# Patient Record
Sex: FEMALE | Race: WHITE | NOT HISPANIC OR LATINO | Employment: OTHER | URBAN - METROPOLITAN AREA
[De-identification: names, ages, dates, MRNs, and addresses within clinical notes are randomized per-mention and may not be internally consistent; named-entity substitution may affect disease eponyms.]

---

## 2017-01-17 ENCOUNTER — HOSPITAL ENCOUNTER (OUTPATIENT)
Dept: FAMILY MEDICINE CLINIC | Facility: CLINIC | Age: 67
Setting detail: SPECIMEN
Discharge: HOME | End: 2017-01-17
Attending: PREVENTIVE MEDICINE | Admitting: PREVENTIVE MEDICINE

## 2017-01-17 LAB
AZTREONAM SUSC ISLT: ABNORMAL
BACTERIA ISLT: ABNORMAL
BACTERIA SPEC AEROBE CULT: ABNORMAL
CEFEPIME SUSC ISLT: ABNORMAL
CEFTRIAXONE SUSC ISLT: ABNORMAL
CIPROFLOXACIN SUSC ISLT: ABNORMAL
COLONY COUNT: ABNORMAL
ERTAPENEM SUSC ISLT: ABNORMAL
LEVOFLOXACIN SUSC ISLT: ABNORMAL
Lab: ABNORMAL
MEROPENEM SUSC ISLT: ABNORMAL
MICRO REPORT STATUS: ABNORMAL
NITROFURANTOIN SUSC ISLT: ABNORMAL
PIP+TAZO SUSC ISLT: ABNORMAL
SPECIMEN SOURCE: ABNORMAL
SUSC METH SPEC: ABNORMAL
TETRACYCLINE SUSC ISLT: ABNORMAL
TOBRAMYCIN SUSC ISLT: ABNORMAL
TRIMETHOPRIM/SULFA: ABNORMAL

## 2017-04-07 ENCOUNTER — HOSPITAL ENCOUNTER (OUTPATIENT)
Dept: FAMILY MEDICINE CLINIC | Facility: CLINIC | Age: 67
Setting detail: SPECIMEN
Discharge: HOME | End: 2017-04-07
Attending: PREVENTIVE MEDICINE | Admitting: PREVENTIVE MEDICINE

## 2017-04-07 LAB
BACTERIA SPEC AEROBE CULT: NORMAL
CASTS URNS QL MICRO: NORMAL /[LPF]
CONV BACTERIA IN URINE MICRO: NEGATIVE
CONV HYALINE CASTS IN URINE MICRO: 1 /[LPF] (ref 0–5)
CONV SMALL ROUND CELLS: NORMAL /[HPF]
FERRITIN SERPL-MCNC: 72 NG/ML (ref 11–307)
FOLATE SERPL-MCNC: 8.1 NG/ML (ref 5.9–24.8)
IRON SATN MFR SERPL: 23 % (ref 15–50)
IRON SERPL-MCNC: 68 UG/DL (ref 28–170)
Lab: NORMAL
MICRO REPORT STATUS: NORMAL
RBC #/AREA URNS HPF: 2 /[HPF] (ref 0–3)
SPECIMEN SOURCE: NORMAL
SPERM URNS QL MICRO: NORMAL /[HPF]
SQUAMOUS SPT QL MICRO: NORMAL /[HPF] (ref 0–5)
TIBC SERPL-MCNC: 299 UG/DL (ref 228–428)
UNIDENT CRYS URNS QL MICRO: NORMAL /[HPF]
VIT B12 SERPL-MCNC: >1500 PG/ML (ref 180–914)
WBC #/AREA URNS HPF: 1 /[HPF] (ref 0–5)
YEAST SPEC QL WET PREP: NORMAL /[HPF]

## 2017-04-25 ENCOUNTER — HOSPITAL ENCOUNTER (OUTPATIENT)
Dept: ONCOLOGY | Facility: CLINIC | Age: 67
Discharge: HOME | End: 2017-04-25
Attending: INTERNAL MEDICINE | Admitting: INTERNAL MEDICINE

## 2017-05-26 ENCOUNTER — HOSPITAL ENCOUNTER (OUTPATIENT)
Dept: FAMILY MEDICINE CLINIC | Facility: CLINIC | Age: 67
Setting detail: SPECIMEN
Discharge: HOME | End: 2017-05-26
Attending: PREVENTIVE MEDICINE | Admitting: PREVENTIVE MEDICINE

## 2017-05-27 LAB
AZTREONAM SUSC ISLT: NORMAL
BACTERIA ISLT: NORMAL
BACTERIA SPEC AEROBE CULT: NORMAL
CEFAZOLIN SUSC ISLT: NORMAL
CEFEPIME SUSC ISLT: NORMAL
CEFTRIAXONE SUSC ISLT: NORMAL
CIPROFLOXACIN SUSC ISLT: NORMAL
COLONY COUNT: NORMAL
ERTAPENEM SUSC ISLT: NORMAL
LEVOFLOXACIN SUSC ISLT: NORMAL
Lab: NORMAL
MEROPENEM SUSC ISLT: NORMAL
MICRO REPORT STATUS: NORMAL
NITROFURANTOIN SUSC ISLT: NORMAL
PIP+TAZO SUSC ISLT: NORMAL
SPECIMEN SOURCE: NORMAL
SUSC METH SPEC: NORMAL
TETRACYCLINE SUSC ISLT: NORMAL
TOBRAMYCIN SUSC ISLT: NORMAL
TRIMETHOPRIM/SULFA: NORMAL

## 2017-07-11 ENCOUNTER — HOSPITAL ENCOUNTER (OUTPATIENT)
Dept: ONCOLOGY | Facility: CLINIC | Age: 67
Discharge: HOME | End: 2017-07-11
Attending: INTERNAL MEDICINE | Admitting: INTERNAL MEDICINE

## 2017-08-23 ENCOUNTER — HOSPITAL ENCOUNTER (OUTPATIENT)
Dept: MAMMOGRAPHY | Facility: HOSPITAL | Age: 67
Discharge: HOME | End: 2017-08-23
Attending: PREVENTIVE MEDICINE | Admitting: PREVENTIVE MEDICINE

## 2017-09-11 ENCOUNTER — HOSPITAL ENCOUNTER (OUTPATIENT)
Dept: FAMILY MEDICINE CLINIC | Facility: CLINIC | Age: 67
Setting detail: SPECIMEN
Discharge: HOME | End: 2017-09-11
Attending: PREVENTIVE MEDICINE | Admitting: PREVENTIVE MEDICINE

## 2017-10-11 ENCOUNTER — HOSPITAL ENCOUNTER (OUTPATIENT)
Dept: INFUSION THERAPY | Facility: HOSPITAL | Age: 67
Discharge: HOME | End: 2017-10-11
Attending: PREVENTIVE MEDICINE | Admitting: PREVENTIVE MEDICINE

## 2017-10-20 ENCOUNTER — HOSPITAL ENCOUNTER (OUTPATIENT)
Dept: OTHER | Facility: HOSPITAL | Age: 67
Discharge: HOME | End: 2017-10-20
Attending: PREVENTIVE MEDICINE | Admitting: PREVENTIVE MEDICINE

## 2017-10-20 LAB
ALBUMIN SERPL-MCNC: 3.3 G/DL (ref 3.5–4.8)
ALBUMIN/GLOB SERPL: 1.3 {RATIO} (ref 1–1.7)
ALP SERPL-CCNC: 75 IU/L (ref 32–91)
ALT SERPL-CCNC: 13 IU/L (ref 14–54)
ANION GAP SERPL CALC-SCNC: 12.2 MMOL/L (ref 10–20)
AST SERPL-CCNC: 16 IU/L (ref 15–41)
BASOPHILS # BLD AUTO: 0 10*3/UL (ref 0–0.2)
BASOPHILS NFR BLD AUTO: 1 % (ref 0–2)
BILIRUB SERPL-MCNC: 0.5 MG/DL (ref 0.3–1.2)
BUN SERPL-MCNC: 21 MG/DL (ref 8–20)
BUN/CREAT SERPL: 11.7 (ref 5.4–26.2)
CALCIUM SERPL-MCNC: 6.5 MG/DL (ref 8.9–10.3)
CHLORIDE SERPL-SCNC: 107 MMOL/L (ref 101–111)
CONV CO2: 25 MMOL/L (ref 22–32)
CONV TOTAL PROTEIN: 5.8 G/DL (ref 6.1–7.9)
CREAT UR-MCNC: 1.8 MG/DL (ref 0.4–1)
DIFFERENTIAL METHOD BLD: (no result)
EOSINOPHIL # BLD AUTO: 0.1 10*3/UL (ref 0–0.3)
EOSINOPHIL # BLD AUTO: 3 % (ref 0–3)
ERYTHROCYTE [DISTWIDTH] IN BLOOD BY AUTOMATED COUNT: 15.6 % (ref 11.5–14.5)
ERYTHROCYTE [SEDIMENTATION RATE] IN BLOOD BY WESTERGREN METHOD: 29 MM/HR (ref 0–30)
GLOBULIN UR ELPH-MCNC: 2.5 G/DL (ref 2.5–3.8)
GLUCOSE SERPL-MCNC: 89 MG/DL (ref 65–99)
HCT VFR BLD AUTO: 32.1 % (ref 35–49)
HGB BLD-MCNC: 10.3 G/DL (ref 12–15)
LYMPHOCYTES # BLD AUTO: 1.2 10*3/UL (ref 0.8–4.8)
LYMPHOCYTES NFR BLD AUTO: 26 % (ref 18–42)
MCH RBC QN AUTO: 28.5 PG (ref 26–32)
MCHC RBC AUTO-ENTMCNC: 32.1 G/DL (ref 32–36)
MCV RBC AUTO: 89 FL (ref 80–94)
MONOCYTES # BLD AUTO: 0.3 10*3/UL (ref 0.1–1.3)
MONOCYTES NFR BLD AUTO: 6 % (ref 2–11)
NEUTROPHILS # BLD AUTO: 3 10*3/UL (ref 2.3–8.6)
NEUTROPHILS NFR BLD AUTO: 64 % (ref 50–75)
NRBC BLD AUTO-RTO: 0 /100{WBCS}
NRBC/RBC NFR BLD MANUAL: 0 10*3/UL
PLATELET # BLD AUTO: 211 10*3/UL (ref 150–450)
PMV BLD AUTO: 7.6 FL (ref 7.4–10.4)
POTASSIUM SERPL-SCNC: 5.2 MMOL/L (ref 3.6–5.1)
RBC # BLD AUTO: 3.6 10*6/UL (ref 4–5.4)
SODIUM SERPL-SCNC: 139 MMOL/L (ref 136–144)
TSH SERPL-ACNC: 1.59 UIU/ML (ref 0.34–5.6)
WBC # BLD AUTO: 4.6 10*3/UL (ref 4.5–11.5)

## 2017-11-14 ENCOUNTER — HOSPITAL ENCOUNTER (OUTPATIENT)
Dept: ONCOLOGY | Facility: CLINIC | Age: 67
Discharge: HOME | End: 2017-11-14
Attending: INTERNAL MEDICINE | Admitting: INTERNAL MEDICINE

## 2018-02-20 ENCOUNTER — HOSPITAL ENCOUNTER (OUTPATIENT)
Dept: FAMILY MEDICINE CLINIC | Facility: CLINIC | Age: 68
Setting detail: SPECIMEN
Discharge: HOME | End: 2018-02-20
Attending: PREVENTIVE MEDICINE | Admitting: PREVENTIVE MEDICINE

## 2018-04-13 ENCOUNTER — HOSPITAL ENCOUNTER (OUTPATIENT)
Dept: CARDIOLOGY | Facility: HOSPITAL | Age: 68
Discharge: HOME | End: 2018-04-13
Attending: INTERNAL MEDICINE | Admitting: INTERNAL MEDICINE

## 2018-05-08 ENCOUNTER — EPISODE CHANGES (OUTPATIENT)
Dept: CASE MANAGEMENT | Facility: OTHER | Age: 68
End: 2018-05-08

## 2018-06-04 ENCOUNTER — EPISODE CHANGES (OUTPATIENT)
Dept: CASE MANAGEMENT | Facility: OTHER | Age: 68
End: 2018-06-04

## 2018-06-19 ENCOUNTER — HOSPITAL ENCOUNTER (OUTPATIENT)
Dept: ONCOLOGY | Facility: CLINIC | Age: 68
Discharge: HOME | End: 2018-06-19
Attending: INTERNAL MEDICINE | Admitting: INTERNAL MEDICINE

## 2018-08-01 ENCOUNTER — HOSPITAL ENCOUNTER (OUTPATIENT)
Dept: FAMILY MEDICINE CLINIC | Facility: CLINIC | Age: 68
Setting detail: SPECIMEN
Discharge: HOME | End: 2018-08-01
Attending: PREVENTIVE MEDICINE | Admitting: PREVENTIVE MEDICINE

## 2018-08-01 LAB
BILIRUB UR QL STRIP: NEGATIVE MG/DL
CASTS URNS QL MICRO: ABNORMAL /[LPF]
COLOR UR: YELLOW
CONV BACTERIA IN URINE MICRO: NEGATIVE
CONV CLARITY OF URINE: CLEAR
CONV HYALINE CASTS IN URINE MICRO: 1 /[LPF] (ref 0–5)
CONV PROTEIN IN URINE BY AUTOMATED TEST STRIP: NEGATIVE MG/DL
CONV SMALL ROUND CELLS: ABNORMAL /[HPF]
CONV UROBILINOGEN IN URINE BY AUTOMATED TEST STRIP: 0.2 MG/DL
CULTURE INDICATED?: ABNORMAL
GLUCOSE UR QL: NEGATIVE MG/DL
HGB UR QL STRIP: NEGATIVE
KETONES UR QL STRIP: NEGATIVE MG/DL
LEUKOCYTE ESTERASE UR QL STRIP: ABNORMAL
NITRITE UR QL STRIP: NEGATIVE
PH UR STRIP.AUTO: 5 [PH] (ref 4.5–8)
RBC #/AREA URNS HPF: 1 /[HPF] (ref 0–3)
SP GR UR: 1.01 (ref 1–1.03)
SPERM URNS QL MICRO: ABNORMAL /[HPF]
SQUAMOUS SPT QL MICRO: 1 /[HPF] (ref 0–5)
UNIDENT CRYS URNS QL MICRO: ABNORMAL /[HPF]
WBC #/AREA URNS HPF: 2 /[HPF] (ref 0–5)
YEAST SPEC QL WET PREP: ABNORMAL /[HPF]

## 2018-09-04 ENCOUNTER — HOSPITAL ENCOUNTER (OUTPATIENT)
Dept: FAMILY MEDICINE CLINIC | Facility: CLINIC | Age: 68
Setting detail: SPECIMEN
Discharge: HOME | End: 2018-09-04
Attending: PREVENTIVE MEDICINE | Admitting: PREVENTIVE MEDICINE

## 2018-09-06 LAB
BACTERIA SPEC AEROBE CULT: NORMAL
Lab: NORMAL
MICRO REPORT STATUS: NORMAL
SPECIMEN SOURCE: NORMAL

## 2018-09-11 ENCOUNTER — EPISODE CHANGES (OUTPATIENT)
Dept: CASE MANAGEMENT | Facility: OTHER | Age: 68
End: 2018-09-11

## 2018-10-08 ENCOUNTER — EPISODE CHANGES (OUTPATIENT)
Dept: CASE MANAGEMENT | Facility: OTHER | Age: 68
End: 2018-10-08

## 2018-12-14 ENCOUNTER — HOSPITAL ENCOUNTER (OUTPATIENT)
Dept: CARDIOLOGY | Facility: HOSPITAL | Age: 68
Discharge: HOME | End: 2018-12-14
Attending: INTERNAL MEDICINE | Admitting: INTERNAL MEDICINE

## 2018-12-18 ENCOUNTER — HOSPITAL ENCOUNTER (OUTPATIENT)
Dept: ONCOLOGY | Facility: CLINIC | Age: 68
Discharge: HOME | End: 2018-12-18
Attending: INTERNAL MEDICINE | Admitting: INTERNAL MEDICINE

## 2019-06-25 ENCOUNTER — OFFICE VISIT (OUTPATIENT)
Dept: ONCOLOGY | Facility: CLINIC | Age: 69
End: 2019-06-25

## 2019-06-25 VITALS
DIASTOLIC BLOOD PRESSURE: 88 MMHG | WEIGHT: 253 LBS | HEIGHT: 62 IN | BODY MASS INDEX: 46.56 KG/M2 | TEMPERATURE: 97.4 F | HEART RATE: 83 BPM | SYSTOLIC BLOOD PRESSURE: 160 MMHG

## 2019-06-25 DIAGNOSIS — Z86.2 HISTORY OF ANEMIA DUE TO CHRONIC KIDNEY DISEASE: Primary | ICD-10-CM

## 2019-06-25 DIAGNOSIS — R30.0 DYSURIA: ICD-10-CM

## 2019-06-25 DIAGNOSIS — N18.9 HISTORY OF ANEMIA DUE TO CHRONIC KIDNEY DISEASE: Primary | ICD-10-CM

## 2019-06-25 DIAGNOSIS — Z86.39 HISTORY OF IRON DEFICIENCY: ICD-10-CM

## 2019-06-25 PROCEDURE — G0463 HOSPITAL OUTPT CLINIC VISIT: HCPCS | Performed by: INTERNAL MEDICINE

## 2019-06-25 PROCEDURE — 99214 OFFICE O/P EST MOD 30 MIN: CPT | Performed by: INTERNAL MEDICINE

## 2019-06-25 RX ORDER — METOCLOPRAMIDE 5 MG/1
5 TABLET ORAL 4 TIMES DAILY
Refills: 4 | COMMUNITY
Start: 2019-06-07 | End: 2020-09-01

## 2019-06-25 RX ORDER — ONDANSETRON 4 MG/1
4 TABLET, FILM COATED ORAL AS NEEDED
Refills: 0 | COMMUNITY
Start: 2019-06-13 | End: 2020-03-09 | Stop reason: SDUPTHER

## 2019-06-25 RX ORDER — ASPIRIN 81 MG/1
81 TABLET ORAL DAILY
COMMUNITY
Start: 2018-09-24 | End: 2021-06-02 | Stop reason: SDUPTHER

## 2019-06-25 RX ORDER — DICYCLOMINE HYDROCHLORIDE 10 MG/1
10 CAPSULE ORAL 4 TIMES DAILY
Refills: 0 | COMMUNITY
Start: 2019-06-13 | End: 2021-06-02 | Stop reason: SDUPTHER

## 2019-06-25 RX ORDER — TRAZODONE HYDROCHLORIDE 50 MG/1
100 TABLET ORAL NIGHTLY
Refills: 2 | COMMUNITY
Start: 2019-05-29 | End: 2019-07-05

## 2019-06-25 RX ORDER — GABAPENTIN 300 MG/1
300 CAPSULE ORAL 3 TIMES DAILY
Refills: 6 | COMMUNITY
Start: 2019-06-17 | End: 2019-08-19 | Stop reason: SDUPTHER

## 2019-06-25 RX ORDER — BUDESONIDE AND FORMOTEROL FUMARATE DIHYDRATE 160; 4.5 UG/1; UG/1
2 AEROSOL RESPIRATORY (INHALATION) AS NEEDED
COMMUNITY
Start: 2017-09-19 | End: 2019-07-22 | Stop reason: SDUPTHER

## 2019-06-25 RX ORDER — CYANOCOBALAMIN 1000 UG/ML
1 INJECTION, SOLUTION INTRAMUSCULAR; SUBCUTANEOUS
Refills: 0 | COMMUNITY
Start: 2019-06-20 | End: 2020-04-26

## 2019-06-25 RX ORDER — BUSPIRONE HYDROCHLORIDE 10 MG/1
10 TABLET ORAL 2 TIMES DAILY
Refills: 6 | COMMUNITY
Start: 2019-06-21 | End: 2020-01-13 | Stop reason: SDUPTHER

## 2019-06-25 RX ORDER — OMEPRAZOLE 40 MG/1
40 CAPSULE, DELAYED RELEASE ORAL 2 TIMES DAILY
Refills: 5 | Status: ON HOLD | COMMUNITY
Start: 2019-06-21 | End: 2021-05-20

## 2019-06-25 RX ORDER — ALLOPURINOL 100 MG/1
100 TABLET ORAL
Refills: 11 | COMMUNITY
Start: 2019-05-24 | End: 2019-08-19

## 2019-06-25 RX ORDER — METOPROLOL SUCCINATE 50 MG/1
50 TABLET, EXTENDED RELEASE ORAL
Refills: 3 | COMMUNITY
Start: 2019-03-30 | End: 2019-07-05

## 2019-06-25 RX ORDER — SUCRALFATE 1 G/1
1 TABLET ORAL 4 TIMES DAILY
Refills: 1 | Status: ON HOLD | COMMUNITY
Start: 2019-05-24 | End: 2021-05-20

## 2019-06-25 RX ORDER — HYDROCODONE BITARTRATE AND ACETAMINOPHEN 7.5; 325 MG/1; MG/1
1 TABLET ORAL EVERY 6 HOURS PRN
Refills: 0 | COMMUNITY
Start: 2019-06-14 | End: 2020-05-29

## 2019-06-25 RX ORDER — FUROSEMIDE 40 MG/1
40 TABLET ORAL DAILY
Refills: 2 | COMMUNITY
Start: 2019-04-01 | End: 2019-09-25 | Stop reason: SDUPTHER

## 2019-06-25 RX ORDER — TIZANIDINE 2 MG/1
2 TABLET ORAL
Refills: 1 | COMMUNITY
Start: 2019-06-13 | End: 2019-11-05

## 2019-06-25 RX ORDER — FLUTICASONE PROPIONATE 50 MCG
1 SPRAY, SUSPENSION (ML) NASAL AS NEEDED
COMMUNITY
Start: 2018-09-24 | End: 2019-07-19

## 2019-06-25 RX ORDER — ERGOCALCIFEROL 1.25 MG/1
50000 CAPSULE ORAL WEEKLY
Refills: 3 | COMMUNITY
Start: 2019-06-13 | End: 2019-08-05 | Stop reason: SDUPTHER

## 2019-06-25 RX ORDER — DESVENLAFAXINE SUCCINATE 50 MG/1
50 TABLET, EXTENDED RELEASE ORAL EVERY MORNING
Refills: 2 | COMMUNITY
Start: 2019-06-13 | End: 2019-11-05

## 2019-06-25 NOTE — PROGRESS NOTES
Hematology-Oncology Follow-up Note       Elaine Toscano  1950    Primary Care Physician: Shanta Morales MD  Referring Physician: Shanta Morales MD    Reason For Visit:  Chief Complaint   Patient presents with   • Follow-up     ACKD       HPI:   :  Ms. Toscano is referred for evaluation of anemia.  She has a history of chronic kidney disease, congestive heart failure, hypertension, osteopenia.  CBC done at [facility] in January 2017 showed hemoglobin 9.7.  She was being followed recently at Dr. Morales’s office and CBC was repeated on 4/11/17 and, at that time, hemoglobin was 11.5.  Patient had an EGD and colonoscopy done by Dr. Cisse that showed erosive esophagitis, gastritis and duodenitis.  Colonoscopy was normal.  Patient has a history of gastric bypass.  The patient was started on iron supplements in April 2017 by Dr. Morales and her hemoglobin improved since then.  Her iron stores also improved in April 2017.    • 1/20/17 - PTH intact 124 (H).  Serum protein electrophoresis shows hypoalbuminemia.  • 1/21/17 - WBC 4.9, hemoglobin 9.5, platelet count 190,000.  Creatinine 3.3, BUN 73.  • 1/24/17 - Creatinine 2.1, BUN 37.    • 3/6/17 - EGD showed grade 3 to grade 4 erosive esophagitis and duodenitis.  Hayden-en-Y gastric bypass.  Colonoscopy was negative, but had poor prep.    • 4/7/17 - B12 >1500.  Ferritin 72.  Folate 8.1.  Iron saturation 23%, TIBC 299, serum Iron 68.    • 4/11/17 - WBC 5.5, hemoglobin 11.5, 0lct 274,000, MCV 93.4.  Retic count 1.7%.    4/25/2017 - The patient presents for initial consultation.  She denies any bleeding per rectum.  She has been taking one iron tablet since March.  She reports giving numerous stool sample tests for diagnosing occult blood, in the past, at Dr. Morales’s office.  She does have some chronic lower extremity edema due to CHF.  She has CKD and follows up with nephrologist, Dr. Tanmay Carroll.  • 7/5/17 - WBC 3.7, hemoglobin 11.2,  platelet count 194,000, MCV 94.2.  Creatinine 1.97.  Serum iron 66, TIBC 272, iron saturation 24%.    • 8/23/17 - DEXAscan shows osteoporosis with a T-score of -2.8.    • 10/20/17 - Doppler of the lower extremities:  Normal.  No evidence of DVT.    • 11/13/17 - Creatinine 1.75.  Ferritin 156.  Serum iron 92, iron saturation 33%, TIBC 186.  WBC 4.2, hemoglobin 11.0, platelet count 229,000, MCV 94.9.    • 1/24/18 - Colonoscopy by Dr. Cisse:  Colon shows diverticulosis and internal hemorrhoids.    • 6/13/18 - Creatinine 1.93.  WBC 4.1, hemoglobin 11.2, platelet count 209,000, MCV 91.9.  Ferritin 258.  Iron saturation 39, serum 100, TIBC 158.  PTH intact 119.   • 8/3/18 - Upper GI endoscopy:  Shows mild chronic gastritis in the stomach and mild reflux esophagitis within the esophagus.     • 12/11/18 - Creatinine 1.8, BUN 24.  WBC 3.8, hemoglobin 11.1, platelet count 202,000, MCV 93.9.  Ferritin 203.  Iron saturation 26%, TIBC 255, serum iron 66.    • 12/14/18 - Cardiac stress test:  EF 52%.  Imaging shows inferior wall MI versus ischemia.  • 6/25/2019  White count 7.4 hemoglobin 11.4 platelet count 232 MCV is 92.8    ferritin 224 serum iron 45 low, iron saturation 17% TIBC's 272 creatinine 1.68 BUN is 25    Subjective:     Patient has never required Procrit.  She is not on any oral iron.  She is reporting symptoms of dysuria.  She reports being tired all the time.      The following portions of the patient's history were reviewed and updated as appropriate: allergies, current medications, past family history, past medical history, past social history, past surgical history and problem list.     Past Medical History:   Diagnosis Date   • Anemia    • Broken foot     right   • Broken nose 07/2017   • CHF (congestive heart failure) (CMS/HCC)    • Chronic low back pain    • CRI (chronic renal insufficiency)    • Depression    • DJD (degenerative joint disease)    • GERD (gastroesophageal reflux disease)    • Gout    •  Hypertension    • Hypothyroidism    • CRUZ (obstructive sleep apnea)     non-complaince with bipap   • Osteopenia    • Suicide attempt (CMS/HCC)    • TMJ (dislocation of temporomandibular joint)        Past Surgical History:   Procedure Laterality Date   • APPENDECTOMY  1986   • BACK SURGERY      2004---2005 L5-S1 spinal stenosis   • CARDIAC CATHETERIZATION  04/2018   • CHOLECYSTECTOMY  2008   • FOOT SURGERY Bilateral 2008   • GASTRIC BYPASS  2007   • INSERT / REPLACE / REMOVE PACEMAKER     • LAPAROSCOPIC GASTRIC BANDING  2004    removed   • OTHER SURGICAL HISTORY  01/2018    myelogram   • TOTAL KNEE ARTHROPLASTY Left          Current Outpatient Medications:   •  allopurinol (ZYLOPRIM) 100 MG tablet, Take 100 mg by mouth every night at bedtime., Disp: , Rfl: 11  •  aspirin (ASPIR) 81 MG EC tablet, Take 81 mg by mouth Daily., Disp: , Rfl:   •  budesonide-formoterol (SYMBICORT) 160-4.5 MCG/ACT inhaler, Inhale 2 puffs As Needed., Disp: , Rfl:   •  busPIRone (BUSPAR) 10 MG tablet, Take 10 mg by mouth 2 (Two) Times a Day., Disp: , Rfl: 6  •  calcium carb-cholecalciferol (CALCIUM PLUS VITAMIN D3) 600-800 MG-UNIT tablet, Take 600 mg by mouth Daily., Disp: , Rfl:   •  cyanocobalamin 1000 MCG/ML injection, Inject 1 mL into the appropriate muscle as directed by prescriber Every 30 (Thirty) Days., Disp: , Rfl: 0  •  desvenlafaxine (PRISTIQ) 50 MG 24 hr tablet, Take 50 mg by mouth Every Morning., Disp: , Rfl: 2  •  diclofenac (VOLTAREN) 1 % gel gel, Apply 1 application topically to the appropriate area as directed As Needed., Disp: , Rfl: 2  •  dicyclomine (BENTYL) 10 MG capsule, Take 10 mg by mouth 4 (Four) Times a Day., Disp: , Rfl: 0  •  fluticasone (FLONASE) 50 MCG/ACT nasal spray, 1 spray into the nostril(s) as directed by provider As Needed., Disp: , Rfl:   •  furosemide (LASIX) 40 MG tablet, Take 40 mg by mouth Daily., Disp: , Rfl: 2  •  gabapentin (NEURONTIN) 300 MG capsule, Take 300 mg by mouth 3 (Three) Times a Day.,  Disp: , Rfl: 6  •  HYDROcodone-acetaminophen (NORCO) 7.5-325 MG per tablet, Take 1 tablet by mouth 4 (Four) Times a Day., Disp: , Rfl: 0  •  metoclopramide (REGLAN) 5 MG tablet, Take 5 mg by mouth 4 (Four) Times a Day., Disp: , Rfl: 4  •  metoprolol succinate XL (TOPROL-XL) 50 MG 24 hr tablet, Take 50 mg by mouth every night at bedtime., Disp: , Rfl: 3  •  omeprazole (priLOSEC) 40 MG capsule, Take 40 mg by mouth 2 (Two) Times a Day., Disp: , Rfl: 5  •  ondansetron (ZOFRAN) 4 MG tablet, Take 4 mg by mouth As Needed., Disp: , Rfl: 0  •  sucralfate (CARAFATE) 1 g tablet, Take 1 g by mouth 4 (Four) Times a Day., Disp: , Rfl: 1  •  tiZANidine (ZANAFLEX) 2 MG tablet, Take 2 mg by mouth every night at bedtime., Disp: , Rfl: 1  •  traZODone (DESYREL) 50 MG tablet, Take 50 mg by mouth Every Night., Disp: , Rfl: 2  •  vitamin D (ERGOCALCIFEROL) 09662 units capsule capsule, Take 50,000 Units by mouth 1 (One) Time Per Week., Disp: , Rfl: 3    Allergies   Allergen Reactions   • Azithromycin Unknown (See Comments)   • Dilaudid  [Hydromorphone Hcl] Unknown (See Comments)   • Isosorbide Nitrate Other (See Comments)   • Macrobid  [Nitrofurantoin] Rash   • Morphine Rash   • Other Itching   • Oxycontin  [Oxycodone] Delirium   • Pineapple Other (See Comments)     Mouth numbness   • Tetanus Antitoxin Rash   • Denosumab Other (See Comments)   • Sulfa Antibiotics Unknown (See Comments)       Family History   Problem Relation Age of Onset   • Hypertension Father         PM implanted   • Arthritis Sister    • Heart disease Sister    • Lung disease Paternal Grandmother    • COPD Other    • Lung disease Other        Cancer-related family history is not on file.    Social History     Tobacco Use   • Smoking status: Never Smoker   • Smokeless tobacco: Never Used   Substance Use Topics   • Alcohol use: No     Frequency: Never   • Drug use: No         ROS:     Review of Systems   Constitutional: Positive for fatigue (has been very tired and in  "bed more than usual) and fever (states she has had a low grade fever recently). Negative for chills.   HENT: Negative for ear pain, mouth sores, nosebleeds and sore throat.    Eyes: Negative for photophobia and visual disturbance.   Respiratory: Negative for wheezing and stridor.    Cardiovascular: Negative for chest pain and palpitations.   Gastrointestinal: Negative for abdominal pain, diarrhea, nausea and vomiting.   Endocrine: Negative for cold intolerance and heat intolerance.   Genitourinary: Positive for dysuria (painful and frequent urination). Negative for hematuria.   Musculoskeletal: Negative for joint swelling and neck stiffness.   Skin: Negative for color change and rash.   Neurological: Negative for seizures and syncope.   Hematological: Negative for adenopathy.        No obvious bleeding   Psychiatric/Behavioral: Negative for agitation, confusion and hallucinations.       Objective:    Vitals:    06/25/19 1431   BP: 160/88   Pulse: 83   Temp: 97.4 °F (36.3 °C)   TempSrc: Oral   Weight: 115 kg (253 lb)   Height: 157.5 cm (62\")   PainSc:   9   PainLoc: Back  Comment: lower left side       (1) Restricted in physically strenuous activity, ambulatory and able to do work of light nature    Physical Exam:     Physical Exam   Constitutional: She is oriented to person, place, and time. She appears well-developed and well-nourished. No distress.   OBESE   HENT:   Head: Normocephalic and atraumatic.   Eyes: Conjunctivae and EOM are normal. Right eye exhibits no discharge. No scleral icterus.   Neck: Normal range of motion. Neck supple. No thyromegaly present.   Cardiovascular: Normal rate, regular rhythm and normal heart sounds. Exam reveals no gallop and no friction rub.   Pulmonary/Chest: Effort normal. No stridor. No respiratory distress. She has no wheezes.   Abdominal: Soft. Bowel sounds are normal. She exhibits no mass. There is no tenderness. There is no rebound and no guarding.   Musculoskeletal: Normal " range of motion. She exhibits edema (TRACE). She exhibits no tenderness.   Lymphadenopathy:     She has no cervical adenopathy.   Neurological: She is alert and oriented to person, place, and time. She exhibits normal muscle tone.   Skin: Skin is warm. No rash noted. She is not diaphoretic. No erythema.   Psychiatric: She has a normal mood and affect. Her behavior is normal.   Nursing note and vitals reviewed.        Lab Results - Last 18 Months   Lab Units 04/29/19  0648 04/20/18  1410   WBC 10*3/uL 5.3 5.0   HEMOGLOBIN g/dL 12.1 11.4*   HEMATOCRIT % 38.8 35.7   PLATELETS 10*3/uL 241 185   MCV fL 87.5 88.9     Lab Results - Last 18 Months   Lab Units 04/29/19  0648 04/20/18  1410   SODIUM mmol/L 139 137   POTASSIUM mmol/L 4.5 4.5 Hemolyzed specimen. Results may be falsely elevated.   CHLORIDE mmol/L 99* 102   TOTAL CO2 mmol/L 27 25   BUN mg/dL 41* 48*   CREATININE mg/dl 1.9* 2.1*   CALCIUM mg/dL 9.2 9.1   GLUCOSE mg/dL 91 88       Assessment/Plan     Assessment:    1. Anemia of chronic kidney disease:  Hemoglobin has been staying above 11.  She has a history of gastric bypass, gastritis and esophagitis, and therefore is also at risk of iron deficiency.  She has CKD stage 4 causing her anemia.  She is now off iron supplements.  Colonoscopy showed diverticulosis and internal hemorrhoids.    2. Chronic kidney disease:  Creatinine is stable around 1.9.    3. History of iron deficiency: Currently she is off iron.  Recent ferritin is in the high normal range, but iron is a little low..  She is currently not on any oral iron.  4. Recurrent UTI:- Reports dysurea  PLAN:  1. No need for Procrit, since hemoglobin is above 10.    2. No need for iron supplementation.  We will need to monitor iron levels.  3. Check UA + culture today  4. Repeat CBC, BMP and iron panel in 6 months       Orders Placed This Encounter   Procedures   • UA / M With / Rflx Culture([facility] ONLY) - Urine, Clean Catch   • Iron Profile     Before next  visit     Standing Status:   Future     Scheduling Instructions:      Please draw these labs prior to the next visit.   • Ferritin     Before next visit     Standing Status:   Future     Scheduling Instructions:      Please draw these labs prior to the next visit.   • CBC & Differential     Please draw these labs prior to the next visit.  Before next visit     Standing Status:   Future     Scheduling Instructions:      Please draw these labs prior to the next visit.     Order Specific Question:   Manual Differential     Answer:   No                Thank you very much for providing the opportunity to participate in this patient’s care. Please do not hesitate to call if there are any other questions

## 2019-06-26 ENCOUNTER — TELEPHONE (OUTPATIENT)
Dept: ONCOLOGY | Facility: CLINIC | Age: 69
End: 2019-06-26

## 2019-06-27 ENCOUNTER — TELEPHONE (OUTPATIENT)
Dept: ONCOLOGY | Facility: CLINIC | Age: 69
End: 2019-06-27

## 2019-06-27 LAB
APPEARANCE UR: CLEAR
BACTERIA #/AREA URNS HPF: ABNORMAL /[HPF]
BACTERIA UR CULT: NORMAL
BACTERIA UR CULT: NORMAL
BILIRUB UR QL STRIP: NEGATIVE
COLOR UR: YELLOW
CRYSTALS URNS MICRO: ABNORMAL
EPI CELLS #/AREA URNS HPF: ABNORMAL /HPF
GLUCOSE UR QL: NEGATIVE
HGB UR QL STRIP: ABNORMAL
KETONES UR QL STRIP: NEGATIVE
LEUKOCYTE ESTERASE UR QL STRIP: ABNORMAL
MICRO URNS: ABNORMAL
MUCOUS THREADS URNS QL MICRO: PRESENT
NITRITE UR QL STRIP: NEGATIVE
PH UR STRIP: 6 [PH] (ref 5–7.5)
PROT UR QL STRIP: NEGATIVE
RBC #/AREA URNS HPF: ABNORMAL /HPF
SP GR UR: 1.01 (ref 1–1.03)
UNIDENT CRYS URNS QL MICRO: PRESENT
URINALYSIS REFLEX: ABNORMAL
UROBILINOGEN UR STRIP-MCNC: 0.2 MG/DL (ref 0.2–1)
WBC #/AREA URNS HPF: ABNORMAL /HPF

## 2019-06-27 RX ORDER — CIPROFLOXACIN 500 MG/1
500 TABLET, FILM COATED ORAL 2 TIMES DAILY
Qty: 14 TABLET | Refills: 0 | Status: SHIPPED | OUTPATIENT
Start: 2019-06-27 | End: 2019-07-05

## 2019-06-27 NOTE — TELEPHONE ENCOUNTER
Called pt and let her know the UA results. I let her know Dr. Guido would like her to start Cipro. She showed v/u. Rx was escribed to pt's pharmacy.

## 2019-06-27 NOTE — TELEPHONE ENCOUNTER
----- Message from Graham Guido MD sent at 6/27/2019  3:27 PM EDT -----  Give her Cirpfloxacin 500 mg PO Q12H x 7 days  UA +ve

## 2019-07-05 ENCOUNTER — OFFICE VISIT (OUTPATIENT)
Dept: FAMILY MEDICINE CLINIC | Facility: CLINIC | Age: 69
End: 2019-07-05

## 2019-07-05 VITALS
RESPIRATION RATE: 20 BRPM | TEMPERATURE: 97.6 F | SYSTOLIC BLOOD PRESSURE: 157 MMHG | HEIGHT: 62 IN | BODY MASS INDEX: 48.21 KG/M2 | OXYGEN SATURATION: 95 % | WEIGHT: 262 LBS | DIASTOLIC BLOOD PRESSURE: 82 MMHG | HEART RATE: 75 BPM

## 2019-07-05 DIAGNOSIS — E87.6 HYPOKALEMIA: ICD-10-CM

## 2019-07-05 DIAGNOSIS — M54.5 CHRONIC LOW BACK PAIN, UNSPECIFIED BACK PAIN LATERALITY, WITH SCIATICA PRESENCE UNSPECIFIED: ICD-10-CM

## 2019-07-05 DIAGNOSIS — B37.2 YEAST DERMATITIS: ICD-10-CM

## 2019-07-05 DIAGNOSIS — R30.0 DYSURIA: ICD-10-CM

## 2019-07-05 DIAGNOSIS — R60.0 LOCALIZED EDEMA: ICD-10-CM

## 2019-07-05 DIAGNOSIS — N18.9 HISTORY OF ANEMIA DUE TO CHRONIC KIDNEY DISEASE: ICD-10-CM

## 2019-07-05 DIAGNOSIS — F33.2 SEVERE EPISODE OF RECURRENT MAJOR DEPRESSIVE DISORDER, WITHOUT PSYCHOTIC FEATURES (HCC): ICD-10-CM

## 2019-07-05 DIAGNOSIS — E78.01 FAMILIAL HYPERCHOLESTEROLEMIA: ICD-10-CM

## 2019-07-05 DIAGNOSIS — E03.9 ACQUIRED HYPOTHYROIDISM: ICD-10-CM

## 2019-07-05 DIAGNOSIS — E53.8 B12 DEFICIENCY: ICD-10-CM

## 2019-07-05 DIAGNOSIS — N39.46 MIXED STRESS AND URGE URINARY INCONTINENCE: Primary | ICD-10-CM

## 2019-07-05 DIAGNOSIS — G89.29 CHRONIC LOW BACK PAIN, UNSPECIFIED BACK PAIN LATERALITY, WITH SCIATICA PRESENCE UNSPECIFIED: ICD-10-CM

## 2019-07-05 DIAGNOSIS — N18.4 CHRONIC KIDNEY DISEASE, STAGE IV (SEVERE) (HCC): ICD-10-CM

## 2019-07-05 DIAGNOSIS — I10 ESSENTIAL HYPERTENSION: ICD-10-CM

## 2019-07-05 DIAGNOSIS — M1A.30X0 CHRONIC GOUT DUE TO RENAL IMPAIRMENT WITHOUT TOPHUS, UNSPECIFIED SITE: ICD-10-CM

## 2019-07-05 DIAGNOSIS — I50.9 CONGESTIVE HEART FAILURE, UNSPECIFIED HF CHRONICITY, UNSPECIFIED HEART FAILURE TYPE (HCC): ICD-10-CM

## 2019-07-05 DIAGNOSIS — Z86.39 HISTORY OF IRON DEFICIENCY: ICD-10-CM

## 2019-07-05 DIAGNOSIS — Z86.2 HISTORY OF ANEMIA DUE TO CHRONIC KIDNEY DISEASE: ICD-10-CM

## 2019-07-05 PROBLEM — R60.9 EDEMA: Status: ACTIVE | Noted: 2018-09-04

## 2019-07-05 PROBLEM — M79.89 SWELLING OF LOWER EXTREMITY: Status: ACTIVE | Noted: 2017-10-20

## 2019-07-05 PROBLEM — G47.33 OBSTRUCTIVE SLEEP APNEA SYNDROME IN ADULT: Status: ACTIVE | Noted: 2017-11-20

## 2019-07-05 PROBLEM — J45.909 ASTHMA: Status: ACTIVE | Noted: 2019-07-05

## 2019-07-05 PROBLEM — E66.01 MORBID OBESITY WITH BMI OF 45.0-49.9, ADULT: Status: ACTIVE | Noted: 2017-12-07

## 2019-07-05 PROBLEM — D64.9 ANEMIA: Status: ACTIVE | Noted: 2019-07-05

## 2019-07-05 LAB
BILIRUB BLD-MCNC: NEGATIVE MG/DL
CLARITY, POC: CLEAR
COLOR UR: YELLOW
GLUCOSE UR STRIP-MCNC: NEGATIVE MG/DL
KETONES UR QL: NEGATIVE
LEUKOCYTE EST, POC: NEGATIVE
NITRITE UR-MCNC: NEGATIVE MG/ML
PH UR: 5.5 [PH] (ref 5–8)
PROT UR STRIP-MCNC: NEGATIVE MG/DL
RBC # UR STRIP: ABNORMAL /UL
SP GR UR: 1 (ref 1–1.03)
UROBILINOGEN UR QL: NORMAL

## 2019-07-05 PROCEDURE — 99214 OFFICE O/P EST MOD 30 MIN: CPT | Performed by: FAMILY MEDICINE

## 2019-07-05 PROCEDURE — 81003 URINALYSIS AUTO W/O SCOPE: CPT | Performed by: FAMILY MEDICINE

## 2019-07-05 RX ORDER — ERYTHROMYCIN 5 MG/G
0.5 OINTMENT OPHTHALMIC NIGHTLY
Refills: 3 | COMMUNITY
Start: 2019-05-17 | End: 2019-07-22 | Stop reason: SDUPTHER

## 2019-07-05 RX ORDER — TOLTERODINE 2 MG/1
2 CAPSULE, EXTENDED RELEASE ORAL DAILY
Qty: 30 CAPSULE | Refills: 2 | Status: SHIPPED | OUTPATIENT
Start: 2019-07-05 | End: 2019-09-03 | Stop reason: SDUPTHER

## 2019-07-05 RX ORDER — NYSTATIN 100000 U/G
CREAM TOPICAL 2 TIMES DAILY
Qty: 30 G | Refills: 3 | Status: ON HOLD | OUTPATIENT
Start: 2019-07-05 | End: 2020-04-24

## 2019-07-05 RX ORDER — FLUCONAZOLE 150 MG/1
150 TABLET ORAL ONCE
Qty: 2 TABLET | Refills: 0 | Status: SHIPPED | OUTPATIENT
Start: 2019-07-05 | End: 2019-07-05

## 2019-07-05 RX ORDER — TRAZODONE HYDROCHLORIDE 100 MG/1
100 TABLET ORAL NIGHTLY PRN
Refills: 2 | COMMUNITY
Start: 2019-05-09 | End: 2019-11-05

## 2019-07-05 NOTE — PROGRESS NOTES
Subjective   Elaine Toscano is a 68 y.o. female.   Chief Complaint   Patient presents with   • Urinary Tract Infection       History of Present Illness   Elaine presents today with persistent as a new patient to establish with complaint of urinary frequency and incontinence and some burning with urination but is improving.  She just finished a course of Cipro prescribed by her hematologist Dr. Crowder 2 days.  Additionally she reports a skin rash in her groin has returned with the antibiotics this has responded in the past 2 Diflucan and nystatin.  I have reviewed urine culture which revealed 2 organisms and sensitivity was performed.  Patient did have 3+ leukocytes at that time today besides her negative.  She does have a nephrologist, and has an appointment scheduled later this month.  She has seen urologist Dr. Lai but does not have any upcoming appointments additionally she sees counselors and psychiatrist at [facility] . previous PCP was Dr. Morales, available notes have been reviewed.  She has extensive history and attempted review and clarify as best possible, patient's clearly asks about forgetfulness and memory.  States her dad  with end-stage dementia recently and since that time she has been very they will interest in doing anything.  The following portions of the patient's history were reviewed and updated as appropriate: allergies, current medications, past family history, past medical history, past social history, past surgical history and problem list.    Review of Systems   Constitutional: Positive for fatigue. Negative for fever.   Respiratory: Positive for shortness of breath. Negative for cough.    Cardiovascular: Positive for leg swelling. Negative for chest pain and palpitations.   Gastrointestinal: Negative.    Genitourinary: Positive for urinary incontinence, dysuria, frequency and urgency. Negative for hematuria.   Musculoskeletal: Positive for back pain and neck stiffness.   Skin:  Negative for rash.   Neurological: Positive for memory problem.   Psychiatric/Behavioral: Positive for depressed mood. Negative for suicidal ideas.       Objective   Physical Exam   Constitutional: She is oriented to person, place, and time. She appears well-developed and well-nourished. No distress.   Eyes: Conjunctivae and EOM are normal. Pupils are equal, round, and reactive to light.   Neck: Normal range of motion.   Cardiovascular: Normal rate and regular rhythm.   No murmur heard.  Pulmonary/Chest: Effort normal. She has no wheezes.   Musculoskeletal: She exhibits edema.   Arthritic changes of the knees bilaterally   Neurological: She is alert and oriented to person, place, and time.   Skin: Skin is warm and dry.   Psychiatric: Cognition and memory are impaired. She exhibits a depressed mood. She expresses no suicidal ideation. She expresses no suicidal plans.   Nursing note and vitals reviewed.        Assessment/Plan   Elaine was seen today for urinary tract infection.    Diagnoses and all orders for this visit:    Mixed stress and urge urinary incontinence    Dysuria  -     POCT urinalysis dipstick, automated    History of anemia due to chronic kidney disease    Congestive heart failure, unspecified HF chronicity, unspecified heart failure type (CMS/HCC)  -     Comprehensive Metabolic Panel; Future  -     Vitamin D 25 Hydroxy; Future    Localized edema    Chronic gout due to renal impairment without tophus, unspecified site  -     Uric acid; Future    Familial hypercholesterolemia  -     Lipid Panel; Future    Acquired hypothyroidism  -     TSH; Future    Chronic low back pain, unspecified back pain laterality, with sciatica presence unspecified    Essential hypertension  -     Comprehensive Metabolic Panel; Future  -     MicroAlbumin, Urine, Random - Urine, Clean Catch; Future    Hypokalemia  -     Comprehensive Metabolic Panel; Future    History of iron deficiency  -     Ferritin; Future  -     CBC and  Differential; Future    Severe episode of recurrent major depressive disorder, without psychotic features (CMS/HCC)    B12 deficiency  -     Vitamin B12 and Folate; Future    Chronic kidney disease, stage IV (severe) (CMS/HCC)   -     Vitamin D 25 Hydroxy; Future    Other orders  -     tolterodine LA (DETROL LA) 2 MG 24 hr capsule; Take 1 capsule by mouth Daily.      Will obtain labs and have her return next week to review if any medications were adjusted as appropriate.  Did advise her to make up with Dr. Lai and discuss her concerns about her memory impression with her psychiatrist, I do believe medications may need to be adjusted.  Return in about 2 weeks (around 7/19/2019) for review medication, with Labs.

## 2019-07-12 ENCOUNTER — RESULTS ENCOUNTER (OUTPATIENT)
Dept: FAMILY MEDICINE CLINIC | Facility: CLINIC | Age: 69
End: 2019-07-12

## 2019-07-12 ENCOUNTER — TELEPHONE (OUTPATIENT)
Dept: FAMILY MEDICINE CLINIC | Facility: CLINIC | Age: 69
End: 2019-07-12

## 2019-07-12 DIAGNOSIS — R35.0 URINARY FREQUENCY: ICD-10-CM

## 2019-07-12 DIAGNOSIS — I50.9 CONGESTIVE HEART FAILURE, UNSPECIFIED HF CHRONICITY, UNSPECIFIED HEART FAILURE TYPE (HCC): ICD-10-CM

## 2019-07-12 DIAGNOSIS — E53.8 B12 DEFICIENCY: ICD-10-CM

## 2019-07-12 DIAGNOSIS — N18.4 CHRONIC KIDNEY DISEASE, STAGE IV (SEVERE) (HCC): ICD-10-CM

## 2019-07-12 DIAGNOSIS — I10 ESSENTIAL HYPERTENSION: ICD-10-CM

## 2019-07-12 DIAGNOSIS — E03.9 ACQUIRED HYPOTHYROIDISM: ICD-10-CM

## 2019-07-12 DIAGNOSIS — Z86.39 HISTORY OF IRON DEFICIENCY: ICD-10-CM

## 2019-07-12 DIAGNOSIS — R30.0 DYSURIA: Primary | ICD-10-CM

## 2019-07-12 DIAGNOSIS — R39.15 URINARY URGENCY: ICD-10-CM

## 2019-07-12 DIAGNOSIS — M1A.30X0 CHRONIC GOUT DUE TO RENAL IMPAIRMENT WITHOUT TOPHUS, UNSPECIFIED SITE: ICD-10-CM

## 2019-07-12 DIAGNOSIS — N39.46 MIXED STRESS AND URGE URINARY INCONTINENCE: ICD-10-CM

## 2019-07-12 DIAGNOSIS — E78.01 FAMILIAL HYPERCHOLESTEROLEMIA: ICD-10-CM

## 2019-07-12 DIAGNOSIS — E87.6 HYPOKALEMIA: ICD-10-CM

## 2019-07-12 NOTE — TELEPHONE ENCOUNTER
Patient called said she was seen recently with , was wanting to know if she could have a referral for Urology to see - Patient is still having issues with frequent urination patient also states she just got over a UTI

## 2019-07-12 NOTE — TELEPHONE ENCOUNTER
Yes, please make referral as requested, for follow-up of symptoms urinary incontinence, dysuria, frequency and urgency.

## 2019-07-15 RX ORDER — ISOSORBIDE MONONITRATE 30 MG/1
TABLET, EXTENDED RELEASE ORAL
Qty: 90 TABLET | Refills: 1 | Status: SHIPPED | OUTPATIENT
Start: 2019-07-15 | End: 2019-07-22 | Stop reason: SDUPTHER

## 2019-07-19 ENCOUNTER — TELEPHONE (OUTPATIENT)
Dept: FAMILY MEDICINE CLINIC | Facility: CLINIC | Age: 69
End: 2019-07-19

## 2019-07-19 RX ORDER — OXYBUTYNIN CHLORIDE 5 MG/1
1 TABLET ORAL 3 TIMES DAILY
COMMUNITY
End: 2019-07-22 | Stop reason: SDUPTHER

## 2019-07-19 RX ORDER — METOPROLOL SUCCINATE 100 MG/1
1 TABLET, EXTENDED RELEASE ORAL DAILY
COMMUNITY
End: 2019-09-25 | Stop reason: SDUPTHER

## 2019-07-19 RX ORDER — BUDESONIDE AND FORMOTEROL FUMARATE DIHYDRATE 160; 4.5 UG/1; UG/1
2 AEROSOL RESPIRATORY (INHALATION) 2 TIMES DAILY PRN
COMMUNITY
End: 2019-07-19 | Stop reason: SDUPTHER

## 2019-07-19 RX ORDER — ALENDRONATE SODIUM 70 MG/1
1 TABLET ORAL
COMMUNITY
End: 2019-12-18

## 2019-07-19 RX ORDER — ESCITALOPRAM OXALATE 20 MG/1
1 TABLET ORAL EVERY MORNING
COMMUNITY
End: 2019-07-22 | Stop reason: SDUPTHER

## 2019-07-22 ENCOUNTER — OFFICE VISIT (OUTPATIENT)
Dept: FAMILY MEDICINE CLINIC | Facility: CLINIC | Age: 69
End: 2019-07-22

## 2019-07-22 VITALS
BODY MASS INDEX: 47.11 KG/M2 | HEIGHT: 62 IN | OXYGEN SATURATION: 95 % | RESPIRATION RATE: 20 BRPM | WEIGHT: 256 LBS | TEMPERATURE: 97.7 F | SYSTOLIC BLOOD PRESSURE: 176 MMHG | HEART RATE: 79 BPM | DIASTOLIC BLOOD PRESSURE: 98 MMHG

## 2019-07-22 DIAGNOSIS — F33.2 SEVERE EPISODE OF RECURRENT MAJOR DEPRESSIVE DISORDER, WITHOUT PSYCHOTIC FEATURES (HCC): ICD-10-CM

## 2019-07-22 DIAGNOSIS — R60.0 LOCALIZED EDEMA: ICD-10-CM

## 2019-07-22 DIAGNOSIS — Z95.0 PRESENCE OF CARDIAC PACEMAKER: ICD-10-CM

## 2019-07-22 DIAGNOSIS — E53.8 B12 DEFICIENCY: Primary | ICD-10-CM

## 2019-07-22 DIAGNOSIS — M48.062 SPINAL STENOSIS OF LUMBAR REGION WITH NEUROGENIC CLAUDICATION: ICD-10-CM

## 2019-07-22 DIAGNOSIS — N18.9 HISTORY OF ANEMIA DUE TO CHRONIC KIDNEY DISEASE: ICD-10-CM

## 2019-07-22 DIAGNOSIS — M26.629 TMJ SYNDROME: ICD-10-CM

## 2019-07-22 DIAGNOSIS — D64.9 ANEMIA, UNSPECIFIED TYPE: ICD-10-CM

## 2019-07-22 DIAGNOSIS — R13.10 DYSPHAGIA, UNSPECIFIED TYPE: ICD-10-CM

## 2019-07-22 DIAGNOSIS — I10 ESSENTIAL HYPERTENSION: ICD-10-CM

## 2019-07-22 DIAGNOSIS — E55.9 VITAMIN D DEFICIENCY: ICD-10-CM

## 2019-07-22 DIAGNOSIS — J45.909 ASTHMA, UNSPECIFIED ASTHMA SEVERITY, UNSPECIFIED WHETHER COMPLICATED, UNSPECIFIED WHETHER PERSISTENT: ICD-10-CM

## 2019-07-22 DIAGNOSIS — E78.01 FAMILIAL HYPERCHOLESTEROLEMIA: ICD-10-CM

## 2019-07-22 DIAGNOSIS — E66.01 MORBID OBESITY WITH BMI OF 45.0-49.9, ADULT (HCC): ICD-10-CM

## 2019-07-22 DIAGNOSIS — K21.00 GASTROESOPHAGEAL REFLUX DISEASE WITH ESOPHAGITIS: ICD-10-CM

## 2019-07-22 DIAGNOSIS — M1A.30X0 CHRONIC GOUT DUE TO RENAL IMPAIRMENT WITHOUT TOPHUS, UNSPECIFIED SITE: ICD-10-CM

## 2019-07-22 DIAGNOSIS — E03.9 ACQUIRED HYPOTHYROIDISM: ICD-10-CM

## 2019-07-22 DIAGNOSIS — Z86.2 HISTORY OF ANEMIA DUE TO CHRONIC KIDNEY DISEASE: ICD-10-CM

## 2019-07-22 DIAGNOSIS — I49.5 SICK SINUS SYNDROME (HCC): ICD-10-CM

## 2019-07-22 DIAGNOSIS — M81.0 OSTEOPOROSIS WITHOUT CURRENT PATHOLOGICAL FRACTURE, UNSPECIFIED OSTEOPOROSIS TYPE: ICD-10-CM

## 2019-07-22 DIAGNOSIS — E87.6 HYPOKALEMIA: ICD-10-CM

## 2019-07-22 PROBLEM — N63.0 BREAST LUMP: Status: ACTIVE | Noted: 2019-02-27

## 2019-07-22 PROBLEM — M79.642 HAND PAIN, LEFT: Status: ACTIVE | Noted: 2019-01-25

## 2019-07-22 PROBLEM — R20.0 NUMBNESS AND TINGLING SENSATION OF SKIN: Status: ACTIVE | Noted: 2017-11-02

## 2019-07-22 PROBLEM — M70.60 GREATER TROCHANTERIC BURSITIS: Status: ACTIVE | Noted: 2017-12-14

## 2019-07-22 PROBLEM — M79.605 LEG PAIN, BILATERAL: Status: ACTIVE | Noted: 2017-10-20

## 2019-07-22 PROBLEM — R29.6 FREQUENT FALLS: Status: ACTIVE | Noted: 2017-07-19

## 2019-07-22 PROBLEM — R94.39 ABNORMAL CARDIOVASCULAR STRESS TEST: Status: ACTIVE | Noted: 2018-04-17

## 2019-07-22 PROBLEM — G62.89 PERIPHERAL AXONAL NEUROPATHY: Status: ACTIVE | Noted: 2017-11-20

## 2019-07-22 PROBLEM — R20.2 NUMBNESS AND TINGLING SENSATION OF SKIN: Status: ACTIVE | Noted: 2017-11-02

## 2019-07-22 PROBLEM — R26.89 LOSS OF BALANCE: Status: ACTIVE | Noted: 2019-02-28

## 2019-07-22 PROBLEM — M79.604 LEG PAIN, BILATERAL: Status: ACTIVE | Noted: 2017-10-20

## 2019-07-22 PROBLEM — M25.511 PAIN IN RIGHT SHOULDER: Status: ACTIVE | Noted: 2017-11-20

## 2019-07-22 PROBLEM — Z74.09 MOBILITY POOR: Status: ACTIVE | Noted: 2019-02-28

## 2019-07-22 PROBLEM — M47.26 OTHER SPONDYLOSIS WITH RADICULOPATHY, LUMBAR REGION: Status: ACTIVE | Noted: 2017-12-07

## 2019-07-22 PROCEDURE — 99214 OFFICE O/P EST MOD 30 MIN: CPT | Performed by: FAMILY MEDICINE

## 2019-07-22 RX ORDER — NYSTATIN 100000 [USP'U]/G
1 POWDER TOPICAL 2 TIMES DAILY PRN
Refills: 3 | COMMUNITY
Start: 2019-07-10 | End: 2019-09-09 | Stop reason: SDUPTHER

## 2019-07-22 RX ORDER — BUDESONIDE AND FORMOTEROL FUMARATE DIHYDRATE 160; 4.5 UG/1; UG/1
2 AEROSOL RESPIRATORY (INHALATION)
Qty: 1 INHALER | Refills: 12 | Status: SHIPPED | OUTPATIENT
Start: 2019-07-22 | End: 2020-10-26 | Stop reason: SDUPTHER

## 2019-07-22 RX ORDER — SYRINGE W-NEEDLE,DISPOSAB,3 ML 25GX5/8"
1 SYRINGE, EMPTY DISPOSABLE MISCELLANEOUS
Refills: 0 | Status: ON HOLD | COMMUNITY
Start: 2019-07-17 | End: 2020-04-24

## 2019-07-22 NOTE — PROGRESS NOTES
Subjective   Elaine Toscano is a 68 y.o. female.   Chief Complaint   Patient presents with   • Follow-up     medication reconciliation        History of Present Illness   Elaine returns today for what was scheduled as a lab review however she has not yet had her labs.  She brings a list of her home medications which are reconciled today has [employer] three times weekly for medication checks and to help with house work. Denies needing medication refills.    PHQ 9 = 20. Blood pressure elevated today but states home blood pressure readings are typically around 1 15-1 17 over 60s to 70s.  Recheck in the office with large adult cuff is 137/94    . The following portions of the patient's history were reviewed and updated as appropriate: allergies, current medications, past family history, past medical history, past social history, past surgical history and problem list.    Review of Systems   Constitutional: Negative for fatigue and fever.   HENT: Positive for ear pain.    Respiratory: Negative for cough and shortness of breath.    Cardiovascular: Negative for chest pain, palpitations and leg swelling.   Gastrointestinal: Positive for abdominal pain, nausea, vomiting ( GI issues followed by Dr. Cisse), GERD and indigestion.   Musculoskeletal: Positive for arthralgias ( Knee pain, x-ray scheduled today per Dr. Owen) and gait problem.   Skin: Negative for rash.   Psychiatric/Behavioral: Positive for depressed mood ( Patient is followed by [facility] at [facility] current medications doing okay). The patient is nervous/anxious.        Objective    Vitals:    07/22/19 1509   BP: 176/98   Pulse: 79   Resp: 20   Temp: 97.7 °F (36.5 °C)   SpO2: 95%       Physical Exam   Constitutional: She is oriented to person, place, and time.   Obese   HENT:   Head: Normocephalic and atraumatic.   Right Ear: Tympanic membrane and external ear normal.   Left Ear: Tympanic membrane and external ear normal.   Mouth/Throat: Uvula is  midline and oropharynx is clear and moist. Oral lesions present. Normal dentition.   Eyes: Conjunctivae and EOM are normal. Pupils are equal, round, and reactive to light.   Neck: No JVD present. No thyromegaly present.   Cardiovascular: Normal rate, regular rhythm and normal heart sounds.   No murmur heard.  Pulmonary/Chest: Breath sounds normal. She has no wheezes. She has no rales.   Musculoskeletal: She exhibits edema.   Tenderness, crepitus, and popping at TMJ joints bilaterally   Lymphadenopathy:     She has no cervical adenopathy.   Neurological: She is alert and oriented to person, place, and time.   Skin: Skin is warm and dry. No rash noted.   Psychiatric: She has a normal mood and affect.         Assessment/Plan   Elaine was seen today for follow-up.    Diagnoses and all orders for this visit:    B12 deficiency    Gastroesophageal reflux disease with esophagitis    Anemia, unspecified type    Asthma, unspecified asthma severity, unspecified whether complicated, unspecified whether persistent  -     budesonide-formoterol (SYMBICORT) 160-4.5 MCG/ACT inhaler; Inhale 2 puffs 2 (Two) Times a Day.    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 tophus, unspecified site    History of anemia due to chronic kidney disease    Familial hypercholesterolemia    Essential hypertension    Hypokalemia    Acquired hypothyroidism    Osteoporosis without current pathological fracture, unspecified osteoporosis type    Sick sinus syndrome (CMS/HCC)    Presence of cardiac pacemaker    Spinal stenosis of lumbar region with neurogenic claudication    TMJ syndrome    Vitamin D deficiency    Morbid obesity with BMI of 45.0-49.9, adult (CMS/[facility])    have reviewed all meds and discussed with patient their individual purpose, updated mediation list,  will send to [facility] in home health care to  facilitate medication management. Continue without change unless dictated by lab results or by specialists. Labs today as previously ordered.     Return in about 3 months (around 10/22/2019) for Recheck.

## 2019-07-22 NOTE — PATIENT INSTRUCTIONS
Temporomandibular Joint Syndrome  Temporomandibular joint (TMJ) syndrome is a condition that affects the joints between your jaw and your skull. The TMJs are located near your ears and allow your jaw to open and close. These joints and the nearby muscles are involved in all movements of the jaw. People with TMJ syndrome have pain in the area of these joints and muscles. Chewing, biting, or other movements of the jaw can be difficult or painful.  TMJ syndrome can be caused by various things. In many cases, the condition is mild and goes away within a few weeks. For some people, the condition can become a long-term problem.  What are the causes?  Possible causes of TMJ syndrome include:  · Grinding your teeth or clenching your jaw. Some people do this when they are under stress.  · Arthritis.  · Injury to the jaw.  · Head or neck injury.  · Teeth or dentures that are not aligned well.    In some cases, the cause of TMJ syndrome may not be known.  What are the signs or symptoms?  The most common symptom is an aching pain on the side of the head in the area of the TMJ. Other symptoms may include:  · Pain when moving your jaw, such as when chewing or biting.  · Being unable to open your jaw all the way.  · Making a clicking sound when you open your mouth.  · Headache.  · Earache.  · Neck or shoulder pain.    How is this diagnosed?  Diagnosis can usually be made based on your symptoms, your medical history, and a physical exam. Your health care provider may check the range of motion of your jaw. Imaging tests, such as X-rays or an MRI, are sometimes done. You may need to see your dentist to determine if your teeth and jaw are lined up correctly.  How is this treated?  TMJ syndrome often goes away on its own. If treatment is needed, the options may include:  · Eating soft foods and applying ice or heat.  · Medicines to relieve pain or inflammation.  · Medicines to relax the muscles.  · A splint, bite plate, or mouthpiece  to prevent teeth grinding or jaw clenching.  · Relaxation techniques or counseling to help reduce stress.  · Transcutaneous electrical nerve stimulation (TENS). This helps to relieve pain by applying an electrical current through the skin.  · Acupuncture. This is sometimes helpful to relieve pain.  · Jaw surgery. This is rarely needed.    Follow these instructions at home:  · Take medicines only as directed by your health care provider.  · Eat a soft diet if you are having trouble chewing.  · Apply ice to the painful area.  ? Put ice in a plastic bag.  ? Place a towel between your skin and the bag.  ? Leave the ice on for 20 minutes, 2-3 times a day.  · Apply a warm compress to the painful area as directed.  · Massage your jaw area and perform any jaw stretching exercises as recommended by your health care provider.  · If you were given a mouthpiece or bite plate, wear it as directed.  · Avoid foods that require a lot of chewing. Do not chew gum.  · Keep all follow-up visits as directed by your health care provider. This is important.  Contact a health care provider if:  · You are having trouble eating.  · You have new or worsening symptoms.  Get help right away if:  · Your jaw locks open or closed.  This information is not intended to replace advice given to you by your health care provider. Make sure you discuss any questions you have with your health care provider.  Document Released: 09/12/2002 Document Revised: 08/17/2017 Document Reviewed: 07/23/2015  [employer] Interactive Patient Education © 2019 [employer]

## 2019-07-23 LAB
25(OH)D3+25(OH)D2 SERPL-MCNC: 48.8 NG/ML (ref 30–100)
ALBUMIN SERPL-MCNC: 4.2 G/DL (ref 3.5–4.8)
ALBUMIN/GLOB SERPL: 1.8 {RATIO} (ref 1.2–2.2)
ALP SERPL-CCNC: 105 IU/L (ref 39–117)
ALT SERPL-CCNC: 12 IU/L (ref 0–32)
AST SERPL-CCNC: 16 IU/L (ref 0–40)
BASOPHILS # BLD AUTO: 0 X10E3/UL (ref 0–0.2)
BASOPHILS NFR BLD AUTO: 1 %
BILIRUB SERPL-MCNC: 0.3 MG/DL (ref 0–1.2)
BUN SERPL-MCNC: 32 MG/DL (ref 8–27)
BUN/CREAT SERPL: 16 (ref 12–28)
CALCIUM SERPL-MCNC: 9.3 MG/DL (ref 8.7–10.3)
CHLORIDE SERPL-SCNC: 96 MMOL/L (ref 96–106)
CHOLEST SERPL-MCNC: 207 MG/DL (ref 100–199)
CO2 SERPL-SCNC: 29 MMOL/L (ref 20–29)
CREAT SERPL-MCNC: 2.05 MG/DL (ref 0.57–1)
EOSINOPHIL # BLD AUTO: 0.3 X10E3/UL (ref 0–0.4)
EOSINOPHIL NFR BLD AUTO: 5 %
ERYTHROCYTE [DISTWIDTH] IN BLOOD BY AUTOMATED COUNT: 15.1 % (ref 12.3–15.4)
FERRITIN SERPL-MCNC: 280 NG/ML (ref 15–150)
FOLATE SERPL-MCNC: 10.1 NG/ML
GLOBULIN SER CALC-MCNC: 2.3 G/DL (ref 1.5–4.5)
GLUCOSE SERPL-MCNC: 112 MG/DL (ref 65–99)
HCT VFR BLD AUTO: 37.4 % (ref 34–46.6)
HDLC SERPL-MCNC: 48 MG/DL
HGB BLD-MCNC: 12 G/DL (ref 11.1–15.9)
IMM GRANULOCYTES # BLD AUTO: 0 X10E3/UL (ref 0–0.1)
IMM GRANULOCYTES NFR BLD AUTO: 0 %
LDLC SERPL CALC-MCNC: 129 MG/DL (ref 0–99)
LYMPHOCYTES # BLD AUTO: 1.1 X10E3/UL (ref 0.7–3.1)
LYMPHOCYTES NFR BLD AUTO: 22 %
MCH RBC QN AUTO: 27.5 PG (ref 26.6–33)
MCHC RBC AUTO-ENTMCNC: 32.1 G/DL (ref 31.5–35.7)
MCV RBC AUTO: 86 FL (ref 79–97)
MICROALBUMIN UR-MCNC: <3 UG/ML
MONOCYTES # BLD AUTO: 0.4 X10E3/UL (ref 0.1–0.9)
MONOCYTES NFR BLD AUTO: 8 %
NEUTROPHILS # BLD AUTO: 3.4 X10E3/UL (ref 1.4–7)
NEUTROPHILS NFR BLD AUTO: 64 %
PLATELET # BLD AUTO: 241 X10E3/UL (ref 150–450)
POTASSIUM SERPL-SCNC: 4.4 MMOL/L (ref 3.5–5.2)
PROT SERPL-MCNC: 6.5 G/DL (ref 6–8.5)
RBC # BLD AUTO: 4.37 X10E6/UL (ref 3.77–5.28)
SODIUM SERPL-SCNC: 142 MMOL/L (ref 134–144)
TRIGL SERPL-MCNC: 151 MG/DL (ref 0–149)
TSH SERPL DL<=0.005 MIU/L-ACNC: 1.19 UIU/ML (ref 0.45–4.5)
URATE SERPL-MCNC: 7.7 MG/DL (ref 2.5–7.1)
VIT B12 SERPL-MCNC: 1094 PG/ML (ref 232–1245)
VLDLC SERPL CALC-MCNC: 30 MG/DL (ref 5–40)
WBC # BLD AUTO: 5.3 X10E3/UL (ref 3.4–10.8)

## 2019-07-30 RX ORDER — ERGOCALCIFEROL 1.25 MG/1
CAPSULE ORAL
Qty: 4 CAPSULE | OUTPATIENT
Start: 2019-07-30

## 2019-08-05 RX ORDER — ERGOCALCIFEROL 1.25 MG/1
50000 CAPSULE ORAL WEEKLY
Qty: 5 CAPSULE | Refills: 3 | Status: SHIPPED | OUTPATIENT
Start: 2019-08-05 | End: 2019-12-18

## 2019-08-05 NOTE — TELEPHONE ENCOUNTER
Pharmacy needing refill of vitamin d, originally prescribed by dr. Morales.pt was seen here on July 22, 2019

## 2019-08-15 RX ORDER — GABAPENTIN 300 MG/1
CAPSULE ORAL
Qty: 90 CAPSULE | OUTPATIENT
Start: 2019-08-15

## 2019-08-19 ENCOUNTER — TELEPHONE (OUTPATIENT)
Dept: FAMILY MEDICINE CLINIC | Facility: CLINIC | Age: 69
End: 2019-08-19

## 2019-08-19 DIAGNOSIS — E53.8 B12 DEFICIENCY: Primary | ICD-10-CM

## 2019-08-19 DIAGNOSIS — M1A.30X0 CHRONIC GOUT DUE TO RENAL IMPAIRMENT WITHOUT TOPHUS, UNSPECIFIED SITE: ICD-10-CM

## 2019-08-19 RX ORDER — GABAPENTIN 300 MG/1
300 CAPSULE ORAL 3 TIMES DAILY
Qty: 90 CAPSULE | Refills: 1 | Status: SHIPPED | OUTPATIENT
Start: 2019-08-19 | End: 2019-12-13

## 2019-08-19 RX ORDER — ALLOPURINOL 100 MG/1
TABLET ORAL
Qty: 180 TABLET | Refills: 1 | Status: SHIPPED | OUTPATIENT
Start: 2019-08-19 | End: 2019-11-25 | Stop reason: SDUPTHER

## 2019-08-19 NOTE — TELEPHONE ENCOUNTER
Pt calling stating in last ov she was instructed to take 100mg allopurinol 2 times daily. Pt is out of the rx and needs a new rx sent into [facility] pharmacy with new instructions if she is still to continue taking that way. thanks

## 2019-08-19 NOTE — TELEPHONE ENCOUNTER
Called pt on cell phone. No answer. LVM advising pt that per Dr. Rocha, patient needs to schedule f/up appt with her in October and to call office to schedule.

## 2019-08-19 NOTE — TELEPHONE ENCOUNTER
Yes per phone note following visit told to increase allopurinol to  200 mg and recheck uric acid and B12 level in about 3 months. (2 months from now). Can you order meds and labs

## 2019-09-03 RX ORDER — TOLTERODINE 2 MG/1
CAPSULE, EXTENDED RELEASE ORAL
Qty: 30 CAPSULE | Refills: 2 | Status: SHIPPED | OUTPATIENT
Start: 2019-09-03 | End: 2019-11-30 | Stop reason: SDUPTHER

## 2019-09-09 ENCOUNTER — OFFICE VISIT (OUTPATIENT)
Dept: FAMILY MEDICINE CLINIC | Facility: CLINIC | Age: 69
End: 2019-09-09

## 2019-09-09 ENCOUNTER — RESULTS ENCOUNTER (OUTPATIENT)
Dept: FAMILY MEDICINE CLINIC | Facility: CLINIC | Age: 69
End: 2019-09-09

## 2019-09-09 VITALS
OXYGEN SATURATION: 95 % | SYSTOLIC BLOOD PRESSURE: 130 MMHG | DIASTOLIC BLOOD PRESSURE: 81 MMHG | WEIGHT: 254.2 LBS | HEART RATE: 64 BPM | TEMPERATURE: 98 F | HEIGHT: 62 IN | BODY MASS INDEX: 46.78 KG/M2 | RESPIRATION RATE: 20 BRPM

## 2019-09-09 DIAGNOSIS — R30.0 DYSURIA: ICD-10-CM

## 2019-09-09 DIAGNOSIS — N30.01 ACUTE CYSTITIS WITH HEMATURIA: Primary | ICD-10-CM

## 2019-09-09 DIAGNOSIS — N30.01 ACUTE CYSTITIS WITH HEMATURIA: ICD-10-CM

## 2019-09-09 DIAGNOSIS — M20.41 HAMMERTOE OF SECOND TOE OF RIGHT FOOT: ICD-10-CM

## 2019-09-09 DIAGNOSIS — M79.675 PAIN OF TOE OF LEFT FOOT: ICD-10-CM

## 2019-09-09 LAB
BILIRUB BLD-MCNC: NEGATIVE MG/DL
CLARITY, POC: ABNORMAL
COLOR UR: YELLOW
GLUCOSE UR STRIP-MCNC: NEGATIVE MG/DL
KETONES UR QL: NEGATIVE
LEUKOCYTE EST, POC: ABNORMAL
NITRITE UR-MCNC: NEGATIVE MG/ML
PH UR: 5.5 [PH] (ref 5–8)
PROT UR STRIP-MCNC: NEGATIVE MG/DL
RBC # UR STRIP: ABNORMAL /UL
SP GR UR: 1 (ref 1–1.03)
UROBILINOGEN UR QL: NORMAL

## 2019-09-09 PROCEDURE — 99213 OFFICE O/P EST LOW 20 MIN: CPT | Performed by: FAMILY MEDICINE

## 2019-09-09 PROCEDURE — 81003 URINALYSIS AUTO W/O SCOPE: CPT | Performed by: FAMILY MEDICINE

## 2019-09-09 RX ORDER — AMOXICILLIN 500 MG/1
500 TABLET, FILM COATED ORAL 3 TIMES DAILY
Qty: 30 TABLET | Refills: 0 | Status: SHIPPED | OUTPATIENT
Start: 2019-09-09 | End: 2019-09-13 | Stop reason: ALTCHOICE

## 2019-09-09 RX ORDER — BUDESONIDE AND FORMOTEROL FUMARATE DIHYDRATE 160; 4.5 UG/1; UG/1
2 AEROSOL RESPIRATORY (INHALATION) 2 TIMES DAILY
COMMUNITY
End: 2019-11-05

## 2019-09-09 NOTE — PROGRESS NOTES
"Subjective:      Elaine Toscano is a 68 y.o. female who complains of burning with urination and dysuria for 1 week.   Patient denies fever, stomach ache and vaginal discharge.  Patient does have a history of recurrent UTI.  Patient does not have a history of pyelonephritis.  The following portions of the patient's history were reviewed and updated as appropriate: allergies, current medications, past family history, past medical history, past social history, past surgical history and problem list.   She is scheduled for surgery on September 19, 2019 right hammertoe and pinning of left second toe by Dr. Keenan in [place], she is going to take care of her urinary tract infection to prevent consultation with the surgery.  Review of Systems  Gastrointestinal: negative for abdominal pain, nausea and vomiting  Musculoskeletal:positive for back pain and bilateral toe/foot pain      Objective:      /81 (BP Location: Right arm, Patient Position: Sitting)   Pulse 64   Temp 98 °F (36.7 °C) (Oral)   Resp 20   Ht 157.5 cm (62\")   Wt 115 kg (254 lb 3.2 oz)   SpO2 95%   BMI 46.49 kg/m²   General: alert, appears stated age, cooperative, no distress and morbidly obese   Abdomen: soft, non-tender, without masses or organomegaly and obese    Back: back muscles are tender, CVA tenderness absent   : defer exam   Rectal: deferred     Laboratory:   Urine dipstick shows Positive for trace blood and small leukocytes otherwise negative.    Micro exam: not done.      Assessment:   Elaine was seen today for difficulty urinating.    Diagnoses and all orders for this visit:    Acute cystitis with hematuria  -     POCT urinalysis dipstick, automated  -     amoxicillin (AMOXIL) 500 MG tablet; Take 1 tablet by mouth 3 (Three) Times a Day.    Dysuria  -     POCT urinalysis dipstick, automated    Hammertoe of second toe of right foot    Pain of toe of left foot         Acute cystitis      Plan:  Plan:      1. Medications: Amoxicillin  2. " Maintain adequate hydration  3. Follow up if symptoms not improving, and prn.      Return in about 6 weeks (around 10/21/2019) for Recheck, chronic conditions.

## 2019-09-12 LAB
BACTERIA UR CULT: ABNORMAL
OTHER ANTIBIOTIC SUSC ISLT: ABNORMAL

## 2019-09-13 RX ORDER — CIPROFLOXACIN 500 MG/1
500 TABLET, FILM COATED ORAL 2 TIMES DAILY
Qty: 14 TABLET | Refills: 0 | Status: SHIPPED | OUTPATIENT
Start: 2019-09-13 | End: 2019-09-25 | Stop reason: SINTOL

## 2019-09-16 ENCOUNTER — TELEPHONE (OUTPATIENT)
Dept: FAMILY MEDICINE CLINIC | Facility: CLINIC | Age: 69
End: 2019-09-16

## 2019-09-19 ENCOUNTER — RESULTS ENCOUNTER (OUTPATIENT)
Dept: FAMILY MEDICINE CLINIC | Facility: CLINIC | Age: 69
End: 2019-09-19

## 2019-09-19 DIAGNOSIS — M1A.30X0 CHRONIC GOUT DUE TO RENAL IMPAIRMENT WITHOUT TOPHUS, UNSPECIFIED SITE: ICD-10-CM

## 2019-09-19 DIAGNOSIS — E53.8 B12 DEFICIENCY: ICD-10-CM

## 2019-09-24 NOTE — TELEPHONE ENCOUNTER
Received request for metoprolol 50 mg.  At initial visit with me on July 5, 2019 this was removed from her medication list.  Please ask if she has been taking this medication or not and confirm she asking for a refill at this time.  Please ask if she has been checking her blood pressures?  If so what kind of readings has she been getting?

## 2019-09-25 ENCOUNTER — OFFICE VISIT (OUTPATIENT)
Dept: FAMILY MEDICINE CLINIC | Facility: CLINIC | Age: 69
End: 2019-09-25

## 2019-09-25 VITALS
BODY MASS INDEX: 48.03 KG/M2 | SYSTOLIC BLOOD PRESSURE: 159 MMHG | HEART RATE: 60 BPM | RESPIRATION RATE: 18 BRPM | WEIGHT: 261 LBS | DIASTOLIC BLOOD PRESSURE: 89 MMHG | TEMPERATURE: 98 F | OXYGEN SATURATION: 95 % | HEIGHT: 62 IN

## 2019-09-25 DIAGNOSIS — I10 ESSENTIAL HYPERTENSION: ICD-10-CM

## 2019-09-25 DIAGNOSIS — S50.812A ABRASION OF LEFT FOREARM, INITIAL ENCOUNTER: Primary | ICD-10-CM

## 2019-09-25 PROBLEM — K58.9 IRRITABLE BOWEL SYNDROME: Status: ACTIVE | Noted: 2019-09-25

## 2019-09-25 PROBLEM — M51.36 DEGENERATION OF LUMBAR INTERVERTEBRAL DISC: Status: ACTIVE | Noted: 2019-09-25

## 2019-09-25 PROBLEM — M51.369 DEGENERATION OF LUMBAR INTERVERTEBRAL DISC: Status: ACTIVE | Noted: 2019-09-25

## 2019-09-25 PROBLEM — K27.9 PEPTIC ULCER: Status: ACTIVE | Noted: 2019-09-25

## 2019-09-25 PROBLEM — T81.9XXA COMPLICATION OF SURGICAL PROCEDURE: Status: ACTIVE | Noted: 2019-09-25

## 2019-09-25 PROCEDURE — 99213 OFFICE O/P EST LOW 20 MIN: CPT | Performed by: FAMILY MEDICINE

## 2019-09-25 RX ORDER — FUROSEMIDE 40 MG/1
TABLET ORAL
Qty: 180 TABLET | Refills: 1 | Status: SHIPPED | OUTPATIENT
Start: 2019-09-25 | End: 2020-03-25

## 2019-09-25 RX ORDER — METOPROLOL SUCCINATE 50 MG/1
TABLET, EXTENDED RELEASE ORAL
Qty: 90 TABLET | Refills: 0 | OUTPATIENT
Start: 2019-09-25

## 2019-09-25 RX ORDER — TRAMADOL HYDROCHLORIDE 50 MG/1
1 TABLET ORAL TAKE AS DIRECTED
Refills: 0 | COMMUNITY
Start: 2019-09-19 | End: 2019-11-05

## 2019-09-25 RX ORDER — METOPROLOL SUCCINATE 100 MG/1
100 TABLET, EXTENDED RELEASE ORAL DAILY
Qty: 90 TABLET | Refills: 3 | Status: SHIPPED | OUTPATIENT
Start: 2019-09-25 | End: 2019-11-05

## 2019-09-25 NOTE — PATIENT INSTRUCTIONS
Abrasion    An abrasion is a cut or a scrape on the outer surface of the skin. An abrasion does not go through all the layers of the skin. It is important to care for your abrasion properly to prevent infection.  What are the causes?  This condition is caused by falling on or gliding across the ground or another surface. When your skin rubs on something, the outer and inner layers of skin may rub off.  What are the signs or symptoms?  The main symptom of this condition is a cut or a scrape. The scrape may be bleeding, or it may appear red or pink. If the abrasion was caused by a fall, there may be a bruise under the cut or scrape.  How is this diagnosed?  An abrasion is diagnosed with a physical exam.  How is this treated?  Treatment for this condition depends on how large and deep the abrasion is. In most cases:  · Your abrasion will be cleaned with water and mild soap. This is done to remove any dirt or debris (such as particles of glass or rock) that may be stuck in the wound.  · An antibiotic ointment may be applied to the abrasion to help prevent infection.  · A bandage (dressing) may be placed on the abrasion to keep it clean.  You may also need a tetanus shot.  Follow these instructions at home:  Medicines  · Take or apply over-the-counter and prescription medicines only as told by your health care provider.  · If you were prescribed an antibiotic medicine, apply it as told by your health care provider.  Wound care  · Clean the wound 2-3 times a day, or as directed by your health care provider. To do this, wash the wound with mild soap and water, rinse off the soap, and pat the wound dry with a clean towel. Do not rub the wound.  · Keep the dressing clean and dry as told by your health care provider.  · There are many different ways to close and cover a wound. Follow instructions from your health care provider about:  ? Caring for your wound.  ? Changing and removing your dressing. You may have to change your  dressing one or more times a day, or as directed by your health care provider.  · Check your wound every day for signs of infection. Check for:  ? Redness, particularly a red streak that spreads out from the wound.  ? Swelling or increased pain.  ? Warmth.  ? Fluid, pus, or a bad smell.  · If directed, put ice on the injured area to reduce pain and swelling:  ? Put ice in a plastic bag.  ? Place a towel between your skin and the bag.  ? Leave the ice on for 20 minutes, 2-3 times a day.  General instructions  · Do not take baths, swim, or use a hot tub until your health care provider says it is okay to do so.  · If possible, raise (elevate) the injured area above the level of your heart while you are sitting or lying down. This will reduce pain and swelling.  · Keep all follow-up visits as directed by your health care provider. This is important.  Contact a health care provider if:  · You received a tetanus shot, and you have swelling, severe pain, redness, or bleeding at the injection site.  · Your pain is not controlled with medicine.  · You have redness, swelling, or more pain at the site of your wound.  Get help right away if:  · You have a red streak spreading away from your wound.  · You have a fever.  · You have fluid, blood, or pus coming from your wound.  · You notice a bad smell coming from your wound or your dressing.  Summary  · An abrasion is a cut or a scrape on the outer surface of the skin. An abrasion does not go through all the layers of the skin.  · Care for your abrasion properly to prevent infection.  · Clean the wound with mild soap and water 2-3 times a day. Follow instructions from your health care provider about taking medicines and changing your bandage (dressing).  · Contact your health care provider if you have redness, swelling or more pain in the wound area.  · Get help right away if you have a fever or if you have fluid, blood, pus, a bad smell, or a red streak coming from the  wound.  This information is not intended to replace advice given to you by your health care provider. Make sure you discuss any questions you have with your health care provider.  Document Released: 09/27/2006 Document Revised: 08/01/2018 Document Reviewed: 08/01/2018  [employer] Interactive Patient Education © 2019 [employer]

## 2019-09-25 NOTE — TELEPHONE ENCOUNTER
Spoke with patient, she is on 100mg,she is NOT on the 50mg ( I dont see it on med list, onlye the 100).  Patient has not checked her bp for few days, she fell on Monday.  She hit concrete and tore skin off of her arm, she put ointment and a bandage on the wound but doesn't want to take bandage off bc she doesn't have anymore supplies.  Pt was transferred up front to try to get appt to be seen.  Pt also states that her BP was 130/70-80 on Saturday, she said that is the average of what it has been running

## 2019-09-25 NOTE — PROGRESS NOTES
Chief Complaint   Patient presents with   • Abrasion     LFA- Fell on 9/23/19       Subjective   History of Present Illness   Elaine Toscano is a 69 y.o. female.   She presents today, 2 days after falling and striking her left forearm on concrete.  She had bilateral foot surgery last week and is still in bandages and postop shoes and is been advised not to get in the bathtub or shower until she comes off her bandages probably about 2 weeks.  She states she put some bacitracin on the wound and wrapped it is unable to cleanse it appropriately.    She has been taking her metoprolol claims to have picked up a refill today.  States blood pressures are much better, feels it is up due to stress and pain    The following portions of the patient's history were reviewed and updated as appropriate: allergies, current medications,  family history, past medical history, social history,  surgical history and problem list.    Current Outpatient Medications on File Prior to Visit   Medication Sig   • alendronate (FOSAMAX) 70 MG tablet Take 1 tablet by mouth Every 7 (Seven) Days. Take on empty stomach. Do not lay down for 1 hour.   • allopurinol (ZYLOPRIM) 100 MG tablet Take 1 tablet by mouth 2 times a day at bedtime   • aspirin (ASPIR) 81 MG EC tablet Take 81 mg by mouth Daily.   • budesonide-formoterol (SYMBICORT) 160-4.5 MCG/ACT inhaler Inhale 2 puffs 2 (Two) Times a Day.   • budesonide-formoterol (SYMBICORT) 160-4.5 MCG/ACT inhaler Inhale 2 puffs 2 (Two) Times a Day. Rinse mouth after use.   • busPIRone (BUSPAR) 10 MG tablet Take 10 mg by mouth 2 (Two) Times a Day.   • calcium carb-cholecalciferol (CALCIUM PLUS VITAMIN D3) 600-800 MG-UNIT tablet Take 600 mg by mouth Daily.   • cyanocobalamin 1000 MCG/ML injection Inject 1 mL into the appropriate muscle as directed by prescriber Every 30 (Thirty) Days.   • desvenlafaxine (PRISTIQ) 50 MG 24 hr tablet Take 50 mg by mouth Every Morning.   • diclofenac (VOLTAREN) 1 % gel gel Apply 1  "application topically to the appropriate area as directed As Needed.   • dicyclomine (BENTYL) 10 MG capsule Take 10 mg by mouth 4 (Four) Times a Day.   • furosemide (LASIX) 40 MG tablet TAKE ONE TABLET BY MOUTH TWICE DAILY   • gabapentin (NEURONTIN) 300 MG capsule Take 1 capsule by mouth 3 (Three) Times a Day.   • HYDROcodone-acetaminophen (NORCO) 7.5-325 MG per tablet Take 1 tablet by mouth 4 (Four) Times a Day.   • linaclotide (LINZESS) 145 MCG capsule capsule Take 1 capsule by mouth Daily As Needed.   • metoclopramide (REGLAN) 5 MG tablet Take 5 mg by mouth 4 (Four) Times a Day.   • metoprolol succinate XL (TOPROL-XL) 100 MG 24 hr tablet Take 1 tablet by mouth Daily.   • nystatin (MYCOSTATIN) 576214 UNIT/GM cream Apply  topically to the appropriate area as directed 2 (Two) Times a Day.   • omeprazole (priLOSEC) 40 MG capsule Take 40 mg by mouth 2 (Two) Times a Day.   • ondansetron (ZOFRAN) 4 MG tablet Take 4 mg by mouth As Needed.   • sucralfate (CARAFATE) 1 g tablet Take 1 g by mouth 4 (Four) Times a Day.   • Syringe 23G X 1\" 3 ML misc Inject 1 syringe into the appropriate muscle as directed by prescriber Every 30 (Thirty) Days.   • tiZANidine (ZANAFLEX) 2 MG tablet Take 2 mg by mouth every night at bedtime.   • tolterodine LA (DETROL LA) 2 MG 24 hr capsule TAKE ONE CAPSULE BY MOUTH EVERY DAY   • traMADol (ULTRAM) 50 MG tablet Take 1 tablet by mouth Take As Directed. Take 1 tablet by mouth every 4 to 6 hours as needed for pain   • traZODone (DESYREL) 100 MG tablet Take 100 mg by mouth At Night As Needed.   • vitamin D (ERGOCALCIFEROL) 15721 units capsule capsule Take 1 capsule by mouth 1 (One) Time Per Week.   • [DISCONTINUED] ciprofloxacin (CIPRO) 500 MG tablet Take 1 tablet by mouth 2 (Two) Times a Day.   • [DISCONTINUED] furosemide (LASIX) 40 MG tablet Take 40 mg by mouth Daily.   • [DISCONTINUED] metoprolol succinate XL (TOPROL-XL) 100 MG 24 hr tablet Take 1 tablet by mouth Daily.     No current " "facility-administered medications on file prior to visit.          Review of Systems   Constitutional: Negative for fatigue and fever.   Respiratory: Negative for cough and shortness of breath.    Cardiovascular: Negative for chest pain, palpitations and leg swelling.   Gastrointestinal: Negative.    Musculoskeletal: Positive for arthralgias, back pain and gait problem.   Skin: Negative for rash.       Objective   /89 (BP Location: Right arm, Patient Position: Sitting)   Pulse 60   Temp 98 °F (36.7 °C) (Oral)   Resp 18   Ht 157.5 cm (62\")   Wt 118 kg (261 lb)   SpO2 95%   BMI 47.74 kg/m²     Physical Exam   Constitutional: She is oriented to person, place, and time. She appears well-developed and well-nourished. No distress.   Cardiovascular: Normal rate and regular rhythm.   No murmur heard.  Pulmonary/Chest: Effort normal and breath sounds normal. She has no wheezes.   Musculoskeletal: She exhibits no edema.   Both feet bandaged and wearing postop shoes.  Ambulating slowly but steady   Neurological: She is alert and oriented to person, place, and time.   Skin: Skin is warm and dry.   Entire left forearm from just distal olecranon to just proximal of wrist along the ulnar surface with purple hematoma.  several non-confluence areas of superficial  -top layer skin abrasion, up to 3 cm in length but mostly limited to half centimeter wide, there is no active drainage and no bleeding or scab   Psychiatric: She has a normal mood and affect.   Nursing note and vitals reviewed.            Assessment/Plan   Diagnoses and all orders for this visit:    1. Abrasion of left forearm, initial encounter (Primary)    2. Essential hypertension      Have cleansed wound with wound wash followed by rinse with saline and patted dry, applied bacitracin, covered with Telfa and wrapped with Kerlix.  We will have Nette, her skilled home health nurse with [facility] clean wound tomorrow and Friday and return here on " Monday for a recheck, sooner if needed.         Return in about 5 days (around 9/30/2019) for wound check.      AVS given with handouts appropriate to diagnoses addressed

## 2019-09-26 ENCOUNTER — TELEPHONE (OUTPATIENT)
Dept: FAMILY MEDICINE CLINIC | Facility: CLINIC | Age: 69
End: 2019-09-26

## 2019-09-30 ENCOUNTER — OFFICE VISIT (OUTPATIENT)
Dept: FAMILY MEDICINE CLINIC | Facility: CLINIC | Age: 69
End: 2019-09-30

## 2019-09-30 VITALS
DIASTOLIC BLOOD PRESSURE: 86 MMHG | OXYGEN SATURATION: 93 % | HEIGHT: 62 IN | HEART RATE: 66 BPM | BODY MASS INDEX: 47.29 KG/M2 | SYSTOLIC BLOOD PRESSURE: 153 MMHG | RESPIRATION RATE: 18 BRPM | WEIGHT: 257 LBS | TEMPERATURE: 98.3 F

## 2019-09-30 DIAGNOSIS — R60.0 LOCALIZED EDEMA: ICD-10-CM

## 2019-09-30 DIAGNOSIS — S40.812D ABRASION OF LEFT UPPER EXTREMITY, SUBSEQUENT ENCOUNTER: Primary | ICD-10-CM

## 2019-09-30 DIAGNOSIS — Z23 NEED FOR IMMUNIZATION AGAINST INFLUENZA: ICD-10-CM

## 2019-09-30 DIAGNOSIS — I10 ESSENTIAL HYPERTENSION: ICD-10-CM

## 2019-09-30 PROCEDURE — 99214 OFFICE O/P EST MOD 30 MIN: CPT | Performed by: FAMILY MEDICINE

## 2019-09-30 PROCEDURE — G0008 ADMIN INFLUENZA VIRUS VAC: HCPCS | Performed by: FAMILY MEDICINE

## 2019-09-30 PROCEDURE — 90653 IIV ADJUVANT VACCINE IM: CPT | Performed by: FAMILY MEDICINE

## 2019-09-30 NOTE — PATIENT INSTRUCTIONS
Abrasion    An abrasion is a cut or a scrape on the outer surface of the skin. An abrasion does not go through all the layers of the skin. It is important to care for your abrasion properly to prevent infection.  What are the causes?  This condition is caused by falling on or gliding across the ground or another surface. When your skin rubs on something, the outer and inner layers of skin may rub off.  What are the signs or symptoms?  The main symptom of this condition is a cut or a scrape. The scrape may be bleeding, or it may appear red or pink. If the abrasion was caused by a fall, there may be a bruise under the cut or scrape.  How is this diagnosed?  An abrasion is diagnosed with a physical exam.  How is this treated?  Treatment for this condition depends on how large and deep the abrasion is. In most cases:  · Your abrasion will be cleaned with water and mild soap. This is done to remove any dirt or debris (such as particles of glass or rock) that may be stuck in the wound.  · An antibiotic ointment may be applied to the abrasion to help prevent infection.  · A bandage (dressing) may be placed on the abrasion to keep it clean.  You may also need a tetanus shot.  Follow these instructions at home:  Medicines  · Take or apply over-the-counter and prescription medicines only as told by your health care provider.  · If you were prescribed an antibiotic medicine, apply it as told by your health care provider.  Wound care  · Clean the wound 2-3 times a day, or as directed by your health care provider. To do this, wash the wound with mild soap and water, rinse off the soap, and pat the wound dry with a clean towel. Do not rub the wound.  · Keep the dressing clean and dry as told by your health care provider.  · There are many different ways to close and cover a wound. Follow instructions from your health care provider about:  ? Caring for your wound.  ? Changing and removing your dressing. You may have to change your  dressing one or more times a day, or as directed by your health care provider.  · Check your wound every day for signs of infection. Check for:  ? Redness, particularly a red streak that spreads out from the wound.  ? Swelling or increased pain.  ? Warmth.  ? Fluid, pus, or a bad smell.  · If directed, put ice on the injured area to reduce pain and swelling:  ? Put ice in a plastic bag.  ? Place a towel between your skin and the bag.  ? Leave the ice on for 20 minutes, 2-3 times a day.  General instructions  · Do not take baths, swim, or use a hot tub until your health care provider says it is okay to do so.  · If possible, raise (elevate) the injured area above the level of your heart while you are sitting or lying down. This will reduce pain and swelling.  · Keep all follow-up visits as directed by your health care provider. This is important.  Contact a health care provider if:  · You received a tetanus shot, and you have swelling, severe pain, redness, or bleeding at the injection site.  · Your pain is not controlled with medicine.  · You have redness, swelling, or more pain at the site of your wound.  Get help right away if:  · You have a red streak spreading away from your wound.  · You have a fever.  · You have fluid, blood, or pus coming from your wound.  · You notice a bad smell coming from your wound or your dressing.  Summary  · An abrasion is a cut or a scrape on the outer surface of the skin. An abrasion does not go through all the layers of the skin.  · Care for your abrasion properly to prevent infection.  · Clean the wound with mild soap and water 2-3 times a day. Follow instructions from your health care provider about taking medicines and changing your bandage (dressing).  · Contact your health care provider if you have redness, swelling or more pain in the wound area.  · Get help right away if you have a fever or if you have fluid, blood, pus, a bad smell, or a red streak coming from the  wound.  This information is not intended to replace advice given to you by your health care provider. Make sure you discuss any questions you have with your health care provider.  Document Released: 09/27/2006 Document Revised: 08/01/2018 Document Reviewed: 08/01/2018  [employer] Interactive Patient Education © 2019 [employer]    Peripheral Edema    Peripheral edema is swelling that is caused by a buildup of fluid. Peripheral edema most often affects the lower legs, ankles, and feet. It can also develop in the arms, hands, and face. The area of the body that has peripheral edema will look swollen. It may also feel heavy or warm. Your clothes may start to feel tight. Pressing on the area may make a temporary dent in your skin. You may not be able to move your arm or leg as much as usual.  There are many causes of peripheral edema. It can be a complication of other diseases, such as congestive heart failure, kidney disease, or a problem with your blood circulation. It also can be a side effect of certain medicines. It often happens to women during pregnancy. Sometimes, the cause is not known. Treating the underlying condition is often the only treatment for peripheral edema.  Follow these instructions at home:  Pay attention to any changes in your symptoms. Take these actions to help with your discomfort:  · Raise (elevate) your legs while you are sitting or lying down.  · Move around often to prevent stiffness and to lessen swelling. Do not sit or stand for long periods of time.  · Wear support stockings as told by your health care provider.  · Follow instructions from your health care provider about limiting salt (sodium) in your diet. Sometimes eating less salt can reduce swelling.  · Take over-the-counter and prescription medicines only as told by your health care provider. Your health care provider may prescribe medicine to help your body get rid of excess water (diuretic).  · Keep all follow-up visits as told  by your health care provider. This is important.  Contact a health care provider if:  · You have a fever.  · Your edema starts suddenly or is getting worse, especially if you are pregnant or have a medical condition.  · You have swelling in only one leg.  · You have increased swelling and pain in your legs.  Get help right away if:  · You develop shortness of breath, especially when you are lying down.  · You have pain in your chest or abdomen.  · You feel weak.  · You faint.  This information is not intended to replace advice given to you by your health care provider. Make sure you discuss any questions you have with your health care provider.  Document Released: 01/25/2006 Document Revised: 05/22/2017 Document Reviewed: 06/29/2016  [employer] Interactive Patient Education © 2019 [employer]

## 2019-09-30 NOTE — PROGRESS NOTES
Chief Complaint   Patient presents with   • Wound Check     LFA       Subjective   History of Present Illness   Elaine Toscano is a 69 y.o. female.     She presents today for follow-up on left forearm abrasion.  It was cleaned dressed and bandaged by home health on Thursday and Friday. She has cleaned it and applied antibiotic ointment the  past 2 days to the best of her ability, she feel like the skin is pulling and tight. It is not draining or red.     Additionally she states she is feeling shaky today and her blood pressure has been running high.  She has not dose of Lasix as prescribed more swelling in her usual she does in the recliner during the day.      The following portions of the patient's history were reviewed and updated as appropriate: allergies, current medications,  family history, past medical history, social history,  surgical history and problem list.    Current Outpatient Medications on File Prior to Visit   Medication Sig   • alendronate (FOSAMAX) 70 MG tablet Take 1 tablet by mouth Every 7 (Seven) Days. Take on empty stomach. Do not lay down for 1 hour.   • allopurinol (ZYLOPRIM) 100 MG tablet Take 1 tablet by mouth 2 times a day at bedtime   • aspirin (ASPIR) 81 MG EC tablet Take 81 mg by mouth Daily.   • budesonide-formoterol (SYMBICORT) 160-4.5 MCG/ACT inhaler Inhale 2 puffs 2 (Two) Times a Day.   • budesonide-formoterol (SYMBICORT) 160-4.5 MCG/ACT inhaler Inhale 2 puffs 2 (Two) Times a Day. Rinse mouth after use.   • busPIRone (BUSPAR) 10 MG tablet Take 10 mg by mouth 2 (Two) Times a Day.   • calcium carb-cholecalciferol (CALCIUM PLUS VITAMIN D3) 600-800 MG-UNIT tablet Take 600 mg by mouth Daily.   • cyanocobalamin 1000 MCG/ML injection Inject 1 mL into the appropriate muscle as directed by prescriber Every 30 (Thirty) Days.   • desvenlafaxine (PRISTIQ) 50 MG 24 hr tablet Take 50 mg by mouth Every Morning.   • diclofenac (VOLTAREN) 1 % gel gel Apply 1 application topically to the appropriate  "area as directed As Needed.   • dicyclomine (BENTYL) 10 MG capsule Take 10 mg by mouth 4 (Four) Times a Day.   • furosemide (LASIX) 40 MG tablet TAKE ONE TABLET BY MOUTH TWICE DAILY   • gabapentin (NEURONTIN) 300 MG capsule Take 1 capsule by mouth 3 (Three) Times a Day.   • HYDROcodone-acetaminophen (NORCO) 7.5-325 MG per tablet Take 1 tablet by mouth 4 (Four) Times a Day.   • linaclotide (LINZESS) 145 MCG capsule capsule Take 1 capsule by mouth Daily As Needed.   • metoclopramide (REGLAN) 5 MG tablet Take 5 mg by mouth 4 (Four) Times a Day.   • metoprolol succinate XL (TOPROL-XL) 100 MG 24 hr tablet Take 1 tablet by mouth Daily.   • nystatin (MYCOSTATIN) 330666 UNIT/GM cream Apply  topically to the appropriate area as directed 2 (Two) Times a Day.   • omeprazole (priLOSEC) 40 MG capsule Take 40 mg by mouth 2 (Two) Times a Day.   • ondansetron (ZOFRAN) 4 MG tablet Take 4 mg by mouth As Needed.   • sucralfate (CARAFATE) 1 g tablet Take 1 g by mouth 4 (Four) Times a Day.   • Syringe 23G X 1\" 3 ML misc Inject 1 syringe into the appropriate muscle as directed by prescriber Every 30 (Thirty) Days.   • tiZANidine (ZANAFLEX) 2 MG tablet Take 2 mg by mouth every night at bedtime.   • tolterodine LA (DETROL LA) 2 MG 24 hr capsule TAKE ONE CAPSULE BY MOUTH EVERY DAY   • traMADol (ULTRAM) 50 MG tablet Take 1 tablet by mouth Take As Directed. Take 1 tablet by mouth every 4 to 6 hours as needed for pain   • traZODone (DESYREL) 100 MG tablet Take 100 mg by mouth At Night As Needed.   • vitamin D (ERGOCALCIFEROL) 53687 units capsule capsule Take 1 capsule by mouth 1 (One) Time Per Week.     No current facility-administered medications on file prior to visit.          Review of Systems   Constitutional: Negative for fatigue and fever.   Respiratory: Negative for cough and shortness of breath.    Cardiovascular: Positive for leg swelling. Negative for chest pain and palpitations.   Gastrointestinal: Negative.    Musculoskeletal: " "Positive for arthralgias, back pain and gait problem.   Skin: Negative for rash.   Neurological: Positive for dizziness.       Objective   /86 (BP Location: Right arm, Patient Position: Sitting, Cuff Size: Large Adult)   Pulse 66   Temp 98.3 °F (36.8 °C) (Oral)   Resp 18   Ht 157.5 cm (62\")   Wt 117 kg (257 lb)   SpO2 93%   BMI 47.01 kg/m²     Physical Exam   Constitutional: She is oriented to person, place, and time. She appears well-developed and well-nourished. No distress.   Cardiovascular: Normal rate and regular rhythm.   No murmur heard.  Pulmonary/Chest: Effort normal and breath sounds normal. She has no wheezes.   Musculoskeletal: She exhibits edema.   Both feet bandaged with ace wraps , edema above the compression line.    wearing postop shoes.  Ambulating slowly but steady   Neurological: She is alert and oriented to person, place, and time.   Skin: Skin is warm and dry.   left forearm with 6 non-confluence areas of superficial skin abrasion 1 to 2.5 cm in length by 0.5 to 2.5 centimeter wide with some puckering at the wound edges, there is no active drainage and no bleeding. Olecranon with 2 cm  Light colored crust/ scab   purple hematoma along ulnar surface.    Psychiatric: She has a normal mood and affect.   Nursing note and vitals reviewed.          Assessment/Plan   Diagnoses and all orders for this visit:    1. Abrasion of left upper extremity, subsequent encounter (Primary)    2. Essential hypertension    3. Localized edema    4. Need for immunization against influenza  -     Fluad Quad >65 years      Increase lasix to 40 mg bid  Discussed reducing salt in diet, elevating feet 10 to 15 minutes 3-4 times a day above the level of the heart and wearing compression stockings to improve edema.           Return for as previously scheduled on 10/21/19.      AVS given with handouts appropriate to diagnoses addressed  "

## 2019-11-05 ENCOUNTER — OFFICE VISIT (OUTPATIENT)
Dept: FAMILY MEDICINE CLINIC | Facility: CLINIC | Age: 69
End: 2019-11-05

## 2019-11-05 VITALS
DIASTOLIC BLOOD PRESSURE: 86 MMHG | TEMPERATURE: 97.6 F | HEIGHT: 62 IN | HEART RATE: 86 BPM | WEIGHT: 273 LBS | OXYGEN SATURATION: 97 % | BODY MASS INDEX: 50.24 KG/M2 | SYSTOLIC BLOOD PRESSURE: 167 MMHG

## 2019-11-05 DIAGNOSIS — R73.9 HYPERGLYCEMIA: ICD-10-CM

## 2019-11-05 DIAGNOSIS — E03.9 ACQUIRED HYPOTHYROIDISM: ICD-10-CM

## 2019-11-05 DIAGNOSIS — M79.605 LEG PAIN, BILATERAL: ICD-10-CM

## 2019-11-05 DIAGNOSIS — E53.8 B12 DEFICIENCY: ICD-10-CM

## 2019-11-05 DIAGNOSIS — I10 ESSENTIAL HYPERTENSION: ICD-10-CM

## 2019-11-05 DIAGNOSIS — M79.604 LEG PAIN, BILATERAL: ICD-10-CM

## 2019-11-05 DIAGNOSIS — M48.062 SPINAL STENOSIS OF LUMBAR REGION WITH NEUROGENIC CLAUDICATION: Primary | ICD-10-CM

## 2019-11-05 DIAGNOSIS — R30.0 DYSURIA: ICD-10-CM

## 2019-11-05 DIAGNOSIS — E55.9 VITAMIN D DEFICIENCY: ICD-10-CM

## 2019-11-05 DIAGNOSIS — Z74.09 MOBILITY POOR: ICD-10-CM

## 2019-11-05 DIAGNOSIS — F33.2 SEVERE EPISODE OF RECURRENT MAJOR DEPRESSIVE DISORDER, WITHOUT PSYCHOTIC FEATURES (HCC): ICD-10-CM

## 2019-11-05 DIAGNOSIS — G62.89 PERIPHERAL AXONAL NEUROPATHY: ICD-10-CM

## 2019-11-05 DIAGNOSIS — R29.6 FREQUENT FALLS: ICD-10-CM

## 2019-11-05 DIAGNOSIS — E87.6 HYPOKALEMIA: ICD-10-CM

## 2019-11-05 DIAGNOSIS — N30.00 ACUTE CYSTITIS WITHOUT HEMATURIA: ICD-10-CM

## 2019-11-05 DIAGNOSIS — E66.01 MORBID OBESITY WITH BMI OF 45.0-49.9, ADULT (HCC): ICD-10-CM

## 2019-11-05 DIAGNOSIS — R32 INCONTINENCE OF URINE IN FEMALE: ICD-10-CM

## 2019-11-05 DIAGNOSIS — M1A.30X0 CHRONIC GOUT DUE TO RENAL IMPAIRMENT WITHOUT TOPHUS, UNSPECIFIED SITE: ICD-10-CM

## 2019-11-05 DIAGNOSIS — R26.89 LOSS OF BALANCE: ICD-10-CM

## 2019-11-05 LAB
BILIRUB BLD-MCNC: NEGATIVE MG/DL
CLARITY, POC: ABNORMAL
COLOR UR: ABNORMAL
GLUCOSE UR STRIP-MCNC: NEGATIVE MG/DL
KETONES UR QL: NEGATIVE
LEUKOCYTE EST, POC: ABNORMAL
NITRITE UR-MCNC: POSITIVE MG/ML
PH UR: 6 [PH] (ref 5–8)
PROT UR STRIP-MCNC: ABNORMAL MG/DL
RBC # UR STRIP: ABNORMAL /UL
SP GR UR: 1.01 (ref 1–1.03)
UROBILINOGEN UR QL: NORMAL

## 2019-11-05 PROCEDURE — 99215 OFFICE O/P EST HI 40 MIN: CPT | Performed by: FAMILY MEDICINE

## 2019-11-05 PROCEDURE — 81002 URINALYSIS NONAUTO W/O SCOPE: CPT | Performed by: FAMILY MEDICINE

## 2019-11-05 RX ORDER — CIPROFLOXACIN 500 MG/1
500 TABLET, FILM COATED ORAL 2 TIMES DAILY
Qty: 14 TABLET | Refills: 0 | Status: SHIPPED | OUTPATIENT
Start: 2019-11-05 | End: 2019-11-19

## 2019-11-05 RX ORDER — DESVENLAFAXINE SUCCINATE 50 MG/1
50 TABLET, EXTENDED RELEASE ORAL EVERY MORNING
Qty: 30 TABLET | Refills: 2 | Status: SHIPPED | OUTPATIENT
Start: 2019-11-05 | End: 2019-11-19

## 2019-11-05 RX ORDER — METOPROLOL SUCCINATE 200 MG/1
200 TABLET, EXTENDED RELEASE ORAL DAILY
Qty: 30 TABLET | Refills: 5 | Status: SHIPPED | OUTPATIENT
Start: 2019-11-05 | End: 2019-12-23 | Stop reason: SDUPTHER

## 2019-11-05 RX ORDER — TIZANIDINE 2 MG/1
2 TABLET ORAL
Qty: 30 TABLET | Refills: 2 | Status: SHIPPED | OUTPATIENT
Start: 2019-11-05 | End: 2020-01-21

## 2019-11-05 RX ORDER — TRAZODONE HYDROCHLORIDE 100 MG/1
100 TABLET ORAL NIGHTLY PRN
Qty: 30 TABLET | Refills: 2 | Status: SHIPPED | OUTPATIENT
Start: 2019-11-05 | End: 2019-11-25 | Stop reason: SDUPTHER

## 2019-11-05 RX ORDER — OXYBUTYNIN CHLORIDE 10 MG/1
TABLET, EXTENDED RELEASE ORAL
Refills: 3 | COMMUNITY
Start: 2019-10-01 | End: 2020-01-21 | Stop reason: SDUPTHER

## 2019-11-05 NOTE — PROGRESS NOTES
Chief Complaint   Patient presents with   • Urinary Tract Infection       Subjective   History of Present Illness   Elaine Toscano is a 69 y.o. female. Pt here 6 week follow up.  Pt having urinary symptoms of hurting when she pees and cloudy urine.  Additionally she reports increased frequency but denies fever nausea or vomiting.     pt has also fallen 3 times in the last 6 weeks.  She is reporting increased problems with ambulation and strength in her legs as well as ongoing muscle spasms and pain in her back.      The following portions of the patient's history were reviewed and updated as appropriate: allergies, current medications, past family history, past medical history, past social history, past surgical history and problem list.    Current Outpatient Medications on File Prior to Visit   Medication Sig   • alendronate (FOSAMAX) 70 MG tablet Take 1 tablet by mouth Every 7 (Seven) Days. Take on empty stomach. Do not lay down for 1 hour.   • allopurinol (ZYLOPRIM) 100 MG tablet Take 1 tablet by mouth 2 times a day at bedtime   • aspirin (ASPIR) 81 MG EC tablet Take 81 mg by mouth Daily.   • budesonide-formoterol (SYMBICORT) 160-4.5 MCG/ACT inhaler Inhale 2 puffs 2 (Two) Times a Day.   • busPIRone (BUSPAR) 10 MG tablet Take 10 mg by mouth 2 (Two) Times a Day.   • calcium carb-cholecalciferol (CALCIUM PLUS VITAMIN D3) 600-800 MG-UNIT tablet Take 600 mg by mouth Daily.   • cyanocobalamin 1000 MCG/ML injection Inject 1 mL into the appropriate muscle as directed by prescriber Every 30 (Thirty) Days.   • diclofenac (VOLTAREN) 1 % gel gel Apply 1 application topically to the appropriate area as directed As Needed.   • dicyclomine (BENTYL) 10 MG capsule Take 10 mg by mouth 4 (Four) Times a Day.   • furosemide (LASIX) 40 MG tablet TAKE ONE TABLET BY MOUTH TWICE DAILY   • gabapentin (NEURONTIN) 300 MG capsule Take 1 capsule by mouth 3 (Three) Times a Day.   • HYDROcodone-acetaminophen (NORCO) 7.5-325 MG per tablet Take 1  "tablet by mouth 4 (Four) Times a Day.   • linaclotide (LINZESS) 145 MCG capsule capsule Take 1 capsule by mouth Daily As Needed.   • metoclopramide (REGLAN) 5 MG tablet Take 5 mg by mouth 4 (Four) Times a Day.   • nystatin (MYCOSTATIN) 040476 UNIT/GM cream Apply  topically to the appropriate area as directed 2 (Two) Times a Day.   • omeprazole (priLOSEC) 40 MG capsule Take 40 mg by mouth 2 (Two) Times a Day.   • ondansetron (ZOFRAN) 4 MG tablet Take 4 mg by mouth As Needed.   • oxybutynin XL (DITROPAN-XL) 10 MG 24 hr tablet Take 1 tablet(s) every day by oral route for 45 days.   • sucralfate (CARAFATE) 1 g tablet Take 1 g by mouth 4 (Four) Times a Day.   • Syringe 23G X 1\" 3 ML misc Inject 1 syringe into the appropriate muscle as directed by prescriber Every 30 (Thirty) Days.   • tolterodine LA (DETROL LA) 2 MG 24 hr capsule TAKE ONE CAPSULE BY MOUTH EVERY DAY   • vitamin D (ERGOCALCIFEROL) 80986 units capsule capsule Take 1 capsule by mouth 1 (One) Time Per Week.     No current facility-administered medications on file prior to visit.          Review of Systems   Constitutional: Positive for fatigue. Negative for fever.   Respiratory: Negative for cough and shortness of breath.    Cardiovascular: Positive for leg swelling. Negative for chest pain and palpitations.   Gastrointestinal: Negative.    Musculoskeletal: Positive for arthralgias, back pain and gait problem.   Skin: Negative for rash.   Neurological: Positive for dizziness, light-headedness and memory problem.       Objective   Vitals:    11/05/19 1352   BP: 167/86   BP Location: Right arm   Patient Position: Sitting   Cuff Size: Adult   Pulse: 86   Temp: 97.6 °F (36.4 °C)   TempSrc: Oral   SpO2: 97%   Weight: 124 kg (273 lb)   Height: 157.5 cm (62.01\")      Body mass index is 49.92 kg/m².    Physical Exam   Constitutional: She is oriented to person, place, and time. She appears well-developed and well-nourished. No distress.   HENT:   Head: Normocephalic " and atraumatic.   Cardiovascular: Normal rate and regular rhythm.   No murmur heard.  Pulmonary/Chest: Effort normal and breath sounds normal. She has no wheezes.   Musculoskeletal: She exhibits no edema.    Ambulating slowly but steady   Neurological: She is alert and oriented to person, place, and time.   Skin: Skin is warm and dry.   Few bruises, the previous skin tears and abrasions have resolved   Psychiatric: She has a normal mood and affect.   Nursing note and vitals reviewed.      Lab Results   Component Value Date    GLU 91 11/05/2019    BUN 18 11/05/2019    CREATININE 1.52 (H) 11/05/2019    EGFRIFNONA 34 (L) 11/05/2019    EGFRIFAFRI 39 (L) 11/05/2019     11/05/2019    K 3.5 11/05/2019     11/05/2019    CALCIUM 8.7 11/05/2019    ALBUMIN 3.5 11/05/2019    BILITOT <0.2 11/05/2019    ALKPHOS 104 11/05/2019    AST 20 11/05/2019    ALT 24 11/05/2019    WBC 6.7 11/05/2019    RBC 3.86 11/05/2019    HCT 34.0 11/05/2019    MCV 88 11/05/2019    MCH 27.7 11/05/2019    TSH 2.200 11/05/2019    PEOH57EO 50.1 11/05/2019    URICACID 5.4 11/05/2019           Assessment/Plan   Diagnoses and all orders for this visit:    1. Spinal stenosis of lumbar region with neurogenic claudication (Primary)  -     Ambulatory Referral to [facility]  -     Ambulatory Referral to Physical Therapy Evaluate and treat  -     tiZANidine (ZANAFLEX) 2 MG tablet; Take 1 tablet by mouth every night at bedtime.  Dispense: 30 tablet; Refill: 2    2. Leg pain, bilateral  -     Ambulatory Referral to Physical Therapy Evaluate and treat  -     tiZANidine (ZANAFLEX) 2 MG tablet; Take 1 tablet by mouth every night at bedtime.  Dispense: 30 tablet; Refill: 2    3. Loss of balance  -     Ambulatory Referral to Physical Therapy Evaluate and treat    4. Mobility poor  -     Ambulatory Referral to Physical Therapy Evaluate and treat    5. Morbid obesity with BMI of 45.0-49.9, adult (CMS/[facility])    6. Frequent falls  -     Ambulatory  Referral to Physical Therapy Evaluate and treat    7. Dysuria  -     POCT urinalysis dipstick, manual    8. Acute cystitis without hematuria  -     POCT urinalysis dipstick, manual  -     ciprofloxacin (CIPRO) 500 MG tablet; Take 1 tablet by mouth 2 (Two) Times a Day.  Dispense: 14 tablet; Refill: 0    9. Peripheral axonal neuropathy  -     Ambulatory Referral to Physical Therapy Evaluate and treat    10. Severe episode of recurrent major depressive disorder, without psychotic features (CMS/HCC)  -     desvenlafaxine (PRISTIQ) 50 MG 24 hr tablet; Take 1 tablet by mouth Every Morning.  Dispense: 30 tablet; Refill: 2  -     traZODone (DESYREL) 100 MG tablet; Take 1 tablet by mouth At Night As Needed for Sleep.  Dispense: 30 tablet; Refill: 2    11. Hyperglycemia  -     Hemoglobin A1c    12. Hypokalemia  -     Comprehensive Metabolic Panel  -     CBC & Differential    13. Acquired hypothyroidism  -     TSH    14. Vitamin D deficiency  -     Vitamin D 25 Hydroxy    15. B12 deficiency  -     Vitamin B12    16. Chronic gout due to renal impairment without tophus, unspecified site  -     Uric Acid    17. Essential hypertension  -     Comprehensive Metabolic Panel  -     metoprolol succinate XL (TOPROL-XL) 200 MG 24 hr tablet; Take 1 tablet by mouth Daily.  Dispense: 30 tablet; Refill: 5    18. Incontinence of urine in female      Patient has acute UTI treating with Cipro.  Patient has long-standing spinal stenosis and increasing symptoms of neuropathy and weakness referring to spine specialist and physical therapy for further evaluation and strengthening.       Return in about 2 weeks (around 11/19/2019) for review labs, F/U falls, memory HTN.      AVS given with handouts appropriate to diagnoses addressed

## 2019-11-06 LAB
25(OH)D3+25(OH)D2 SERPL-MCNC: 50.1 NG/ML (ref 30–100)
ALBUMIN SERPL-MCNC: 3.5 G/DL (ref 3.5–4.8)
ALBUMIN/GLOB SERPL: 1.4 {RATIO} (ref 1.2–2.2)
ALP SERPL-CCNC: 104 IU/L (ref 39–117)
ALT SERPL-CCNC: 24 IU/L (ref 0–32)
AST SERPL-CCNC: 20 IU/L (ref 0–40)
BASOPHILS # BLD AUTO: 0 X10E3/UL (ref 0–0.2)
BASOPHILS NFR BLD AUTO: 0 %
BILIRUB SERPL-MCNC: <0.2 MG/DL (ref 0–1.2)
BUN SERPL-MCNC: 18 MG/DL (ref 8–27)
BUN/CREAT SERPL: 12 (ref 12–28)
CALCIUM SERPL-MCNC: 8.7 MG/DL (ref 8.7–10.3)
CHLORIDE SERPL-SCNC: 102 MMOL/L (ref 96–106)
CO2 SERPL-SCNC: 23 MMOL/L (ref 20–29)
CREAT SERPL-MCNC: 1.52 MG/DL (ref 0.57–1)
EOSINOPHIL # BLD AUTO: 0.2 X10E3/UL (ref 0–0.4)
EOSINOPHIL NFR BLD AUTO: 4 %
ERYTHROCYTE [DISTWIDTH] IN BLOOD BY AUTOMATED COUNT: 16.5 % (ref 12.3–15.4)
GLOBULIN SER CALC-MCNC: 2.5 G/DL (ref 1.5–4.5)
GLUCOSE SERPL-MCNC: 91 MG/DL (ref 65–99)
HBA1C MFR BLD: 5.8 % (ref 4.8–5.6)
HCT VFR BLD AUTO: 34 % (ref 34–46.6)
HGB BLD-MCNC: 10.7 G/DL (ref 11.1–15.9)
IMM GRANULOCYTES # BLD AUTO: 0.1 X10E3/UL (ref 0–0.1)
IMM GRANULOCYTES NFR BLD AUTO: 1 %
LYMPHOCYTES # BLD AUTO: 1.2 X10E3/UL (ref 0.7–3.1)
LYMPHOCYTES NFR BLD AUTO: 17 %
MCH RBC QN AUTO: 27.7 PG (ref 26.6–33)
MCHC RBC AUTO-ENTMCNC: 31.5 G/DL (ref 31.5–35.7)
MCV RBC AUTO: 88 FL (ref 79–97)
MONOCYTES # BLD AUTO: 0.7 X10E3/UL (ref 0.1–0.9)
MONOCYTES NFR BLD AUTO: 11 %
NEUTROPHILS # BLD AUTO: 4.5 X10E3/UL (ref 1.4–7)
NEUTROPHILS NFR BLD AUTO: 67 %
PLATELET # BLD AUTO: 262 X10E3/UL (ref 150–450)
POTASSIUM SERPL-SCNC: 3.5 MMOL/L (ref 3.5–5.2)
PROT SERPL-MCNC: 6 G/DL (ref 6–8.5)
RBC # BLD AUTO: 3.86 X10E6/UL (ref 3.77–5.28)
SODIUM SERPL-SCNC: 144 MMOL/L (ref 134–144)
TSH SERPL DL<=0.005 MIU/L-ACNC: 2.2 UIU/ML (ref 0.45–4.5)
URATE SERPL-MCNC: 5.4 MG/DL (ref 2.5–7.1)
VIT B12 SERPL-MCNC: 1297 PG/ML (ref 232–1245)
WBC # BLD AUTO: 6.7 X10E3/UL (ref 3.4–10.8)

## 2019-11-07 RX ORDER — ERGOCALCIFEROL 1.25 MG/1
CAPSULE ORAL
Qty: 5 CAPSULE | Refills: 3 | OUTPATIENT
Start: 2019-11-07

## 2019-11-08 ENCOUNTER — TELEPHONE (OUTPATIENT)
Dept: FAMILY MEDICINE CLINIC | Facility: CLINIC | Age: 69
End: 2019-11-08

## 2019-11-08 DIAGNOSIS — I10 ESSENTIAL HYPERTENSION: ICD-10-CM

## 2019-11-08 DIAGNOSIS — R60.0 LOCALIZED EDEMA: ICD-10-CM

## 2019-11-08 DIAGNOSIS — N18.9 HISTORY OF ANEMIA DUE TO CHRONIC KIDNEY DISEASE: Primary | ICD-10-CM

## 2019-11-08 DIAGNOSIS — Z86.2 HISTORY OF ANEMIA DUE TO CHRONIC KIDNEY DISEASE: Primary | ICD-10-CM

## 2019-11-08 NOTE — TELEPHONE ENCOUNTER
Patient calling stating that both her legs are swollen.  Started two days ago.  Swelled when she wakes up in morning, elevates her legs during the day. She is taking 2 lasix daily. Please advise.

## 2019-11-08 NOTE — TELEPHONE ENCOUNTER
Continue Lasix, reduce salt in diet, elevating feet at least 10 to 15 minutes 3-4 times a day above the level of the heart get some compression stockings i.e. NIKOLAY hose or Jobst stockings to improve edema. Repeat CMP and CBC to make sure renal function and hemoglobin are stable.  If she is having shortness of breath or chest pain or any increase in leg pain, or swelling become severe she should go to urgent care or emergency department as needed

## 2019-11-08 NOTE — TELEPHONE ENCOUNTER
Called patient. Patient's identity verified. Advised her of the above. She voiced understanding. Lab orders placed.

## 2019-11-11 ENCOUNTER — RESULTS ENCOUNTER (OUTPATIENT)
Dept: FAMILY MEDICINE CLINIC | Facility: CLINIC | Age: 69
End: 2019-11-11

## 2019-11-11 DIAGNOSIS — I10 ESSENTIAL HYPERTENSION: ICD-10-CM

## 2019-11-11 DIAGNOSIS — R60.0 LOCALIZED EDEMA: ICD-10-CM

## 2019-11-11 DIAGNOSIS — Z86.2 HISTORY OF ANEMIA DUE TO CHRONIC KIDNEY DISEASE: ICD-10-CM

## 2019-11-11 DIAGNOSIS — N18.9 HISTORY OF ANEMIA DUE TO CHRONIC KIDNEY DISEASE: ICD-10-CM

## 2019-11-12 DIAGNOSIS — M79.604 LEG PAIN, BILATERAL: ICD-10-CM

## 2019-11-12 DIAGNOSIS — Z74.09 MOBILITY POOR: Primary | ICD-10-CM

## 2019-11-12 DIAGNOSIS — M79.605 LEG PAIN, BILATERAL: ICD-10-CM

## 2019-11-12 DIAGNOSIS — R26.89 LOSS OF BALANCE: ICD-10-CM

## 2019-11-19 ENCOUNTER — OFFICE VISIT (OUTPATIENT)
Dept: FAMILY MEDICINE CLINIC | Facility: CLINIC | Age: 69
End: 2019-11-19

## 2019-11-19 VITALS
TEMPERATURE: 98 F | OXYGEN SATURATION: 91 % | HEART RATE: 60 BPM | WEIGHT: 273 LBS | DIASTOLIC BLOOD PRESSURE: 81 MMHG | BODY MASS INDEX: 50.24 KG/M2 | SYSTOLIC BLOOD PRESSURE: 151 MMHG | HEIGHT: 62 IN

## 2019-11-19 DIAGNOSIS — M48.062 SPINAL STENOSIS OF LUMBAR REGION WITH NEUROGENIC CLAUDICATION: ICD-10-CM

## 2019-11-19 DIAGNOSIS — N18.4 CHRONIC PROGRESSIVE RENAL FAILURE, STAGE 4 (SEVERE) (HCC): ICD-10-CM

## 2019-11-19 DIAGNOSIS — D50.9 IRON DEFICIENCY ANEMIA, UNSPECIFIED IRON DEFICIENCY ANEMIA TYPE: ICD-10-CM

## 2019-11-19 DIAGNOSIS — F33.2 SEVERE EPISODE OF RECURRENT MAJOR DEPRESSIVE DISORDER, WITHOUT PSYCHOTIC FEATURES (HCC): ICD-10-CM

## 2019-11-19 DIAGNOSIS — I10 ESSENTIAL HYPERTENSION: Primary | ICD-10-CM

## 2019-11-19 DIAGNOSIS — Z74.09 MOBILITY POOR: ICD-10-CM

## 2019-11-19 PROBLEM — D64.9 ANEMIA: Status: ACTIVE | Noted: 2019-11-19

## 2019-11-19 PROCEDURE — 99214 OFFICE O/P EST MOD 30 MIN: CPT | Performed by: FAMILY MEDICINE

## 2019-11-19 RX ORDER — CYCLOSPORINE 0.5 MG/ML
1 EMULSION OPHTHALMIC 2 TIMES DAILY
Status: ON HOLD | COMMUNITY
Start: 2019-10-24 | End: 2020-04-24

## 2019-11-19 RX ORDER — DESVENLAFAXINE 100 MG/1
100 TABLET, EXTENDED RELEASE ORAL EVERY MORNING
Qty: 30 TABLET | Refills: 12 | Status: SHIPPED | OUTPATIENT
Start: 2019-11-19 | End: 2020-01-21

## 2019-11-19 RX ORDER — POTASSIUM CHLORIDE 750 MG/1
10 TABLET, FILM COATED, EXTENDED RELEASE ORAL DAILY
Refills: 5 | COMMUNITY
Start: 2019-11-15 | End: 2021-02-10 | Stop reason: SDUPTHER

## 2019-11-19 NOTE — PATIENT INSTRUCTIONS
Prediabetes Eating Plan  Prediabetes is a condition that causes blood sugar (glucose) levels to be higher than normal. This increases the risk for developing diabetes. In order to prevent diabetes from developing, your health care provider may recommend a diet and other lifestyle changes to help you:  · Control your blood glucose levels.  · Improve your cholesterol levels.  · Manage your blood pressure.  Your health care provider may recommend working with a diet and nutrition specialist (dietitian) to make a meal plan that is best for you.  What are tips for following this plan?  Lifestyle  · Set weight loss goals with the help of your health care team. It is recommended that most people with prediabetes lose 7% of their current body weight.  · Exercise for at least 30 minutes at least 5 days a week.  · Attend a support group or seek ongoing support from a mental health counselor.  · Take over-the-counter and prescription medicines only as told by your health care provider.  Reading food labels  · Read food labels to check the amount of fat, salt (sodium), and sugar in prepackaged foods. Avoid foods that have:  ? Saturated fats.  ? Trans fats.  ? Added sugars.  · Avoid foods that have more than 300 milligrams (mg) of sodium per serving. Limit your daily sodium intake to less than 2,300 mg each day.  Shopping  · Avoid buying pre-made and processed foods.  Cooking  · Cook with olive oil. Do not use butter, lard, or ghee.  · Bake, broil, grill, or boil foods. Avoid frying.  Meal planning    · Work with your dietitian to develop an eating plan that is right for you. This may include:  ? Tracking how many calories you take in. Use a food diary, notebook, or mobile application to track what you eat at each meal.  ? Using the glycemic index (GI) to plan your meals. The index tells you how quickly a food will raise your blood glucose. Choose low-GI foods. These foods take a longer time to raise blood glucose.  · Consider  following a Mediterranean diet. This diet includes:  ? Several servings each day of fresh fruits and vegetables.  ? Eating fish at least twice a week.  ? Several servings each day of whole grains, beans, nuts, and seeds.  ? Using olive oil instead of other fats.  ? Moderate alcohol consumption.  ? Eating small amounts of red meat and whole-fat dairy.  · If you have high blood pressure, you may need to limit your sodium intake or follow a diet such as the DASH eating plan. DASH is an eating plan that aims to lower high blood pressure.  What foods are recommended?  The items listed below may not be a complete list. Talk with your dietitian about what dietary choices are best for you.  Grains  Whole grains, such as whole-wheat or whole-grain breads, crackers, cereals, and pasta. Unsweetened oatmeal. Bulgur. Barley. Quinoa. Brown rice. Corn or whole-wheat flour tortillas or taco shells.  Vegetables  Lettuce. Spinach. Peas. Beets. Cauliflower. Cabbage. Broccoli. Carrots. Tomatoes. Squash. Eggplant. Herbs. Peppers. Onions. Cucumbers. [place] sprouts.  Fruits  Berries. Bananas. Apples. Oranges. Grapes. Papaya. [place]. Pomegranate. Kiwi. Grapefruit. Cherries.  Meats and other protein foods  Seafood. Poultry without skin. Lean cuts of pork and beef. Tofu. Eggs. Nuts. Beans.  Dairy  Low-fat or fat-free dairy products, such as yogurt, cottage cheese, and cheese.  Beverages  Water. Tea. Coffee. Sugar-free or diet soda. [place] water. Lowfat or no-fat milk. Milk alternatives, such as soy or almond milk.  Fats and oils  Olive oil. Canola oil. Sunflower oil. Grapeseed oil. Avocado. Walnuts.  Sweets and desserts  Sugar-free or low-fat pudding. Sugar-free or low-fat ice cream and other frozen treats.  Seasoning and other foods  Herbs. Sodium-free spices. Mustard. Relish. Low-fat, low-sugar ketchup. Low-fat, low-sugar barbecue sauce. Low-fat or fat-free mayonnaise.  What foods are not recommended?  The items listed below may not be a  complete list. Talk with your dietitian about what dietary choices are best for you.  Grains  Refined white flour and flour products, such as bread, pasta, snack foods, and cereals.  Vegetables  Canned vegetables. Frozen vegetables with butter or cream sauce.  Fruits  Fruits canned with syrup.  Meats and other protein foods  Fatty cuts of meat. Poultry with skin. Breaded or fried meat. Processed meats.  Dairy  Full-fat yogurt, cheese, or milk.  Beverages  Sweetened drinks, such as sweet iced tea and soda.  Fats and oils  Butter. Lard. Ghee.  Sweets and desserts  Baked goods, such as cake, cupcakes, pastries, cookies, and cheesecake.  Seasoning and other foods  Spice mixes with added salt. Ketchup. Barbecue sauce. Mayonnaise.  Summary  · To prevent diabetes from developing, you may need to make diet and other lifestyle changes to help control blood sugar, improve cholesterol levels, and manage your blood pressure.  · Set weight loss goals with the help of your health care team. It is recommended that most people with prediabetes lose 7 percent of their current body weight.  · Consider following a Mediterranean diet that includes plenty of fresh fruits and vegetables, whole grains, beans, nuts, seeds, fish, lean meat, low-fat dairy, and healthy oils.  This information is not intended to replace advice given to you by your health care provider. Make sure you discuss any questions you have with your health care provider.  Document Released: 05/03/2016 Document Revised: 02/21/2018 Document Reviewed: 02/21/2018  [employer] Interactive Patient Education © 2019 [employer]

## 2019-11-19 NOTE — PROGRESS NOTES
Chief Complaint   Patient presents with   • Hypertension       Subjective   Hypertension   Pertinent negatives include no chest pain, palpitations or shortness of breath.      Elaine Toscano is a 69 y.o. female.  Patient returns for a 2-week follow-up on hypertension, falls, and was supposed to have had lab work but did not do before visit and will do following this visit.  She had had 3 falls prior to her previous appointment has not had an additional fall in the past 2 weeks.  Had referred to spine specialist and physical therapy for evaluation and strengthening. Reports legs are week and hurt. Seeing pain management.    This am prior to taking metoprolol BP was 183/101. Forgot to increase from 100 to 200, pharmacy delivers tomorrow.    Stress, no pateince, irritable, 1st holidays without father. Dont sleep well wake up often.  Pristiq not enough. Previously Prozac worked better.         The following portions of the patient's history were reviewed and updated as appropriate: allergies, current medications, past family history, past medical history, past social history, past surgical history and problem list.    Current Outpatient Medications on File Prior to Visit   Medication Sig   • alendronate (FOSAMAX) 70 MG tablet Take 1 tablet by mouth Every 7 (Seven) Days. Take on empty stomach. Do not lay down for 1 hour.   • allopurinol (ZYLOPRIM) 100 MG tablet Take 1 tablet by mouth 2 times a day at bedtime   • aspirin (ASPIR) 81 MG EC tablet Take 81 mg by mouth Daily.   • budesonide-formoterol (SYMBICORT) 160-4.5 MCG/ACT inhaler Inhale 2 puffs 2 (Two) Times a Day.   • busPIRone (BUSPAR) 10 MG tablet Take 10 mg by mouth 2 (Two) Times a Day.   • calcium carb-cholecalciferol (CALCIUM PLUS VITAMIN D3) 600-800 MG-UNIT tablet Take 600 mg by mouth Daily.   • cyanocobalamin 1000 MCG/ML injection Inject 1 mL into the appropriate muscle as directed by prescriber Every 30 (Thirty) Days.   • cycloSPORINE (RESTASIS) 0.05 % ophthalmic  "emulsion Administer 1 drop to both eyes 2 (Two) Times a Day.   • diclofenac (VOLTAREN) 1 % gel gel Apply 1 application topically to the appropriate area as directed As Needed.   • dicyclomine (BENTYL) 10 MG capsule Take 10 mg by mouth 4 (Four) Times a Day.   • furosemide (LASIX) 40 MG tablet TAKE ONE TABLET BY MOUTH TWICE DAILY   • gabapentin (NEURONTIN) 300 MG capsule Take 1 capsule by mouth 3 (Three) Times a Day.   • HYDROcodone-acetaminophen (NORCO) 7.5-325 MG per tablet Take 1 tablet by mouth 4 (Four) Times a Day.   • linaclotide (LINZESS) 145 MCG capsule capsule Take 1 capsule by mouth Daily As Needed.   • metoclopramide (REGLAN) 5 MG tablet Take 5 mg by mouth 4 (Four) Times a Day.   • metoprolol succinate XL (TOPROL-XL) 200 MG 24 hr tablet Take 1 tablet by mouth Daily.   • nystatin (MYCOSTATIN) 674121 UNIT/GM cream Apply  topically to the appropriate area as directed 2 (Two) Times a Day.   • omeprazole (priLOSEC) 40 MG capsule Take 40 mg by mouth 2 (Two) Times a Day.   • ondansetron (ZOFRAN) 4 MG tablet Take 4 mg by mouth As Needed.   • oxybutynin XL (DITROPAN-XL) 10 MG 24 hr tablet Take 1 tablet(s) every day by oral route for 45 days.   • potassium chloride (K-DUR) 10 MEQ CR tablet Take 10 mEq by mouth Daily.   • sucralfate (CARAFATE) 1 g tablet Take 1 g by mouth 4 (Four) Times a Day.   • Syringe 23G X 1\" 3 ML misc Inject 1 syringe into the appropriate muscle as directed by prescriber Every 30 (Thirty) Days.   • tiZANidine (ZANAFLEX) 2 MG tablet Take 1 tablet by mouth every night at bedtime.   • tolterodine LA (DETROL LA) 2 MG 24 hr capsule TAKE ONE CAPSULE BY MOUTH EVERY DAY   • traZODone (DESYREL) 100 MG tablet Take 1 tablet by mouth At Night As Needed for Sleep.   • vitamin D (ERGOCALCIFEROL) 50683 units capsule capsule Take 1 capsule by mouth 1 (One) Time Per Week.   • [DISCONTINUED] ciprofloxacin (CIPRO) 500 MG tablet Take 1 tablet by mouth 2 (Two) Times a Day.   • [DISCONTINUED] desvenlafaxine (PRISTIQ) " "50 MG 24 hr tablet Take 1 tablet by mouth Every Morning.   • [DISCONTINUED] Cyanocobalamin 1000 MCG/ML kit Inject 1 dose as directed Every 30 (Thirty) Days.     No current facility-administered medications on file prior to visit.          Review of Systems   Constitutional: Positive for fatigue. Negative for fever.   Respiratory: Negative for cough and shortness of breath.    Cardiovascular: Positive for leg swelling. Negative for chest pain and palpitations.   Gastrointestinal: Negative.    Musculoskeletal: Positive for arthralgias, back pain and gait problem.   Skin: Negative for rash.   Neurological: Positive for dizziness, weakness, light-headedness and memory problem.   Psychiatric/Behavioral: Positive for stress. The patient is nervous/anxious.        Objective   Vitals:    11/19/19 0937   BP: 151/81   BP Location: Right arm   Patient Position: Sitting   Cuff Size: Adult   Pulse: 60   Temp: 98 °F (36.7 °C)   TempSrc: Oral   SpO2: 91%   Weight: 124 kg (273 lb)   Height: 157.5 cm (62.01\")      Body mass index is 49.92 kg/m².    Physical Exam   Constitutional: She is oriented to person, place, and time. She appears well-developed and well-nourished. No distress.   HENT:   Head: Normocephalic and atraumatic.   Cardiovascular: Normal rate and regular rhythm.   No murmur heard.  Pulmonary/Chest: Effort normal and breath sounds normal. She has no wheezes.   Musculoskeletal: She exhibits no edema.    Ambulating slowly but steady   Neurological: She is alert and oriented to person, place, and time.   Skin: Skin is warm and dry.   Few bruises, the previous skin tears and abrasions have resolved   Psychiatric: She has a normal mood and affect.   Nursing note and vitals reviewed.      Lab Results   Component Value Date    GLU 91 11/05/2019    BUN 18 11/05/2019    CREATININE 1.52 (H) 11/05/2019    EGFRIFNONA 34 (L) 11/05/2019    EGFRIFAFRI 39 (L) 11/05/2019     11/05/2019    K 3.5 11/05/2019     11/05/2019    " CALCIUM 8.7 11/05/2019    ALBUMIN 3.5 11/05/2019    BILITOT <0.2 11/05/2019    ALKPHOS 104 11/05/2019    AST 20 11/05/2019    ALT 24 11/05/2019    WBC 6.7 11/05/2019    RBC 3.86 11/05/2019    HCT 34.0 11/05/2019    MCV 88 11/05/2019    MCH 27.7 11/05/2019    TSH 2.200 11/05/2019    ATRV06BT 50.1 11/05/2019    URICACID 5.4 11/05/2019           Assessment/Plan   Diagnoses and all orders for this visit:    1. Essential hypertension (Primary)    2. Severe episode of recurrent major depressive disorder, without psychotic features (CMS/HCC)  -     desvenlafaxine (PRISTIQ) 100 MG 24 hr tablet; Take 1 tablet by mouth Every Morning.  Dispense: 30 tablet; Refill: 12    3. Mobility poor    4. Spinal stenosis of lumbar region with neurogenic claudication    5. Iron deficiency anemia, unspecified iron deficiency anemia type    6. Chronic progressive renal failure, stage 4 (severe) (CMS/HCC)    Obtain labs CBC BMP to follow-up on anemia and renal failure as previously ordered-  today    HTN still above goal, increase metoprolol to 200 mg as previously directed and if still not at goal will add second agent such at  ARB (coozar 50 mg)    long-standing spinal stenosis and increasing symptoms of neuropathy and weakness.  Has initial physical therapy appointment scheduled on 11/21/19 at [place] and has an upcoming appointment with spine specialist as well.   Return in about 4 weeks (around 12/17/2019).      AVS given with handouts appropriate to diagnoses addressed

## 2019-11-20 LAB
ALBUMIN SERPL-MCNC: 3.7 G/DL (ref 3.5–4.8)
ALBUMIN/GLOB SERPL: 1.7 {RATIO} (ref 1.2–2.2)
ALP SERPL-CCNC: 101 IU/L (ref 39–117)
ALT SERPL-CCNC: 11 IU/L (ref 0–32)
AST SERPL-CCNC: 14 IU/L (ref 0–40)
BASOPHILS # BLD AUTO: 0 X10E3/UL (ref 0–0.2)
BASOPHILS NFR BLD AUTO: 0 %
BILIRUB SERPL-MCNC: 0.2 MG/DL (ref 0–1.2)
BUN SERPL-MCNC: 18 MG/DL (ref 8–27)
BUN/CREAT SERPL: 12 (ref 12–28)
CALCIUM SERPL-MCNC: 8.8 MG/DL (ref 8.7–10.3)
CHLORIDE SERPL-SCNC: 99 MMOL/L (ref 96–106)
CO2 SERPL-SCNC: 28 MMOL/L (ref 20–29)
CREAT SERPL-MCNC: 1.45 MG/DL (ref 0.57–1)
EOSINOPHIL # BLD AUTO: 0.2 X10E3/UL (ref 0–0.4)
EOSINOPHIL NFR BLD AUTO: 3 %
ERYTHROCYTE [DISTWIDTH] IN BLOOD BY AUTOMATED COUNT: 15.9 % (ref 12.3–15.4)
GLOBULIN SER CALC-MCNC: 2.2 G/DL (ref 1.5–4.5)
GLUCOSE SERPL-MCNC: 103 MG/DL (ref 65–99)
HCT VFR BLD AUTO: 34.9 % (ref 34–46.6)
HGB BLD-MCNC: 11 G/DL (ref 11.1–15.9)
IMM GRANULOCYTES # BLD AUTO: 0.1 X10E3/UL (ref 0–0.1)
IMM GRANULOCYTES NFR BLD AUTO: 1 %
LYMPHOCYTES # BLD AUTO: 1 X10E3/UL (ref 0.7–3.1)
LYMPHOCYTES NFR BLD AUTO: 16 %
MCH RBC QN AUTO: 28.1 PG (ref 26.6–33)
MCHC RBC AUTO-ENTMCNC: 31.5 G/DL (ref 31.5–35.7)
MCV RBC AUTO: 89 FL (ref 79–97)
MONOCYTES # BLD AUTO: 0.4 X10E3/UL (ref 0.1–0.9)
MONOCYTES NFR BLD AUTO: 7 %
NEUTROPHILS # BLD AUTO: 4.8 X10E3/UL (ref 1.4–7)
NEUTROPHILS NFR BLD AUTO: 73 %
PLATELET # BLD AUTO: 298 X10E3/UL (ref 150–450)
POTASSIUM SERPL-SCNC: 3.5 MMOL/L (ref 3.5–5.2)
PROT SERPL-MCNC: 5.9 G/DL (ref 6–8.5)
RBC # BLD AUTO: 3.92 X10E6/UL (ref 3.77–5.28)
SODIUM SERPL-SCNC: 145 MMOL/L (ref 134–144)
WBC # BLD AUTO: 6.5 X10E3/UL (ref 3.4–10.8)

## 2019-11-25 DIAGNOSIS — F33.2 SEVERE EPISODE OF RECURRENT MAJOR DEPRESSIVE DISORDER, WITHOUT PSYCHOTIC FEATURES (HCC): ICD-10-CM

## 2019-11-25 RX ORDER — TRAZODONE HYDROCHLORIDE 100 MG/1
100 TABLET ORAL NIGHTLY PRN
Qty: 90 TABLET | Refills: 1 | Status: SHIPPED | OUTPATIENT
Start: 2019-11-25 | End: 2019-12-13

## 2019-11-25 RX ORDER — ALLOPURINOL 100 MG/1
TABLET ORAL
Qty: 180 TABLET | Refills: 1 | Status: SHIPPED | OUTPATIENT
Start: 2019-11-25 | End: 2020-05-13

## 2019-12-02 RX ORDER — TOLTERODINE 2 MG/1
CAPSULE, EXTENDED RELEASE ORAL
Qty: 30 CAPSULE | Refills: 2 | Status: SHIPPED | OUTPATIENT
Start: 2019-12-02 | End: 2020-02-26

## 2019-12-06 ENCOUNTER — TELEPHONE (OUTPATIENT)
Dept: FAMILY MEDICINE CLINIC | Facility: CLINIC | Age: 69
End: 2019-12-06

## 2019-12-06 NOTE — TELEPHONE ENCOUNTER
Pt called to ask if she should take an extra potassium pill.  She is having incresed cramping in her legs.

## 2019-12-06 NOTE — TELEPHONE ENCOUNTER
Last 2 potassium levels were at low end of normal at 3.5, yes she can take an extra potassium daily for up to 1 week but if continue longer than that we will need to check a potassium level to ensure not getting too much.

## 2019-12-07 NOTE — TELEPHONE ENCOUNTER
TC to patient with instructions per Josefina Landeros MD. Patient understands and will call next week to update.

## 2019-12-13 ENCOUNTER — RESULTS ENCOUNTER (OUTPATIENT)
Dept: FAMILY MEDICINE CLINIC | Facility: CLINIC | Age: 69
End: 2019-12-13

## 2019-12-13 ENCOUNTER — OFFICE VISIT (OUTPATIENT)
Dept: FAMILY MEDICINE CLINIC | Facility: CLINIC | Age: 69
End: 2019-12-13

## 2019-12-13 VITALS
OXYGEN SATURATION: 93 % | BODY MASS INDEX: 46.93 KG/M2 | WEIGHT: 255 LBS | DIASTOLIC BLOOD PRESSURE: 78 MMHG | HEIGHT: 62 IN | TEMPERATURE: 98.5 F | HEART RATE: 63 BPM | SYSTOLIC BLOOD PRESSURE: 121 MMHG

## 2019-12-13 DIAGNOSIS — E66.01 MORBID OBESITY WITH BMI OF 45.0-49.9, ADULT (HCC): ICD-10-CM

## 2019-12-13 DIAGNOSIS — R30.0 DYSURIA: ICD-10-CM

## 2019-12-13 DIAGNOSIS — F33.2 SEVERE EPISODE OF RECURRENT MAJOR DEPRESSIVE DISORDER, WITHOUT PSYCHOTIC FEATURES (HCC): ICD-10-CM

## 2019-12-13 DIAGNOSIS — M47.26 OTHER SPONDYLOSIS WITH RADICULOPATHY, LUMBAR REGION: ICD-10-CM

## 2019-12-13 DIAGNOSIS — M51.36 DEGENERATION OF LUMBAR INTERVERTEBRAL DISC: Primary | ICD-10-CM

## 2019-12-13 DIAGNOSIS — M48.062 SPINAL STENOSIS OF LUMBAR REGION WITH NEUROGENIC CLAUDICATION: ICD-10-CM

## 2019-12-13 DIAGNOSIS — N30.00 ACUTE CYSTITIS WITHOUT HEMATURIA: ICD-10-CM

## 2019-12-13 LAB
BILIRUB BLD-MCNC: NEGATIVE MG/DL
CLARITY, POC: ABNORMAL
COLOR UR: YELLOW
GLUCOSE UR STRIP-MCNC: NEGATIVE MG/DL
KETONES UR QL: NEGATIVE
LEUKOCYTE EST, POC: ABNORMAL
NITRITE UR-MCNC: POSITIVE MG/ML
PH UR: 5.5 [PH] (ref 5–8)
PROT UR STRIP-MCNC: NEGATIVE MG/DL
RBC # UR STRIP: ABNORMAL /UL
SP GR UR: 1.01 (ref 1–1.03)
UROBILINOGEN UR QL: NORMAL

## 2019-12-13 PROCEDURE — 99214 OFFICE O/P EST MOD 30 MIN: CPT | Performed by: FAMILY MEDICINE

## 2019-12-13 PROCEDURE — 81003 URINALYSIS AUTO W/O SCOPE: CPT | Performed by: FAMILY MEDICINE

## 2019-12-13 RX ORDER — AMOXICILLIN AND CLAVULANATE POTASSIUM 500; 125 MG/1; MG/1
1 TABLET, FILM COATED ORAL 2 TIMES DAILY
Qty: 14 TABLET | Refills: 0 | Status: SHIPPED | OUTPATIENT
Start: 2019-12-13 | End: 2020-01-21

## 2019-12-13 RX ORDER — GABAPENTIN 300 MG/1
300 CAPSULE ORAL 2 TIMES DAILY
Qty: 90 CAPSULE | Refills: 1
Start: 2019-12-13 | End: 2020-03-09

## 2019-12-13 RX ORDER — TRAZODONE HYDROCHLORIDE 100 MG/1
100 TABLET ORAL NIGHTLY PRN
Qty: 90 TABLET | Refills: 1
Start: 2019-12-13 | End: 2020-01-21

## 2019-12-13 NOTE — PROGRESS NOTES
Chief Complaint   Patient presents with   • Leg Pain     legs giving out on her   • Urinary Tract Infection     symptoms       Subjective   History of Present Illness   Elaine Toscano is a 69 y.o. female. Pt here stating she is having increased difficulty walking due to pain additionally she is having urinary symptoms of internal hurting when she pees and cloudy urine.  She reports increased frequency but denies fever nausea or vomiting.  For previous recent UTI she received a course of Cipro on November 5    Patient had been referred to spine surgery but appears to have canceled an appointment on December 10.  She has follow-up with pain management on the sixth.  She is continuing physical therapy 2 days a week.  She reports ongoing problems with ambulation and strength in her legs as well as ongoing muscle spasms and pain in her back.    3 days ago she [facility] the emergency [facility] due to increased pain in her legs.  She was given a shot of Toradol.  Additionally the she has recently had a emergency department physician told her she was taking too many medications and she has reduced trazodone from 200 to 100 mg at night.    The following portions of the patient's history were reviewed and updated as appropriate: allergies, current medications, past family history, past medical history, past social history, past surgical history and problem list.    Current Outpatient Medications on File Prior to Visit   Medication Sig   • allopurinol (ZYLOPRIM) 100 MG tablet Take 2 tablet by mouth at bedtime   • aspirin (ASPIR) 81 MG EC tablet Take 81 mg by mouth Daily.   • busPIRone (BUSPAR) 10 MG tablet Take 10 mg by mouth 2 (Two) Times a Day.   • calcium carb-cholecalciferol (CALCIUM PLUS VITAMIN D3) 600-800 MG-UNIT tablet Take 600 mg by mouth Daily.   • cyanocobalamin 1000 MCG/ML injection Inject 1 mL into the appropriate muscle as directed by prescriber Every 30 (Thirty) Days.   •  "cycloSPORINE (RESTASIS) 0.05 % ophthalmic emulsion Administer 1 drop to both eyes 2 (Two) Times a Day.   • desvenlafaxine (PRISTIQ) 100 MG 24 hr tablet Take 1 tablet by mouth Every Morning.   • diclofenac (VOLTAREN) 1 % gel gel Apply 1 application topically to the appropriate area as directed As Needed.   • dicyclomine (BENTYL) 10 MG capsule Take 10 mg by mouth 4 (Four) Times a Day.   • furosemide (LASIX) 40 MG tablet TAKE ONE TABLET BY MOUTH TWICE DAILY   • HYDROcodone-acetaminophen (NORCO) 7.5-325 MG per tablet Take 1 tablet by mouth 4 (Four) Times a Day.   • linaclotide (LINZESS) 145 MCG capsule capsule Take 1 capsule by mouth Daily As Needed.   • metoclopramide (REGLAN) 5 MG tablet Take 5 mg by mouth 4 (Four) Times a Day.   • nystatin (MYCOSTATIN) 371118 UNIT/GM cream Apply  topically to the appropriate area as directed 2 (Two) Times a Day.   • omeprazole (priLOSEC) 40 MG capsule Take 40 mg by mouth 2 (Two) Times a Day.   • ondansetron (ZOFRAN) 4 MG tablet Take 4 mg by mouth As Needed.   • oxybutynin XL (DITROPAN-XL) 10 MG 24 hr tablet Take 1 tablet(s) every day by oral route for 45 days.   • potassium chloride (K-DUR) 10 MEQ CR tablet Take 10 mEq by mouth Daily.   • sucralfate (CARAFATE) 1 g tablet Take 1 g by mouth 4 (Four) Times a Day.   • Syringe 23G X 1\" 3 ML misc Inject 1 syringe into the appropriate muscle as directed by prescriber Every 30 (Thirty) Days.   • tiZANidine (ZANAFLEX) 2 MG tablet Take 1 tablet by mouth every night at bedtime.   • tolterodine LA (DETROL LA) 2 MG 24 hr capsule TAKE ONE CAPSULE BY MOUTH EVERY DAY   • budesonide-formoterol (SYMBICORT) 160-4.5 MCG/ACT inhaler Inhale 2 puffs 2 (Two) Times a Day.     No current facility-administered medications on file prior to visit.          Review of Systems   Constitutional: Positive for fatigue. Negative for fever.   Respiratory: Negative for cough and shortness of breath.    Cardiovascular: Positive for leg swelling. Negative for chest pain " "and palpitations.   Gastrointestinal: Negative.    Musculoskeletal: Positive for arthralgias, back pain and gait problem.   Skin: Negative for rash.   Neurological: Positive for dizziness, light-headedness and memory problem.       Objective   Vitals:    12/13/19 1314   BP: 121/78   BP Location: Left arm   Patient Position: Sitting   Cuff Size: Adult   Pulse: 63   Temp: 98.5 °F (36.9 °C)   TempSrc: Oral   SpO2: 93%   Weight: 116 kg (255 lb)   Height: 157.5 cm (62.01\")      Body mass index is 46.63 kg/m².    Physical Exam   Constitutional: She is oriented to person, place, and time. She appears well-developed and well-nourished. No distress.   HENT:   Head: Normocephalic and atraumatic.   Cardiovascular: Normal rate and regular rhythm.   No murmur heard.  Pulmonary/Chest: Effort normal and breath sounds normal. She has no wheezes.   Musculoskeletal: She exhibits no edema.    Ambulating very slowly   Neurological: She is alert and oriented to person, place, and time.   Skin: Skin is warm and dry.    bruises on extremities   Psychiatric: She has a normal mood and affect.   Nursing note and vitals reviewed.      Lab Results   Component Value Date     (H) 11/19/2019    BUN 18 11/19/2019    CREATININE 1.45 (H) 11/19/2019    EGFRIFNONA 36 (L) 11/19/2019    EGFRIFAFRI 42 (L) 11/19/2019     (H) 11/19/2019    K 3.5 11/19/2019    CL 99 11/19/2019    CALCIUM 8.8 11/19/2019    ALBUMIN 3.7 11/19/2019    BILITOT 0.2 11/19/2019    ALKPHOS 101 11/19/2019    AST 14 11/19/2019    ALT 11 11/19/2019    WBC 6.5 11/19/2019    RBC 3.92 11/19/2019    HCT 34.9 11/19/2019    MCV 89 11/19/2019    MCH 28.1 11/19/2019    TSH 2.200 11/05/2019    [id number] 50.1 11/05/2019    URICACID 5.4 11/05/2019           Assessment/Plan   Diagnoses and all orders for this visit:    1. Degeneration of lumbar intervertebral disc (Primary)    2. Morbid obesity with BMI of 45.0-49.9, adult (CMS/[facility])    3. Dysuria  -     POCT urinalysis dipstick, " automated  -     Cancel: Urinalysis With Culture If Indicated -; Future  -     Urine Culture - Urine, Urine, Clean Catch; Future    4. Severe episode of recurrent major depressive disorder, without psychotic features (CMS/HCC)  -     traZODone (DESYREL) 100 MG tablet; Take 1 tablet by mouth At Night As Needed for Sleep.  Dispense: 90 tablet; Refill: 1    5. Acute cystitis without hematuria  -     POCT urinalysis dipstick, automated  -     amoxicillin-clavulanate (AUGMENTIN) 500-125 MG per tablet; Take 1 tablet by mouth 2 (Two) Times a Day.  Dispense: 14 tablet; Refill: 0  -     Urine Culture - Urine, Urine, Clean Catch; Future    6. Other spondylosis with radiculopathy, lumbar region  -     gabapentin (NEURONTIN) 300 MG capsule; Take 1 capsule by mouth 2 (Two) Times a Day.  Dispense: 90 capsule; Refill: 1    7. Spinal stenosis of lumbar region with neurogenic claudication  -     gabapentin (NEURONTIN) 300 MG capsule; Take 1 capsule by mouth 2 (Two) Times a Day.  Dispense: 90 capsule; Refill: 1      Patient has another acute UTI treating with Augmentin, due to recent Cipro use.  Patient has long-standing spinal stenosis and increasing symptoms of neuropathy and weakness has upcoming appointment with spine specialist, but apparently was postponed from December 10 to January 30.  Continue physical therapy for strengthening.    Add, urine culture was resistant to Augmentin, sending in a course of Cipro.     Return in about 2 weeks (around 12/27/2019) for Recheck.      AVS given with handouts appropriate to diagnoses addressed

## 2019-12-16 LAB
BACTERIA UR CULT: ABNORMAL
BACTERIA UR CULT: ABNORMAL
OTHER ANTIBIOTIC SUSC ISLT: ABNORMAL

## 2019-12-18 RX ORDER — ALENDRONATE SODIUM 70 MG/1
TABLET ORAL
Qty: 13 TABLET | Refills: 3 | Status: SHIPPED | OUTPATIENT
Start: 2019-12-18 | End: 2020-12-29

## 2019-12-18 RX ORDER — ERGOCALCIFEROL 1.25 MG/1
CAPSULE ORAL
Qty: 5 CAPSULE | Refills: 3 | Status: SHIPPED | OUTPATIENT
Start: 2019-12-18 | End: 2020-03-31

## 2019-12-23 RX ORDER — METOPROLOL SUCCINATE 50 MG/1
TABLET, EXTENDED RELEASE ORAL
Qty: 90 TABLET | Refills: 3 | Status: SHIPPED | OUTPATIENT
Start: 2019-12-23 | End: 2019-12-24

## 2019-12-24 RX ORDER — METOPROLOL SUCCINATE 50 MG/1
TABLET, EXTENDED RELEASE ORAL
Qty: 90 TABLET | Refills: 3 | Status: SHIPPED | OUTPATIENT
Start: 2019-12-24 | End: 2019-12-28 | Stop reason: SDUPTHER

## 2019-12-25 ENCOUNTER — RESULTS ENCOUNTER (OUTPATIENT)
Dept: ONCOLOGY | Facility: CLINIC | Age: 69
End: 2019-12-25

## 2019-12-25 DIAGNOSIS — Z86.2 HISTORY OF ANEMIA DUE TO CHRONIC KIDNEY DISEASE: ICD-10-CM

## 2019-12-25 DIAGNOSIS — N18.9 HISTORY OF ANEMIA DUE TO CHRONIC KIDNEY DISEASE: ICD-10-CM

## 2019-12-25 DIAGNOSIS — Z86.39 HISTORY OF IRON DEFICIENCY: ICD-10-CM

## 2019-12-28 RX ORDER — METOPROLOL SUCCINATE 50 MG/1
TABLET, EXTENDED RELEASE ORAL
Qty: 90 TABLET | Refills: 3 | Status: SHIPPED | OUTPATIENT
Start: 2019-12-28 | End: 2020-02-21 | Stop reason: ALTCHOICE

## 2019-12-30 RX ORDER — CIPROFLOXACIN 500 MG/1
500 TABLET, FILM COATED ORAL 2 TIMES DAILY
Qty: 14 TABLET | Refills: 0 | Status: SHIPPED | OUTPATIENT
Start: 2019-12-30 | End: 2020-01-21

## 2020-01-02 NOTE — PROGRESS NOTES
Hematology-Oncology Follow-up Note       Elaine Toscano  1950    Primary Care Physician: Josefina Landeros MD  Referring Physician: Josefina Landeros*    Reason For Visit:  No chief complaint on file.      HPI:   :  Ms. Toscano is referred for evaluation of anemia.  She has a history of chronic kidney disease, congestive heart failure, hypertension, osteopenia.  CBC done at [facility] in January 2017 showed hemoglobin 9.7.  She was being followed recently at Dr. Morales’s office and CBC was repeated on 4/11/17 and, at that time, hemoglobin was 11.5.  Patient had an EGD and colonoscopy done by Dr. Cisse that showed erosive esophagitis, gastritis and duodenitis.  Colonoscopy was normal.  Patient has a history of gastric bypass.  The patient was started on iron supplements in April 2017 by Dr. Morales and her hemoglobin improved since then.  Her iron stores also improved in April 2017.    • 1/20/17 - PTH intact 124 (H).  Serum protein electrophoresis shows hypoalbuminemia.  • 1/21/17 - WBC 4.9, hemoglobin 9.5, platelet count 190,000.  Creatinine 3.3, BUN 73.  • 1/24/17 - Creatinine 2.1, BUN 37.    • 3/6/17 - EGD showed grade 3 to grade 4 erosive esophagitis and duodenitis.  Hayden-en-Y gastric bypass.  Colonoscopy was negative, but had poor prep.    • 4/7/17 - B12 >1500.  Ferritin 72.  Folate 8.1.  Iron saturation 23%, TIBC 299, serum Iron 68.    • 4/11/17 - WBC 5.5, hemoglobin 11.5, 0lct 274,000, MCV 93.4.  Retic count 1.7%.    4/25/2017 - The patient presents for initial consultation.  She denies any bleeding per rectum.  She has been taking one iron tablet since March.  She reports giving numerous stool sample tests for diagnosing occult blood, in the past, at Dr. Morales’s office.  She does have some chronic lower extremity edema due to CHF.  She has CKD and follows up with nephrologist, Dr. Tanmay Carroll.  • 7/5/17 - WBC 3.7, hemoglobin 11.2, platelet count 194,000, MCV 94.2.   Creatinine 1.97.  Serum iron 66, TIBC 272, iron saturation 24%.    • 8/23/17 - DEXAscan shows osteoporosis with a T-score of -2.8.    • 10/20/17 - Doppler of the lower extremities:  Normal.  No evidence of DVT.    • 11/13/17 - Creatinine 1.75.  Ferritin 156.  Serum iron 92, iron saturation 33%, TIBC 186.  WBC 4.2, hemoglobin 11.0, platelet count 229,000, MCV 94.9.    • 1/24/18 - Colonoscopy by Dr. Cisse:  Colon shows diverticulosis and internal hemorrhoids.    • 6/13/18 - Creatinine 1.93.  WBC 4.1, hemoglobin 11.2, platelet count 209,000, MCV 91.9.  Ferritin 258.  Iron saturation 39, serum 100, TIBC 158.  PTH intact 119.   • 8/3/18 - Upper GI endoscopy:  Shows mild chronic gastritis in the stomach and mild reflux esophagitis within the esophagus.     • 12/11/18 - Creatinine 1.8, BUN 24.  WBC 3.8, hemoglobin 11.1, platelet count 202,000, MCV 93.9.  Ferritin 203.  Iron saturation 26%, TIBC 255, serum iron 66.    • 12/14/18 - Cardiac stress test:  EF 52%.  Imaging shows inferior wall MI versus ischemia.  • 1/2/2020  White count 7.4 hemoglobin 11.4 platelet count 232 MCV is 92.8    ferritin 224 serum iron 45 low, iron saturation 17% TIBC's 272 creatinine 1.68 BUN is 25    Subjective:     Patient has never required Procrit.  She is not on any oral iron.  She is reporting symptoms of dysuria.  She reports being tired all the time.      The following portions of the patient's history were reviewed and updated as appropriate: allergies, current medications, past family history, past medical history, past social history, past surgical history and problem list.     Past Medical History:   Diagnosis Date   • Anemia    • Broken foot     right   • Broken nose 07/2017   • CHF (congestive heart failure) (CMS/[facility])    • Chronic low back pain    • CRI (chronic renal insufficiency)    • Depression    • DJD (degenerative joint disease)    • Fracture of ankle 8/12/2016   • GERD (gastroesophageal reflux disease)    • Gout    •  Hypertension    • Hypothyroidism    • CRUZ (obstructive sleep apnea)     non-complaince with bipap   • Osteopenia    • Suicide attempt (CMS/HCC)    • TMJ (dislocation of temporomandibular joint)        Past Surgical History:   Procedure Laterality Date   • APPENDECTOMY  1986   • BACK SURGERY      2004---2005 L5-S1 spinal stenosis   • CARDIAC CATHETERIZATION  04/2018   • CHOLECYSTECTOMY  2008   • FOOT SURGERY Bilateral 2008   • FOOT SURGERY Bilateral 09/2019    Dr. Keenan- 2nd toe on bilateral feet    • GASTRIC BYPASS  2007   • INSERT / REPLACE / REMOVE PACEMAKER     • LAPAROSCOPIC GASTRIC BANDING  2004    removed   • OTHER SURGICAL HISTORY  01/2018    myelogram   • TOTAL KNEE ARTHROPLASTY Left          Current Outpatient Medications:   •  alendronate (FOSAMAX) 70 MG tablet, TAKE ONE TABLET BY MOUTH every WEEK, Disp: 13 tablet, Rfl: 3  •  allopurinol (ZYLOPRIM) 100 MG tablet, Take 2 tablet by mouth at bedtime, Disp: 180 tablet, Rfl: 1  •  amoxicillin-clavulanate (AUGMENTIN) 500-125 MG per tablet, Take 1 tablet by mouth 2 (Two) Times a Day., Disp: 14 tablet, Rfl: 0  •  aspirin (ASPIR) 81 MG EC tablet, Take 81 mg by mouth Daily., Disp: , Rfl:   •  budesonide-formoterol (SYMBICORT) 160-4.5 MCG/ACT inhaler, Inhale 2 puffs 2 (Two) Times a Day., Disp: 1 inhaler, Rfl: 12  •  busPIRone (BUSPAR) 10 MG tablet, Take 10 mg by mouth 2 (Two) Times a Day., Disp: , Rfl: 6  •  calcium carb-cholecalciferol (CALCIUM PLUS VITAMIN D3) 600-800 MG-UNIT tablet, Take 600 mg by mouth Daily., Disp: , Rfl:   •  ciprofloxacin (CIPRO) 500 MG tablet, Take 1 tablet by mouth 2 (Two) Times a Day., Disp: 14 tablet, Rfl: 0  •  cyanocobalamin 1000 MCG/ML injection, Inject 1 mL into the appropriate muscle as directed by prescriber Every 30 (Thirty) Days., Disp: , Rfl: 0  •  cycloSPORINE (RESTASIS) 0.05 % ophthalmic emulsion, Administer 1 drop to both eyes 2 (Two) Times a Day., Disp: , Rfl:   •  desvenlafaxine (PRISTIQ) 100 MG 24 hr tablet, Take 1 tablet by  "mouth Every Morning., Disp: 30 tablet, Rfl: 12  •  diclofenac (VOLTAREN) 1 % gel gel, Apply 1 application topically to the appropriate area as directed As Needed., Disp: , Rfl: 2  •  dicyclomine (BENTYL) 10 MG capsule, Take 10 mg by mouth 4 (Four) Times a Day., Disp: , Rfl: 0  •  furosemide (LASIX) 40 MG tablet, TAKE ONE TABLET BY MOUTH TWICE DAILY, Disp: 180 tablet, Rfl: 1  •  gabapentin (NEURONTIN) 300 MG capsule, Take 1 capsule by mouth 2 (Two) Times a Day., Disp: 90 capsule, Rfl: 1  •  HYDROcodone-acetaminophen (NORCO) 7.5-325 MG per tablet, Take 1 tablet by mouth 4 (Four) Times a Day., Disp: , Rfl: 0  •  linaclotide (LINZESS) 145 MCG capsule capsule, Take 1 capsule by mouth Daily As Needed., Disp: , Rfl:   •  metoclopramide (REGLAN) 5 MG tablet, Take 5 mg by mouth 4 (Four) Times a Day., Disp: , Rfl: 4  •  metoprolol succinate XL (TOPROL-XL) 50 MG 24 hr tablet, TAKE ONE TABLET BY MOUTH at bedtime, Disp: 90 tablet, Rfl: 3  •  nystatin (MYCOSTATIN) 996077 UNIT/GM cream, Apply  topically to the appropriate area as directed 2 (Two) Times a Day., Disp: 30 g, Rfl: 3  •  omeprazole (priLOSEC) 40 MG capsule, Take 40 mg by mouth 2 (Two) Times a Day., Disp: , Rfl: 5  •  ondansetron (ZOFRAN) 4 MG tablet, Take 4 mg by mouth As Needed., Disp: , Rfl: 0  •  oxybutynin XL (DITROPAN-XL) 10 MG 24 hr tablet, Take 1 tablet(s) every day by oral route for 45 days., Disp: , Rfl: 3  •  potassium chloride (K-DUR) 10 MEQ CR tablet, Take 10 mEq by mouth Daily., Disp: , Rfl: 5  •  sucralfate (CARAFATE) 1 g tablet, Take 1 g by mouth 4 (Four) Times a Day., Disp: , Rfl: 1  •  Syringe 23G X 1\" 3 ML misc, Inject 1 syringe into the appropriate muscle as directed by prescriber Every 30 (Thirty) Days., Disp: , Rfl: 0  •  tiZANidine (ZANAFLEX) 2 MG tablet, Take 1 tablet by mouth every night at bedtime., Disp: 30 tablet, Rfl: 2  •  tolterodine LA (DETROL LA) 2 MG 24 hr capsule, TAKE ONE CAPSULE BY MOUTH EVERY DAY, Disp: 30 capsule, Rfl: 2  •  " traZODone (DESYREL) 100 MG tablet, Take 1 tablet by mouth At Night As Needed for Sleep., Disp: 90 tablet, Rfl: 1  •  vitamin D (ERGOCALCIFEROL) 1.25 MG (85109 UT) capsule capsule, TAKE ONE CAPSULE BY MOUTH one time PER WEEK, Disp: 5 capsule, Rfl: 3    Allergies   Allergen Reactions   • Azithromycin Unknown (See Comments)   • Codeine Nausea And Vomiting   • Dilaudid  [Hydromorphone Hcl] Unknown (See Comments)   • Isosorbide Nitrate Other (See Comments)   • Macrobid  [Nitrofurantoin] Rash   • Morphine Rash   • Other Itching   • Oxycontin  [Oxycodone] Delirium   • Pineapple Other (See Comments)     Mouth numbness   • Povidone Iodine Rash   • Tetanus Antitoxin Rash   • Denosumab Other (See Comments)   • Demerol [Meperidine] Unknown (See Comments)     Pt does not remember. She states it was a long time ago   • Etodolac Other (See Comments)   • Lodrane D [Brompheniramine-Pseudoeph] Other (See Comments)     drowsiness   • Shrimp Flavor Swelling     Sauce only    • Sulfa Antibiotics Unknown (See Comments)   • Iodine Itching and Rash       Family History   Problem Relation Age of Onset   • Hypertension Father         PM implanted   • Arthritis Sister    • Heart disease Sister    • Lung disease Paternal Grandmother    • COPD Other    • Lung disease Other        Cancer-related family history is not on file.    Social History     Tobacco Use   • Smoking status: Never Smoker   • Smokeless tobacco: Never Used   Substance Use Topics   • Alcohol use: No     Frequency: Never   • Drug use: No         ROS:     Review of Systems   Constitutional: Positive for fatigue (has been very tired and in bed more than usual) and fever (states she has had a low grade fever recently). Negative for chills.   HENT: Negative for ear pain, mouth sores, nosebleeds and sore throat.    Eyes: Negative for photophobia and visual disturbance.   Respiratory: Negative for wheezing and stridor.    Cardiovascular: Negative for chest pain and palpitations.    Gastrointestinal: Negative for abdominal pain, diarrhea, nausea and vomiting.   Endocrine: Negative for cold intolerance and heat intolerance.   Genitourinary: Positive for dysuria (painful and frequent urination). Negative for hematuria.   Musculoskeletal: Negative for joint swelling and neck stiffness.   Skin: Negative for color change and rash.   Neurological: Negative for seizures and syncope.   Hematological: Negative for adenopathy.        No obvious bleeding   Psychiatric/Behavioral: Negative for agitation, confusion and hallucinations.       Objective:    There were no vitals filed for this visit.    (1) Restricted in physically strenuous activity, ambulatory and able to do work of light nature    Physical Exam:     Physical Exam   Constitutional: She is oriented to person, place, and time. She appears well-developed and well-nourished. No distress.   OBESE   HENT:   Head: Normocephalic and atraumatic.   Eyes: Conjunctivae and EOM are normal. Right eye exhibits no discharge. No scleral icterus.   Neck: Normal range of motion. Neck supple. No thyromegaly present.   Cardiovascular: Normal rate, regular rhythm and normal heart sounds. Exam reveals no gallop and no friction rub.   Pulmonary/Chest: Effort normal. No stridor. No respiratory distress. She has no wheezes.   Abdominal: Soft. Bowel sounds are normal. She exhibits no mass. There is no tenderness. There is no rebound and no guarding.   Musculoskeletal: Normal range of motion. She exhibits edema (TRACE). She exhibits no tenderness.   Lymphadenopathy:     She has no cervical adenopathy.   Neurological: She is alert and oriented to person, place, and time. She exhibits normal muscle tone.   Skin: Skin is warm. No rash noted. She is not diaphoretic. No erythema.   Psychiatric: She has a normal mood and affect. Her behavior is normal.   Nursing note and vitals reviewed.        Lab Results - Last 18 Months   Lab Units 11/19/19  1030 11/05/19  0608  07/22/19  1608   WBC x10E3/uL 6.5 6.7 5.3   HEMOGLOBIN g/dL 11.0* 10.7* 12.0   HEMATOCRIT % 34.9 34.0 37.4   PLATELETS x10E3/uL 298 262 241   MCV fL 89 88 86     Lab Results - Last 18 Months   Lab Units 11/19/19  1030 11/05/19  1457 07/22/19  1608 04/29/19  0648   SODIUM mmol/L 145* 144 142 139   POTASSIUM mmol/L 3.5 3.5 4.4 4.5   CHLORIDE mmol/L 99 102 96 99*   TOTAL CO2 mmol/L 28 23 29 27   BUN mg/dL 18 18 32* 41*   CREATININE mg/dL 1.45* 1.52* 2.05* 1.9*   CALCIUM mg/dL 8.8 8.7 9.3 9.2   BILIRUBIN mg/dL 0.2 <0.2 0.3  --    ALK PHOS IU/L 101 104 105  --    ALT (SGPT) IU/L 11 24 12  --    AST (SGOT) IU/L 14 20 16  --    GLUCOSE mg/dL  --   --   --  91       Assessment/Plan     Assessment:    1. Anemia of chronic kidney disease:  Hemoglobin has been staying above 11.  She has a history of gastric bypass, gastritis and esophagitis, and therefore is also at risk of iron deficiency.  She has CKD stage 4 causing her anemia.  She is now off iron supplements.  Colonoscopy showed diverticulosis and internal hemorrhoids.    2. Chronic kidney disease:  Creatinine is stable around 1.9.    3. History of iron deficiency: Currently she is off iron.  Recent ferritin is in the high normal range, but iron is a little low..  She is currently not on any oral iron.  4. Recurrent UTI:- Reports dysurea  PLAN:  1. No need for Procrit, since hemoglobin is above 10.    2. No need for iron supplementation.  We will need to monitor iron levels.  3. Check UA + culture today  4. Repeat CBC, BMP and iron panel in 6 months       No orders of the defined types were placed in this encounter.               Thank you very much for providing the opportunity to participate in this patient’s care. Please do not hesitate to call if there are any other questions

## 2020-01-03 ENCOUNTER — CLINICAL SUPPORT NO REQUIREMENTS (OUTPATIENT)
Dept: CARDIOLOGY | Facility: CLINIC | Age: 70
End: 2020-01-03

## 2020-01-03 DIAGNOSIS — I49.5 SICK SINUS SYNDROME (HCC): ICD-10-CM

## 2020-01-03 DIAGNOSIS — Z95.0 PRESENCE OF CARDIAC PACEMAKER: Primary | ICD-10-CM

## 2020-01-03 PROCEDURE — 93280 PM DEVICE PROGR EVAL DUAL: CPT | Performed by: INTERNAL MEDICINE

## 2020-01-07 ENCOUNTER — TELEPHONE (OUTPATIENT)
Dept: ONCOLOGY | Facility: CLINIC | Age: 70
End: 2020-01-07

## 2020-01-07 ENCOUNTER — APPOINTMENT (OUTPATIENT)
Dept: ONCOLOGY | Facility: CLINIC | Age: 70
End: 2020-01-07

## 2020-01-07 NOTE — TELEPHONE ENCOUNTER
Ms. Toscano called to cancel today's appt due to lack of transportation.  Rescheduled for 2/4, 1st avail appt.

## 2020-01-13 RX ORDER — BUSPIRONE HYDROCHLORIDE 10 MG/1
10 TABLET ORAL 2 TIMES DAILY
Qty: 60 TABLET | Refills: 6 | Status: ON HOLD | OUTPATIENT
Start: 2020-01-13 | End: 2020-04-24

## 2020-01-17 ENCOUNTER — TELEPHONE (OUTPATIENT)
Dept: FAMILY MEDICINE CLINIC | Facility: CLINIC | Age: 70
End: 2020-01-17

## 2020-01-17 NOTE — TELEPHONE ENCOUNTER
PATIENT CALLED SAID SHE HAS A UTI, WHEN I ASKED HER SYMPTOMS SHE SAID SHE IS NOT HAVING ANY BURNING WHEN SHE PEES, SAID SHE IS SHAKING ALL OVER AND KEEPS DROPPING THINGS. SHE IS WANTING TO KNOW IF SHE CAN DROP OFF URINE SAMPLE

## 2020-01-21 ENCOUNTER — OFFICE VISIT (OUTPATIENT)
Dept: FAMILY MEDICINE CLINIC | Facility: CLINIC | Age: 70
End: 2020-01-21

## 2020-01-21 VITALS
HEART RATE: 67 BPM | OXYGEN SATURATION: 92 % | HEIGHT: 62 IN | DIASTOLIC BLOOD PRESSURE: 80 MMHG | SYSTOLIC BLOOD PRESSURE: 125 MMHG | BODY MASS INDEX: 48.58 KG/M2 | TEMPERATURE: 97.7 F | WEIGHT: 264 LBS

## 2020-01-21 DIAGNOSIS — N18.9 HISTORY OF ANEMIA DUE TO CHRONIC KIDNEY DISEASE: ICD-10-CM

## 2020-01-21 DIAGNOSIS — M79.605 LEG PAIN, BILATERAL: ICD-10-CM

## 2020-01-21 DIAGNOSIS — E66.01 MORBID OBESITY WITH BMI OF 45.0-49.9, ADULT (HCC): ICD-10-CM

## 2020-01-21 DIAGNOSIS — M79.604 LEG PAIN, BILATERAL: ICD-10-CM

## 2020-01-21 DIAGNOSIS — N18.4 RENAL FAILURE, CHRONIC, STAGE 4 (SEVERE) (HCC): ICD-10-CM

## 2020-01-21 DIAGNOSIS — F33.2 SEVERE EPISODE OF RECURRENT MAJOR DEPRESSIVE DISORDER, WITHOUT PSYCHOTIC FEATURES (HCC): Primary | ICD-10-CM

## 2020-01-21 DIAGNOSIS — M48.062 SPINAL STENOSIS OF LUMBAR REGION WITH NEUROGENIC CLAUDICATION: ICD-10-CM

## 2020-01-21 DIAGNOSIS — Z86.2 HISTORY OF ANEMIA DUE TO CHRONIC KIDNEY DISEASE: ICD-10-CM

## 2020-01-21 DIAGNOSIS — I50.9 CONGESTIVE HEART FAILURE, UNSPECIFIED HF CHRONICITY, UNSPECIFIED HEART FAILURE TYPE (HCC): ICD-10-CM

## 2020-01-21 DIAGNOSIS — N39.46 MIXED STRESS AND URGE URINARY INCONTINENCE: ICD-10-CM

## 2020-01-21 PROCEDURE — 99214 OFFICE O/P EST MOD 30 MIN: CPT | Performed by: FAMILY MEDICINE

## 2020-01-21 RX ORDER — DESVENLAFAXINE SUCCINATE 50 MG/1
50 TABLET, EXTENDED RELEASE ORAL EVERY MORNING
Qty: 30 TABLET | Refills: 3 | Status: SHIPPED | OUTPATIENT
Start: 2020-01-21 | End: 2020-04-09

## 2020-01-21 RX ORDER — TRAZODONE HYDROCHLORIDE 100 MG/1
50 TABLET ORAL NIGHTLY PRN
Qty: 90 TABLET | Refills: 1
Start: 2020-01-21 | End: 2020-02-18 | Stop reason: SDUPTHER

## 2020-01-21 NOTE — PROGRESS NOTES
Chief Complaint   Patient presents with   • Depression   • Fatigue       Subjective      Elaine Toscano is a 69 y.o. female.   She presents today for 4-month follow-up on depression, urinary incontinence, and back pain.  She was driven emergency department January 17 for generalized body aches, she was diagnosed with a viral syndrome.  I have reviewed records, GFR is down to 27, hemoglobin down to 10.7, Flu tests were negative I did not see a UA in that report.  She states her general fatigue and weakness improved somewhat.  Denies any current fevers    Depression, stable, on Pristiq.    Chronic renal insufficiency, have an appointment nephrologist,Dr Araujo, on Thursday.     Urinary incontinence continues to have frequent urination, she is taking both Detrol and Ditropan he has seen clinic appointment with him she is currently taking Azo for discomfort, states it is not really helping, she is not sure if she has another infection or not.    Patient had been referred to spine surgery canceled an appointment on December 10 and is rescheduled for January 30.  She had follow-up with pain management on Jan 6,  Increased gabapentin to 3 daily , reduced Hydrocodone to 3 daily,  tried tizanidine, increased pain, stopped tizanidine.    Missed physical therapy due to recent viral infection. Was helpful but transportation is an issue.  She reports ongoing problems with ambulation and strength in her legs as well as ongoing muscle spasms and pain in her back.      Fatigue, last visit reduced trazodone from 200 to 100 mg at night. Did not make much difference, still sleepy during the day.  Did not have any increased problems last night.    The following portions of the patient's history were reviewed and updated as appropriate: allergies, current medications, past family history, past medical history, past social history, past surgical history and problem list.    Current Outpatient Medications on File Prior to Visit    Medication Sig   • alendronate (FOSAMAX) 70 MG tablet TAKE ONE TABLET BY MOUTH every WEEK   • allopurinol (ZYLOPRIM) 100 MG tablet Take 2 tablet by mouth at bedtime   • aspirin (ASPIR) 81 MG EC tablet Take 81 mg by mouth Daily.   • budesonide-formoterol (SYMBICORT) 160-4.5 MCG/ACT inhaler Inhale 2 puffs 2 (Two) Times a Day.   • busPIRone (BUSPAR) 10 MG tablet Take 1 tablet by mouth 2 (Two) Times a Day.   • calcium carb-cholecalciferol (CALCIUM PLUS VITAMIN D3) 600-800 MG-UNIT tablet Take 600 mg by mouth Daily.   • cyanocobalamin 1000 MCG/ML injection Inject 1 mL into the appropriate muscle as directed by prescriber Every 30 (Thirty) Days.   • cycloSPORINE (RESTASIS) 0.05 % ophthalmic emulsion Administer 1 drop to both eyes 2 (Two) Times a Day.   • diclofenac (VOLTAREN) 1 % gel gel Apply 1 application topically to the appropriate area as directed As Needed.   • dicyclomine (BENTYL) 10 MG capsule Take 10 mg by mouth 4 (Four) Times a Day.   • furosemide (LASIX) 40 MG tablet TAKE ONE TABLET BY MOUTH TWICE DAILY   • gabapentin (NEURONTIN) 300 MG capsule Take 1 capsule by mouth 2 (Two) Times a Day. (Patient taking differently: Take 300 mg by mouth 3 (Three) Times a Day.)   • HYDROcodone-acetaminophen (NORCO) 7.5-325 MG per tablet Take 1 tablet by mouth Every 8 (Eight) Hours As Needed.   • linaclotide (LINZESS) 145 MCG capsule capsule Take 1 capsule by mouth Daily As Needed.   • metoclopramide (REGLAN) 5 MG tablet Take 5 mg by mouth 4 (Four) Times a Day.   • metoprolol succinate XL (TOPROL-XL) 50 MG 24 hr tablet TAKE ONE TABLET BY MOUTH at bedtime   • nystatin (MYCOSTATIN) 015201 UNIT/GM cream Apply  topically to the appropriate area as directed 2 (Two) Times a Day.   • omeprazole (priLOSEC) 40 MG capsule Take 40 mg by mouth 2 (Two) Times a Day.   • ondansetron (ZOFRAN) 4 MG tablet Take 4 mg by mouth As Needed.   • potassium chloride (K-DUR) 10 MEQ CR tablet Take 10 mEq by mouth Daily.   • sucralfate (CARAFATE) 1 g  "tablet Take 1 g by mouth 4 (Four) Times a Day.   • Syringe 23G X 1\" 3 ML misc Inject 1 syringe into the appropriate muscle as directed by prescriber Every 30 (Thirty) Days.   • tolterodine LA (DETROL LA) 2 MG 24 hr capsule TAKE ONE CAPSULE BY MOUTH EVERY DAY   • vitamin D (ERGOCALCIFEROL) 1.25 MG (97589 UT) capsule capsule TAKE ONE CAPSULE BY MOUTH one time PER WEEK   • [DISCONTINUED] desvenlafaxine (PRISTIQ) 100 MG 24 hr tablet Take 1 tablet by mouth Every Morning.   • [DISCONTINUED] oxybutynin XL (DITROPAN-XL) 10 MG 24 hr tablet Take 1 tablet(s) every day by oral route for 45 days.   • [DISCONTINUED] traZODone (DESYREL) 100 MG tablet Take 1 tablet by mouth At Night As Needed for Sleep.   • [DISCONTINUED] amoxicillin-clavulanate (AUGMENTIN) 500-125 MG per tablet Take 1 tablet by mouth 2 (Two) Times a Day.   • [DISCONTINUED] ciprofloxacin (CIPRO) 500 MG tablet Take 1 tablet by mouth 2 (Two) Times a Day.   • [DISCONTINUED] tiZANidine (ZANAFLEX) 2 MG tablet Take 1 tablet by mouth every night at bedtime.     No current facility-administered medications on file prior to visit.          Review of Systems   Constitutional: Positive for fatigue. Negative for fever.   Respiratory: Negative for cough and shortness of breath.    Cardiovascular: Positive for leg swelling. Negative for chest pain and palpitations.   Gastrointestinal: Negative.    Genitourinary: Positive for dysuria, frequency, urgency and urinary incontinence.   Musculoskeletal: Positive for arthralgias, back pain and gait problem.   Skin: Negative for rash.   Neurological: Positive for dizziness, light-headedness and memory problem.       Objective   Vitals:    01/21/20 1337 01/21/20 1348   BP: 139/90 125/80   BP Location: Right arm Left arm   Patient Position: Sitting Sitting   Cuff Size: Adult Adult   Pulse: 67    Temp: 97.7 °F (36.5 °C)    TempSrc: Oral    SpO2: 92%    Weight: 120 kg (264 lb)    Height: 157.5 cm (62.01\")       Body mass index is 48.27 " kg/m².    Physical Exam   Constitutional: She is oriented to person, place, and time. She appears well-developed and well-nourished. No distress.   HENT:   Head: Normocephalic and atraumatic.   Cardiovascular: Normal rate, regular rhythm and normal heart sounds.   No murmur heard.  Pulmonary/Chest: Effort normal and breath sounds normal. She has no wheezes.   Musculoskeletal: She exhibits no edema.    Ambulates slowly   Neurological: She is alert and oriented to person, place, and time.   Skin: Skin is warm and dry.   Psychiatric: She has a normal mood and affect.   Nursing note and vitals reviewed.      Lab Results   Component Value Date     (H) 11/19/2019    BUN 18 11/19/2019    CREATININE 1.45 (H) 11/19/2019    EGFRIFNONA 36 (L) 11/19/2019    EGFRIFAFRI 42 (L) 11/19/2019     (H) 11/19/2019    K 3.5 11/19/2019    CL 99 11/19/2019    CALCIUM 8.8 11/19/2019    ALBUMIN 3.7 11/19/2019    BILITOT 0.2 11/19/2019    ALKPHOS 101 11/19/2019    AST 14 11/19/2019    ALT 11 11/19/2019    WBC 6.5 11/19/2019    RBC 3.92 11/19/2019    HCT 34.9 11/19/2019    MCV 89 11/19/2019    MCH 28.1 11/19/2019    TSH 2.200 11/05/2019    [id number] 50.1 11/05/2019    URICACID 5.4 11/05/2019           Assessment/Plan   Diagnoses and all orders for this visit:    1. Severe episode of recurrent major depressive disorder, without psychotic features (CMS/HCC) (Primary)  -     traZODone (DESYREL) 100 MG tablet; Take 0.5 tablets by mouth At Night As Needed for Sleep.  Dispense: 90 tablet; Refill: 1  -     desvenlafaxine (PRISTIQ) 50 MG 24 hr tablet; Take 1 tablet by mouth Every Morning.  Dispense: 30 tablet; Refill: 3    2. Renal failure, chronic, stage 4 (severe) (CMS/HCC)    3. Spinal stenosis of lumbar region with neurogenic claudication    4. Leg pain, bilateral    5. Congestive heart failure, unspecified HF chronicity, unspecified heart failure type (CMS/HCC)    6. Morbid obesity with BMI of 45.0-49.9, adult (CMS/HCC)    7. History  of anemia due to chronic kidney disease    8. Mixed stress and urge urinary incontinence      Patient has history of recurrent urinary tract infections as well as urinary incontinence.  She is currently taking Azo, advised her to discontinue that today and return on Friday with a clean-catch urine. Stopping Ditropan as accidental duplicate therapy, continue Detrol     Patient has long-standing spinal stenosis and increasing symptoms of neuropathy and weakness has upcoming appointment with spine specialist, January 30. Recommend continue physical therapy for strengthening.    Chronic renal disease, recent GFR of 27 in the emergency department.  Stressed need to keep follow-up with nephrology, gave her a copy of her labs from the emergency department to share with nephrology.    Reducing Pristiq and  trazodone half due to renal disease and day time sedation.      Return in about 4 weeks (around 2/18/2020) for Recheck depression , pain, CRF.      AVS given with handouts appropriate to diagnoses addressed

## 2020-01-22 ENCOUNTER — TELEPHONE (OUTPATIENT)
Dept: FAMILY MEDICINE CLINIC | Facility: CLINIC | Age: 70
End: 2020-01-22

## 2020-01-22 DIAGNOSIS — M79.605 LEG PAIN, BILATERAL: Primary | ICD-10-CM

## 2020-01-22 DIAGNOSIS — M48.062 SPINAL STENOSIS OF LUMBAR REGION WITH NEUROGENIC CLAUDICATION: ICD-10-CM

## 2020-01-22 DIAGNOSIS — M79.604 LEG PAIN, BILATERAL: Primary | ICD-10-CM

## 2020-01-22 NOTE — TELEPHONE ENCOUNTER
Thank you for placing the referral, can you please add diagnosis of lumbar spinal stenosis with neurogenic claudication and then I will sign the order.  Additionally patient had been referred to Dr. Gonzales spine surgeon, appears her appointment is on February 4.  Want to make sure she does not miss that appointment.

## 2020-01-22 NOTE — TELEPHONE ENCOUNTER
Have called the pt.  She wants to switch her pain clinic to [facility].  I have put in the referral to change.

## 2020-01-22 NOTE — TELEPHONE ENCOUNTER
Thank you, I have signed the referral.  Has she been reminded about the back surgeon appointment on February 4?

## 2020-01-28 ENCOUNTER — TELEPHONE (OUTPATIENT)
Dept: PAIN MEDICINE | Facility: CLINIC | Age: 70
End: 2020-01-28

## 2020-01-28 ENCOUNTER — TELEPHONE (OUTPATIENT)
Dept: FAMILY MEDICINE CLINIC | Facility: CLINIC | Age: 70
End: 2020-01-28

## 2020-01-28 NOTE — TELEPHONE ENCOUNTER
Pain Management denied patient was discharged from [facility] for unprescribed meds in system x's 2 and got an unauthorized med fill

## 2020-01-30 NOTE — TELEPHONE ENCOUNTER
Did you ask how many hydrocodone she had remaining?  Did you look into referral to the new pain management clinic?

## 2020-01-30 NOTE — TELEPHONE ENCOUNTER
I have spoke with the pt, first open appt in on Wednesday.  I have told her to go to the ER for treatment is it gets worse.  She agreed

## 2020-01-30 NOTE — TELEPHONE ENCOUNTER
Pain Management denied patient - pt was  discharged from [facility] for unprescribed meds in system x's 2 and got and unauthorized med fill    Please advise

## 2020-01-30 NOTE — TELEPHONE ENCOUNTER
Pt had a rough weekend and is starting to feel a little better although is very shakey.  Pt is possibly going thru with- drawls.  She is trying to ration out her pain meds.  No Pain clinic will take her.  Please Advise

## 2020-01-30 NOTE — TELEPHONE ENCOUNTER
I am sorry this has happened, however, she is the one who broke her pain management contract.  Historically pain doctors will not see patients after they have broken a pain contract with someone else.  If she thinks she is in withdrawal she should go to the emergency department for immediate treatment.  I can see her tomorrow to discus treatment to help her through withdrawals, put  I cannot prescribe her narcotics.  Please find out how many Norco  Or other narcotic pills she has remaining and I can give her a schedule to take them to reduce the withdrawal symptoms.    There has been any new pain management clinic open, there is a 1 page brochure probably within the top 5 pages in my lower in basket, could contact them to try to get an appointment.

## 2020-01-31 DIAGNOSIS — M79.604 LEG PAIN, BILATERAL: Primary | ICD-10-CM

## 2020-01-31 DIAGNOSIS — M79.605 LEG PAIN, BILATERAL: Primary | ICD-10-CM

## 2020-01-31 NOTE — TELEPHONE ENCOUNTER
If she reduces to 1 twice a day for the next 4 days, 1/2 tablet 3 times a day for 4 days, one half twice a day for 4 days and then one half daily until she has completed her supply she should not go through narcotic withdrawal

## 2020-01-31 NOTE — TELEPHONE ENCOUNTER
Pt has 23 pills left yesturday.  I called her and the shakiness is better today, compared to yesturday.  I have Jeffery trying to get her in at the new pain clinic

## 2020-01-31 NOTE — TELEPHONE ENCOUNTER
Per Dr. Josefina Ross I faxed everything to [facility] to see if they will see this patient

## 2020-01-31 NOTE — TELEPHONE ENCOUNTER
Told pt schedule to take the medication, daughter was writing it down for her.  Told her as soon as we know about new pain clinic we will call her.  Stated she understood.

## 2020-02-03 NOTE — PROGRESS NOTES
Hematology-Oncology Follow-up Note       Elaine Toscano  1950    Primary Care Physician: Josefina Landeros MD  Referring Physician: Josefina Landeros*    Reason For Visit:  Chief Complaint   Patient presents with   • Follow-up     Anemia of chronic kidney disease       HPI:    Ms. Toscano is referred for evaluation of anemia.  She has a history of chronic kidney disease, congestive heart failure, hypertension, osteopenia.  CBC done at [facility] in January 2017 showed hemoglobin 9.7.  She was being followed recently at Dr. Morales’s office and CBC was repeated on 4/11/17 and, at that time, hemoglobin was 11.5.  Patient had an EGD and colonoscopy done by Dr. Cisse that showed erosive esophagitis, gastritis and duodenitis.  Colonoscopy was normal.  Patient has a history of gastric bypass.  The patient was started on iron supplements in April 2017 by Dr. Morales and her hemoglobin improved since then.  Her iron stores also improved in April 2017.    • 1/20/17 - PTH intact 124 (H).  Serum protein electrophoresis shows hypoalbuminemia.  • 1/21/17 - WBC 4.9, hemoglobin 9.5, platelet count 190,000.  Creatinine 3.3, BUN 73.  • 1/24/17 - Creatinine 2.1, BUN 37.    • 3/6/17 - EGD showed grade 3 to grade 4 erosive esophagitis and duodenitis.  Hayden-en-Y gastric bypass.  Colonoscopy was negative, but had poor prep.    • 4/7/17 - B12 >1500.  Ferritin 72.  Folate 8.1.  Iron saturation 23%, TIBC 299, serum Iron 68.    • 4/11/17 - WBC 5.5, hemoglobin 11.5, 0lct 274,000, MCV 93.4.  Retic count 1.7%.    4/25/2017 - The patient presents for initial consultation.  She denies any bleeding per rectum.  She has been taking one iron tablet since March.  She reports giving numerous stool sample tests for diagnosing occult blood, in the past, at Dr. Morales’s office.  She does have some chronic lower extremity edema due to CHF.  She has CKD and follows up with nephrologist, Dr. Tanmay Carroll.  • 7/5/17 -  WBC 3.7, hemoglobin 11.2, platelet count 194,000, MCV 94.2.  Creatinine 1.97.  Serum iron 66, TIBC 272, iron saturation 24%.    • 8/23/17 - DEXAscan shows osteoporosis with a T-score of -2.8.    • 10/20/17 - Doppler of the lower extremities:  Normal.  No evidence of DVT.    • 11/13/17 - Creatinine 1.75.  Ferritin 156.  Serum iron 92, iron saturation 33%, TIBC 186.  WBC 4.2, hemoglobin 11.0, platelet count 229,000, MCV 94.9.    • 1/24/18 - Colonoscopy by Dr. Cisse:  Colon shows diverticulosis and internal hemorrhoids.    • 6/13/18 - Creatinine 1.93.  WBC 4.1, hemoglobin 11.2, platelet count 209,000, MCV 91.9.  Ferritin 258.  Iron saturation 39, serum 100, TIBC 158.  PTH intact 119.   • 8/3/18 - Upper GI endoscopy:  Shows mild chronic gastritis in the stomach and mild reflux esophagitis within the esophagus.     • 12/11/18 - Creatinine 1.8, BUN 24.  WBC 3.8, hemoglobin 11.1, platelet count 202,000, MCV 93.9.  Ferritin 203.  Iron saturation 26%, TIBC 255, serum iron 66.    • 12/14/18 - Cardiac stress test:  EF 52%.  Imaging shows inferior wall MI versus ischemia.  •  White count 7.4 hemoglobin 11.4 platelet count 232 MCV is 92.8  ferritin 224 serum iron 45 low, iron saturation 17% TIBC's 272 creatinine 1.68 BUN is 25  •  1/25/2020 creatinine 2.23, BUN 48, calcium 8.6, hemoglobin 11.1, hematocrit 36.3, platelets 234,   • 1/17/2020: WBC 5.1, hemoglobin 10.7, MCV 91.5, platelets 205          Subjective: 02/04/20  Here for a follow up appointment. Her female friend is present at today's appointment. She has not needed Procrit, as her HGB has been greater than ten. She is also no longer on an iron supplement. She states she did not get blood work done prior to this visit because she was sick. She states she is still having some fatigue and SOA. She also complains of back pain at today's visit. She sates that she has had diarrhea the past few days.   Patient denies noticing any blood in the stool or bleeding per  rectum.    The following portions of the patient's history were reviewed and updated as appropriate: allergies, current medications, past family history, past medical history, past social history, past surgical history and problem list.     Past Medical History:   Diagnosis Date   • Anemia    • Broken foot     right   • Broken nose 07/2017   • CHF (congestive heart failure) (CMS/[facility])    • Chronic low back pain    • CRI (chronic renal insufficiency)    • Depression    • DJD (degenerative joint disease)    • Fracture of ankle 8/12/2016   • GERD (gastroesophageal reflux disease)    • Gout    • Hypertension    • Hypothyroidism    • CRUZ (obstructive sleep apnea)     non-complaince with bipap   • Osteopenia    • Suicide attempt (CMS/[facility])    • TMJ (dislocation of temporomandibular joint)        Past Surgical History:   Procedure Laterality Date   • APPENDECTOMY  1986   • BACK SURGERY      2004---2005 L5-S1 spinal stenosis   • CARDIAC CATHETERIZATION  04/2018   • CHOLECYSTECTOMY  2008   • FOOT SURGERY Bilateral 2008   • FOOT SURGERY Bilateral 09/2019    Dr. Keenan- 2nd toe on bilateral feet    • GASTRIC BYPASS  2007   • INSERT / REPLACE / REMOVE PACEMAKER     • LAPAROSCOPIC GASTRIC BANDING  2004    removed   • OTHER SURGICAL HISTORY  01/2018    myelogram   • TOTAL KNEE ARTHROPLASTY Left          Current Outpatient Medications:   •  alendronate (FOSAMAX) 70 MG tablet, TAKE ONE TABLET BY MOUTH every WEEK, Disp: 13 tablet, Rfl: 3  •  allopurinol (ZYLOPRIM) 100 MG tablet, Take 2 tablet by mouth at bedtime, Disp: 180 tablet, Rfl: 1  •  aspirin (ASPIR) 81 MG EC tablet, Take 81 mg by mouth Daily., Disp: , Rfl:   •  budesonide-formoterol (SYMBICORT) 160-4.5 MCG/ACT inhaler, Inhale 2 puffs 2 (Two) Times a Day., Disp: 1 inhaler, Rfl: 12  •  busPIRone (BUSPAR) 10 MG tablet, Take 1 tablet by mouth 2 (Two) Times a Day., Disp: 60 tablet, Rfl: 6  •  calcium carb-cholecalciferol (CALCIUM PLUS VITAMIN D3) 600-800 MG-UNIT tablet, Take 600 mg  "by mouth Daily., Disp: , Rfl:   •  cyanocobalamin 1000 MCG/ML injection, Inject 1 mL into the appropriate muscle as directed by prescriber Every 30 (Thirty) Days., Disp: , Rfl: 0  •  cycloSPORINE (RESTASIS) 0.05 % ophthalmic emulsion, Administer 1 drop to both eyes 2 (Two) Times a Day., Disp: , Rfl:   •  desvenlafaxine (PRISTIQ) 50 MG 24 hr tablet, Take 1 tablet by mouth Every Morning., Disp: 30 tablet, Rfl: 3  •  diclofenac (VOLTAREN) 1 % gel gel, Apply 1 application topically to the appropriate area as directed As Needed., Disp: , Rfl: 2  •  dicyclomine (BENTYL) 10 MG capsule, Take 10 mg by mouth 4 (Four) Times a Day., Disp: , Rfl: 0  •  furosemide (LASIX) 40 MG tablet, TAKE ONE TABLET BY MOUTH TWICE DAILY, Disp: 180 tablet, Rfl: 1  •  gabapentin (NEURONTIN) 300 MG capsule, Take 1 capsule by mouth 2 (Two) Times a Day. (Patient taking differently: Take 300 mg by mouth 3 (Three) Times a Day.), Disp: 90 capsule, Rfl: 1  •  HYDROcodone-acetaminophen (NORCO) 7.5-325 MG per tablet, Take 1 tablet by mouth Every 8 (Eight) Hours As Needed., Disp: , Rfl: 0  •  linaclotide (LINZESS) 145 MCG capsule capsule, Take 1 capsule by mouth Daily As Needed., Disp: , Rfl:   •  metoclopramide (REGLAN) 5 MG tablet, Take 5 mg by mouth 4 (Four) Times a Day., Disp: , Rfl: 4  •  metoprolol succinate XL (TOPROL-XL) 50 MG 24 hr tablet, TAKE ONE TABLET BY MOUTH at bedtime, Disp: 90 tablet, Rfl: 3  •  nystatin (MYCOSTATIN) 954704 UNIT/GM cream, Apply  topically to the appropriate area as directed 2 (Two) Times a Day., Disp: 30 g, Rfl: 3  •  omeprazole (priLOSEC) 40 MG capsule, Take 40 mg by mouth 2 (Two) Times a Day., Disp: , Rfl: 5  •  ondansetron (ZOFRAN) 4 MG tablet, Take 4 mg by mouth As Needed., Disp: , Rfl: 0  •  potassium chloride (K-DUR) 10 MEQ CR tablet, Take 10 mEq by mouth Daily., Disp: , Rfl: 5  •  sucralfate (CARAFATE) 1 g tablet, Take 1 g by mouth 4 (Four) Times a Day., Disp: , Rfl: 1  •  Syringe 23G X 1\" 3 ML misc, Inject 1 " syringe into the appropriate muscle as directed by prescriber Every 30 (Thirty) Days., Disp: , Rfl: 0  •  tolterodine LA (DETROL LA) 2 MG 24 hr capsule, TAKE ONE CAPSULE BY MOUTH EVERY DAY, Disp: 30 capsule, Rfl: 2  •  traZODone (DESYREL) 100 MG tablet, Take 0.5 tablets by mouth At Night As Needed for Sleep., Disp: 90 tablet, Rfl: 1  •  vitamin D (ERGOCALCIFEROL) 1.25 MG (03921 UT) capsule capsule, TAKE ONE CAPSULE BY MOUTH one time PER WEEK, Disp: 5 capsule, Rfl: 3    Allergies   Allergen Reactions   • Azithromycin Unknown (See Comments)   • Codeine Nausea And Vomiting   • Dilaudid  [Hydromorphone Hcl] Unknown (See Comments)   • Isosorbide Nitrate Other (See Comments)   • Macrobid  [Nitrofurantoin] Rash   • Morphine Rash   • Other Itching   • Oxycontin  [Oxycodone] Delirium   • Pineapple Other (See Comments)     Mouth numbness   • Povidone Iodine Rash   • Tetanus Antitoxin Rash   • Denosumab Other (See Comments)   • Demerol [Meperidine] Unknown (See Comments)     Pt does not remember. She states it was a long time ago   • Etodolac Other (See Comments)   • Lodrane D [Brompheniramine-Pseudoeph] Other (See Comments)     drowsiness   • Shrimp Flavor Swelling     Sauce only    • Sulfa Antibiotics Unknown (See Comments)   • Tizanidine Other (See Comments)     Increased pain   • Iodine Itching and Rash       Family History   Problem Relation Age of Onset   • Hypertension Father         PM implanted   • Arthritis Sister    • Heart disease Sister    • Lung disease Paternal Grandmother    • COPD Other    • Lung disease Other        Cancer-related family history is not on file.    Social History     Tobacco Use   • Smoking status: Never Smoker   • Smokeless tobacco: Never Used   Substance Use Topics   • Alcohol use: No     Frequency: Never   • Drug use: No         ROS:     Review of Systems   Constitutional: Positive for fatigue (has been very tired and in bed more than usual). Negative for chills and fever (states she has  "had a low grade fever recently).   HENT: Negative for ear pain, mouth sores, nosebleeds and sore throat.    Eyes: Negative for photophobia and visual disturbance.   Respiratory: Positive for shortness of breath. Negative for wheezing and stridor.    Cardiovascular: Negative for chest pain and palpitations.   Gastrointestinal: Positive for diarrhea (for the past few days). Negative for abdominal pain, nausea and vomiting.   Endocrine: Negative for cold intolerance and heat intolerance.   Genitourinary: Positive for dysuria (painful and frequent urination). Negative for hematuria.   Musculoskeletal: Positive for arthralgias, back pain, gait problem (uses cane on right side) and myalgias. Negative for joint swelling and neck stiffness.   Skin: Negative for color change and rash.   Neurological: Negative for seizures and syncope.   Hematological: Negative for adenopathy.        No obvious bleeding   Psychiatric/Behavioral: Negative for agitation, confusion and hallucinations.       Objective:    Vitals:    02/04/20 1032   BP: 150/93   Pulse: 74   Resp: 18   Temp: 98 °F (36.7 °C)   Weight: 115 kg (254 lb)   Height: 157.5 cm (62\")   PainSc:   9   PainLoc: Back       (1) Restricted in physically strenuous activity, ambulatory and able to do work of light nature    Physical Exam:     Physical Exam   Constitutional: She is oriented to person, place, and time. She appears well-developed and well-nourished. No distress.   OBESE   HENT:   Head: Normocephalic and atraumatic.   Eyes: Conjunctivae and EOM are normal. Right eye exhibits no discharge. No scleral icterus.   Neck: Normal range of motion. Neck supple. No thyromegaly present.   Cardiovascular: Normal rate, regular rhythm and normal heart sounds. Exam reveals no gallop and no friction rub.   Pulmonary/Chest: Effort normal. No stridor. No respiratory distress. She has no wheezes.   Abdominal: Soft. Bowel sounds are normal. She exhibits no mass. There is no tenderness. " There is no rebound and no guarding.   Musculoskeletal: Normal range of motion. She exhibits edema (TRACE). She exhibits no tenderness.   Lymphadenopathy:     She has no cervical adenopathy.   Neurological: She is alert and oriented to person, place, and time. She exhibits normal muscle tone.   Skin: Skin is warm. No rash noted. She is not diaphoretic. No erythema.   Psychiatric: She has a normal mood and affect. Her behavior is normal.   Nursing note and vitals reviewed.        Lab Results - Last 18 Months   Lab Units 11/19/19  1030 11/05/19  1457 07/22/19  1608   WBC x10E3/uL 6.5 6.7 5.3   HEMOGLOBIN g/dL 11.0* 10.7* 12.0   HEMATOCRIT % 34.9 34.0 37.4   PLATELETS x10E3/uL 298 262 241   MCV fL 89 88 86     Lab Results - Last 18 Months   Lab Units 11/19/19  1030 11/05/19  1457 07/22/19  1608 04/29/19  0648   SODIUM mmol/L 145* 144 142 139   POTASSIUM mmol/L 3.5 3.5 4.4 4.5   CHLORIDE mmol/L 99 102 96 99*   TOTAL CO2 mmol/L 28 23 29 27   BUN mg/dL 18 18 32* 41*   CREATININE mg/dL 1.45* 1.52* 2.05* 1.9*   CALCIUM mg/dL 8.8 8.7 9.3 9.2   BILIRUBIN mg/dL 0.2 <0.2 0.3  --    ALK PHOS IU/L 101 104 105  --    ALT (SGPT) IU/L 11 24 12  --    AST (SGOT) IU/L 14 20 16  --    GLUCOSE mg/dL  --   --   --  91       Assessment/Plan     Assessment:    1. Anemia of chronic kidney disease:  Hemoglobin has been staying above 10, and close to 11.  She has a history of gastric bypass, gastritis and esophagitis, and therefore is also at risk of iron deficiency.  She has CKD stage 4 causing her anemia.  She is now off iron supplements.  Colonoscopy showed diverticulosis and internal hemorrhoids.    2. Chronic kidney disease:  Creatinine is stable around 1.9.    3. History of iron deficiency: Currently she is off iron.  Recent ferritin is in the high normal range, but iron is a little low..  She is currently not on any oral iron.    PLAN:  1. No need for Procrit, since hemoglobin is above 10.    2.  We will need to monitor iron levels and  replace as needed..  3. Patient reports hematuria, advised to continue follow-up with urologist and her nephrologist.  4. Repeat CBC and iron panel in 1 year.      Orders Placed This Encounter   Procedures   • Ferritin     1 week prior to next appt     Standing Status:   Future     Scheduling Instructions:      Please draw these labs prior to the next visit.   • Iron Profile     1 week prior to next appt     Standing Status:   Future     Scheduling Instructions:      Please draw these labs prior to the next visit.   • Ferritin     1 week prior to appt     Standing Status:   Future     Scheduling Instructions:      Please draw these labs prior to the next visit.   • [facility] Requisition     Ferritin, iron panel     Standing Status:   Future     Standing Expiration Date:   2/4/2021     Scheduling Instructions:      Lab #:      Collection Date/Time: 2/4/2020      Provider: DREA      Details:   • CBC & Differential     Please draw these labs prior to the next visit.  1 week prior to next appt     Standing Status:   Future     Scheduling Instructions:      Please draw these labs prior to the next visit.     Order Specific Question:   Manual Differential     Answer:   No                Thank you very much for providing the opportunity to participate in this patient’s care. Please do not hesitate to call if there are any other questions

## 2020-02-04 ENCOUNTER — RESULTS ENCOUNTER (OUTPATIENT)
Dept: ONCOLOGY | Facility: CLINIC | Age: 70
End: 2020-02-04

## 2020-02-04 ENCOUNTER — OFFICE VISIT (OUTPATIENT)
Dept: ONCOLOGY | Facility: CLINIC | Age: 70
End: 2020-02-04

## 2020-02-04 VITALS
TEMPERATURE: 98 F | DIASTOLIC BLOOD PRESSURE: 93 MMHG | RESPIRATION RATE: 18 BRPM | HEART RATE: 74 BPM | HEIGHT: 62 IN | BODY MASS INDEX: 46.74 KG/M2 | WEIGHT: 254 LBS | SYSTOLIC BLOOD PRESSURE: 150 MMHG

## 2020-02-04 DIAGNOSIS — D50.9 IRON DEFICIENCY ANEMIA, UNSPECIFIED IRON DEFICIENCY ANEMIA TYPE: ICD-10-CM

## 2020-02-04 DIAGNOSIS — D50.9 IRON DEFICIENCY ANEMIA, UNSPECIFIED IRON DEFICIENCY ANEMIA TYPE: Primary | ICD-10-CM

## 2020-02-04 PROCEDURE — 99213 OFFICE O/P EST LOW 20 MIN: CPT | Performed by: INTERNAL MEDICINE

## 2020-02-18 DIAGNOSIS — F33.2 SEVERE EPISODE OF RECURRENT MAJOR DEPRESSIVE DISORDER, WITHOUT PSYCHOTIC FEATURES (HCC): ICD-10-CM

## 2020-02-18 RX ORDER — TRAZODONE HYDROCHLORIDE 50 MG/1
50 TABLET ORAL NIGHTLY PRN
Qty: 90 TABLET | Refills: 0 | Status: SHIPPED | OUTPATIENT
Start: 2020-02-18 | End: 2020-03-23

## 2020-02-21 ENCOUNTER — OFFICE VISIT (OUTPATIENT)
Dept: FAMILY MEDICINE CLINIC | Facility: CLINIC | Age: 70
End: 2020-02-21

## 2020-02-21 VITALS
DIASTOLIC BLOOD PRESSURE: 82 MMHG | TEMPERATURE: 98 F | OXYGEN SATURATION: 92 % | HEART RATE: 85 BPM | SYSTOLIC BLOOD PRESSURE: 134 MMHG | WEIGHT: 265 LBS | BODY MASS INDEX: 48.76 KG/M2 | HEIGHT: 62 IN

## 2020-02-21 DIAGNOSIS — M54.41 CHRONIC LOW BACK PAIN WITH BILATERAL SCIATICA, UNSPECIFIED BACK PAIN LATERALITY: ICD-10-CM

## 2020-02-21 DIAGNOSIS — I10 ESSENTIAL HYPERTENSION: ICD-10-CM

## 2020-02-21 DIAGNOSIS — G25.0 TREMOR, ESSENTIAL: ICD-10-CM

## 2020-02-21 DIAGNOSIS — N18.4 RENAL FAILURE, CHRONIC, STAGE 4 (SEVERE) (HCC): ICD-10-CM

## 2020-02-21 DIAGNOSIS — I49.5 SICK SINUS SYNDROME (HCC): ICD-10-CM

## 2020-02-21 DIAGNOSIS — M54.42 CHRONIC LOW BACK PAIN WITH BILATERAL SCIATICA, UNSPECIFIED BACK PAIN LATERALITY: ICD-10-CM

## 2020-02-21 DIAGNOSIS — M48.062 SPINAL STENOSIS OF LUMBAR REGION WITH NEUROGENIC CLAUDICATION: ICD-10-CM

## 2020-02-21 DIAGNOSIS — M47.26 OTHER SPONDYLOSIS WITH RADICULOPATHY, LUMBAR REGION: ICD-10-CM

## 2020-02-21 DIAGNOSIS — B37.2 CANDIDAL DERMATITIS: ICD-10-CM

## 2020-02-21 DIAGNOSIS — F33.2 SEVERE EPISODE OF RECURRENT MAJOR DEPRESSIVE DISORDER, WITHOUT PSYCHOTIC FEATURES (HCC): Primary | ICD-10-CM

## 2020-02-21 DIAGNOSIS — G89.29 CHRONIC LOW BACK PAIN WITH BILATERAL SCIATICA, UNSPECIFIED BACK PAIN LATERALITY: ICD-10-CM

## 2020-02-21 PROCEDURE — 99214 OFFICE O/P EST MOD 30 MIN: CPT | Performed by: FAMILY MEDICINE

## 2020-02-21 RX ORDER — NYSTATIN 100000 U/G
CREAM TOPICAL AS NEEDED
Qty: 30 G | Refills: 3 | Status: SHIPPED | OUTPATIENT
Start: 2020-02-21 | End: 2020-03-19

## 2020-02-21 RX ORDER — FLUCONAZOLE 150 MG/1
TABLET ORAL
Qty: 2 TABLET | Refills: 1 | Status: ON HOLD | OUTPATIENT
Start: 2020-02-21 | End: 2020-04-24

## 2020-02-21 RX ORDER — PROPRANOLOL HYDROCHLORIDE 60 MG/1
60 TABLET ORAL 3 TIMES DAILY
Qty: 90 TABLET | Refills: 1 | Status: SHIPPED | OUTPATIENT
Start: 2020-02-21 | End: 2020-03-17

## 2020-02-21 NOTE — PROGRESS NOTES
Chief Complaint   Patient presents with   • Depression   • Pain       Subjective     Elaine Toscano is a 69 y.o. female.  She presents today for a 4-week follow-up on depression, chronic renal failure, and spinal stenosis with neurogenic claudication and bilateral leg pain.    Depresion improved on Prestiq    Since last visit she was dismissed from pain management clinic.  Had been told this was due to feeling and unauthorized prescription, however the notes from new pain management physician indicated for a negative drug screen.  She has started going to a new pain management clinic, specialist and pain management.  They have given her 1 caudal epidural injection 2 days ago which she states was beneficial including no radiation of pain into legs, however sore at injection site. Planning on another steroid injection, hesitant.   Office visit dated February 5, 2020 has been obtained and reviewed.  They are continuing hydrocodone therapy at 7.5 mg 4 times daily and gabapentin, told to increase 400 mg tid but have not obtained new RX.  They advised her to supplement with acetaminophen and NSAIDs.  I reminded her with her stage IV renal disease she should avoid all anti-inflammatories except topicals.    She was scheduled with spine surgeon, Dr. Gonzales, on January 30 but this was apparently rescheduled to February 12.  She no showed this appointment.  Stating that referral has been made by [facility] pain management and since she has been dismissed from their decided not to follow through with that referral.    She has seen by  hematologist Dr. Guido, on February 2, for follow-up on anemia of chronic kidney disease.  Patient reported hematuria and was advised to follow-up with urologist and nephrologist.  Intended one-year follow-up.    Stage IV renal disease followed by Dr. Araujo.  Last appointment in January, calling to obtain consultation report.    Patient also reports a tremor in her left hand, she is  right-handed the tremor is not as bad in her right hand, is worse with intention.  She does have a family history, states her dad shook as well.    The following portions of the patient's history were reviewed and updated as appropriate: allergies, current medications, past family history, past medical history, past social history, past surgical history and problem list.    Current Outpatient Medications on File Prior to Visit   Medication Sig   • alendronate (FOSAMAX) 70 MG tablet TAKE ONE TABLET BY MOUTH every WEEK   • allopurinol (ZYLOPRIM) 100 MG tablet Take 2 tablet by mouth at bedtime   • aspirin (ASPIR) 81 MG EC tablet Take 81 mg by mouth Daily.   • budesonide-formoterol (SYMBICORT) 160-4.5 MCG/ACT inhaler Inhale 2 puffs 2 (Two) Times a Day.   • busPIRone (BUSPAR) 10 MG tablet Take 1 tablet by mouth 2 (Two) Times a Day.   • calcium carb-cholecalciferol (CALCIUM PLUS VITAMIN D3) 600-800 MG-UNIT tablet Take 600 mg by mouth Daily.   • cyanocobalamin 1000 MCG/ML injection Inject 1 mL into the appropriate muscle as directed by prescriber Every 30 (Thirty) Days.   • cycloSPORINE (RESTASIS) 0.05 % ophthalmic emulsion Administer 1 drop to both eyes 2 (Two) Times a Day.   • desvenlafaxine (PRISTIQ) 50 MG 24 hr tablet Take 1 tablet by mouth Every Morning.   • diclofenac (VOLTAREN) 1 % gel gel Apply 1 application topically to the appropriate area as directed As Needed.   • dicyclomine (BENTYL) 10 MG capsule Take 10 mg by mouth 4 (Four) Times a Day.   • furosemide (LASIX) 40 MG tablet TAKE ONE TABLET BY MOUTH TWICE DAILY   • gabapentin (NEURONTIN) 300 MG capsule Take 1 capsule by mouth 2 (Two) Times a Day. (Patient taking differently: Take 300 mg by mouth 3 (Three) Times a Day.)   • HYDROcodone-acetaminophen (NORCO) 7.5-325 MG per tablet Take 1 tablet by mouth Every 8 (Eight) Hours As Needed.   • linaclotide (LINZESS) 145 MCG capsule capsule Take 1 capsule by mouth Daily As Needed.   • metoclopramide (REGLAN) 5 MG tablet  "Take 5 mg by mouth 4 (Four) Times a Day.   • nystatin (MYCOSTATIN) 614256 UNIT/GM cream Apply  topically to the appropriate area as directed 2 (Two) Times a Day.   • omeprazole (priLOSEC) 40 MG capsule Take 40 mg by mouth 2 (Two) Times a Day.   • ondansetron (ZOFRAN) 4 MG tablet Take 4 mg by mouth As Needed.   • potassium chloride (K-DUR) 10 MEQ CR tablet Take 10 mEq by mouth Daily.   • sucralfate (CARAFATE) 1 g tablet Take 1 g by mouth 4 (Four) Times a Day.   • Syringe 23G X 1\" 3 ML misc Inject 1 syringe into the appropriate muscle as directed by prescriber Every 30 (Thirty) Days.   • tolterodine LA (DETROL LA) 2 MG 24 hr capsule TAKE ONE CAPSULE BY MOUTH EVERY DAY   • traZODone (DESYREL) 50 MG tablet Take 1 tablet by mouth At Night As Needed for Sleep.   • vitamin D (ERGOCALCIFEROL) 1.25 MG (45413 UT) capsule capsule TAKE ONE CAPSULE BY MOUTH one time PER WEEK   • [DISCONTINUED] metoprolol succinate XL (TOPROL-XL) 50 MG 24 hr tablet TAKE ONE TABLET BY MOUTH at bedtime     No current facility-administered medications on file prior to visit.      Medications Discontinued During This Encounter   Medication Reason   • metoprolol succinate XL (TOPROL-XL) 50 MG 24 hr tablet Alternate therapy       Review of Systems   Constitutional: Positive for fatigue. Negative for fever.   Respiratory: Negative for cough and shortness of breath.    Cardiovascular: Positive for leg swelling. Negative for chest pain and palpitations.   Gastrointestinal: Negative.    Genitourinary: Positive for dysuria, frequency, urgency and urinary incontinence.   Musculoskeletal: Positive for arthralgias, back pain and gait problem.   Skin: Positive for rash.   Neurological: Positive for dizziness, tremors, weakness, light-headedness and memory problem.        Objective   Vitals:    02/21/20 0835 02/21/20 0846   BP: 156/89 134/82   BP Location: Left arm Left arm   Patient Position: Sitting Sitting   Cuff Size: Adult Adult   Pulse: 85    Temp: 98 °F " "(36.7 °C)    TempSrc: Oral    SpO2: 92%    Weight: 120 kg (265 lb)    Height: 157.5 cm (62.01\")       Body mass index is 48.46 kg/m².    Physical Exam   Constitutional: She is oriented to person, place, and time. She appears well-developed and well-nourished. No distress.   HENT:   Head: Normocephalic and atraumatic.   Cardiovascular: Normal rate, regular rhythm and normal heart sounds.   No murmur heard.  Pulmonary/Chest: Effort normal and breath sounds normal. She has no wheezes.   Musculoskeletal: She exhibits no edema.   There is mild tremor with intention left somewhat greater than right, fingers on left hand appears somewhat smaller than right there is mild weakness to abduction of the fingers otherwise normal range of motion bilaterally.  There is no cogwheeling of the arms.   Neurological: She is alert and oriented to person, place, and time.   Skin: Skin is warm and dry.   Psychiatric: She has a normal mood and affect.   Nursing note and vitals reviewed.          Lab Results   Component Value Date    HGBA1C 5.8 (H) 11/05/2019        Procedures     Assessment/Plan   Diagnoses and all orders for this visit:    1. Severe episode of recurrent major depressive disorder, without psychotic features (CMS/HCC) (Primary)    2. Spinal stenosis of lumbar region with neurogenic claudication    3. Chronic low back pain with bilateral sciatica, unspecified back pain laterality    4. Other spondylosis with radiculopathy, lumbar region    5. Renal failure, chronic, stage 4 (severe) (CMS/HCC)    6. Sick sinus syndrome (CMS/HCC)    7. Essential hypertension  -     propranolol (INDERAL) 60 MG tablet; Take 1 tablet by mouth 3 (Three) Times a Day.  Dispense: 90 tablet; Refill: 1    8. Candidal dermatitis  -     fluconazole (DIFLUCAN) 150 MG tablet; 1 now, may repeat in 1 week if needed for skin yeast infection  Dispense: 2 tablet; Refill: 1  -     nystatin (MYCOSTATIN) 280783 UNIT/GM cream; Apply  topically to the appropriate " area as directed As Needed (skin yeast infection).  Dispense: 30 g; Refill: 3    9. Tremor, essential  -     propranolol (INDERAL) 60 MG tablet; Take 1 tablet by mouth 3 (Three) Times a Day.  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  Will recheck in 1 month and adjust dosing if needed.  If no significant benefit would recommend referral to neurology for further evaluation.    Recurrent candidal dermatitis, prescription for Diflucan as well as topical nystatin cream.    A1c in prediabetes range with reported hyperglycemia up to 200 after drinking soft drinks at home.  Advised to cut out all sugary drinks and other high sugar foods to reduce risk of developing diabetes and to work on weight reduction.    Medications Discontinued During This Encounter   Medication Reason   • metoprolol succinate XL (TOPROL-XL) 50 MG 24 hr tablet Alternate therapy          Return in about 4 weeks (around 3/20/2020) for Recheck HTN and tremor.        AVS given

## 2020-02-21 NOTE — PATIENT INSTRUCTIONS
Preventing Type 2 Diabetes Mellitus  Type 2 diabetes (type 2 diabetes mellitus) is a long-term (chronic) disease that affects blood sugar (glucose) levels. Normally, a hormone called insulin allows glucose to enter cells in the body. The cells use glucose for energy. In type 2 diabetes, one or both of these problems may be present:  · The body does not make enough insulin.  · The body does not respond properly to insulin that it makes (insulin resistance).  Insulin resistance or lack of insulin causes excess glucose to build up in the blood instead of going into cells. As a result, high blood glucose (hyperglycemia) develops, which can cause many complications. Being overweight or obese and having an inactive (sedentary) lifestyle can increase your risk for diabetes. Type 2 diabetes can be delayed or prevented by making certain nutrition and lifestyle changes.  What nutrition changes can be made?    · Eat healthy meals and snacks regularly. Keep a healthy snack with you for when you get hungry between meals, such as fruit or a handful of nuts.  · Eat lean meats and proteins that are low in saturated fats, such as chicken, fish, egg whites, and beans. Avoid processed meats.  · Eat plenty of fruits and vegetables and plenty of grains that have not been processed (whole grains). It is recommended that you eat:  ? 1½?2 cups of fruit every day.  ? 2½?3 cups of vegetables every day.  ? 6?8 oz of whole grains every day, such as oats, whole wheat, bulgur, brown rice, quinoa, and millet.  · Eat low-fat dairy products, such as milk, yogurt, and cheese.  · Eat foods that contain healthy fats, such as nuts, avocado, olive oil, and canola oil.  · Drink water throughout the day. Avoid drinks that contain added sugar, such as soda or sweet tea.  · Follow instructions from your health care provider about specific eating or drinking restrictions.  · Control how much food you eat at a time (portion size).  ? Check food labels to find  out the serving sizes of foods.  ? Use a kitchen scale to weigh amounts of foods.  · Saute or steam food instead of frying it. Cook with water or broth instead of oils or butter.  · Limit your intake of:  ? Salt (sodium). Have no more than 1 tsp (2,400 mg) of sodium a day. If you have heart disease or high blood pressure, have less than ½?¾ tsp (1,500 mg) of sodium a day.  ? Saturated fat. This is fat that is solid at room temperature, such as butter or fat on meat.  What lifestyle changes can be made?  Activity    · Do moderate-intensity physical activity for at least 30 minutes on at least 5 days of the week, or as much as told by your health care provider.  · Ask your health care provider what activities are safe for you. A mix of physical activities may be best, such as walking, swimming, cycling, and strength training.  · Try to add physical activity into your day. For example:  ? Park in spots that are farther away than usual, so that you walk more. For example, park in a far corner of the parking lot when you go to the office or the grocery store.  ? Take a walk during your lunch break.  ? Use stairs instead of elevators or escalators.  Weight Loss  · Lose weight as directed. Your health care provider can determine how much weight loss is best for you and can help you lose weight safely.  · If you are overweight or obese, you may be instructed to lose at least 5?7 % of your body weight.  Alcohol and Tobacco    · Limit alcohol intake to no more than 1 drink a day for nonpregnant women and 2 drinks a day for men. One drink equals 12 oz of beer, 5 oz of wine, or 1½ oz of hard liquor.  · Do not use any tobacco products, such as cigarettes, chewing tobacco, and e-cigarettes. If you need help quitting, ask your health care provider.  Work With Your Health Care Provider  · Have your blood glucose tested regularly, as told by your health care provider.  · Discuss your risk factors and how you can reduce your risk for  diabetes.  · Get screening tests as told by your health care provider. You may have screening tests regularly, especially if you have certain risk factors for type 2 diabetes.  · Make an appointment with a diet and nutrition specialist (registered dietitian). A registered dietitian can help you make a healthy eating plan and can help you understand portion sizes and food labels.  Why are these changes important?  · It is possible to prevent or delay type 2 diabetes and related health problems by making lifestyle and nutrition changes.  · It can be difficult to recognize signs of type 2 diabetes. The best way to avoid possible damage to your body is to take actions to prevent the disease before you develop symptoms.  What can happen if changes are not made?  · Your blood glucose levels may keep increasing. Having high blood glucose for a long time is dangerous. Too much glucose in your blood can damage your blood vessels, heart, kidneys, nerves, and eyes.  · You may develop prediabetes or type 2 diabetes. Type 2 diabetes can lead to many chronic health problems and complications, such as:  ? Heart disease.  ? Stroke.  ? Blindness.  ? Kidney disease.  ? Depression.  ? Poor circulation in the feet and legs, which could lead to surgical removal (amputation) in severe cases.  Where to find support  · Ask your health care provider to recommend a registered dietitian, diabetes educator, or weight loss program.  · Look for local or online weight loss groups.  · Join a gym, fitness club, or outdoor activity group, such as a walking club.  Where to find more information  To learn more about diabetes and diabetes prevention, visit:  · American Diabetes Association (ADA): www.diabetes.org  · National [place] of Diabetes and Digestive and Kidney Diseases: www.niddk.nih.gov/health-information/diabetes  To learn more about healthy eating, visit:  · The U.[place]. Department of Agriculture (USDA), Choose My Plate:  www.choosemyplate.gov/food-groups  · Office of Disease Prevention and Health Promotion (ODPHP), Dietary Guidelines: www.health.gov/dietaryguidelines  Summary  · You can reduce your risk for type 2 diabetes by increasing your physical activity, eating healthy foods, and losing weight as directed.  · Talk with your health care provider about your risk for type 2 diabetes. Ask about any blood tests or screening tests that you need to have.  This information is not intended to replace advice given to you by your health care provider. Make sure you discuss any questions you have with your health care provider.  Document Released: 04/10/2017 Document Revised: 11/29/2018 Document Reviewed: 02/07/2017  [employer] Patient Education © 2020 [employer]    Calorie Counting for Weight Loss  Calories are units of energy. Your body needs a certain amount of calories from food to keep you going throughout the day. When you eat more calories than your body needs, your body stores the extra calories as fat. When you eat fewer calories than your body needs, your body burns fat to get the energy it needs.  Calorie counting means keeping track of how many calories you eat and drink each day. Calorie counting can be helpful if you need to lose weight. If you make sure to eat fewer calories than your body needs, you should lose weight. Ask your health care provider what a healthy weight is for you.  For calorie counting to work, you will need to eat the right number of calories in a day in order to lose a healthy amount of weight per week. A dietitian can help you determine how many calories you need in a day and will give you suggestions on how to reach your calorie goal.  · A healthy amount of weight to lose per week is usually 1-2 lb (0.5-0.9 kg). This usually means that your daily calorie intake should be reduced by 500-750 calories.  · Eating 1,200 - 1,500 calories per day can help most women lose weight.  · Eating 1,500  - 1,800 calories per day can help most men lose weight.  What is my plan?  My goal is to have __________ calories per day.  If I have this many calories per day, I should lose around __________ pounds per week.  What do I need to know about calorie counting?  In order to meet your daily calorie goal, you will need to:  · Find out how many calories are in each food you would like to eat. Try to do this before you eat.  · Decide how much of the food you plan to eat.  · Write down what you ate and how many calories it had. Doing this is called keeping a food log.  To successfully lose weight, it is important to balance calorie counting with a healthy lifestyle that includes regular activity. Aim for 150 minutes of moderate exercise (such as walking) or 75 minutes of vigorous exercise (such as running) each week.  Where do I find calorie information?    The number of calories in a food can be found on a Nutrition Facts label. If a food does not have a Nutrition Facts label, try to look up the calories online or ask your dietitian for help.  Remember that calories are listed per serving. If you choose to have more than one serving of a food, you will have to multiply the calories per serving by the amount of servings you plan to eat. For example, the label on a package of bread might say that a serving size is 1 slice and that there are 90 calories in a serving. If you eat 1 slice, you will have eaten 90 calories. If you eat 2 slices, you will have eaten 180 calories.  How do I keep a food log?  Immediately after each meal, record the following information in your food log:  · What you ate. Don't forget to include toppings, sauces, and other extras on the food.  · How much you ate. This can be measured in cups, ounces, or number of items.  · How many calories each food and drink had.  · The total number of calories in the meal.  Keep your food log near you, such as in a small notebook in your pocket, or use a mobile madhu  "or website. Some programs will calculate calories for you and show you how many calories you have left for the day to meet your goal.  What are some calorie counting tips?    · Use your calories on foods and drinks that will fill you up and not leave you hungry:  ? Some examples of foods that fill you up are nuts and nut butters, vegetables, lean proteins, and high-fiber foods like whole grains. High-fiber foods are foods with more than 5 g fiber per serving.  ? Drinks such as sodas, specialty coffee drinks, alcohol, and juices have a lot of calories, yet do not fill you up.  · Eat nutritious foods and avoid empty calories. Empty calories are calories you get from foods or beverages that do not have many vitamins or protein, such as candy, sweets, and soda. It is better to have a nutritious high-calorie food (such as an avocado) than a food with few nutrients (such as a bag of chips).  · Know how many calories are in the foods you eat most often. This will help you calculate calorie counts faster.  · Pay attention to calories in drinks. Low-calorie drinks include water and unsweetened drinks.  · Pay attention to nutrition labels for \"low fat\" or \"fat free\" foods. These foods sometimes have the same amount of calories or more calories than the full fat versions. They also often have added sugar, starch, or salt, to make up for flavor that was removed with the fat.  · Find a way of tracking calories that works for you. Get creative. Try different apps or programs if writing down calories does not work for you.  What are some portion control tips?  · Know how many calories are in a serving. This will help you know how many servings of a certain food you can have.  · Use a measuring cup to measure serving sizes. You could also try weighing out portions on a kitchen scale. With time, you will be able to estimate serving sizes for some foods.  · Take some time to put servings of different foods on your favorite plates, " bowls, and cups so you know what a serving looks like.  · Try not to eat straight from a bag or box. Doing this can lead to overeating. Put the amount you would like to eat in a cup or on a plate to make sure you are eating the right portion.  · Use smaller plates, glasses, and bowls to prevent overeating.  · Try not to multitask (for example, watch TV or use your computer) while eating. If it is time to eat, sit down at a table and enjoy your food. This will help you to know when you are full. It will also help you to be aware of what you are eating and how much you are eating.  What are tips for following this plan?  Reading food labels  · Check the calorie count compared to the serving size. The serving size may be smaller than what you are used to eating.  · Check the source of the calories. Make sure the food you are eating is high in vitamins and protein and low in saturated and trans fats.  Shopping  · Read nutrition labels while you shop. This will help you make healthy decisions before you decide to purchase your food.  · Make a grocery list and stick to it.  Cooking  · Try to cook your favorite foods in a healthier way. For example, try baking instead of frying.  · Use low-fat dairy products.  Meal planning  · Use more fruits and vegetables. Half of your plate should be fruits and vegetables.  · Include lean proteins like poultry and fish.  How do I count calories when eating out?  · Ask for smaller portion sizes.  · Consider sharing an entree and sides instead of getting your own entree.  · If you get your own entree, eat only half. Ask for a box at the beginning of your meal and put the rest of your entree in it so you are not tempted to eat it.  · If calories are listed on the menu, choose the lower calorie options.  · Choose dishes that include vegetables, fruits, whole grains, low-fat dairy products, and lean protein.  · Choose items that are boiled, broiled, grilled, or steamed. Stay away from items  "that are buttered, battered, fried, or served with cream sauce. Items labeled \"crispy\" are usually fried, unless stated otherwise.  · Choose water, low-fat milk, unsweetened iced tea, or other drinks without added sugar. If you want an alcoholic beverage, choose a lower calorie option such as a glass of wine or light beer.  · Ask for dressings, sauces, and syrups on the side. These are usually high in calories, so you should limit the amount you eat.  · If you want a salad, choose a garden salad and ask for grilled meats. Avoid extra toppings like fish, cheese, or fried items. Ask for the dressing on the side, or ask for olive oil and vinegar or lemon to use as dressing.  · Estimate how many servings of a food you are given. For example, a serving of cooked rice is ½ cup or about the size of half a baseball. Knowing serving sizes will help you be aware of how much food you are eating at restaurants. The list below tells you how big or small some common portion sizes are based on everyday objects:  ? 1 oz--4 stacked dice.  ? 3 oz--1 deck of cards.  ? 1 tsp--1 die.  ? 1 Tbsp--½ a ping-pong ball.  ? 2 Tbsp--1 ping-pong ball.  ? ½ cup--½ baseball.  ? 1 cup--1 baseball.  Summary  · Calorie counting means keeping track of how many calories you eat and drink each day. If you eat fewer calories than your body needs, you should lose weight.  · A healthy amount of weight to lose per week is usually 1-2 lb (0.5-0.9 kg). This usually means reducing your daily calorie intake by 500-750 calories.  · The number of calories in a food can be found on a Nutrition Facts label. If a food does not have a Nutrition Facts label, try to look up the calories online or ask your dietitian for help.  · Use your calories on foods and drinks that will fill you up, and not on foods and drinks that will leave you hungry.  · Use smaller plates, glasses, and bowls to prevent overeating.  This information is not intended to replace advice given to " you by your health care provider. Make sure you discuss any questions you have with your health care provider.  Document Released: 12/18/2006 Document Revised: 09/06/2019 Document Reviewed: 11/17/2017  [employer] Interactive Patient Education © 2020 [employer]    Mindfulness-Based Stress Reduction  Mindfulness-based stress reduction (MBSR) is a program that helps people learn to practice mindfulness. Mindfulness is the practice of intentionally paying attention to the present moment. It can be learned and practiced through techniques such as education, breathing exercises, meditation, and yoga. MBSR includes several mindfulness techniques in one program.  MBSR works best when you understand the treatment, are willing to try new things, and can commit to spending time practicing what you learn. MBSR training may include learning about:  · How your emotions, thoughts, and reactions affect your body.  · New ways to respond to things that cause negative thoughts to start (triggers).  · How to notice your thoughts and let go of them.  · Practicing awareness of everyday things that you normally do without thinking.  · The techniques and goals of different types of meditation.  What are the benefits of MBSR?  MBSR can have many benefits, which include helping you to:  · Develop self-awareness. This refers to knowing and understanding yourself.  · Learn skills and attitudes that help you to participate in your own health care.  · Learn new ways to care for yourself.  · Be more accepting about how things are, and let things go.  · Be less judgmental and approach things with an open mind.  · Be patient with yourself and trust yourself more.  MBSR has also been shown to:  · Reduce negative emotions, such as depression and anxiety.  · Improve memory and focus.  · Change how you sense and approach pain.  · Boost your body's ability to fight infections.  · Help you connect better with other people.  · Improve your sense of  well-being.  Follow these instructions at home:    · Find a local in-person or online MBSR program.  · Set aside some time regularly for mindfulness practice.  · Find a mindfulness practice that works best for you. This may include one or more of the following:  ? Meditation. Meditation involves focusing your mind on a certain thought or activity.  ? Breathing awareness exercises. These help you to stay present by focusing on your breath.  ? Body scan. For this practice, you lie down and pay attention to each part of your body from head to toe. You can identify tension and soreness and intentionally relax parts of your body.  ? Yoga. Yoga involves stretching and breathing, and it can improve your ability to move and be flexible. It can also provide an experience of testing your body's limits, which can help you release stress.  ? Mindful eating. This way of eating involves focusing on the taste, texture, color, and smell of each bite of food. Because this slows down eating and helps you feel full sooner, it can be an important part of a weight-loss plan.  · Find a podcast or recording that provides guidance for breathing awareness, body scan, or meditation exercises. You can listen to these any time when you have a free moment to rest without distractions.  · Follow your treatment plan as told by your health care provider. This may include taking regular medicines and making changes to your diet or lifestyle as recommended.  How to practice mindfulness  To do a basic awareness exercise:  · Find a comfortable place to sit.  · Pay attention to the present moment. Observe your thoughts, feelings, and surroundings just as they are.  · Avoid placing judgment on yourself, your feelings, or your surroundings. Make note of any judgment that comes up, and let it go.  · Your mind may wander, and that is okay. Make note of when your thoughts drift, and return your attention to the present moment.  To do basic mindfulness  meditation:  · Find a comfortable place to sit. This may include a stable chair or a firm floor cushion.  ? Sit upright with your back straight. Let your arms fall next to your side with your hands resting on your legs.  ? If sitting in a chair, rest your feet flat on the floor.  ? If sitting on a cushion, cross your legs in front of you.  · Keep your head in a neutral position with your chin dropped slightly. Relax your jaw and rest the tip of your tongue on the roof of your mouth. Drop your gaze to the floor. You can close your eyes if you like.  · Breathe normally and pay attention to your breath. Feel the air moving in and out of your nose. Feel your belly expanding and relaxing with each breath.  · Your mind may wander, and that is okay. Make note of when your thoughts drift, and return your attention to your breath.  · Avoid placing judgment on yourself, your feelings, or your surroundings. Make note of any judgment or feelings that come up, let them go, and bring your attention back to your breath.  · When you are ready, lift your gaze or open your eyes. Pay attention to how your body feels after the meditation.  Where to find more information  You can find more information about MBSR from:  · Your health care provider.  · Community-based meditation centers or programs.  · Programs offered near you.  Summary  · Mindfulness-based stress reduction (MBSR) is a program that teaches you how to intentionally pay attention to the present moment. It is used with other treatments to help you cope better with daily stress, emotions, and pain.  · MBSR focuses on developing self-awareness, which allows you to respond to life stress without judgment or negative emotions.  · MBSR programs may involve learning different mindfulness practices, such as breathing exercises, meditation, yoga, body scan, or mindful eating. Find a mindfulness practice that works best for you, and set aside time for it on a regular basis.  This  information is not intended to replace advice given to you by your health care provider. Make sure you discuss any questions you have with your health care provider.  Document Released: 04/26/2018 Document Revised: 04/26/2018 Document Reviewed: 04/26/2018  [employer] Patient Education © 2020 [employer]

## 2020-02-26 DIAGNOSIS — B37.2 CANDIDAL DERMATITIS: ICD-10-CM

## 2020-02-26 RX ORDER — FLUCONAZOLE 150 MG/1
TABLET ORAL
Qty: 2 TABLET | Refills: 1 | OUTPATIENT
Start: 2020-02-26

## 2020-02-26 RX ORDER — TOLTERODINE 2 MG/1
CAPSULE, EXTENDED RELEASE ORAL
Qty: 30 CAPSULE | Refills: 2 | Status: SHIPPED | OUTPATIENT
Start: 2020-02-26 | End: 2020-05-22

## 2020-03-09 ENCOUNTER — OFFICE VISIT (OUTPATIENT)
Dept: FAMILY MEDICINE CLINIC | Facility: CLINIC | Age: 70
End: 2020-03-09

## 2020-03-09 VITALS
TEMPERATURE: 97.6 F | HEART RATE: 68 BPM | WEIGHT: 258 LBS | SYSTOLIC BLOOD PRESSURE: 115 MMHG | HEIGHT: 62 IN | OXYGEN SATURATION: 93 % | BODY MASS INDEX: 47.48 KG/M2 | DIASTOLIC BLOOD PRESSURE: 75 MMHG

## 2020-03-09 DIAGNOSIS — R09.89 RUNNY NOSE: ICD-10-CM

## 2020-03-09 DIAGNOSIS — R30.0 DYSURIA: ICD-10-CM

## 2020-03-09 DIAGNOSIS — R11.0 NAUSEA: ICD-10-CM

## 2020-03-09 DIAGNOSIS — R05.9 COUGH: Primary | ICD-10-CM

## 2020-03-09 DIAGNOSIS — N30.01 ACUTE CYSTITIS WITH HEMATURIA: ICD-10-CM

## 2020-03-09 DIAGNOSIS — R19.7 DIARRHEA, UNSPECIFIED TYPE: ICD-10-CM

## 2020-03-09 LAB
BILIRUB BLD-MCNC: NEGATIVE MG/DL
CLARITY, POC: CLEAR
COLOR UR: YELLOW
EXPIRATION DATE: NORMAL
FLUAV AG NPH QL: NEGATIVE
FLUBV AG NPH QL: NEGATIVE
GLUCOSE UR STRIP-MCNC: NEGATIVE MG/DL
INTERNAL CONTROL: NORMAL
KETONES UR QL: NEGATIVE
LEUKOCYTE EST, POC: ABNORMAL
Lab: NORMAL
NITRITE UR-MCNC: NEGATIVE MG/ML
PH UR: 5 [PH] (ref 5–8)
PROT UR STRIP-MCNC: ABNORMAL MG/DL
RBC # UR STRIP: ABNORMAL /UL
SP GR UR: 1.01 (ref 1–1.03)
UROBILINOGEN UR QL: NORMAL

## 2020-03-09 PROCEDURE — 81003 URINALYSIS AUTO W/O SCOPE: CPT | Performed by: FAMILY MEDICINE

## 2020-03-09 PROCEDURE — 87804 INFLUENZA ASSAY W/OPTIC: CPT | Performed by: FAMILY MEDICINE

## 2020-03-09 PROCEDURE — 99214 OFFICE O/P EST MOD 30 MIN: CPT | Performed by: FAMILY MEDICINE

## 2020-03-09 RX ORDER — ONDANSETRON 4 MG/1
4 TABLET, FILM COATED ORAL AS NEEDED
Qty: 20 TABLET | Refills: 1 | Status: SHIPPED | OUTPATIENT
Start: 2020-03-09 | End: 2020-10-21 | Stop reason: SDUPTHER

## 2020-03-09 RX ORDER — AMOXICILLIN 500 MG/1
500 CAPSULE ORAL 3 TIMES DAILY
Qty: 21 CAPSULE | Refills: 0 | Status: ON HOLD | OUTPATIENT
Start: 2020-03-09 | End: 2020-04-24

## 2020-03-09 RX ORDER — GUAIFENESIN AND DEXTROMETHORPHAN HYDROBROMIDE 600; 30 MG/1; MG/1
TABLET, EXTENDED RELEASE ORAL
Qty: 20 TABLET | Refills: 1 | Status: SHIPPED | OUTPATIENT
Start: 2020-03-09 | End: 2020-03-13

## 2020-03-09 RX ORDER — GABAPENTIN 400 MG/1
400 CAPSULE ORAL 4 TIMES DAILY
COMMUNITY
Start: 2020-03-03 | End: 2020-04-25 | Stop reason: HOSPADM

## 2020-03-09 NOTE — PROGRESS NOTES
Chief Complaint   Patient presents with   • Cough   • Fever       Subjective     Elaine Toscano is a 69 y.o. female.  She presents today for low-grade fever around 99 last evening, runny nose over the weekend, and cough that started on Friday, 3 days ago.  Cough is productive of a clear phlegm but she feels like she cannot get up that is in there.  She is also having diarrhea several times a day.  She is also wondering if she could be getting a urine infection, increased frequency and mild dysuria.  Ill exposure :her home health Aid has been sick last couple weeks.      The following portions of the patient's history were reviewed and updated as appropriate: allergies, current medications, past family history, past medical history, past social history, past surgical history and problem list.    Current Outpatient Medications on File Prior to Visit   Medication Sig   • alendronate (FOSAMAX) 70 MG tablet TAKE ONE TABLET BY MOUTH every WEEK   • allopurinol (ZYLOPRIM) 100 MG tablet Take 2 tablet by mouth at bedtime   • aspirin (ASPIR) 81 MG EC tablet Take 81 mg by mouth Daily.   • budesonide-formoterol (SYMBICORT) 160-4.5 MCG/ACT inhaler Inhale 2 puffs 2 (Two) Times a Day.   • busPIRone (BUSPAR) 10 MG tablet Take 1 tablet by mouth 2 (Two) Times a Day.   • calcium carb-cholecalciferol (CALCIUM PLUS VITAMIN D3) 600-800 MG-UNIT tablet Take 600 mg by mouth Daily.   • cyanocobalamin 1000 MCG/ML injection Inject 1 mL into the appropriate muscle as directed by prescriber Every 30 (Thirty) Days.   • cycloSPORINE (RESTASIS) 0.05 % ophthalmic emulsion Administer 1 drop to both eyes 2 (Two) Times a Day.   • desvenlafaxine (PRISTIQ) 50 MG 24 hr tablet Take 1 tablet by mouth Every Morning.   • diclofenac (VOLTAREN) 1 % gel gel Apply 1 application topically to the appropriate area as directed As Needed.   • dicyclomine (BENTYL) 10 MG capsule Take 10 mg by mouth 4 (Four) Times a Day.   • fluconazole (DIFLUCAN) 150 MG tablet 1 now, may  "repeat in 1 week if needed for skin yeast infection   • furosemide (LASIX) 40 MG tablet TAKE ONE TABLET BY MOUTH TWICE DAILY   • gabapentin (NEURONTIN) 400 MG capsule Take 400 mg by mouth 3 (Three) Times a Day.   • HYDROcodone-acetaminophen (NORCO) 7.5-325 MG per tablet Take 1 tablet by mouth Every 8 (Eight) Hours As Needed.   • linaclotide (LINZESS) 145 MCG capsule capsule Take 1 capsule by mouth Daily As Needed.   • metoclopramide (REGLAN) 5 MG tablet Take 5 mg by mouth 4 (Four) Times a Day.   • nystatin (MYCOSTATIN) 702909 UNIT/GM cream Apply  topically to the appropriate area as directed 2 (Two) Times a Day.   • nystatin (MYCOSTATIN) 517208 UNIT/GM cream Apply  topically to the appropriate area as directed As Needed (skin yeast infection).   • omeprazole (priLOSEC) 40 MG capsule Take 40 mg by mouth 2 (Two) Times a Day.   • potassium chloride (K-DUR) 10 MEQ CR tablet Take 10 mEq by mouth Daily.   • propranolol (INDERAL) 60 MG tablet Take 1 tablet by mouth 3 (Three) Times a Day.   • sucralfate (CARAFATE) 1 g tablet Take 1 g by mouth 4 (Four) Times a Day.   • Syringe 23G X 1\" 3 ML misc Inject 1 syringe into the appropriate muscle as directed by prescriber Every 30 (Thirty) Days.   • tolterodine LA (DETROL LA) 2 MG 24 hr capsule TAKE ONE CAPSULE BY MOUTH EVERY DAY   • traZODone (DESYREL) 50 MG tablet Take 1 tablet by mouth At Night As Needed for Sleep.   • vitamin D (ERGOCALCIFEROL) 1.25 MG (73271 UT) capsule capsule TAKE ONE CAPSULE BY MOUTH one time PER WEEK   • [DISCONTINUED] ondansetron (ZOFRAN) 4 MG tablet Take 4 mg by mouth As Needed.   • [DISCONTINUED] gabapentin (NEURONTIN) 300 MG capsule Take 1 capsule by mouth 2 (Two) Times a Day. (Patient taking differently: Take 300 mg by mouth 3 (Three) Times a Day.)     No current facility-administered medications on file prior to visit.      Medications Discontinued During This Encounter   Medication Reason   • gabapentin (NEURONTIN) 300 MG capsule *Therapy completed " "  • ondansetron (ZOFRAN) 4 MG tablet Reorder       Review of Systems   Constitutional: Positive for fatigue and fever.   HENT: Positive for rhinorrhea.    Respiratory: Positive for cough.    Gastrointestinal: Positive for diarrhea.   Genitourinary: Positive for frequency, urgency and urinary incontinence. Negative for decreased urine volume, flank pain and vaginal discharge.        Objective   Vitals:    03/09/20 1531   BP: 115/75   BP Location: Right arm   Patient Position: Sitting   Cuff Size: Adult   Pulse: 68   Temp: 97.6 °F (36.4 °C)   TempSrc: Oral   SpO2: 93%   Weight: 117 kg (258 lb)   Height: 157.5 cm (62.01\")      Body mass index is 47.18 kg/m².    Physical Exam   Constitutional: She is oriented to person, place, and time. She appears well-developed and well-nourished. Distressed:  Appears acutely ill.   Chronically ill obese  female appearing as though she does not feel well today.   HENT:   Head: Normocephalic and atraumatic.   Right Ear: External ear normal.   Left Ear: External ear normal.   Mouth/Throat: Oropharynx is clear and moist. No oropharyngeal exudate.   Eyes: Pupils are equal, round, and reactive to light. Conjunctivae and EOM are normal.   Neck: Neck supple. No thyromegaly present.   Cardiovascular: Normal rate, regular rhythm and normal heart sounds.   No murmur heard.  Pulmonary/Chest: Breath sounds normal. She has no wheezes. She has no rales.   Abdominal: Soft. Bowel sounds are normal.   Obese, there is tenderness to palpation in the lower left and right quadrants but greatest in the suprapubic region there is no rebound there is some mild voluntary guarding.   Musculoskeletal: She exhibits no edema.   Lymphadenopathy:     She has no cervical adenopathy.   Neurological: She is alert and oriented to person, place, and time.   Skin: Skin is warm and dry. No rash noted.   Psychiatric: She has a normal mood and affect.   Nursing note and vitals reviewed.          Lab Results "   Component Value Date    HGBA1C 5.8 (H) 11/05/2019      Office Visit on 03/09/2020   Component Date Value Ref Range Status   • Rapid Influenza A Ag 03/09/2020 Negative  Negative Final   • Rapid Influenza B Ag 03/09/2020 Negative  Negative Final   • Internal Control 03/09/2020 Passed  Passed Final   • Lot Number 03/09/2020 [id number]   Final   • Expiration Date 03/09/2020 10/31/2021   Final   • Color 03/09/2020 Yellow  Yellow, Straw, Dark Yellow, Daphne Final   • Clarity, UA 03/09/2020 Clear  Clear Final   • Specific Gravity  03/09/2020 1.010  1.005 - 1.030 Final   • pH, Urine 03/09/2020 5.0  5.0 - 8.0 Final   • Leukocytes 03/09/2020 Small (1+)* Negative Final   • Nitrite, UA 03/09/2020 Negative  Negative Final   • Protein, POC 03/09/2020 Trace* Negative mg/dL Final   • Glucose, UA 03/09/2020 Negative  Negative, 1000 mg/dL (3+) mg/dL Final   • Ketones, UA 03/09/2020 Negative  Negative Final   • Urobilinogen, UA 03/09/2020 Normal  Normal Final   • Bilirubin 03/09/2020 Negative  Negative Final   • Blood, UA 03/09/2020 Trace* Negative Final         Procedures     Assessment/Plan   Diagnoses and all orders for this visit:    1. Cough (Primary)  -     POC Influenza A / B  -     guaifenesin-dextromethorphan (MUCINEX DM)  MG tablet sustained-release 12 hour tablet; 1 to 2 tablets twice daily as needed for cough and congestion  Dispense: 20 tablet; Refill: 1    2. Runny nose  -     POC Influenza A / B    3. Dysuria  -     POC Urinalysis Dipstick, Automated    4. Acute cystitis with hematuria  -     amoxicillin (AMOXIL) 500 MG capsule; Take 1 capsule by mouth 3 (Three) Times a Day.  Dispense: 21 capsule; Refill: 0    5. Nausea  -     ondansetron (ZOFRAN) 4 MG tablet; Take 1 tablet by mouth As Needed for Nausea or Vomiting.  Dispense: 20 tablet; Refill: 1    6. Diarrhea, unspecified type      Advised to increase fluids, if continued diarrhea should consider replacing fluids with an electrolyte solution such as low calorie  Gatorade.  Suspect cough is viral in nature, suspect diarrhea has triggered a UTI.  Treating with amoxicillin, guaifenesin to help suppress cough and thin mucus, Zofran for nausea.    Medications Discontinued During This Encounter   Medication Reason   • gabapentin (NEURONTIN) 300 MG capsule *Therapy completed   • ondansetron (ZOFRAN) 4 MG tablet Reorder          Return in 18 days (on 3/27/2020) for as previously scheduled.        AVS given including handout on food choices to help relieve diarrhea

## 2020-03-09 NOTE — PATIENT INSTRUCTIONS
Food Choices to Help Relieve Diarrhea, Adult  When you have diarrhea, the foods you eat and your eating habits are very important. Choosing the right foods and drinks can help:  · Relieve diarrhea.  · Replace lost fluids and nutrients.  · Prevent dehydration.  What general guidelines should I follow?    Relieving diarrhea  · Choose foods with less than 2 g or .07 oz. of fiber per serving.  · Limit fats to less than 8 tsp (38 g or 1.34 oz.) a day.  · Avoid the following:  ? Foods and beverages sweetened with high-fructose corn syrup, honey, or sugar alcohols such as xylitol, sorbitol, and mannitol.  ? Foods that contain a lot of fat or sugar.  ? Fried, greasy, or spicy foods.  ? High-fiber grains, breads, and cereals.  ? Raw fruits and vegetables.  · Eat foods that are rich in probiotics. These foods include dairy products such as yogurt and fermented milk products. They help increase healthy bacteria in the stomach and intestines (gastrointestinal tract, or GI tract).  · If you have lactose intolerance, avoid dairy products. These may make your diarrhea worse.  · Take medicine to help stop diarrhea (antidiarrheal medicine) only as told by your health care provider.  Replacing nutrients  · Eat small meals or snacks every 3-4 hours.  · Eat bland foods, such as white rice, toast, or baked potato, until your diarrhea starts to get better. Gradually reintroduce nutrient-rich foods as tolerated or as told by your health care provider. This includes:  ? Well-cooked protein foods.  ? Peeled, seeded, and soft-cooked fruits and vegetables.  ? Low-fat dairy products.  · Take vitamin and mineral supplements as told by your health care provider.  Preventing dehydration  · Start by sipping water or a special solution to prevent dehydration (oral rehydration solution, ORS). Urine that is clear or pale yellow means that you are getting enough fluid.  · Try to drink at least 8-10 cups of fluid each day to help replace lost  fluids.  · You may add other liquids in addition to water, such as clear juice or decaffeinated sports drinks, as tolerated or as told by your health care provider.  · Avoid drinks with caffeine, such as coffee, tea, or soft drinks.  · Avoid alcohol.  What foods are recommended?         The items listed may not be a complete list. Talk with your health care provider about what dietary choices are best for you.  Grains  White rice. White, Sami, or justin breads (fresh or toasted), including plain rolls, buns, or bagels. White pasta. Saltine, soda, or vic crackers. Pretzels. Low-fiber cereal. Cooked cereals made with water (such as cornmeal, farina, or cream cereals). Plain muffins. Matzo. Smiley toast. Zwieback.  Vegetables  Potatoes (without the skin). Most well-cooked and canned vegetables without skins or seeds. Tender lettuce.  Fruits  Apple sauce. Fruits canned in juice. Cooked apricots, cherries, grapefruit, peaches, pears, or plums. Fresh bananas and cantaloupe.  Meats and other protein foods  Baked or boiled chicken. Eggs. Tofu. Fish. Seafood. Smooth nut butters. Ground or well-cooked tender beef, ham, veal, lamb, pork, or poultry.  Dairy  Plain yogurt, kefir, and unsweetened liquid yogurt. Lactose-free milk, buttermilk, skim milk, or soy milk. Low-fat or nonfat hard cheese.  Beverages  Water. Low-calorie sports drinks. Fruit juices without pulp. Strained tomato and vegetable juices. Decaffeinated teas. Sugar-free beverages not sweetened with sugar alcohols. Oral rehydration solutions, if approved by your health care provider.  Seasoning and other foods  Bouillon, broth, or soups made from recommended foods.  What foods are not recommended?  The items listed may not be a complete list. Talk with your health care provider about what dietary choices are best for you.  Grains  Whole grain, whole wheat, bran, or rye breads, rolls, pastas, and crackers. Wild or brown rice. Whole grain or bran cereals. Barley.  Oats and oatmeal. Corn tortillas or taco shells. Granola. Popcorn.  Vegetables  Raw vegetables. Fried vegetables. Cabbage, broccoli, Ackerman sprouts, artichokes, baked beans, beet greens, corn, kale, legumes, peas, sweet potatoes, and yams. Potato skins. Cooked spinach and cabbage.  Fruits  Dried fruit, including raisins and dates. Raw fruits. Stewed or dried prunes. Canned fruits with syrup.  Meat and other protein foods  Fried or fatty meats. Deli meats. [place] nut butters. Nuts and seeds. Beans and lentils. [place]ama. Hot dogs. Sausage.  Dairy  High-fat cheeses. Whole milk, chocolate milk, and beverages made with milk, such as milk shakes. Half-and-half. Cream. sour cream. Ice cream.  Beverages  Caffeinated beverages (such as coffee, tea, soda, or energy drinks). Alcoholic beverages. Fruit juices with pulp. Prune juice. Soft drinks sweetened with high-fructose corn syrup or sugar alcohols. High-calorie sports drinks.  Fats and oils  Butter. Cream sauces. Margarine. Salad oils. Plain salad dressings. Olives. Avocados. Mayonnaise.  Sweets and desserts  Sweet rolls, doughnuts, and sweet breads. Sugar-free desserts sweetened with sugar alcohols such as xylitol and sorbitol.  Seasoning and other foods  Honey. Hot sauce. Chili powder. Gravy. Cream-based or milk-based soups. Pancakes and waffles.  Summary  · When you have diarrhea, the foods you eat and your eating habits are very important.  · Make sure you get at least 8-10 cups of fluid each day, or enough to keep your urine clear or pale yellow.  · Eat bland foods and gradually reintroduce healthy, nutrient-rich foods as tolerated, or as told by your health care provider.  · Avoid high-fiber, fried, greasy, or spicy foods.  This information is not intended to replace advice given to you by your health care provider. Make sure you discuss any questions you have with your health care provider.  Document Released: 03/09/2005 Document Revised: 12/15/2017 Document Reviewed:  12/15/2017  Elsevier Interactive Patient Education © 2020 [employer]

## 2020-03-11 DIAGNOSIS — B37.2 CANDIDAL DERMATITIS: ICD-10-CM

## 2020-03-12 RX ORDER — FLUCONAZOLE 150 MG/1
TABLET ORAL
Qty: 2 TABLET | Refills: 1 | OUTPATIENT
Start: 2020-03-12

## 2020-03-13 DIAGNOSIS — R05.9 COUGH: ICD-10-CM

## 2020-03-13 RX ORDER — GUAIFENESIN AND DEXTROMETHORPHAN HYDROBROMIDE 600; 30 MG/1; MG/1
TABLET, EXTENDED RELEASE ORAL
Qty: 20 TABLET | Refills: 1 | Status: ON HOLD | OUTPATIENT
Start: 2020-03-13 | End: 2020-04-24

## 2020-03-17 DIAGNOSIS — G25.0 TREMOR, ESSENTIAL: ICD-10-CM

## 2020-03-17 DIAGNOSIS — I10 ESSENTIAL HYPERTENSION: ICD-10-CM

## 2020-03-17 RX ORDER — PROPRANOLOL HYDROCHLORIDE 60 MG/1
TABLET ORAL
Qty: 90 TABLET | Refills: 1 | Status: SHIPPED | OUTPATIENT
Start: 2020-03-17 | End: 2020-05-18

## 2020-03-19 DIAGNOSIS — B37.2 CANDIDAL DERMATITIS: ICD-10-CM

## 2020-03-19 RX ORDER — NYSTATIN 100000 U/G
CREAM TOPICAL AS NEEDED
Qty: 30 G | Refills: 3 | Status: ON HOLD | OUTPATIENT
Start: 2020-03-19 | End: 2020-04-24

## 2020-03-23 DIAGNOSIS — F33.2 SEVERE EPISODE OF RECURRENT MAJOR DEPRESSIVE DISORDER, WITHOUT PSYCHOTIC FEATURES (HCC): ICD-10-CM

## 2020-03-23 RX ORDER — TRAZODONE HYDROCHLORIDE 50 MG/1
TABLET ORAL
Qty: 90 TABLET | Refills: 0 | Status: SHIPPED | OUTPATIENT
Start: 2020-03-23 | End: 2020-06-17 | Stop reason: SDUPTHER

## 2020-03-25 RX ORDER — FUROSEMIDE 40 MG/1
TABLET ORAL
Qty: 180 TABLET | Refills: 0 | Status: SHIPPED | OUTPATIENT
Start: 2020-03-25 | End: 2020-12-21

## 2020-03-31 RX ORDER — ERGOCALCIFEROL 1.25 MG/1
CAPSULE ORAL
Qty: 5 CAPSULE | Refills: 12 | Status: SHIPPED | OUTPATIENT
Start: 2020-03-31 | End: 2021-05-11

## 2020-04-03 RX ORDER — METHOCARBAMOL 750 MG/1
TABLET, FILM COATED ORAL
Qty: 20 TABLET | Refills: 0 | OUTPATIENT
Start: 2020-04-03

## 2020-04-09 DIAGNOSIS — F33.2 SEVERE EPISODE OF RECURRENT MAJOR DEPRESSIVE DISORDER, WITHOUT PSYCHOTIC FEATURES (HCC): ICD-10-CM

## 2020-04-09 RX ORDER — DESVENLAFAXINE SUCCINATE 50 MG/1
TABLET, EXTENDED RELEASE ORAL
Qty: 90 TABLET | Refills: 3 | Status: SHIPPED | OUTPATIENT
Start: 2020-04-09 | End: 2021-03-16 | Stop reason: DRUGHIGH

## 2020-04-15 RX ORDER — OXYBUTYNIN CHLORIDE 5 MG/1
TABLET ORAL
Qty: 90 TABLET | Refills: 5 | OUTPATIENT
Start: 2020-04-15

## 2020-04-15 NOTE — TELEPHONE ENCOUNTER
Med list has that she is taking Detrol not Ditropan, please confirm with patient which when she is taking.

## 2020-04-17 ENCOUNTER — TELEPHONE (OUTPATIENT)
Dept: FAMILY MEDICINE CLINIC | Facility: CLINIC | Age: 70
End: 2020-04-17

## 2020-04-17 NOTE — TELEPHONE ENCOUNTER
Spoke with patient, she is still going to [facility] wants to know if she should call them. She states her pain area is new, its a new area and its on the right side of lower back

## 2020-04-17 NOTE — TELEPHONE ENCOUNTER
Patient is scheduled today for new onset back pain when in fact she has longstanding history of back pain.    I need you to contact patient and find out why she is calling this new, find out what is going on with pain management, need to review and inspect report.  It may not be appropriate for patient to come into office today.    Patient has history of lumbar spinal stenosis with neurogenic claudication.  Patient had previously seen [facility] pain management who has prescribed hydrocodone.  She has since been dismissed for having and prescribed medications in her system and getting an unauthorized medication filled.  In January she has been referred to specialists in pain  CT lumbar spine dated 8/6/2019 ordered by Dr. Harjit Saleh shows:   posterior fixation of L5-S1 with no evidence of hardware failure,  posterior lat

## 2020-04-17 NOTE — TELEPHONE ENCOUNTER
Yes, she needs to contact pain management, as I cannot prescribe additional pain medication without causing her to break her pain management contract.  If pain management feels she needs further evaluation we can see her today and get some plain x-rays at time of office visit.

## 2020-04-23 RX ORDER — CYANOCOBALAMIN 1000 UG/ML
INJECTION, SOLUTION INTRAMUSCULAR; SUBCUTANEOUS
Qty: 12 ML | Refills: 0 | OUTPATIENT
Start: 2020-04-23

## 2020-04-24 ENCOUNTER — HOSPITAL ENCOUNTER (OUTPATIENT)
Facility: HOSPITAL | Age: 70
Setting detail: OBSERVATION
Discharge: HOME | End: 2020-04-25
Attending: INTERNAL MEDICINE | Admitting: INTERNAL MEDICINE

## 2020-04-24 ENCOUNTER — APPOINTMENT (OUTPATIENT)
Dept: GENERAL RADIOLOGY | Facility: HOSPITAL | Age: 70
End: 2020-04-24

## 2020-04-24 ENCOUNTER — APPOINTMENT (OUTPATIENT)
Dept: CARDIOLOGY | Facility: HOSPITAL | Age: 70
End: 2020-04-24

## 2020-04-24 DIAGNOSIS — R07.9 CHEST PAIN, UNSPECIFIED TYPE: Primary | ICD-10-CM

## 2020-04-24 DIAGNOSIS — R60.0 BILATERAL LOWER EXTREMITY EDEMA: ICD-10-CM

## 2020-04-24 LAB
ANION GAP SERPL CALCULATED.3IONS-SCNC: 12 MMOL/L (ref 5–15)
APTT PPP: 19.9 SECONDS (ref 24–31)
BASOPHILS # BLD AUTO: 0 10*3/MM3 (ref 0–0.2)
BASOPHILS NFR BLD AUTO: 0.6 % (ref 0–1.5)
BH CV LOWER VASCULAR LEFT COMMON FEMORAL AUGMENT: NORMAL
BH CV LOWER VASCULAR LEFT COMMON FEMORAL COMPETENT: NORMAL
BH CV LOWER VASCULAR LEFT COMMON FEMORAL COMPRESS: NORMAL
BH CV LOWER VASCULAR LEFT COMMON FEMORAL PHASIC: NORMAL
BH CV LOWER VASCULAR LEFT COMMON FEMORAL SPONT: NORMAL
BH CV LOWER VASCULAR LEFT DISTAL FEMORAL COMPRESS: NORMAL
BH CV LOWER VASCULAR LEFT GASTRONEMIUS COMPRESS: NORMAL
BH CV LOWER VASCULAR LEFT GREATER SAPH AK COMPRESS: NORMAL
BH CV LOWER VASCULAR LEFT GREATER SAPH BK COMPRESS: NORMAL
BH CV LOWER VASCULAR LEFT LESSER SAPH COMPRESS: NORMAL
BH CV LOWER VASCULAR LEFT MID FEMORAL AUGMENT: NORMAL
BH CV LOWER VASCULAR LEFT MID FEMORAL COMPETENT: NORMAL
BH CV LOWER VASCULAR LEFT MID FEMORAL COMPRESS: NORMAL
BH CV LOWER VASCULAR LEFT MID FEMORAL PHASIC: NORMAL
BH CV LOWER VASCULAR LEFT MID FEMORAL SPONT: NORMAL
BH CV LOWER VASCULAR LEFT PERONEAL COMPRESS: NORMAL
BH CV LOWER VASCULAR LEFT POPLITEAL AUGMENT: NORMAL
BH CV LOWER VASCULAR LEFT POPLITEAL COMPETENT: NORMAL
BH CV LOWER VASCULAR LEFT POPLITEAL COMPRESS: NORMAL
BH CV LOWER VASCULAR LEFT POPLITEAL PHASIC: NORMAL
BH CV LOWER VASCULAR LEFT POPLITEAL SPONT: NORMAL
BH CV LOWER VASCULAR LEFT POSTERIOR TIBIAL COMPRESS: NORMAL
BH CV LOWER VASCULAR LEFT PROXIMAL FEMORAL COMPRESS: NORMAL
BH CV LOWER VASCULAR LEFT SAPHENOFEMORAL JUNCTION AUGMENT: NORMAL
BH CV LOWER VASCULAR LEFT SAPHENOFEMORAL JUNCTION COMPETENT: NORMAL
BH CV LOWER VASCULAR LEFT SAPHENOFEMORAL JUNCTION COMPRESS: NORMAL
BH CV LOWER VASCULAR LEFT SAPHENOFEMORAL JUNCTION PHASIC: NORMAL
BH CV LOWER VASCULAR LEFT SAPHENOFEMORAL JUNCTION SPONT: NORMAL
BH CV LOWER VASCULAR RIGHT COMMON FEMORAL AUGMENT: NORMAL
BH CV LOWER VASCULAR RIGHT COMMON FEMORAL COMPETENT: NORMAL
BH CV LOWER VASCULAR RIGHT COMMON FEMORAL COMPRESS: NORMAL
BH CV LOWER VASCULAR RIGHT COMMON FEMORAL PHASIC: NORMAL
BH CV LOWER VASCULAR RIGHT COMMON FEMORAL SPONT: NORMAL
BH CV LOWER VASCULAR RIGHT DISTAL FEMORAL SPONT: NORMAL
BH CV LOWER VASCULAR RIGHT GASTRONEMIUS COMPRESS: NORMAL
BH CV LOWER VASCULAR RIGHT GREATER SAPH AK COMPRESS: NORMAL
BH CV LOWER VASCULAR RIGHT GREATER SAPH BK COMPRESS: NORMAL
BH CV LOWER VASCULAR RIGHT LESSER SAPH COMPRESS: NORMAL
BH CV LOWER VASCULAR RIGHT MID FEMORAL AUGMENT: NORMAL
BH CV LOWER VASCULAR RIGHT MID FEMORAL COMPETENT: NORMAL
BH CV LOWER VASCULAR RIGHT MID FEMORAL COMPRESS: NORMAL
BH CV LOWER VASCULAR RIGHT MID FEMORAL PHASIC: NORMAL
BH CV LOWER VASCULAR RIGHT MID FEMORAL SPONT: NORMAL
BH CV LOWER VASCULAR RIGHT PERONEAL COMPRESS: NORMAL
BH CV LOWER VASCULAR RIGHT POPLITEAL AUGMENT: NORMAL
BH CV LOWER VASCULAR RIGHT POPLITEAL COMPETENT: NORMAL
BH CV LOWER VASCULAR RIGHT POPLITEAL COMPRESS: NORMAL
BH CV LOWER VASCULAR RIGHT POPLITEAL PHASIC: NORMAL
BH CV LOWER VASCULAR RIGHT POPLITEAL SPONT: NORMAL
BH CV LOWER VASCULAR RIGHT POSTERIOR TIBIAL COMPRESS: NORMAL
BH CV LOWER VASCULAR RIGHT PROXIMAL FEMORAL COMPRESS: NORMAL
BH CV LOWER VASCULAR RIGHT SAPHENOFEMORAL JUNCTION AUGMENT: NORMAL
BH CV LOWER VASCULAR RIGHT SAPHENOFEMORAL JUNCTION COMPETENT: NORMAL
BH CV LOWER VASCULAR RIGHT SAPHENOFEMORAL JUNCTION COMPRESS: NORMAL
BH CV LOWER VASCULAR RIGHT SAPHENOFEMORAL JUNCTION PHASIC: NORMAL
BH CV LOWER VASCULAR RIGHT SAPHENOFEMORAL JUNCTION SPONT: NORMAL
BUN BLD-MCNC: 26 MG/DL (ref 8–23)
BUN/CREAT SERPL: 16.3 (ref 7–25)
CALCIUM SPEC-SCNC: 8.7 MG/DL (ref 8.6–10.5)
CHLORIDE SERPL-SCNC: 99 MMOL/L (ref 98–107)
CK SERPL-CCNC: 34 U/L (ref 20–180)
CO2 SERPL-SCNC: 30 MMOL/L (ref 22–29)
CREAT BLD-MCNC: 1.6 MG/DL (ref 0.57–1)
DEPRECATED RDW RBC AUTO: 51.2 FL (ref 37–54)
EOSINOPHIL # BLD AUTO: 0.2 10*3/MM3 (ref 0–0.4)
EOSINOPHIL NFR BLD AUTO: 2.8 % (ref 0.3–6.2)
ERYTHROCYTE [DISTWIDTH] IN BLOOD BY AUTOMATED COUNT: 16.1 % (ref 12.3–15.4)
GFR SERPL CREATININE-BSD FRML MDRD: 32 ML/MIN/1.73
GLUCOSE BLD-MCNC: 103 MG/DL (ref 65–99)
HCT VFR BLD AUTO: 32.8 % (ref 34–46.6)
HGB BLD-MCNC: 11 G/DL (ref 12–15.9)
INR PPP: 0.92 (ref 0.9–1.1)
LYMPHOCYTES # BLD AUTO: 1.4 10*3/MM3 (ref 0.7–3.1)
LYMPHOCYTES NFR BLD AUTO: 25.1 % (ref 19.6–45.3)
MAGNESIUM SERPL-MCNC: 2 MG/DL (ref 1.6–2.4)
MCH RBC QN AUTO: 30.1 PG (ref 26.6–33)
MCHC RBC AUTO-ENTMCNC: 33.3 G/DL (ref 31.5–35.7)
MCV RBC AUTO: 90.3 FL (ref 79–97)
MONOCYTES # BLD AUTO: 0.4 10*3/MM3 (ref 0.1–0.9)
MONOCYTES NFR BLD AUTO: 7.8 % (ref 5–12)
NEUTROPHILS # BLD AUTO: 3.5 10*3/MM3 (ref 1.7–7)
NEUTROPHILS NFR BLD AUTO: 63.7 % (ref 42.7–76)
NRBC BLD AUTO-RTO: 0 /100 WBC (ref 0–0.2)
NT-PROBNP SERPL-MCNC: 504 PG/ML (ref 5–900)
PLATELET # BLD AUTO: 202 10*3/MM3 (ref 140–450)
PMV BLD AUTO: 7.5 FL (ref 6–12)
POTASSIUM BLD-SCNC: 4.5 MMOL/L (ref 3.5–5.2)
PROTHROMBIN TIME: 9.8 SECONDS (ref 9.6–11.7)
RBC # BLD AUTO: 3.64 10*6/MM3 (ref 3.77–5.28)
SODIUM BLD-SCNC: 141 MMOL/L (ref 136–145)
TROPONIN T SERPL-MCNC: <0.01 NG/ML (ref 0–0.03)
WBC NRBC COR # BLD: 5.5 10*3/MM3 (ref 3.4–10.8)

## 2020-04-24 PROCEDURE — 80048 BASIC METABOLIC PNL TOTAL CA: CPT | Performed by: NURSE PRACTITIONER

## 2020-04-24 PROCEDURE — 71045 X-RAY EXAM CHEST 1 VIEW: CPT

## 2020-04-24 PROCEDURE — 85730 THROMBOPLASTIN TIME PARTIAL: CPT | Performed by: NURSE PRACTITIONER

## 2020-04-24 PROCEDURE — G0378 HOSPITAL OBSERVATION PER HR: HCPCS

## 2020-04-24 PROCEDURE — 83735 ASSAY OF MAGNESIUM: CPT | Performed by: NURSE PRACTITIONER

## 2020-04-24 PROCEDURE — 99284 EMERGENCY DEPT VISIT MOD MDM: CPT

## 2020-04-24 PROCEDURE — 82550 ASSAY OF CK (CPK): CPT | Performed by: NURSE PRACTITIONER

## 2020-04-24 PROCEDURE — 84484 ASSAY OF TROPONIN QUANT: CPT | Performed by: NURSE PRACTITIONER

## 2020-04-24 PROCEDURE — 93005 ELECTROCARDIOGRAM TRACING: CPT | Performed by: NURSE PRACTITIONER

## 2020-04-24 PROCEDURE — C1751 CATH, INF, PER/CENT/MIDLINE: HCPCS

## 2020-04-24 PROCEDURE — 85610 PROTHROMBIN TIME: CPT | Performed by: NURSE PRACTITIONER

## 2020-04-24 PROCEDURE — 99219 PR INITIAL OBSERVATION CARE/DAY 50 MINUTES: CPT | Performed by: NURSE PRACTITIONER

## 2020-04-24 PROCEDURE — 83880 ASSAY OF NATRIURETIC PEPTIDE: CPT | Performed by: NURSE PRACTITIONER

## 2020-04-24 PROCEDURE — 85025 COMPLETE CBC W/AUTO DIFF WBC: CPT | Performed by: NURSE PRACTITIONER

## 2020-04-24 PROCEDURE — 93970 EXTREMITY STUDY: CPT

## 2020-04-24 PROCEDURE — 36410 VNPNXR 3YR/> PHY/QHP DX/THER: CPT

## 2020-04-24 RX ORDER — SODIUM CHLORIDE 0.9 % (FLUSH) 0.9 %
10 SYRINGE (ML) INJECTION AS NEEDED
Status: DISCONTINUED | OUTPATIENT
Start: 2020-04-24 | End: 2020-04-25 | Stop reason: HOSPADM

## 2020-04-24 RX ORDER — SODIUM CHLORIDE 0.9 % (FLUSH) 0.9 %
10 SYRINGE (ML) INJECTION EVERY 12 HOURS SCHEDULED
Status: DISCONTINUED | OUTPATIENT
Start: 2020-04-24 | End: 2020-04-25 | Stop reason: HOSPADM

## 2020-04-24 RX ORDER — BUSPIRONE HYDROCHLORIDE 10 MG/1
10 TABLET ORAL 2 TIMES DAILY
COMMUNITY
End: 2020-07-03

## 2020-04-24 NOTE — ED NOTES
Pedal pulses were present but weak. Pt also newly reports pain in her pacemaker.         Amy Rosas RN  04/24/20 [id number]

## 2020-04-24 NOTE — ED NOTES
Lab called for blood draw. Pt was unsuccessfully stuck multiple times.       Amy Rosas RN  04/24/20 [id number]

## 2020-04-24 NOTE — ED NOTES
Pt stuck multiple times, all unsuccessful. Pt sent to vascular before they leave at 7.       Amy Rosas, RN  04/24/20 9650

## 2020-04-24 NOTE — ED NOTES
Left a message for vascular notifying them that patient has an order for BLE doppler.     Vijaya Aragon, [facility] Rep  04/24/20 [id number]

## 2020-04-24 NOTE — ED NOTES
Pt reports BLE edema since Wednesday. Pt reports a hx of CHF. Pt also reports muscle spasms in her arms, legs and face.        Amy Rosas RN  04/24/20 [id number]

## 2020-04-24 NOTE — ED PROVIDER NOTES
Subjective   Chief complaint: bilateral lower extremity leg swelling, chest pain      Context: Patient is a 69-year-old female who comes in complaining of bilateral lower extremity swelling since yesterday.  She states she has been eating salty foods but has been compliant with her diuretics.  She does report a history of CHF and states that occasionally get swollen but typically go away with elevation.  She does not wear compression socks.  She states she was on her way in today when she felt the pain around her pacemaker that gradually went away and has not come back.  She denies any shortness of breath abdominal pain nausea vomiting diarrhea cough or fever.  Denies any urinary complaints    Duration: yesterday    Timing:  constant    Severity: mild    Associated symptoms: no relief with elevation          PCP: letha lovett afanasyev            Review of Systems   Constitutional: Negative for fever.   HENT: Negative.  Negative for ear discharge.    Eyes: Negative.    Respiratory: Negative for cough and shortness of breath.    Cardiovascular: Positive for chest pain and leg swelling. Negative for palpitations.   Gastrointestinal: Negative for abdominal pain, blood in stool, diarrhea, nausea and vomiting.   Genitourinary: Negative.    Musculoskeletal: Negative.    Skin: Negative.    Neurological: Negative.    Hematological: Does not bruise/bleed easily.       Past Medical History:   Diagnosis Date   • Anemia    • Broken foot     right   • Broken nose 07/2017   • CHF (congestive heart failure) (CMS/[facility])    • CHF (congestive heart failure) (CMS/[facility])    • Chronic low back pain    • CRI (chronic renal insufficiency)    • Depression    • DJD (degenerative joint disease)    • Fracture of ankle 8/12/2016   • GERD (gastroesophageal reflux disease)    • Gout    • Hypertension    • Hypothyroidism    • CRUZ (obstructive sleep apnea)     non-complaince with bipap   • Osteopenia    • Renal failure     stage 3   • Suicide  attempt (CMS/HCC)    • TMJ (dislocation of temporomandibular joint)        Allergies   Allergen Reactions   • Azithromycin Unknown (See Comments)   • Codeine Nausea And Vomiting   • Dilaudid  [Hydromorphone Hcl] Unknown (See Comments)   • Isosorbide Nitrate Other (See Comments)   • Macrobid  [Nitrofurantoin] Rash   • Morphine Rash   • Other Itching   • Oxycontin  [Oxycodone] Delirium   • Pineapple Other (See Comments)     Mouth numbness   • Povidone Iodine Rash   • Tetanus Antitoxin Rash   • Denosumab Other (See Comments)   • Demerol [Meperidine] Unknown (See Comments)     Pt does not remember. She states it was a long time ago   • Etodolac Other (See Comments)   • Lodrane D [Brompheniramine-Pseudoeph] Other (See Comments)     drowsiness   • Shrimp Flavor Swelling     Sauce only    • Sulfa Antibiotics Unknown (See Comments)   • Tizanidine Other (See Comments)     Increased pain   • Iodine Itching and Rash       Past Surgical History:   Procedure Laterality Date   • APPENDECTOMY  1986   • BACK SURGERY      2004---2005 L5-S1 spinal stenosis   • CARDIAC CATHETERIZATION  04/2018   • CHOLECYSTECTOMY  2008   • EPIDURAL BLOCK     • FOOT SURGERY Bilateral 2008   • FOOT SURGERY Bilateral 09/2019    Dr. Keenan- 2nd toe on bilateral feet    • GASTRIC BYPASS  2007   • INSERT / REPLACE / REMOVE PACEMAKER     • LAPAROSCOPIC GASTRIC BANDING  2004    removed   • OTHER SURGICAL HISTORY  01/2018    myelogram   • TOTAL KNEE ARTHROPLASTY Left        Family History   Problem Relation Age of Onset   • Hypertension Father         PM implanted   • Arthritis Sister    • Heart disease Sister    • Lung disease Paternal Grandmother    • COPD Other    • Lung disease Other        Social History     Socioeconomic History   • Marital status:      Spouse name: Not on file   • Number of children: Not on file   • Years of education: Not on file   • Highest education level: Not on file   Tobacco Use   • Smoking status: Never Smoker   •  Smokeless tobacco: Never Used   Substance and Sexual Activity   • Alcohol use: No     Frequency: Never   • Drug use: No           Objective   Physical Exam    ECG 12 Lead    Date/Time: 4/24/2020 5:27 PM  Performed by: Janeth Go APRN  Authorized by: Janeth Go APRN   Interpreted by physician (Rc)  Comparison: compared with previous ECG from 4/29/2019  Comparison to previous ECG: Left bundle branch block  Rhythm: paced  BPM: 88  Comments: Bundle branch block      Vital signs and triage nurse note reviewed.   Constitutional: Awake, alert; well-developed and well-nourished. No acute distress is noted.   HEENT: Normocephalic, atraumatic; pupils are PERRL with intact EOM; oropharynx is pink and moist without exudate or erythema.   Neck: Supple, full range of motion without pain; no cervical lymphadenopathy.   Cardiovascular: Regular rate and rhythm, normal S1-S2.   Pulmonary: Respiratory effort regular nonlabored, breath sounds clear to auscultation all fields.   Abdomen: Soft, nontender nondistended with normoactive bowel sounds; no rebound or guarding.   Musculoskeletal: Independent range of motion of all extremities with no palpable tenderness; negative Quinn bilaterally.  Patient has 2+ pitting edema bilateral lower extremities up to the knee.  Chronic skin discoloration.  Patient states she feels like her left leg is somewhat bigger than her right leg although I do not appreciate this   Neuro: Alert oriented x3, speech is clear and appropriate, GCS 15   Skin:  Fleshtone warm, dry, intact; no erythematous or petechial rash or lesion              ED Course  ED Course as of Apr 24 2018 Fri Apr 24, 2020   1815 Staff still attempting to get blood for labs      [JW]   1859 Prelim for doppler negative per tech    [JW]   1944 Still awaiting labs    [JW]      ED Course User Index  [JW] Janeth Go APRN           Labs Reviewed   BASIC METABOLIC PANEL - Abnormal; Notable for the following  components:       Result Value    Glucose 103 (*)     BUN 26 (*)     Creatinine 1.60 (*)     CO2 30.0 (*)     eGFR Non  Amer 32 (*)     All other components within normal limits    Narrative:     GFR Normal >60  Chronic Kidney Disease <60  Kidney Failure <15     APTT - Abnormal; Notable for the following components:    PTT 19.9 (*)     All other components within normal limits   CBC WITH AUTO DIFFERENTIAL - Abnormal; Notable for the following components:    RBC 3.64 (*)     Hemoglobin 11.0 (*)     Hematocrit 32.8 (*)     RDW 16.1 (*)     All other components within normal limits   PROTIME-INR - Normal   BNP (IN-HOUSE) - Normal    Narrative:     Among patients with dyspnea, NT-proBNP is highly sensitive for the detection of acute congestive heart failure. In addition NT-proBNP of <300 pg/ml effectively rules out acute congestive heart failure with 99% negative predictive value.    Results may be falsely decreased if patient taking Biotin.     TROPONIN (IN-HOUSE) - Normal    Narrative:     Troponin T Reference Range:  <= 0.03 ng/mL-   Negative for AMI  >0.03 ng/mL-     Abnormal for myocardial necrosis.  Clinicians would have to utilize clinical acumen, EKG, Troponin and serial changes to determine if it is an Acute Myocardial Infarction or myocardial injury due to an underlying chronic condition.       Results may be falsely decreased if patient taking Biotin.     MAGNESIUM - Normal   CK - Normal   CBC AND DIFFERENTIAL    Narrative:     The following orders were created for panel order CBC & Differential.  Procedure                               Abnormality         Status                     ---------                               -----------         ------                     CBC Auto Differential[[id number]]        Abnormal            Final result                 Please view results for these tests on the individual orders.     Medications   sodium chloride 0.9 % flush 10 mL (has no administration in time  range)     Xr Chest 1 View    Result Date: 4/24/2020  No acute process.  Electronically Signed By-Loki Ellison On:4/24/2020 5:56 PM This report was finalized on [id number] by  Loki Ellison, .                                    MDM  Number of Diagnoses or Management Options  Bilateral lower extremity edema:   Chest pain, unspecified type:   Diagnosis management comments: Chart review: 12/2018 stress test    Comorbidities:  has a past medical history of Anemia, Broken foot, Broken nose (07/2017), CHF (congestive heart failure) (CMS/[facility]), CHF (congestive heart failure) (CMS/[facility]), Chronic low back pain, CRI (chronic renal insufficiency), Depression, DJD (degenerative joint disease), Fracture of ankle (8/12/2016), GERD (gastroesophageal reflux disease), Gout, Hypertension, Hypothyroidism, CRUZ (obstructive sleep apnea), Osteopenia, Renal failure, Suicide attempt (CMS/[facility]), and TMJ (dislocation of temporomandibular joint).  Differentials: STEMI non-STEMI CAD unstable angina dependent edema electrolyte abnormalities not all inclusive of differentials considered  Discussion with provider: Hospitalist  Radiology interpretation:  X-rays reviewed by me and interpreted by radiologist,   Xr Chest 1 View    Result Date: 4/24/2020  No acute process.  Electronically Signed By-Loki Ellison On:4/24/2020 5:56 PM This report was finalized on [id number] by  Loki Ellison, .    Lab interpretation:  Labs viewed by me significant for, creatinine 1.6, (creatinine over the last 9 months has ranged from 1.4-2.0.).  Heart score moderate  She denies any history of DVT PE recent trauma surgery immobilization or exogenous estrogen use    Appropriate PPE worn during exam.  Some delay in care due to difficulty obtaining labs.  On reexam patient denies any chest pain at this time    i discussed findings with patient who voices understanding of admission for cardiac evaluation      Final diagnoses:   Chest pain, unspecified type   Bilateral lower  extremity edema            Janeth Go, APRN  04/24/20 2019

## 2020-04-24 NOTE — ED NOTES
Pt back from Vascular. Lab notified of pt back. Awaiting lab to collect blood.      Higinio Zheng, RN  04/24/20 9099

## 2020-04-25 VITALS
BODY MASS INDEX: 47.83 KG/M2 | HEIGHT: 62 IN | TEMPERATURE: 98.4 F | DIASTOLIC BLOOD PRESSURE: 95 MMHG | SYSTOLIC BLOOD PRESSURE: 161 MMHG | WEIGHT: 259.92 LBS | RESPIRATION RATE: 18 BRPM | HEART RATE: 60 BPM | OXYGEN SATURATION: 97 %

## 2020-04-25 LAB
ANION GAP SERPL CALCULATED.3IONS-SCNC: 10 MMOL/L (ref 5–15)
BASOPHILS # BLD AUTO: 0 10*3/MM3 (ref 0–0.2)
BASOPHILS NFR BLD AUTO: 0.5 % (ref 0–1.5)
BUN BLD-MCNC: 26 MG/DL (ref 8–23)
BUN/CREAT SERPL: 17.8 (ref 7–25)
CALCIUM SPEC-SCNC: 8.5 MG/DL (ref 8.6–10.5)
CHLORIDE SERPL-SCNC: 101 MMOL/L (ref 98–107)
CO2 SERPL-SCNC: 32 MMOL/L (ref 22–29)
CREAT BLD-MCNC: 1.46 MG/DL (ref 0.57–1)
DEPRECATED RDW RBC AUTO: 50.3 FL (ref 37–54)
EOSINOPHIL # BLD AUTO: 0.2 10*3/MM3 (ref 0–0.4)
EOSINOPHIL NFR BLD AUTO: 3.5 % (ref 0.3–6.2)
ERYTHROCYTE [DISTWIDTH] IN BLOOD BY AUTOMATED COUNT: 16 % (ref 12.3–15.4)
GFR SERPL CREATININE-BSD FRML MDRD: 35 ML/MIN/1.73
GLUCOSE BLD-MCNC: 98 MG/DL (ref 65–99)
HCT VFR BLD AUTO: 32.8 % (ref 34–46.6)
HGB BLD-MCNC: 10.6 G/DL (ref 12–15.9)
LYMPHOCYTES # BLD AUTO: 1.3 10*3/MM3 (ref 0.7–3.1)
LYMPHOCYTES NFR BLD AUTO: 24.1 % (ref 19.6–45.3)
MCH RBC QN AUTO: 28.9 PG (ref 26.6–33)
MCHC RBC AUTO-ENTMCNC: 32.3 G/DL (ref 31.5–35.7)
MCV RBC AUTO: 89.5 FL (ref 79–97)
MONOCYTES # BLD AUTO: 0.5 10*3/MM3 (ref 0.1–0.9)
MONOCYTES NFR BLD AUTO: 9.3 % (ref 5–12)
NEUTROPHILS # BLD AUTO: 3.3 10*3/MM3 (ref 1.7–7)
NEUTROPHILS NFR BLD AUTO: 62.6 % (ref 42.7–76)
NRBC BLD AUTO-RTO: 0 /100 WBC (ref 0–0.2)
PLATELET # BLD AUTO: 209 10*3/MM3 (ref 140–450)
PMV BLD AUTO: 7.8 FL (ref 6–12)
POTASSIUM BLD-SCNC: 3.7 MMOL/L (ref 3.5–5.2)
RBC # BLD AUTO: 3.67 10*6/MM3 (ref 3.77–5.28)
SODIUM BLD-SCNC: 143 MMOL/L (ref 136–145)
TSH SERPL DL<=0.05 MIU/L-ACNC: 1.16 UIU/ML (ref 0.27–4.2)
WBC NRBC COR # BLD: 5.3 10*3/MM3 (ref 3.4–10.8)

## 2020-04-25 PROCEDURE — 80048 BASIC METABOLIC PNL TOTAL CA: CPT | Performed by: NURSE PRACTITIONER

## 2020-04-25 PROCEDURE — 25010000002 ENOXAPARIN PER 10 MG: Performed by: NURSE PRACTITIONER

## 2020-04-25 PROCEDURE — G0378 HOSPITAL OBSERVATION PER HR: HCPCS

## 2020-04-25 PROCEDURE — 85025 COMPLETE CBC W/AUTO DIFF WBC: CPT | Performed by: NURSE PRACTITIONER

## 2020-04-25 PROCEDURE — 84443 ASSAY THYROID STIM HORMONE: CPT | Performed by: NURSE PRACTITIONER

## 2020-04-25 PROCEDURE — 96372 THER/PROPH/DIAG INJ SC/IM: CPT

## 2020-04-25 PROCEDURE — 99217 PR OBSERVATION CARE DISCHARGE MANAGEMENT: CPT | Performed by: INTERNAL MEDICINE

## 2020-04-25 RX ORDER — FUROSEMIDE 40 MG/1
40 TABLET ORAL
Status: DISCONTINUED | OUTPATIENT
Start: 2020-04-25 | End: 2020-04-25 | Stop reason: HOSPADM

## 2020-04-25 RX ORDER — ALLOPURINOL 100 MG/1
200 TABLET ORAL DAILY
Status: DISCONTINUED | OUTPATIENT
Start: 2020-04-25 | End: 2020-04-25 | Stop reason: HOSPADM

## 2020-04-25 RX ORDER — TRAZODONE HYDROCHLORIDE 50 MG/1
50 TABLET ORAL NIGHTLY PRN
Status: DISCONTINUED | OUTPATIENT
Start: 2020-04-25 | End: 2020-04-25 | Stop reason: HOSPADM

## 2020-04-25 RX ORDER — DESVENLAFAXINE SUCCINATE 50 MG/1
50 TABLET, EXTENDED RELEASE ORAL DAILY
Status: DISCONTINUED | OUTPATIENT
Start: 2020-04-25 | End: 2020-04-25 | Stop reason: HOSPADM

## 2020-04-25 RX ORDER — HYDROCODONE BITARTRATE AND ACETAMINOPHEN 7.5; 325 MG/1; MG/1
1 TABLET ORAL EVERY 6 HOURS PRN
Status: DISCONTINUED | OUTPATIENT
Start: 2020-04-25 | End: 2020-04-25 | Stop reason: HOSPADM

## 2020-04-25 RX ORDER — DICYCLOMINE HYDROCHLORIDE 10 MG/1
10 CAPSULE ORAL 4 TIMES DAILY
Status: DISCONTINUED | OUTPATIENT
Start: 2020-04-25 | End: 2020-04-25 | Stop reason: HOSPADM

## 2020-04-25 RX ORDER — PANTOPRAZOLE SODIUM 40 MG/1
40 TABLET, DELAYED RELEASE ORAL EVERY MORNING
Status: DISCONTINUED | OUTPATIENT
Start: 2020-04-25 | End: 2020-04-25 | Stop reason: HOSPADM

## 2020-04-25 RX ORDER — DOCUSATE SODIUM 100 MG/1
100 CAPSULE, LIQUID FILLED ORAL 2 TIMES DAILY
Status: DISCONTINUED | OUTPATIENT
Start: 2020-04-25 | End: 2020-04-25 | Stop reason: HOSPADM

## 2020-04-25 RX ORDER — POTASSIUM CHLORIDE 750 MG/1
10 TABLET, FILM COATED, EXTENDED RELEASE ORAL DAILY
Status: DISCONTINUED | OUTPATIENT
Start: 2020-04-25 | End: 2020-04-25 | Stop reason: HOSPADM

## 2020-04-25 RX ORDER — BUSPIRONE HYDROCHLORIDE 10 MG/1
10 TABLET ORAL 2 TIMES DAILY
Status: DISCONTINUED | OUTPATIENT
Start: 2020-04-25 | End: 2020-04-25 | Stop reason: HOSPADM

## 2020-04-25 RX ORDER — ASPIRIN 81 MG/1
81 TABLET ORAL DAILY
Status: DISCONTINUED | OUTPATIENT
Start: 2020-04-25 | End: 2020-04-25 | Stop reason: HOSPADM

## 2020-04-25 RX ORDER — SUCRALFATE 1 G/1
1 TABLET ORAL
Status: DISCONTINUED | OUTPATIENT
Start: 2020-04-25 | End: 2020-04-25 | Stop reason: HOSPADM

## 2020-04-25 RX ORDER — OXYBUTYNIN CHLORIDE 5 MG/1
5 TABLET, EXTENDED RELEASE ORAL DAILY
Status: DISCONTINUED | OUTPATIENT
Start: 2020-04-25 | End: 2020-04-25 | Stop reason: HOSPADM

## 2020-04-25 RX ORDER — PROPRANOLOL HYDROCHLORIDE 20 MG/1
60 TABLET ORAL 3 TIMES DAILY
Status: DISCONTINUED | OUTPATIENT
Start: 2020-04-25 | End: 2020-04-25 | Stop reason: HOSPADM

## 2020-04-25 RX ORDER — BUDESONIDE AND FORMOTEROL FUMARATE DIHYDRATE 160; 4.5 UG/1; UG/1
2 AEROSOL RESPIRATORY (INHALATION)
Status: DISCONTINUED | OUTPATIENT
Start: 2020-04-25 | End: 2020-04-25 | Stop reason: HOSPADM

## 2020-04-25 RX ORDER — METOCLOPRAMIDE 5 MG/1
5 TABLET ORAL
Status: DISCONTINUED | OUTPATIENT
Start: 2020-04-25 | End: 2020-04-25 | Stop reason: HOSPADM

## 2020-04-25 RX ADMIN — SUCRALFATE 1 G: 1 TABLET ORAL at 08:44

## 2020-04-25 RX ADMIN — FUROSEMIDE 40 MG: 40 TABLET ORAL at 08:43

## 2020-04-25 RX ADMIN — DESVENLAFAXINE 50 MG: 50 TABLET, FILM COATED, EXTENDED RELEASE ORAL at 08:43

## 2020-04-25 RX ADMIN — ALLOPURINOL 200 MG: 100 TABLET ORAL at 08:43

## 2020-04-25 RX ADMIN — ENOXAPARIN SODIUM 40 MG: 40 INJECTION SUBCUTANEOUS at 00:52

## 2020-04-25 RX ADMIN — METOCLOPRAMIDE 5 MG: 5 TABLET ORAL at 12:03

## 2020-04-25 RX ADMIN — PANTOPRAZOLE SODIUM 40 MG: 40 TABLET, DELAYED RELEASE ORAL at 08:44

## 2020-04-25 RX ADMIN — OXYBUTYNIN CHLORIDE 5 MG: 5 TABLET, EXTENDED RELEASE ORAL at 08:44

## 2020-04-25 RX ADMIN — HYDROCODONE BITARTRATE AND ACETAMINOPHEN 1 TABLET: 7.5; 325 TABLET ORAL at 07:53

## 2020-04-25 RX ADMIN — HYDROCODONE BITARTRATE AND ACETAMINOPHEN 1 TABLET: 7.5; 325 TABLET ORAL at 01:29

## 2020-04-25 RX ADMIN — DOCUSATE SODIUM 100 MG: 100 CAPSULE, LIQUID FILLED ORAL at 08:43

## 2020-04-25 RX ADMIN — DICYCLOMINE HYDROCHLORIDE 10 MG: 10 CAPSULE ORAL at 12:03

## 2020-04-25 RX ADMIN — OYSTER SHELL CALCIUM WITH VITAMIN D 1 TABLET: 500; 200 TABLET, FILM COATED ORAL at 08:44

## 2020-04-25 RX ADMIN — POTASSIUM CHLORIDE 10 MEQ: 750 TABLET, EXTENDED RELEASE ORAL at 08:44

## 2020-04-25 RX ADMIN — TRAZODONE HYDROCHLORIDE 50 MG: 50 TABLET ORAL at 01:29

## 2020-04-25 RX ADMIN — Medication 10 ML: at 08:48

## 2020-04-25 RX ADMIN — PROPRANOLOL HYDROCHLORIDE 60 MG: 20 TABLET ORAL at 08:50

## 2020-04-25 RX ADMIN — ENOXAPARIN SODIUM 40 MG: 40 INJECTION SUBCUTANEOUS at 08:42

## 2020-04-25 RX ADMIN — DICYCLOMINE HYDROCHLORIDE 10 MG: 10 CAPSULE ORAL at 08:44

## 2020-04-25 RX ADMIN — SUCRALFATE 1 G: 1 TABLET ORAL at 12:03

## 2020-04-25 RX ADMIN — METOCLOPRAMIDE 5 MG: 5 TABLET ORAL at 08:43

## 2020-04-25 RX ADMIN — BUSPIRONE HYDROCHLORIDE 10 MG: 10 TABLET ORAL at 08:43

## 2020-04-25 RX ADMIN — ASPIRIN 81 MG: 81 TABLET, COATED ORAL at 08:43

## 2020-04-25 NOTE — PLAN OF CARE
Problem: Patient Care Overview  Goal: Plan of Care Review  Outcome: Ongoing (interventions implemented as appropriate)  Flowsheets  Taken 4/25/2020 0410 by Tessa Payan RN  Progress: improving  Taken 4/25/2020 0754 by Erica Ramirez RN  Plan of Care Reviewed With: patient  Taken 4/25/2020 1038 by Erica Ramirez RN  Outcome Summary: Pt resting with no complaints of chest pain.  Gabapentin held, no tremors observed.  Pt should d/c home today.

## 2020-04-25 NOTE — CONSULTS
picc team consult:      Patient stuck multiple times in ER for a line and arrived on floor without access.  Attempted power glide midline and US guided PIV without success, vessels too deep.  picc line recommended if access is desired.  MD decided to switch patient to only PO lasix and forgo IV access at this time.  Will follow.

## 2020-04-25 NOTE — DISCHARGE SUMMARY
Date of Admission: 4/24/2020    Date of Discharge:  4/25/2020    Length of stay:  LOS: 0 days     Presenting Problem:   Bilateral lower extremity edema [R60.0]  Chest pain, unspecified type [R07.9]      Principal and Active Diagnosis During Hospital Stay:     Active Hospital Problems    Diagnosis  POA   • Chest pain [R07.9]  Yes   • Hypertension [I10]  Yes   • Hypothyroidism [E03.9]  Yes   • Edema [R60.9]  Yes   • Low back pain [M54.5]  Yes   • Osteoporosis [M81.0]  Yes      Resolved Hospital Problems   No resolved problems to display.     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;   -resolved after stopping gabapentin  -would avoid gabapentin at all as outpt     CKD stage III, chronic with creatinine  which is near baseline:   Continue Lasix with potassium; continue calcium     Osteoporosis, chronic: Hold Fosamax; continue calcium     Gout, chronic: Continue allopurinol     CAD, chronic: Continue aspirin, propranolol     COPD, chronic: Continue Symbicort     Anxiety, chronic: Continue buspirone, Pristiq, propranolol, trazodone     Chronic pain: Continue Bentyl, Norco     Chronic constipation --likely secondary to chronic narcotic use: Hold Linzess; continue Reglan; add Colace     GERD, chronic: continue prilosec formulary substitution; continue sucralfate     Overactive bladder, chronic: Continue Detrol [place] pharmacy formulary substitution      Hospital Course  Patient is a 69 y.o. female presented with some lower extremity edema as well as myoclonic jerking.  She has history of CKD and was recently increased on her gabapentin which was likely the culprit of her jerking spells as these resolved after gabapentin was stopped.  She had some vague chest pain that resolved and was likely musculoskeletal in nature troponin  was negative.  She was back to her baseline the next day and felt stable to discharge home..        Procedures Performed:none         Consults:   Consults     Date and Time Order Name Status Description    4/24/2020 2014 Hospitalist (on-call MD unless specified) Completed           Pertinent Test Results:     Lab Results (last 72 hours)     Procedure Component Value Units Date/Time    TSH [[id number]]  (Normal) Collected:  04/25/20 0256    Specimen:  Blood Updated:  04/25/20 0428     TSH 1.160 uIU/mL      Comment: TSH results may be falsely decreased if patient taking Biotin.       Basic Metabolic Panel [[id number]]  (Abnormal) Collected:  04/25/20 0256    Specimen:  Blood Updated:  04/25/20 0427     Glucose 98 mg/dL      BUN 26 mg/dL      Creatinine 1.46 mg/dL      Sodium 143 mmol/L      Potassium 3.7 mmol/L      Chloride 101 mmol/L      CO2 32.0 mmol/L      Calcium 8.5 mg/dL      eGFR Non African Amer 35 mL/min/1.73      BUN/Creatinine Ratio 17.8     Anion Gap 10.0 mmol/L     Narrative:       GFR Normal >60  Chronic Kidney Disease <60  Kidney Failure <15      CBC & Differential [[id number]] Collected:  04/25/20 0256    Specimen:  Blood Updated:  04/25/20 0418    Narrative:       The following orders were created for panel order CBC & Differential.  Procedure                               Abnormality         Status                     ---------                               -----------         ------                     CBC Auto Differential[[id number]]        Abnormal            Final result                 Please view results for these tests on the individual orders.    CBC Auto Differential [[id number]]  (Abnormal) Collected:  04/25/20 0256    Specimen:  Blood Updated:  04/25/20 0418     WBC 5.30 10*3/mm3      RBC 3.67 10*6/mm3      Hemoglobin 10.6 g/dL      Hematocrit 32.8 %      MCV 89.5 fL      MCH 28.9 pg      MCHC 32.3 g/dL      RDW 16.0 %      RDW-SD 50.3 fl      MPV 7.8 fL      Platelets 209 10*3/mm3       Neutrophil % 62.6 %      Lymphocyte % 24.1 %      Monocyte % 9.3 %      Eosinophil % 3.5 %      Basophil % 0.5 %      Neutrophils, Absolute 3.30 10*3/mm3      Lymphocytes, Absolute 1.30 10*3/mm3      Monocytes, Absolute 0.50 10*3/mm3      Eosinophils, Absolute 0.20 10*3/mm3      Basophils, Absolute 0.00 10*3/mm3      nRBC 0.0 /100 WBC     Basic Metabolic Panel [[id number]]  (Abnormal) Collected:  04/24/20 1935    Specimen:  Blood Updated:  04/24/20 2008     Glucose 103 mg/dL      BUN 26 mg/dL      Creatinine 1.60 mg/dL      Sodium 141 mmol/L      Potassium 4.5 mmol/L      Chloride 99 mmol/L      CO2 30.0 mmol/L      Calcium 8.7 mg/dL      eGFR Non African Amer 32 mL/min/1.73      BUN/Creatinine Ratio 16.3     Anion Gap 12.0 mmol/L     Narrative:       GFR Normal >60  Chronic Kidney Disease <60  Kidney Failure <15      Troponin [[id number]]  (Normal) Collected:  04/24/20 1935    Specimen:  Blood Updated:  04/24/20 2008     Troponin T <0.010 ng/mL     Narrative:       Troponin T Reference Range:  <= 0.03 ng/mL-   Negative for AMI  >0.03 ng/mL-     Abnormal for myocardial necrosis.  Clinicians would have to utilize clinical acumen, EKG, Troponin and serial changes to determine if it is an Acute Myocardial Infarction or myocardial injury due to an underlying chronic condition.       Results may be falsely decreased if patient taking Biotin.      CK [[id number]]  (Normal) Collected:  04/24/20 1935    Specimen:  Blood Updated:  04/24/20 2008     Creatine Kinase 34 U/L     Protime-INR [[id number]]  (Normal) Collected:  04/24/20 1935    Specimen:  Blood Updated:  04/24/20 2003     Protime 9.8 Seconds      INR 0.92    aPTT [[id number]]  (Abnormal) Collected:  04/24/20 1935    Specimen:  Blood Updated:  04/24/20 2003     PTT 19.9 seconds     CBC & Differential [[id number]] Collected:  04/24/20 1935    Specimen:  Blood Updated:  04/24/20 1949    Narrative:       The following orders were created for panel order CBC &  Differential.  Procedure                               Abnormality         Status                     ---------                               -----------         ------                     CBC Auto Differential[[id number]]        Abnormal            Final result                 Please view results for these tests on the individual orders.    CBC Auto Differential [[id number]]  (Abnormal) Collected:  04/24/20 1935    Specimen:  Blood Updated:  04/24/20 1949     WBC 5.50 10*3/mm3      RBC 3.64 10*6/mm3      Hemoglobin 11.0 g/dL      Hematocrit 32.8 %      MCV 90.3 fL      MCH 30.1 pg      MCHC 33.3 g/dL      RDW 16.1 %      RDW-SD 51.2 fl      MPV 7.5 fL      Platelets 202 10*3/mm3      Neutrophil % 63.7 %      Lymphocyte % 25.1 %      Monocyte % 7.8 %      Eosinophil % 2.8 %      Basophil % 0.6 %      Neutrophils, Absolute 3.50 10*3/mm3      Lymphocytes, Absolute 1.40 10*3/mm3      Monocytes, Absolute 0.40 10*3/mm3      Eosinophils, Absolute 0.20 10*3/mm3      Basophils, Absolute 0.00 10*3/mm3      nRBC 0.0 /100 WBC     Magnesium [[id number]]  (Normal) Collected:  04/24/20 1854    Specimen:  Blood Updated:  04/24/20 1923     Magnesium 2.0 mg/dL     BNP [[id number]]  (Normal) Collected:  04/24/20 1854    Specimen:  Blood Updated:  04/24/20 1922     proBNP 504.0 pg/mL     Narrative:       Among patients with dyspnea, NT-proBNP is highly sensitive for the detection of acute congestive heart failure. In addition NT-proBNP of <300 pg/ml effectively rules out acute congestive heart failure with 99% negative predictive value.    Results may be falsely decreased if patient taking Biotin.                 Microbiology Results (last 10 days)     ** No results found for the last 240 hours. **                 Imaging Results (All)     Procedure Component Value Units Date/Time    XR Chest 1 View [[id number]] Collected:  04/24/20 1755     Updated:  04/24/20 1758    Narrative:          DATE OF EXAM:   4/24/2020 5:41 PM     PROCEDURE:    XR CHEST 1 VW-     INDICATIONS:   soa     COMPARISON:  12/16/2016     TECHNIQUE:   Portable chest radiograph.     FINDINGS:    Left-sided AICD noted. Heart size normal. No focal consolidation,  pneumothorax, or large pleural effusion. Bony thorax intact.       Impression:       No acute process.     Electronically Signed By-Loki Ellison On:4/24/2020 5:56 PM  This report was finalized on [id number] by  Loki Ellison, .            Condition on Discharge:  stable    Vital Signs  Temp:  [97.6 °F (36.4 °C)-97.7 °F (36.5 °C)] 97.7 °F (36.5 °C)  Heart Rate:  [51-81] 81  Resp:  [16-18] 16  BP: (123-162)/(74-94) 123/74    Physical Exam:  Physical Exam   Constitutional: No distress.   Cardiovascular: Normal rate.   Pulmonary/Chest: Effort normal. No respiratory distress.   Abdominal: Soft. She exhibits no distension.   Musculoskeletal: She exhibits edema.   Mild LE edema   Psychiatric: She has a normal mood and affect.       Discharge Disposition  Home or Self Care    Discharge Medications     Discharge Medications      Continue These Medications      Instructions Start Date   alendronate 70 MG tablet  Commonly known as:  FOSAMAX   TAKE ONE TABLET BY MOUTH every WEEK      allopurinol 100 MG tablet  Commonly known as:  ZYLOPRIM   Take 2 tablet by mouth at bedtime      ASPIR 81 MG EC tablet  Generic drug:  aspirin   81 mg, Oral, Daily      budesonide-formoterol 160-4.5 MCG/ACT inhaler  Commonly known as:  Symbicort   2 puffs, Inhalation, 2 Times Daily - RT      busPIRone 10 MG tablet  Commonly known as:  BUSPAR   10 mg, Oral, 2 Times Daily      Calcium Plus Vitamin D3 600-800 MG-UNIT tablet  Generic drug:  calcium carb-cholecalciferol   600 mg, Oral, Every 24 Hours      cyanocobalamin 1000 MCG/ML injection   1 mL, Intramuscular, Every 30 Days      desvenlafaxine 50 MG 24 hr tablet  Commonly known as:  PRISTIQ   TAKE ONE TABLET BY MOUTH EVERY MORNING      dicyclomine 10 MG capsule  Commonly known as:  BENTYL   10 mg, Oral, 4  Times Daily      furosemide 40 MG tablet  Commonly known as:  LASIX   TAKE ONE TABLET BY MOUTH TWICE DAILY      HYDROcodone-acetaminophen 7.5-325 MG per tablet  Commonly known as:  NORCO   1 tablet, Oral, Every 6 Hours PRN      linaclotide 145 MCG capsule capsule  Commonly known as:  LINZESS   1 capsule, Oral, Daily PRN      metoclopramide 5 MG tablet  Commonly known as:  REGLAN   5 mg, Oral, 4 Times Daily      omeprazole 40 MG capsule  Commonly known as:  priLOSEC   40 mg, Oral, 2 Times Daily      ondansetron 4 MG tablet  Commonly known as:  ZOFRAN   4 mg, Oral, As Needed      potassium chloride 10 MEQ CR tablet  Commonly known as:  K-DUR   10 mEq, Oral, Daily      propranolol 60 MG tablet  Commonly known as:  INDERAL   TAKE ONE TABLET BY MOUTH THREE TIMES DAILY      sucralfate 1 g tablet  Commonly known as:  CARAFATE   1 g, Oral, 4 Times Daily      tolterodine LA 2 MG 24 hr capsule  Commonly known as:  DETROL LA   TAKE ONE CAPSULE BY MOUTH EVERY DAY      traZODone 50 MG tablet  Commonly known as:  DESYREL   TAKE ONE TABLET BY MOUTH AT night AS NEEDED FOR SLEEP      vitamin D 1.25 MG (21904 UT) capsule capsule  Commonly known as:  ERGOCALCIFEROL   TAKE ONE CAPSULE BY MOUTH one time PER WEEK         Stop These Medications    gabapentin 400 MG capsule  Commonly known as:  NEURONTIN            Discharge Diet:     Activity at Discharge:     Follow-up Appointments  Future Appointments   Date Time Provider Department Center   5/29/2020 10:15 AM Josefina Landeros MD [facility] None   6/5/2020  9:20 AM [facility] None   2/2/2021 10:00 AM Graham Guido MD [facility]     Additional Instructions for the Follow-ups that You Need to Schedule     Discharge Follow-up with PCP   As directed       Currently Documented PCP:    Josefina Landeros MD    PCP Phone Number:    [phone]     Follow Up Details:  one week               Test Results Pending at Discharge       Risk  for Readmission (LACE) Score: 7 (4/25/2020  6:00 AM)      This patient has been examined wearing appropriate Personal Protective Equipment . 04/25/20        Shreyas Morgan DO  04/25/20  12:44

## 2020-04-25 NOTE — H&P
[facility] Medicine Services      Patient Name: Elaine Tocsano  : 1950  MRN: [id number]  Primary Care Physician: Josefina Landeros MD  Date of admission: 2020    Patient Care Team:  Josefina Landeros MD as PCP - General (Family Medicine)  Ranjit Chanel as PCP - Claims Attributed  Josefina Landeros MD as Consulting Physician (Family Medicine)          Subjective   History Present Illness     Chief Complaint:   Chief Complaint   Patient presents with   • Leg Swelling       Ms. Toscano is a 69 y.o.  presents to [facility] complaining of lower extremity edema.         69-year-old female presents to the ER with a chief complaint of bilateral lower extremity edema which began yesterday but has worsened over the last 24 hours.  On her way to the hospital the patient had moderate, pressure type chest pain in the upper left chest near her pacemaker lasting approximately 2 to 3 minutes with spontaneous resolution.  He denied any associated shortness of breath, nausea or diaphoresis.  The patient also is concerned about jerking of her hands, shoulders and legs which she reports is chronic but has worsened over the last several days.  The patient has a history of chronic lower extremity edema which is managed on diuretic therapy in conjunction with lower extremity elevation.  The patient has a history of CHF, CKD stage III progressing towards stage IV, CAD with sick sinus syndrome and pacemaker.  She does report she has been eating salt-containing foods but not to excess recently.  The patient had addition of gabapentin to her pain management regime 3 days ago.  The patient's primary concern is that her lower extremity edema usually resolves when she elevates her feet but it has stayed and her increased jerking.  She also reports that today she had an episode lasting 1 to 2 minutes where she found it difficult to speak due to her chin jerking.    EF per stress  test December 2018 was 52%      Review of Systems   Constitution: Negative for chills, diaphoresis and fever.   Cardiovascular: Positive for chest pain and leg swelling. Negative for dyspnea on exertion.   Respiratory: Negative for cough and shortness of breath.    Musculoskeletal: Positive for back pain.        Chronic low back pain   Gastrointestinal: Negative for vomiting.   Genitourinary: Negative for dysuria.   Neurological: Positive for tremors. Negative for dizziness, focal weakness, headaches, numbness, seizures and weakness.        Difficulty speaking for 1 to 2 minutes.   All other systems reviewed and are negative.          Personal History     Past Medical History:   Past Medical History:   Diagnosis Date   • Anemia    • Broken foot     right   • Broken nose 07/2017   • CHF (congestive heart failure) (CMS/[facility])    • CHF (congestive heart failure) (CMS/[facility])    • Chronic low back pain    • CRI (chronic renal insufficiency)    • Depression    • DJD (degenerative joint disease)    • Fracture of ankle 8/12/2016   • GERD (gastroesophageal reflux disease)    • Gout    • Hypertension    • Hypothyroidism    • CRUZ (obstructive sleep apnea)     non-complaince with bipap   • Osteopenia    • Renal failure     stage 3   • Suicide attempt (CMS/[facility])    • TMJ (dislocation of temporomandibular joint)        Surgical History:      Past Surgical History:   Procedure Laterality Date   • APPENDECTOMY  1986   • BACK SURGERY      2004---2005 L5-S1 spinal stenosis   • CARDIAC CATHETERIZATION  04/2018   • CHOLECYSTECTOMY  2008   • EPIDURAL BLOCK     • FOOT SURGERY Bilateral 2008   • FOOT SURGERY Bilateral 09/2019    Dr. Keenan- 2nd toe on bilateral feet    • GASTRIC BYPASS  2007   • INSERT / REPLACE / REMOVE PACEMAKER     • LAPAROSCOPIC GASTRIC BANDING  2004    removed   • OTHER SURGICAL HISTORY  01/2018    myelogram   • TOTAL KNEE ARTHROPLASTY Left            Family History: family history includes Arthritis in her sister; COPD in  an other family member; Heart disease in her sister; Hypertension in her father; Lung disease in her paternal grandmother and another family member. Otherwise pertinent FHx was reviewed and unremarkable.     Social History:  reports that she has never smoked. She has never used smokeless tobacco. She reports that she does not drink alcohol or use drugs.      Medications:  Prior to Admission medications    Medication Sig Start Date End Date Taking? Authorizing Provider   alendronate (FOSAMAX) 70 MG tablet TAKE ONE TABLET BY MOUTH every WEEK 12/18/19  Yes Shanta Morales MD   allopurinol (ZYLOPRIM) 100 MG tablet Take 2 tablet by mouth at bedtime 11/25/19  Yes Josefina Landeros MD   aspirin (ASPIR) 81 MG EC tablet Take 81 mg by mouth Daily. 9/24/18  Yes Flavia Renteria MD   budesonide-formoterol (SYMBICORT) 160-4.5 MCG/ACT inhaler Inhale 2 puffs 2 (Two) Times a Day. 7/22/19  Yes Josefina Landeros MD   busPIRone (BUSPAR) 10 MG tablet Take 10 mg by mouth 2 (Two) Times a Day.   Yes Flavia Renteria MD   calcium carb-cholecalciferol (CALCIUM PLUS VITAMIN D3) 600-800 MG-UNIT tablet Take 600 mg by mouth Daily. 2/20/18  Yes Flavia Renteria MD   cyanocobalamin 1000 MCG/ML injection Inject 1 mL into the appropriate muscle as directed by prescriber Every 30 (Thirty) Days. 6/20/19  Yes Flavia Renteria MD   desvenlafaxine (PRISTIQ) 50 MG 24 hr tablet TAKE ONE TABLET BY MOUTH EVERY MORNING 4/9/20  Yes Josefina Landeros MD   dicyclomine (BENTYL) 10 MG capsule Take 10 mg by mouth 4 (Four) Times a Day. 6/13/19  Yes Flavia Renteria MD   furosemide (LASIX) 40 MG tablet TAKE ONE TABLET BY MOUTH TWICE DAILY 3/25/20  Yes Sánchez Larson MD   gabapentin (NEURONTIN) 400 MG capsule Take 400 mg by mouth 4 (Four) Times a Day. 3/3/20  Yes Flavia Renteria MD   HYDROcodone-acetaminophen (NORCO) 7.5-325 MG per tablet Take 1 tablet by mouth Every 6 (Six) Hours As Needed. 6/14/19  Yes  Flavia Renteria MD   linaclotide (LINZESS) 145 MCG capsule capsule Take 1 capsule by mouth Daily As Needed.   Yes Flavia Renteria MD   metoclopramide (REGLAN) 5 MG tablet Take 5 mg by mouth 4 (Four) Times a Day. 6/7/19  Yes Flavia Renteria MD   Metoprolol Succinate 200 MG capsule extended-release 24 hour sprinkle Take 200 mg by mouth Daily.   Yes Flavia Renteria MD   omeprazole (priLOSEC) 40 MG capsule Take 40 mg by mouth 2 (Two) Times a Day. 6/21/19  Yes Flavia Renteria MD   ondansetron (ZOFRAN) 4 MG tablet Take 1 tablet by mouth As Needed for Nausea or Vomiting. 3/9/20  Yes Josefina Landeros MD   potassium chloride (K-DUR) 10 MEQ CR tablet Take 10 mEq by mouth Daily. 11/15/19  Yes Flavia Renteria MD   propranolol (INDERAL) 60 MG tablet TAKE ONE TABLET BY MOUTH THREE TIMES DAILY 3/17/20  Yes Josefina Landeros MD   sucralfate (CARAFATE) 1 g tablet Take 1 g by mouth 4 (Four) Times a Day. 5/24/19  Yes Flavia Renteria MD   tolterodine LA (DETROL LA) 2 MG 24 hr capsule TAKE ONE CAPSULE BY MOUTH EVERY DAY 2/26/20  Yes Josefina Landeros MD   traZODone (DESYREL) 50 MG tablet TAKE ONE TABLET BY MOUTH AT night AS NEEDED FOR SLEEP 3/23/20  Yes Josefina Landeros MD   vitamin D (ERGOCALCIFEROL) 1.25 MG (99458 UT) capsule capsule TAKE ONE CAPSULE BY MOUTH one time PER WEEK 3/31/20  Yes Josefina Landreos MD   amoxicillin (AMOXIL) 500 MG capsule Take 1 capsule by mouth 3 (Three) Times a Day. 3/9/20 4/24/20  Josefina Landeros MD   busPIRone (BUSPAR) 10 MG tablet Take 1 tablet by mouth 2 (Two) Times a Day. 1/13/20 4/24/20  Josefina Landeros MD   cycloSPORINE (RESTASIS) 0.05 % ophthalmic emulsion Administer 1 drop to both eyes 2 (Two) Times a Day. 10/24/19 4/24/20  Provider, MD Flavia   diclofenac (VOLTAREN) 1 % gel gel Apply 1 application topically to the appropriate area as directed As Needed. 5/30/19 4/24/20  Provider,  "MD Flavia   fluconazole (DIFLUCAN) 150 MG tablet 1 now, may repeat in 1 week if needed for skin yeast infection 2/21/20 4/24/20  Josefina Landeros MD   guaifenesin-dextromethorphan (MUCINEX DM)  MG tablet sustained-release 12 hour tablet TAKE 1 TO 2 TABLETS BY MOUTH TWICE DAILY AS NEEDED FOR cough AND congestion 3/13/20 4/24/20  Josefina Landeros MD   nystatin (MYCOSTATIN) 191472 UNIT/GM cream Apply  topically to the appropriate area as directed 2 (Two) Times a Day. 7/5/19 4/24/20  Josefina Landeros MD   nystatin (MYCOSTATIN) 340952 UNIT/GM cream Apply topically to the appropriate area as directed As Needed (skin yeast infection). 3/19/20 4/24/20  Josefina Landeros MD   Syringe 23G X 1\" 3 ML misc Inject 1 syringe into the appropriate muscle as directed by prescriber Every 30 (Thirty) Days. 7/17/19 4/24/20  ProviderFlavia MD       Allergies:    Allergies   Allergen Reactions   • Azithromycin Unknown (See Comments)   • Codeine Nausea And Vomiting   • Dilaudid  [Hydromorphone Hcl] Unknown (See Comments)   • Isosorbide Nitrate Other (See Comments)   • Macrobid  [Nitrofurantoin] Rash   • Morphine Rash   • Other Itching   • Oxycontin  [Oxycodone] Delirium   • Pineapple Other (See Comments)     Mouth numbness   • Povidone Iodine Rash   • Tetanus Antitoxin Rash   • Denosumab Other (See Comments)   • Demerol [Meperidine] Unknown (See Comments)     Pt does not remember. She states it was a long time ago   • Etodolac Other (See Comments)   • Lodrane D [Brompheniramine-Pseudoeph] Other (See Comments)     drowsiness   • Shrimp Flavor Swelling     Sauce only    • Sulfa Antibiotics Unknown (See Comments)   • Tizanidine Other (See Comments)     Increased pain   • Iodine Itching and Rash       Objective   Objective     Vital Signs  Temp:  [97.6 °F (36.4 °C)] 97.6 °F (36.4 °C)  Heart Rate:  [62-73] 62  Resp:  [16-18] 16  BP: (133-162)/(76-94) 133/79  SpO2:  [94 %-100 %] 97 %  on   ; "      Body mass index is 47.54 kg/m².    Physical Exam   Constitutional: She is oriented to person, place, and time. She appears well-developed and well-nourished. No distress.   Eyes: Pupils are equal, round, and reactive to light.   Neck: Normal range of motion. Neck supple.   Cardiovascular: Normal rate, regular rhythm, normal heart sounds and intact distal pulses.   No murmur heard.  Pulmonary/Chest: Effort normal and breath sounds normal. No respiratory distress. She exhibits no tenderness.   Abdominal: Soft. Bowel sounds are normal. She exhibits no distension. There is no tenderness.   Musculoskeletal: Normal range of motion. She exhibits edema and tenderness.   Bilateral nonpitting lower extremity edema with lichenification of the bilateral lower extremities consistent with chronic intermittent lower extremity edema.  There is no erythema, warmth, or induration.  No evidence of cellulitis.    Neurological: She is alert and oriented to person, place, and time. No cranial nerve deficit or sensory deficit. She exhibits normal muscle tone. Coordination normal.   Intermittent, involuntary jerking of the bilateral hands with rotation inward which is mild in intensity.   Skin: Skin is warm and dry. Capillary refill takes less than 2 seconds. No rash noted. She is not diaphoretic. No erythema.   Psychiatric: She has a normal mood and affect. Her behavior is normal. Judgment and thought content normal.   Vitals reviewed.      Results Review:  I have personally reviewed most recent cardiac tracings, lab results and radiology images and interpretations and agree with findings, most notably: Only minor abnormalities.    Results from last 7 days   Lab Units 04/24/20 1935   WBC 10*3/mm3 5.50   HEMOGLOBIN g/dL 11.0*   HEMATOCRIT % 32.8*   PLATELETS 10*3/mm3 202   INR  0.92     Results from last 7 days   Lab Units 04/24/20 1935 04/24/20  1854   SODIUM mmol/L 141  --    POTASSIUM mmol/L 4.5  --    CHLORIDE mmol/L 99  --     CO2 mmol/L 30.0*  --    BUN mg/dL 26*  --    CREATININE mg/dL 1.60*  --    GLUCOSE mg/dL 103*  --    CALCIUM mg/dL 8.7  --    TROPONIN T ng/mL <0.010  --    PROBNP pg/mL  --  504.0     Estimated Creatinine Clearance: 39.4 mL/min (A) (by C-G formula based on SCr of 1.6 mg/dL (H)).  Brief Urine Lab Results  (Last result in the past 365 days)      Color   Clarity   Blood   Leuk Est   Nitrite   Protein   CREAT   Urine HCG        03/09/20 1614 Yellow Clear Trace Small (1+) Negative Trace               Microbiology Results (last 10 days)     ** No results found for the last 240 hours. **        EKG reviewed showing atrial paced rhythm with a wide QRS.  ECG/EMG Results (most recent)     Procedure Component Value Units Date/Time    ECG 12 Lead [[id number]] Collected:  04/24/20 1727     Updated:  04/24/20 1728    Narrative:       HEART RATE= 88  bpm  RR Interval= 680  ms  HI Interval= 71  ms  P Horizontal Axis=   deg  P Front Axis=   deg  QRSD Interval= 165  ms  QT Interval= 466  ms  QRS Axis= -41  deg  T Wave Axis= 88  deg  - ABNORMAL ECG -  Atrial-paced complexes  Paired ventricular premature complexes  LVH with secondary repolarization abnormality  Anterior Q waves, possibly due to LVH  When compared with ECG of 29-Apr-2019 7:32:42,  Significant change in rhythm  Electronically Signed By:   Date and Time of Study: 2020-04-24 17:27:02    ECG 12 Lead [[id number]] Resulted:  04/24/20 2019     Updated:  04/24/20 2019          Results for orders placed during the hospital encounter of 04/24/20   Duplex Venous Lower Extremity - Bilateral    Narrative · Normal bilateral lower extremity venous duplex scan.        Chest x-ray reviewed showing clear lung fields without evidence of pulmonary edema.       Xr Chest 1 View    Result Date: 4/24/2020  No acute process.  Electronically Signed By-Loki Ellison On:4/24/2020 5:56 PM This report was finalized on [id number] by  Loki Ellison, .        Estimated Creatinine Clearance: 39.4  mL/min (A) (by C-G formula based on SCr of 1.6 mg/dL (H)).    Assessment/Plan   Assessment/Plan       Active Hospital Problems    Diagnosis  POA   • Chest pain [R07.9]  Yes      Resolved Hospital Problems   No resolved problems to display.     Chest pain, self-limiting x2 to 3 minutes, uncertain etiology--likely musculoskeletal; chronic cause needs to be considered: Serial troponin; analgesics as needed    Lower extremity edema, chronic with acute exacerbation, uncertain etiology--consideration for exacerbation related to gabapentin: Hold gabapentin; continue p.o. 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; consideration for exacerbation from hyperkinesis related to addition of gabapentin; other causes cannot be excluded: Hold gabapentin    --Patient has been on long-term narcotic use with CKD    CKD stage III, chronic with creatinine 1.6 which is near baseline: Continue Lasix with potassium; continue calcium    Osteoporosis, chronic: Hold Fosamax; continue calcium    Gout, chronic: Continue allopurinol    CAD, chronic: Continue aspirin, propranolol    COPD, chronic: Continue Symbicort    Anxiety, chronic: Continue buspirone, Pristiq, propranolol, trazodone    Chronic pain: Continue Bentyl, Norco    Chronic constipation --likely secondary to chronic narcotic use: Hold Linzess; continue Reglan; add Colace    GERD, chronic: continue prilosec formulary substitution; continue sucralfate    Overactive bladder, chronic: Continue Detrol [place] pharmacy formulary substitution      VTE Prophylaxis -   Mechanical Order History:     None      Pharmalogical Order History:     Ordered     Dose Route Frequency Stop    04/24/20 2110  enoxaparin (LOVENOX) syringe 40 mg      40 mg SC Every 12 Hours Scheduled --    04/24/20 2104  Pharmacy to Dose  enoxaparin (LOVENOX)      -- XX Continuous PRN --          CODE STATUS:    Code Status and Medical Interventions:   Ordered at: 04/24/20 7144     Code Status:    CPR     Medical Interventions (Level of Support Prior to Arrest):    Full       This patient has been examined wearing appropriate Personal Protective Equipment and discussed with emergency room provider.  The patient does not meet COVID 19 testing criteria, at this time. 04/24/20      I discussed the patient's findings and my recommendations with patient.        Electronically signed by VIOLETTA Mercedes, 04/24/20, 9:36 PM.  [facility] Hospitalist Team

## 2020-04-25 NOTE — PLAN OF CARE
Problem: Patient Care Overview  Goal: Plan of Care Review  Outcome: Ongoing (interventions implemented as appropriate)  Flowsheets (Taken 4/25/2020 5723)  Progress: improving  Plan of Care Reviewed With: patient  Note:   No c/o chest pain overnight.

## 2020-04-26 ENCOUNTER — TELEPHONE (OUTPATIENT)
Dept: FAMILY MEDICINE CLINIC | Facility: CLINIC | Age: 70
End: 2020-04-26

## 2020-04-26 RX ORDER — OXYBUTYNIN CHLORIDE 5 MG/1
TABLET ORAL
Qty: 90 TABLET | Refills: 0 | OUTPATIENT
Start: 2020-04-26

## 2020-04-26 RX ORDER — CYANOCOBALAMIN 1000 UG/ML
INJECTION, SOLUTION INTRAMUSCULAR; SUBCUTANEOUS
Qty: 12 ML | Refills: 0 | Status: SHIPPED | OUTPATIENT
Start: 2020-04-26 | End: 2020-09-21 | Stop reason: SDUPTHER

## 2020-04-26 NOTE — TELEPHONE ENCOUNTER
Patient was admitted to the hospital April 24-25 for chest pain and lower extremity edema and myoclonus.  Please contact patient to schedule a hospital follow-up, transition of care appointment this week.  Thank you

## 2020-04-27 ENCOUNTER — TRANSITIONAL CARE MANAGEMENT TELEPHONE ENCOUNTER (OUTPATIENT)
Dept: FAMILY MEDICINE CLINIC | Facility: CLINIC | Age: 70
End: 2020-04-27

## 2020-04-29 RX ORDER — SYRINGE, DISPOSABLE, 3 ML
1 SYRINGE, EMPTY DISPOSABLE MISCELLANEOUS
Qty: 100 EACH | Refills: 1 | Status: SHIPPED | OUTPATIENT
Start: 2020-04-29 | End: 2020-09-21 | Stop reason: SDUPTHER

## 2020-04-29 NOTE — TELEPHONE ENCOUNTER
Home health facility called and stated that they need to for us to include the syringe and needle on the B-12 RX.  They will not be able to provide this

## 2020-05-06 ENCOUNTER — OFFICE VISIT (OUTPATIENT)
Dept: FAMILY MEDICINE CLINIC | Facility: CLINIC | Age: 70
End: 2020-05-06

## 2020-05-06 DIAGNOSIS — N18.30 CHRONIC RENAL DISEASE, STAGE III (HCC): ICD-10-CM

## 2020-05-06 DIAGNOSIS — Z74.09 MOBILITY POOR: ICD-10-CM

## 2020-05-06 DIAGNOSIS — E66.01 MORBID OBESITY WITH BMI OF 45.0-49.9, ADULT (HCC): ICD-10-CM

## 2020-05-06 DIAGNOSIS — M54.42 CHRONIC LOW BACK PAIN WITH BILATERAL SCIATICA, UNSPECIFIED BACK PAIN LATERALITY: ICD-10-CM

## 2020-05-06 DIAGNOSIS — M48.062 SPINAL STENOSIS OF LUMBAR REGION WITH NEUROGENIC CLAUDICATION: ICD-10-CM

## 2020-05-06 DIAGNOSIS — Z09 HOSPITAL DISCHARGE FOLLOW-UP: Primary | ICD-10-CM

## 2020-05-06 DIAGNOSIS — G89.29 CHRONIC LOW BACK PAIN WITH BILATERAL SCIATICA, UNSPECIFIED BACK PAIN LATERALITY: ICD-10-CM

## 2020-05-06 DIAGNOSIS — M79.89 SWELLING OF LOWER EXTREMITY: ICD-10-CM

## 2020-05-06 DIAGNOSIS — M54.41 CHRONIC LOW BACK PAIN WITH BILATERAL SCIATICA, UNSPECIFIED BACK PAIN LATERALITY: ICD-10-CM

## 2020-05-06 DIAGNOSIS — M47.26 OTHER SPONDYLOSIS WITH RADICULOPATHY, LUMBAR REGION: ICD-10-CM

## 2020-05-06 DIAGNOSIS — T50.905A ADVERSE EFFECT OF DRUG, INITIAL ENCOUNTER: ICD-10-CM

## 2020-05-06 DIAGNOSIS — Z76.89 ENCOUNTER FOR SUPPORT AND COORDINATION OF TRANSITION OF CARE: ICD-10-CM

## 2020-05-06 DIAGNOSIS — I10 ESSENTIAL HYPERTENSION: ICD-10-CM

## 2020-05-06 PROCEDURE — 99443 PR PHYS/QHP TELEPHONE EVALUATION 21-30 MIN: CPT | Performed by: FAMILY MEDICINE

## 2020-05-06 NOTE — PROGRESS NOTES
Chief Complaint   Patient presents with   • Hospital f/u chest pain   • Congestive Heart Failure       Subjective     Elaine Toscano is a 69 y.o. female.  She is scheduled today for a transitional care/hospital follow-up appointment.  She was accidentally scheduled 8 days ago for an in office visit on a day I was doing work from home.  Due to trouble with transportation she was rescheduled today as a telephone visit.     you have chosen to receive care through a telephone visit. Do you consent to use a telephone visit for your medical care today?Yes  24 minutes was spent on phone with patient with greater than an additional 30 minutes with reviewing medical records and documentation.    Patient is scheduled today for a hospital f/u for chest pain, lower extremity edema and myoclonus.  She was admitted April 24 -25, 2020 patient denies any chest pain since hospital discharge.  Patient states she is bending over more now when she walks due to increased back pain.   Prior to hospital admission, Dr. Saba had specialist and pain management in [place] had increased her gabapentin.  Gabapentin was discontinued while in the hospital, she states in addition to causing her chest pain, it made her shake all over and her chin drop, since stopped symptoms have resolved.  She does have follow-up with pain management next week.  She is continuing to take hydrocodone and there has been discussion about some sort of implantable device to help with her chronic pain.  She does not currently have a spine specialist, had surgery with Dr. López many years ago, does not want to see a spine specialist at this time because she would not consider surgery as a treatment option.    Patient reports that Nabila Walker, her , has told her she needs to get me to sign a paper for a new motorized wheelchair for [facility].    Patient states she was told she had congestive heart failure while in the hospital, this was not  listed as a admission or discharge diagnosis.  She did have a slightly elevated proBNP at 500 chest x-ray did not show any focal consolidation, pneumothorax, or large pleural effusion.  She is not sure when her next follow-up with her cardiologist Dr. Camejo scheduled.  She is having edema in her legs bilaterally, she is avoiding salt and taking 80 mg Lasix daily.  She states she spends most of her time out of chair and claims she does prop her feet frequently.  She states this does not help with swelling but overnight the swelling does resolve.  She is sleeping in her bed.    Patient's next appointment with nephrology, Dr Ness scheduled in June.    Patient does report she has been having abdominal cramps and loose stools frequently over the past 2 days, 3 times this morning, she does have an appointment with her gastroenterologist at 3:00 today.  She denies fever.      Patient has a paper for a new motorized wheelchair from [employer] that she is needing filled out.       The following portions of the patient's history were reviewed and updated as appropriate: allergies, current medications, past family history, past medical history, past social history, past surgical history and problem list.    Current Outpatient Medications on File Prior to Visit   Medication Sig   • alendronate (FOSAMAX) 70 MG tablet TAKE ONE TABLET BY MOUTH every WEEK   • allopurinol (ZYLOPRIM) 100 MG tablet Take 2 tablet by mouth at bedtime   • aspirin (ASPIR) 81 MG EC tablet Take 81 mg by mouth Daily.   • budesonide-formoterol (SYMBICORT) 160-4.5 MCG/ACT inhaler Inhale 2 puffs 2 (Two) Times a Day.   • busPIRone (BUSPAR) 10 MG tablet Take 10 mg by mouth 2 (Two) Times a Day.   • calcium carb-cholecalciferol (CALCIUM PLUS VITAMIN D3) 600-800 MG-UNIT tablet Take 600 mg by mouth Daily.   • cyanocobalamin 1000 MCG/ML injection inject ONE ML INTRAMUSCULARLY every MONTH   • desvenlafaxine (PRISTIQ) 50 MG 24 hr tablet TAKE ONE TABLET BY MOUTH  "EVERY MORNING   • dicyclomine (BENTYL) 10 MG capsule Take 10 mg by mouth 4 (Four) Times a Day.   • furosemide (LASIX) 40 MG tablet TAKE ONE TABLET BY MOUTH TWICE DAILY   • HYDROcodone-acetaminophen (NORCO) 7.5-325 MG per tablet Take 1 tablet by mouth Every 6 (Six) Hours As Needed.   • linaclotide (LINZESS) 145 MCG capsule capsule Take 1 capsule by mouth Daily As Needed.   • metoclopramide (REGLAN) 5 MG tablet Take 5 mg by mouth 4 (Four) Times a Day.   • Needle, Disp, 24G X 1\" misc 1 each Every 30 (Thirty) Days.   • omeprazole (priLOSEC) 40 MG capsule Take 40 mg by mouth 2 (Two) Times a Day.   • ondansetron (ZOFRAN) 4 MG tablet Take 1 tablet by mouth As Needed for Nausea or Vomiting.   • potassium chloride (K-DUR) 10 MEQ CR tablet Take 10 mEq by mouth Daily.   • propranolol (INDERAL) 60 MG tablet TAKE ONE TABLET BY MOUTH THREE TIMES DAILY   • sucralfate (CARAFATE) 1 g tablet Take 1 g by mouth 4 (Four) Times a Day.   • Syringe, Disposable, (SYRINGE 2-3 ML) 3 ML misc 1 each Every 30 (Thirty) Days.   • tolterodine LA (DETROL LA) 2 MG 24 hr capsule TAKE ONE CAPSULE BY MOUTH EVERY DAY   • traZODone (DESYREL) 50 MG tablet TAKE ONE TABLET BY MOUTH AT night AS NEEDED FOR SLEEP   • vitamin D (ERGOCALCIFEROL) 1.25 MG (01323 UT) capsule capsule TAKE ONE CAPSULE BY MOUTH one time PER WEEK     No current facility-administered medications on file prior to visit.      There are no discontinued medications.    Review of Systems   Constitutional: Positive for fatigue. Negative for fever.   Respiratory: Positive for shortness of breath ( With exertion). Negative for cough.    Cardiovascular: Positive for leg swelling. Negative for chest pain and palpitations.   Gastrointestinal: Negative.    Genitourinary: Positive for frequency and urinary incontinence. Negative for dysuria.   Musculoskeletal: Positive for arthralgias, back pain and gait problem.   Skin: Positive for rash.   Neurological: Positive for dizziness, tremors, weakness, " light-headedness and memory problem.        Tremor and dizziness has improved       Objective      Home blood pressure this morning 128/80 using home blood pressure cuff.    Physical Exam   Constitutional: She is oriented to person, place, and time.   Pulmonary/Chest:   No conversational dyspnea  No cough during encounter   Neurological: She is alert and oriented to person, place, and time.   Psychiatric: She has a normal mood and affect. Thought content normal.       Lab Results   Component Value Date    BUN 26 (H) 04/25/2020    CREATININE 1.46 (H) 04/25/2020    EGFRIFNONA 35 (L) 04/25/2020     04/25/2020    K 3.7 04/25/2020     04/25/2020    CALCIUM 8.5 (L) 04/25/2020    CKTOTAL 34 04/24/2020    WBC 5.30 04/25/2020    RBC 3.67 (L) 04/25/2020    HCT 32.8 (L) 04/25/2020    MCV 89.5 04/25/2020    MCH 28.9 04/25/2020    TSH 1.160 04/25/2020    INR 0.92 04/24/2020      Lab Results   Component Value Date    HGBA1C 5.8 (H) 11/05/2019        Procedures     Assessment/Plan   Diagnoses and all orders for this visit:    1. Hospital discharge follow-up (Primary)    2. Encounter for support and coordination of transition of care    3. Adverse effect of drug, initial encounter    4. Swelling of lower extremity    5. Chronic renal disease, stage III (CMS/[facility])    6. Chronic low back pain with bilateral sciatica, unspecified back pain laterality    7. Essential hypertension    8. Mobility poor    9. Other spondylosis with radiculopathy, lumbar region    10. Spinal stenosis of lumbar region with neurogenic claudication    11. Morbid obesity with BMI of 45.0-49.9, adult (CMS/[facility])      Continue medications per updated list, need to prop legs above level of heart at least 15 minutes 2 or 3 times daily to reduce edema.    Lumbar stenosis with difficulty with ambulation advised patient that wheelchair evaluations need to be face-to-face, she may have already had physical therapy evaluation if not I will refer to PT for  evaluation she is to bring in the paperwork and I will determine the next step in ordering the needed equipment.   There are no discontinued medications.       Return in about 1 week (around 5/13/2020).        AVS available on [employer]

## 2020-05-08 DIAGNOSIS — I10 ESSENTIAL HYPERTENSION: ICD-10-CM

## 2020-05-08 RX ORDER — METOPROLOL SUCCINATE 200 MG/1
TABLET, EXTENDED RELEASE ORAL
Qty: 30 TABLET | Refills: 5 | OUTPATIENT
Start: 2020-05-08

## 2020-05-13 RX ORDER — ALLOPURINOL 100 MG/1
TABLET ORAL
Qty: 180 TABLET | Refills: 1 | Status: SHIPPED | OUTPATIENT
Start: 2020-05-13 | End: 2020-08-27 | Stop reason: SDUPTHER

## 2020-05-18 DIAGNOSIS — I10 ESSENTIAL HYPERTENSION: ICD-10-CM

## 2020-05-18 DIAGNOSIS — G25.0 TREMOR, ESSENTIAL: ICD-10-CM

## 2020-05-18 RX ORDER — PROPRANOLOL HYDROCHLORIDE 60 MG/1
TABLET ORAL
Qty: 90 TABLET | Refills: 1 | Status: SHIPPED | OUTPATIENT
Start: 2020-05-18 | End: 2020-09-22 | Stop reason: SDUPTHER

## 2020-05-22 RX ORDER — TOLTERODINE 2 MG/1
CAPSULE, EXTENDED RELEASE ORAL
Qty: 30 CAPSULE | Refills: 5 | Status: SHIPPED | OUTPATIENT
Start: 2020-05-22 | End: 2020-09-22 | Stop reason: ALTCHOICE

## 2020-05-29 ENCOUNTER — OFFICE VISIT (OUTPATIENT)
Dept: FAMILY MEDICINE CLINIC | Facility: CLINIC | Age: 70
End: 2020-05-29

## 2020-05-29 VITALS
DIASTOLIC BLOOD PRESSURE: 90 MMHG | TEMPERATURE: 98 F | SYSTOLIC BLOOD PRESSURE: 125 MMHG | WEIGHT: 261 LBS | OXYGEN SATURATION: 95 % | HEART RATE: 66 BPM | HEIGHT: 62 IN | BODY MASS INDEX: 48.03 KG/M2

## 2020-05-29 DIAGNOSIS — R20.0 NUMBNESS AND TINGLING SENSATION OF SKIN: ICD-10-CM

## 2020-05-29 DIAGNOSIS — B37.9 CANDIDIASIS: ICD-10-CM

## 2020-05-29 DIAGNOSIS — M54.41 CHRONIC LOW BACK PAIN WITH BILATERAL SCIATICA, UNSPECIFIED BACK PAIN LATERALITY: ICD-10-CM

## 2020-05-29 DIAGNOSIS — R73.02 IMPAIRED GLUCOSE TOLERANCE: ICD-10-CM

## 2020-05-29 DIAGNOSIS — M54.42 CHRONIC LOW BACK PAIN WITH BILATERAL SCIATICA, UNSPECIFIED BACK PAIN LATERALITY: ICD-10-CM

## 2020-05-29 DIAGNOSIS — M47.26 OTHER SPONDYLOSIS WITH RADICULOPATHY, LUMBAR REGION: Primary | ICD-10-CM

## 2020-05-29 DIAGNOSIS — R26.89 LOSS OF BALANCE: ICD-10-CM

## 2020-05-29 DIAGNOSIS — E55.9 VITAMIN D DEFICIENCY: ICD-10-CM

## 2020-05-29 DIAGNOSIS — R29.6 FREQUENT FALLS: ICD-10-CM

## 2020-05-29 DIAGNOSIS — M79.604 LEG PAIN, BILATERAL: ICD-10-CM

## 2020-05-29 DIAGNOSIS — Z13.220 SCREENING FOR CHOLESTEROL LEVEL: ICD-10-CM

## 2020-05-29 DIAGNOSIS — R20.2 NUMBNESS AND TINGLING SENSATION OF SKIN: ICD-10-CM

## 2020-05-29 DIAGNOSIS — G89.29 CHRONIC LOW BACK PAIN WITH BILATERAL SCIATICA, UNSPECIFIED BACK PAIN LATERALITY: ICD-10-CM

## 2020-05-29 DIAGNOSIS — M79.605 LEG PAIN, BILATERAL: ICD-10-CM

## 2020-05-29 DIAGNOSIS — M48.062 SPINAL STENOSIS OF LUMBAR REGION WITH NEUROGENIC CLAUDICATION: ICD-10-CM

## 2020-05-29 PROBLEM — G47.09 OTHER INSOMNIA: Status: ACTIVE | Noted: 2020-05-29

## 2020-05-29 PROCEDURE — 99214 OFFICE O/P EST MOD 30 MIN: CPT | Performed by: FAMILY MEDICINE

## 2020-05-29 RX ORDER — HYDROCODONE BITARTRATE AND ACETAMINOPHEN 10; 325 MG/1; MG/1
1 TABLET ORAL EVERY 6 HOURS PRN
COMMUNITY
Start: 2020-05-20

## 2020-05-29 RX ORDER — FLUCONAZOLE 150 MG/1
150 TABLET ORAL WEEKLY
Qty: 2 TABLET | Refills: 0 | Status: SHIPPED | OUTPATIENT
Start: 2020-05-29 | End: 2020-12-09

## 2020-05-29 RX ORDER — SYRINGE WITH NEEDLE, 1 ML 25GX5/8"
SYRINGE, EMPTY DISPOSABLE MISCELLANEOUS
COMMUNITY
Start: 2020-05-21 | End: 2021-03-19

## 2020-06-02 LAB
25(OH)D3+25(OH)D2 SERPL-MCNC: 65.9 NG/ML (ref 30–100)
ALBUMIN SERPL-MCNC: 3.7 G/DL (ref 3.7–4.7)
ALBUMIN/GLOB SERPL: 1.8 {RATIO} (ref 1.2–2.2)
ALP SERPL-CCNC: 111 IU/L (ref 39–117)
ALT SERPL-CCNC: 9 IU/L (ref 0–32)
AST SERPL-CCNC: 13 IU/L (ref 0–40)
BASOPHILS # BLD AUTO: 0 X10E3/UL (ref 0–0.2)
BASOPHILS NFR BLD AUTO: 1 %
BILIRUB SERPL-MCNC: 0.3 MG/DL (ref 0–1.2)
BUN SERPL-MCNC: 18 MG/DL (ref 8–27)
BUN/CREAT SERPL: 14 (ref 12–28)
CALCIUM SERPL-MCNC: 9.1 MG/DL (ref 8.7–10.3)
CHLORIDE SERPL-SCNC: 100 MMOL/L (ref 96–106)
CHOLEST SERPL-MCNC: 167 MG/DL (ref 100–199)
CO2 SERPL-SCNC: 25 MMOL/L (ref 20–29)
CREAT SERPL-MCNC: 1.26 MG/DL (ref 0.57–1)
EOSINOPHIL # BLD AUTO: 0.3 X10E3/UL (ref 0–0.4)
EOSINOPHIL NFR BLD AUTO: 4 %
ERYTHROCYTE [DISTWIDTH] IN BLOOD BY AUTOMATED COUNT: 14.6 % (ref 11.7–15.4)
GLOBULIN SER CALC-MCNC: 2.1 G/DL (ref 1.5–4.5)
GLUCOSE SERPL-MCNC: 94 MG/DL (ref 65–99)
HBA1C MFR BLD: 5.8 % (ref 4.8–5.6)
HCT VFR BLD AUTO: 36.5 % (ref 34–46.6)
HDLC SERPL-MCNC: 38 MG/DL
HGB BLD-MCNC: 11.6 G/DL (ref 11.1–15.9)
IMM GRANULOCYTES # BLD AUTO: 0 X10E3/UL (ref 0–0.1)
IMM GRANULOCYTES NFR BLD AUTO: 0 %
LDLC SERPL CALC-MCNC: 95 MG/DL (ref 0–99)
LYMPHOCYTES # BLD AUTO: 1.3 X10E3/UL (ref 0.7–3.1)
LYMPHOCYTES NFR BLD AUTO: 17 %
MCH RBC QN AUTO: 28.6 PG (ref 26.6–33)
MCHC RBC AUTO-ENTMCNC: 31.8 G/DL (ref 31.5–35.7)
MCV RBC AUTO: 90 FL (ref 79–97)
MONOCYTES # BLD AUTO: 0.6 X10E3/UL (ref 0.1–0.9)
MONOCYTES NFR BLD AUTO: 8 %
NEUTROPHILS # BLD AUTO: 5.1 X10E3/UL (ref 1.4–7)
NEUTROPHILS NFR BLD AUTO: 70 %
PLATELET # BLD AUTO: 266 X10E3/UL (ref 150–450)
POTASSIUM SERPL-SCNC: 3.5 MMOL/L (ref 3.5–5.2)
PROT SERPL-MCNC: 5.8 G/DL (ref 6–8.5)
RBC # BLD AUTO: 4.06 X10E6/UL (ref 3.77–5.28)
SODIUM SERPL-SCNC: 142 MMOL/L (ref 134–144)
TRIGL SERPL-MCNC: 168 MG/DL (ref 0–149)
VLDLC SERPL CALC-MCNC: 34 MG/DL (ref 5–40)
WBC # BLD AUTO: 7.4 X10E3/UL (ref 3.4–10.8)

## 2020-06-05 ENCOUNTER — CLINICAL SUPPORT NO REQUIREMENTS (OUTPATIENT)
Dept: CARDIOLOGY | Facility: CLINIC | Age: 70
End: 2020-06-05

## 2020-06-05 DIAGNOSIS — I49.5 SICK SINUS SYNDROME (HCC): ICD-10-CM

## 2020-06-05 DIAGNOSIS — Z95.0 PRESENCE OF CARDIAC PACEMAKER: Primary | ICD-10-CM

## 2020-06-05 PROCEDURE — 93280 PM DEVICE PROGR EVAL DUAL: CPT | Performed by: INTERNAL MEDICINE

## 2020-06-07 NOTE — PROGRESS NOTES
Please inform patient renal function has improved a little.  A1c is same as 7 months ago just into the prediabetes range.  Lipid panel is at goal, vitamin D and CBC are normal.

## 2020-06-15 ENCOUNTER — TELEPHONE (OUTPATIENT)
Dept: FAMILY MEDICINE CLINIC | Facility: CLINIC | Age: 70
End: 2020-06-15

## 2020-06-15 NOTE — TELEPHONE ENCOUNTER
Please inform patient that with cough and runny nose, testing for corona is not a bad idea.  If she is having vomiting for 4 days and Zofran is not helping I would additionally recommend she be evaluated for that.  I would recommend she go to urgent care at [facility] where they can evaluate her for both issues and do the corona testing.

## 2020-06-15 NOTE — TELEPHONE ENCOUNTER
Patient calling wanting to know if there is anything else she can take for vomiting, she has been for 4 days, zofran not helping. Patient is weak and has a runny nose and cough. Patient states her family thinks she needs to be tested for corona. Pt has not had any known contact of being around anyone who has tested positive for corona. Please advise.

## 2020-06-17 DIAGNOSIS — F33.2 SEVERE EPISODE OF RECURRENT MAJOR DEPRESSIVE DISORDER, WITHOUT PSYCHOTIC FEATURES (HCC): ICD-10-CM

## 2020-06-17 RX ORDER — TRAZODONE HYDROCHLORIDE 50 MG/1
TABLET ORAL
Qty: 90 TABLET | Refills: 0 | Status: SHIPPED | OUTPATIENT
Start: 2020-06-17 | End: 2020-08-27

## 2020-06-27 NOTE — TELEPHONE ENCOUNTER
I have signed the note, I have referred patient to physical therapy for mobility needs evaluation, is this who is asking for my records? If so please send.   If it is [employer] or [employer] requesting the record, after discussion patient with patient at this office visit she agreed to do more in-depth evaluation through therapy to make sure she got a wheelchair with exactly the configuration/specific attachment that she would need.

## 2020-07-03 RX ORDER — BUSPIRONE HYDROCHLORIDE 10 MG/1
TABLET ORAL
Qty: 60 TABLET | Refills: 6 | Status: SHIPPED | OUTPATIENT
Start: 2020-07-03 | End: 2021-01-14

## 2020-07-06 ENCOUNTER — TELEPHONE (OUTPATIENT)
Dept: FAMILY MEDICINE CLINIC | Facility: CLINIC | Age: 70
End: 2020-07-06

## 2020-07-06 NOTE — TELEPHONE ENCOUNTER
Please handwrite order with needed information and include spinal stenosis with neurogenic claudication, frequent falls and any other needed diagnosis.  Patient does have appointment today

## 2020-07-06 NOTE — TELEPHONE ENCOUNTER
Received letter from medicaid , needing script for a bedside commode with her diagnosis, height and weight to be faxed to [phone]

## 2020-08-04 ENCOUNTER — OFFICE VISIT (OUTPATIENT)
Dept: FAMILY MEDICINE CLINIC | Facility: CLINIC | Age: 70
End: 2020-08-04

## 2020-08-04 VITALS
HEART RATE: 62 BPM | WEIGHT: 262 LBS | TEMPERATURE: 98 F | OXYGEN SATURATION: 96 % | HEIGHT: 62 IN | BODY MASS INDEX: 48.21 KG/M2 | SYSTOLIC BLOOD PRESSURE: 132 MMHG | DIASTOLIC BLOOD PRESSURE: 83 MMHG

## 2020-08-04 DIAGNOSIS — R73.9 HYPERGLYCEMIA: ICD-10-CM

## 2020-08-04 DIAGNOSIS — N18.4 RENAL FAILURE, CHRONIC, STAGE 4 (SEVERE) (HCC): ICD-10-CM

## 2020-08-04 DIAGNOSIS — R29.6 FREQUENT FALLS: ICD-10-CM

## 2020-08-04 DIAGNOSIS — G89.29 CHRONIC LOW BACK PAIN WITH BILATERAL SCIATICA, UNSPECIFIED BACK PAIN LATERALITY: ICD-10-CM

## 2020-08-04 DIAGNOSIS — Z78.0 MENOPAUSE: ICD-10-CM

## 2020-08-04 DIAGNOSIS — E66.01 MORBID OBESITY WITH BMI OF 45.0-49.9, ADULT (HCC): ICD-10-CM

## 2020-08-04 DIAGNOSIS — M48.062 SPINAL STENOSIS OF LUMBAR REGION WITH NEUROGENIC CLAUDICATION: Primary | ICD-10-CM

## 2020-08-04 DIAGNOSIS — M25.551 PAIN OF RIGHT HIP JOINT: ICD-10-CM

## 2020-08-04 DIAGNOSIS — M54.41 CHRONIC LOW BACK PAIN WITH BILATERAL SCIATICA, UNSPECIFIED BACK PAIN LATERALITY: ICD-10-CM

## 2020-08-04 DIAGNOSIS — Z74.09 MOBILITY POOR: ICD-10-CM

## 2020-08-04 DIAGNOSIS — Z12.31 ENCOUNTER FOR SCREENING MAMMOGRAM FOR BREAST CANCER: ICD-10-CM

## 2020-08-04 DIAGNOSIS — M79.605 LEG PAIN, BILATERAL: ICD-10-CM

## 2020-08-04 DIAGNOSIS — R26.89 LOSS OF BALANCE: ICD-10-CM

## 2020-08-04 DIAGNOSIS — M47.26 OTHER SPONDYLOSIS WITH RADICULOPATHY, LUMBAR REGION: ICD-10-CM

## 2020-08-04 DIAGNOSIS — M54.42 CHRONIC LOW BACK PAIN WITH BILATERAL SCIATICA, UNSPECIFIED BACK PAIN LATERALITY: ICD-10-CM

## 2020-08-04 DIAGNOSIS — M51.36 DEGENERATION OF LUMBAR INTERVERTEBRAL DISC: ICD-10-CM

## 2020-08-04 DIAGNOSIS — M79.604 LEG PAIN, BILATERAL: ICD-10-CM

## 2020-08-04 PROCEDURE — 99214 OFFICE O/P EST MOD 30 MIN: CPT | Performed by: FAMILY MEDICINE

## 2020-08-04 NOTE — PATIENT INSTRUCTIONS
Preventing Type 2 Diabetes Mellitus  Type 2 diabetes (type 2 diabetes mellitus) is a long-term (chronic) disease that affects blood sugar (glucose) levels. Normally, a hormone called insulin allows glucose to enter cells in the body. The cells use glucose for energy. In type 2 diabetes, one or both of these problems may be present:  · The body does not make enough insulin.  · The body does not respond properly to insulin that it makes (insulin resistance).  Insulin resistance or lack of insulin causes excess glucose to build up in the blood instead of going into cells. As a result, high blood glucose (hyperglycemia) develops, which can cause many complications. Being overweight or obese and having an inactive (sedentary) lifestyle can increase your risk for diabetes. Type 2 diabetes can be delayed or prevented by making certain nutrition and lifestyle changes.  What nutrition changes can be made?    · Eat healthy meals and snacks regularly. Keep a healthy snack with you for when you get hungry between meals, such as fruit or a handful of nuts.  · Eat lean meats and proteins that are low in saturated fats, such as chicken, fish, egg whites, and beans. Avoid processed meats.  · Eat plenty of fruits and vegetables and plenty of grains that have not been processed (whole grains). It is recommended that you eat:  ? 1½?2 cups of fruit every day.  ? 2½?3 cups of vegetables every day.  ? 6?8 oz of whole grains every day, such as oats, whole wheat, bulgur, brown rice, quinoa, and millet.  · Eat low-fat dairy products, such as milk, yogurt, and cheese.  · Eat foods that contain healthy fats, such as nuts, avocado, olive oil, and canola oil.  · Drink water throughout the day. Avoid drinks that contain added sugar, such as soda or sweet tea.  · Follow instructions from your health care provider about specific eating or drinking restrictions.  · Control how much food you eat at a time (portion size).  ? Check food labels to find  out the serving sizes of foods.  ? Use a kitchen scale to weigh amounts of foods.  · Saute or steam food instead of frying it. Cook with water or broth instead of oils or butter.  · Limit your intake of:  ? Salt (sodium). Have no more than 1 tsp (2,400 mg) of sodium a day. If you have heart disease or high blood pressure, have less than ½?¾ tsp (1,500 mg) of sodium a day.  ? Saturated fat. This is fat that is solid at room temperature, such as butter or fat on meat.  What lifestyle changes can be made?  Activity    · Do moderate-intensity physical activity for at least 30 minutes on at least 5 days of the week, or as much as told by your health care provider.  · Ask your health care provider what activities are safe for you. A mix of physical activities may be best, such as walking, swimming, cycling, and strength training.  · Try to add physical activity into your day. For example:  ? Park in spots that are farther away than usual, so that you walk more. For example, park in a far corner of the parking lot when you go to the office or the grocery store.  ? Take a walk during your lunch break.  ? Use stairs instead of elevators or escalators.  Weight Loss  · Lose weight as directed. Your health care provider can determine how much weight loss is best for you and can help you lose weight safely.  · If you are overweight or obese, you may be instructed to lose at least 5?7 % of your body weight.  Alcohol and Tobacco    · Limit alcohol intake to no more than 1 drink a day for nonpregnant women and 2 drinks a day for men. One drink equals 12 oz of beer, 5 oz of wine, or 1½ oz of hard liquor.  · Do not use any tobacco products, such as cigarettes, chewing tobacco, and e-cigarettes. If you need help quitting, ask your health care provider.  Work With Your Health Care Provider  · Have your blood glucose tested regularly, as told by your health care provider.  · Discuss your risk factors and how you can reduce your risk for  diabetes.  · Get screening tests as told by your health care provider. You may have screening tests regularly, especially if you have certain risk factors for type 2 diabetes.  · Make an appointment with a diet and nutrition specialist (registered dietitian). A registered dietitian can help you make a healthy eating plan and can help you understand portion sizes and food labels.  Why are these changes important?  · It is possible to prevent or delay type 2 diabetes and related health problems by making lifestyle and nutrition changes.  · It can be difficult to recognize signs of type 2 diabetes. The best way to avoid possible damage to your body is to take actions to prevent the disease before you develop symptoms.  What can happen if changes are not made?  · Your blood glucose levels may keep increasing. Having high blood glucose for a long time is dangerous. Too much glucose in your blood can damage your blood vessels, heart, kidneys, nerves, and eyes.  · You may develop prediabetes or type 2 diabetes. Type 2 diabetes can lead to many chronic health problems and complications, such as:  ? Heart disease.  ? Stroke.  ? Blindness.  ? Kidney disease.  ? Depression.  ? Poor circulation in the feet and legs, which could lead to surgical removal (amputation) in severe cases.  Where to find support  · Ask your health care provider to recommend a registered dietitian, diabetes educator, or weight loss program.  · Look for local or online weight loss groups.  · Join a gym, fitness club, or outdoor activity group, such as a walking club.  Where to find more information  To learn more about diabetes and diabetes prevention, visit:  · American Diabetes Association (ADA): www.diabetes.org  · National [place] of Diabetes and Digestive and Kidney Diseases: www.niddk.nih.gov/health-information/diabetes  To learn more about healthy eating, visit:  · The U.S. Department of Agriculture (USDA), Choose My Plate:  www.choosemyplate.gov/food-groups  · Office of Disease Prevention and Health Promotion (ODPHP), Dietary Guidelines: www.health.gov/dietaryguidelines  Summary  · You can reduce your risk for type 2 diabetes by increasing your physical activity, eating healthy foods, and losing weight as directed.  · Talk with your health care provider about your risk for type 2 diabetes. Ask about any blood tests or screening tests that you need to have.  This information is not intended to replace advice given to you by your health care provider. Make sure you discuss any questions you have with your health care provider.  Document Released: 04/10/2017 Document Revised: 04/10/2020 Document Reviewed: 02/07/2017  [employer] Patient Education © 2020 [employer]    Blood Glucose Monitoring, Adult  Monitoring your blood sugar (glucose) is an important part of managing your diabetes (diabetes mellitus). Blood glucose monitoring involves checking your blood glucose as often as directed and keeping a record (log) of your results over time.  Checking your blood glucose regularly and keeping a blood glucose log can:  · Help you and your health care provider adjust your diabetes management plan as needed, including your medicines or insulin.  · Help you understand how food, exercise, illnesses, and medicines affect your blood glucose.  · Let you know what your blood glucose is at any time. You can quickly find out if you have low blood glucose (hypoglycemia) or high blood glucose (hyperglycemia).  Your health care provider will set individualized treatment goals for you. Your goals will be based on your age, other medical conditions you have, and how you respond to diabetes treatment. Generally, the goal of treatment is to maintain the following blood glucose levels:  · Before meals (preprandial):  mg/dL (4.4-7.2 mmol/L).  · After meals (postprandial): below 180 mg/dL (10 mmol/L).  · A1c level: less than 7%.  Supplies needed:  · Blood  glucose meter.  · Test strips for your meter. Each meter has its own strips. You must use the strips that came with your meter.  · A needle to prick your finger (lancet). Do not use a lancet more than one time.  · A device that holds the lancet (lancing device).  · A journal or log book to write down your results.  How to check your blood glucose    1. Wash your hands with soap and water.  2. Prick the side of your finger (not the tip) with the lancet. Use a different finger each time.  3. Gently rub the finger until a small drop of blood appears.  4. Follow instructions that come with your meter for inserting the test strip, applying blood to the strip, and using your blood glucose meter.  5. Write down your result and any notes.  Some meters allow you to use areas of your body other than your finger (alternative sites) to test your blood. The most common alternative sites are:  · Forearm.  · Thigh.  · Palm of the hand.  If you think you may have hypoglycemia, or if you have a history of not knowing when your blood glucose is getting low (hypoglycemia unawareness), do not use alternative sites. Use your finger instead. Alternative sites may not be as accurate as the fingers, because blood flow is slower in these areas. This means that the result you get may be delayed, and it may be different from the result that you would get from your finger.  Follow these instructions at home:  Blood glucose log    · Every time you check your blood glucose, write down your result. Also write down any notes about things that may be affecting your blood glucose, such as your diet and exercise for the day. This information can help you and your health care provider:  ? Look for patterns in your blood glucose over time.  ? Adjust your diabetes management plan as needed.  · Check if your meter allows you to download your records to a computer. Most glucose meters store a record of glucose readings in the meter.  If you have type 1  "diabetes:  · Check your blood glucose 2 or more times a day.  · Also check your blood glucose:  ? Before every insulin injection.  ? Before and after exercise.  ? Before meals.  ? 2 hours after a meal.  ? Occasionally between 2:00 a.m. and 3:00 a.m., as directed.  ? Before potentially dangerous tasks, like driving or using heavy machinery.  ? At bedtime.  · You may need to check your blood glucose more often, up to 6-10 times a day, if you:  ? Use an insulin pump.  ? Need multiple daily injections (MDI).  ? Have diabetes that is not well-controlled.  ? Are ill.  ? Have a history of severe hypoglycemia.  ? Have hypoglycemia unawareness.  If you have type 2 diabetes:  · If you take insulin or other diabetes medicines, check your blood glucose 2 or more times a day.  · If you are on intensive insulin therapy, check your blood glucose 4 or more times a day. Occasionally, you may also need to check between 2:00 a.m. and 3:00 a.m., as directed.  · Also check your blood glucose:  ? Before and after exercise.  ? Before potentially dangerous tasks, like driving or using heavy machinery.  · You may need to check your blood glucose more often if:  ? Your medicine is being adjusted.  ? Your diabetes is not well-controlled.  ? You are ill.  General tips  · Always keep your supplies with you.  · If you have questions or need help, all blood glucose meters have a 24-hour \"hotline\" phone number that you can call. You may also contact your health care provider.  · After you use a few boxes of test strips, adjust (calibrate) your blood glucose meter by following instructions that came with your meter.  Contact a health care provider if:  · Your blood glucose is at or above 240 mg/dL (13.3 mmol/L) for 2 days in a row.  · You have been sick or have had a fever for 2 days or longer, and you are not getting better.  · You have any of the following problems for more than 6 hours:  ? You cannot eat or drink.  ? You have nausea or " vomiting.  ? You have diarrhea.  Get help right away if:  · Your blood glucose is lower than 54 mg/dL (3 mmol/L).  · You become confused or you have trouble thinking clearly.  · You have difficulty breathing.  · You have moderate or large ketone levels in your urine.  Summary  · Monitoring your blood sugar (glucose) is an important part of managing your diabetes (diabetes mellitus).  · Blood glucose monitoring involves checking your blood glucose as often as directed and keeping a record (log) of your results over time.  · Your health care provider will set individualized treatment goals for you. Your goals will be based on your age, other medical conditions you have, and how you respond to diabetes treatment.  · Every time you check your blood glucose, write down your result. Also write down any notes about things that may be affecting your blood glucose, such as your diet and exercise for the day.  This information is not intended to replace advice given to you by your health care provider. Make sure you discuss any questions you have with your health care provider.  Document Released: 12/20/2004 Document Revised: 10/11/2019 Document Reviewed: 05/29/2017  [employer] Patient Education © 2020 [employer]

## 2020-08-04 NOTE — PROGRESS NOTES
Chief Complaint   Patient presents with   • Hypertension   • electric wheelchair       Subjective     Elaine Toscano is a 69 y.o. female.  Patient is here to follow-up on blood pressure and check progress on obtaining electric wheelchair. Per physical therapy patient is needing a new face-to-face mobility exam for an electric wheelchair.  She just had PT evaluation for specific mobility needs on Friday at PT  At [facility], previous face-to-face evaluation is over 2 months old and needs to be within 30 days. She is working with Joaquin at [facility] to get the wheelchair who states old examination is adequate his concern for her is that she has reported to him that she has had sores on her bottom that she has been using a cream prescribed by me to treat them. She has been using nystain on sores and believes it has helped, do not believe have a current sore.  Her  at [facility] is Nabil[facility] Avalos.      Patient has significant difficulty with ambulation due to history of lumbar spinal stenosis with neurogenic claudication.  Patient has ongoing issues with severe back pain, unsteady gait, and recurrent falls.  She has tingling and numbness in her right much greater than in her left lower extremity.  She is currently using a walker in her home, she has a manual wheelchair but cannot propel herself due to weakness in her arms.  She states a scooter would be too big to maneuver within her home.  She does have lower extremity edema and does try to prop her legs several times daily.  She has had multiple falls, and has difficulty getting herself back up, have had to call ambulance at least 4 times this year.  She does have a history of back surgeries in 1986.  She is currently seeing pain Doctor at [facility] she has had another series of 3 lumbar epidural steroid injections next scheduled in September and Dr Saba also recently increased Norco from 7.5 mg 3 times daily to 10 mg 4  "times daily to help control her pain. Does help better but recent rain has aggravated pain across bottom of back .     Additionally, she brings in some home blood pressure glucose readings.  A.m. glucose ranging from 102-239.  She does not have history of diabetes, A1c 2 months ago was at low end of prediabetes range.  She is not on any oral steroids last steroid injection was around June 11.     Home blood pressures range from 97//79.  She denies feeling lightheaded, dizzy, syncopal when blood pressures are low she does not believe this has in any way contributed to her falls.  She does state when her blood pressure is low she will eat something and it always responds and improves above 110.    The following portions of the patient's history were reviewed and updated as appropriate: allergies, current medications, past family history, past medical history, past social history, past surgical history and problem list.    Current Outpatient Medications on File Prior to Visit   Medication Sig   • alendronate (FOSAMAX) 70 MG tablet TAKE ONE TABLET BY MOUTH every WEEK   • allopurinol (ZYLOPRIM) 100 MG tablet TAKE TWO TABLETS BY MOUTH AT BEDTIME   • aspirin (ASPIR) 81 MG EC tablet Take 81 mg by mouth Daily.   • B-D 3CC LUER-CHRIS SYR 25GX1\" 25G X 1\" 3 ML misc USE AS DIRECTED EVERY 30 DAYS   • budesonide-formoterol (SYMBICORT) 160-4.5 MCG/ACT inhaler Inhale 2 puffs 2 (Two) Times a Day.   • busPIRone (BUSPAR) 10 MG tablet Take 1 tablet by mouth 2 (Two) Times a Day.   • calcium carb-cholecalciferol (CALCIUM PLUS VITAMIN D3) 600-800 MG-UNIT tablet Take 600 mg by mouth Daily.   • cyanocobalamin 1000 MCG/ML injection inject ONE ML INTRAMUSCULARLY every MONTH   • desvenlafaxine (PRISTIQ) 50 MG 24 hr tablet TAKE ONE TABLET BY MOUTH EVERY MORNING   • dicyclomine (BENTYL) 10 MG capsule Take 10 mg by mouth 4 (Four) Times a Day.   • fluconazole (Diflucan) 150 MG tablet Take 1 tablet by mouth 1 (One) Time Per Week.   • furosemide " "(LASIX) 40 MG tablet TAKE ONE TABLET BY MOUTH TWICE DAILY   • HYDROcodone-acetaminophen (NORCO)  MG per tablet Take 1 oral tablet 4 times a day as needed for pain. dose increase, d/c previous dose   • linaclotide (LINZESS) 145 MCG capsule capsule Take 1 capsule by mouth Daily As Needed.   • metoclopramide (REGLAN) 5 MG tablet Take 5 mg by mouth 4 (Four) Times a Day.   • Needle, Disp, 24G X 1\" misc 1 each Every 30 (Thirty) Days.   • omeprazole (priLOSEC) 40 MG capsule Take 40 mg by mouth 2 (Two) Times a Day.   • ondansetron (ZOFRAN) 4 MG tablet Take 1 tablet by mouth As Needed for Nausea or Vomiting.   • potassium chloride (K-DUR) 10 MEQ CR tablet Take 10 mEq by mouth Daily.   • propranolol (INDERAL) 60 MG tablet TAKE ONE TABLET BY MOUTH THREE TIMES DAILY   • sucralfate (CARAFATE) 1 g tablet Take 1 g by mouth 4 (Four) Times a Day.   • Syringe, Disposable, (SYRINGE 2-3 ML) 3 ML misc 1 each Every 30 (Thirty) Days.   • tolterodine LA (DETROL LA) 2 MG 24 hr capsule TAKE ONE CAPSULE BY MOUTH EVERY DAY   • traZODone (DESYREL) 50 MG tablet TAKE ONE TABLET BY MOUTH AT night AS NEEDED FOR SLEEP   • vitamin D (ERGOCALCIFEROL) 1.25 MG (97892 UT) capsule capsule TAKE ONE CAPSULE BY MOUTH one time PER WEEK     No current facility-administered medications on file prior to visit.      There are no discontinued medications.    Review of Systems   Constitutional: Positive for fatigue. Negative for fever.   Respiratory: Positive for shortness of breath ( With exertion). Negative for cough.    Cardiovascular: Positive for leg swelling. Negative for chest pain and palpitations.   Gastrointestinal: Negative.    Genitourinary: Positive for frequency and urinary incontinence. Negative for dysuria.   Musculoskeletal: Positive for arthralgias, back pain and gait problem.   Skin: Positive for rash.   Neurological: Positive for dizziness, tremors, weakness, light-headedness and memory problem. Negative for seizures and syncope.      " "  Objective   Vitals:    08/04/20 1507   BP: 132/83   BP Location: Left arm   Patient Position: Sitting   Cuff Size: Adult   Pulse: 62   Temp: 98 °F (36.7 °C)   TempSrc: Infrared   SpO2: 96%   Weight: 119 kg (262 lb)   Height: 157.5 cm (62\")      Body mass index is 47.92 kg/m².    Physical Exam   Constitutional: She is oriented to person, place, and time. She appears well-developed and well-nourished. No distress.   HENT:   Head: Normocephalic and atraumatic.   Cardiovascular: Normal rate and regular rhythm.   No murmur heard.  Pulmonary/Chest: Effort normal and breath sounds normal. She has no wheezes.   Musculoskeletal: She exhibits edema ( 1+ LE bilaterlly).    Limited ambulation appears antalgic, somewhat forward bending and waddling, is very slow and unsteady.  There is tenderness to palpation of the entire lower thoracic and lumbar spine and paraspinous muscles.  There is 4 of 5+ strength in lower extremities bilaterally, slightly reduced sensation in the feet.   Neurological: She is alert and oriented to person, place, and time.   Skin: Skin is warm and dry.   There are no active pressure ulcers or rashes in her gluteal fold or buttocks   Psychiatric: She has a normal mood and affect.   Nursing note and vitals reviewed.      Lab Results   Component Value Date    GLU 94 06/01/2020    BUN 18 06/01/2020    CREATININE 1.26 (H) 06/01/2020    EGFRIFNONA 43 (L) 06/01/2020    EGFRIFAFRI 50 (L) 06/01/2020     06/01/2020    K 3.5 06/01/2020     06/01/2020    CALCIUM 9.1 06/01/2020    ALBUMIN 3.7 06/01/2020    BILITOT 0.3 06/01/2020    ALKPHOS 111 06/01/2020    AST 13 06/01/2020    ALT 9 06/01/2020    CHLPL 167 06/01/2020    TRIG 168 (H) 06/01/2020    HDL 38 (L) 06/01/2020    VLDL 34 06/01/2020    LDL 95 06/01/2020    WBC 7.4 06/01/2020    RBC 4.06 06/01/2020    HCT 36.5 06/01/2020    MCV 90 06/01/2020    MCH 28.6 06/01/2020    ARNA98KT 65.9 06/01/2020      Lab Results   Component Value Date    HGBA1C 5.8 " (H) 06/01/2020    HGBA1C 5.8 (H) 11/05/2019      CT lumbar spine dated 8/6/2019 ordered by Dr. Harjit Saleh shows:   posterior fixation of L5-S1 with no evidence of hardware failure  Procedures     Assessment/Plan   Diagnoses and all orders for this visit:    1. Spinal stenosis of lumbar region with neurogenic claudication (Primary)    2. Chronic low back pain with bilateral sciatica, unspecified back pain laterality    3. Morbid obesity with BMI of 45.0-49.9, adult (CMS/[facility])    4. Other spondylosis with radiculopathy, lumbar region    5. Pain of right hip joint    6. Leg pain, bilateral    7. Degeneration of lumbar intervertebral disc    8. Loss of balance    9. Mobility poor    10. Frequent falls    11. Encounter for screening mammogram for breast cancer  -     Mammo Screening Digital Tomosynthesis Bilateral With CAD; Future    12. Menopause  -     DEXA Bone Density Axial; Future    13. Hyperglycemia  -     Hemoglobin A1c; Future    14. Renal failure, chronic, stage 4 (severe) (CMS/[facility])  -     Comprehensive Metabolic Panel; Future          There are no discontinued medications.  She has had  mobility device evaluation with physical therapy on Friday, their assessment has not been completed anticipate it should contain specifics for wheelchair needs, hopefully she can obtain her device soon.    History of pressure ulcers without any active lesions would benefit from cushioning in her chair.    Recent hyperglycemia with slightly elevated A1c recheck in 1 month giving information on how to prevent diabetes and encouraged a low concentrated sweets diet.    Hypertension at goal do not want to reduce medication at this time, but if any evidence that this could be contributing to her unsteadiness or falls should consider reduction.    Preventative health care, patient is due for mammogram ordering this along with a DEXA bone scan.     Return in about 4 weeks (around 9/1/2020) for Recheck hyperglycemia, renal  impairment.        AVS given

## 2020-08-12 ENCOUNTER — TELEPHONE (OUTPATIENT)
Dept: FAMILY MEDICINE CLINIC | Facility: CLINIC | Age: 70
End: 2020-08-12

## 2020-08-12 NOTE — TELEPHONE ENCOUNTER
Please write order as requested for bathroom modification for walk-in shower and use diagnosis codes M 51.36, Z 70 4.09, R 26.89, M 47.26, E 66.01, R 29.6, and I 50.9.  Thank you

## 2020-08-12 NOTE — TELEPHONE ENCOUNTER
Received fax from Nabila Nina, patients  and she is needing a script that says bathroom modification along with her diagnosis to be able to get her a walk in shower

## 2020-08-21 RX ORDER — FUROSEMIDE 40 MG/1
TABLET ORAL
Qty: 180 TABLET | Refills: 0 | OUTPATIENT
Start: 2020-08-21

## 2020-08-27 DIAGNOSIS — F33.2 SEVERE EPISODE OF RECURRENT MAJOR DEPRESSIVE DISORDER, WITHOUT PSYCHOTIC FEATURES (HCC): ICD-10-CM

## 2020-08-27 RX ORDER — TRAZODONE HYDROCHLORIDE 50 MG/1
TABLET ORAL
Qty: 90 TABLET | Refills: 0 | Status: SHIPPED | OUTPATIENT
Start: 2020-08-27 | End: 2020-09-11

## 2020-08-27 RX ORDER — ALLOPURINOL 100 MG/1
TABLET ORAL
Qty: 180 TABLET | Refills: 1 | Status: SHIPPED | OUTPATIENT
Start: 2020-08-27 | End: 2020-11-09 | Stop reason: SDUPTHER

## 2020-08-28 ENCOUNTER — TELEPHONE (OUTPATIENT)
Dept: FAMILY MEDICINE CLINIC | Facility: CLINIC | Age: 70
End: 2020-08-28

## 2020-08-28 NOTE — TELEPHONE ENCOUNTER
FYI:  Nurse from [facility] ([phone]) only wanted to let us know that Dr. Saba (pain management physician) is monitoring patients pain and prescribing pain meds as appropriate.  Patient has come up short on pain meds when should have had enough to last her.  Again, Dr. Saba will handle but wanted you to be aware.

## 2020-08-31 ENCOUNTER — RESULTS ENCOUNTER (OUTPATIENT)
Dept: FAMILY MEDICINE CLINIC | Facility: CLINIC | Age: 70
End: 2020-08-31

## 2020-08-31 DIAGNOSIS — N18.4 RENAL FAILURE, CHRONIC, STAGE 4 (SEVERE) (HCC): ICD-10-CM

## 2020-08-31 DIAGNOSIS — R73.9 HYPERGLYCEMIA: ICD-10-CM

## 2020-09-01 ENCOUNTER — OFFICE VISIT (OUTPATIENT)
Dept: FAMILY MEDICINE CLINIC | Facility: CLINIC | Age: 70
End: 2020-09-01

## 2020-09-01 VITALS
WEIGHT: 263 LBS | HEART RATE: 74 BPM | SYSTOLIC BLOOD PRESSURE: 120 MMHG | HEIGHT: 62 IN | TEMPERATURE: 97.3 F | BODY MASS INDEX: 48.4 KG/M2 | DIASTOLIC BLOOD PRESSURE: 80 MMHG | OXYGEN SATURATION: 94 %

## 2020-09-01 DIAGNOSIS — F33.2 SEVERE EPISODE OF RECURRENT MAJOR DEPRESSIVE DISORDER, WITHOUT PSYCHOTIC FEATURES (HCC): Primary | ICD-10-CM

## 2020-09-01 DIAGNOSIS — I10 ESSENTIAL HYPERTENSION: ICD-10-CM

## 2020-09-01 DIAGNOSIS — R73.9 HYPERGLYCEMIA: ICD-10-CM

## 2020-09-01 DIAGNOSIS — N18.30 CHRONIC RENAL DISEASE, STAGE III (HCC): ICD-10-CM

## 2020-09-01 PROCEDURE — 99214 OFFICE O/P EST MOD 30 MIN: CPT | Performed by: FAMILY MEDICINE

## 2020-09-01 RX ORDER — CEFDINIR 300 MG/1
1 CAPSULE ORAL EVERY 12 HOURS
COMMUNITY
End: 2020-10-21

## 2020-09-01 RX ORDER — QUETIAPINE FUMARATE 50 MG/1
50 TABLET, FILM COATED ORAL NIGHTLY
Qty: 30 TABLET | Refills: 0 | Status: SHIPPED | OUTPATIENT
Start: 2020-09-01 | End: 2020-09-22 | Stop reason: SDUPTHER

## 2020-09-01 NOTE — PROGRESS NOTES
"Chief Complaint   Patient presents with   • hyperglycmia       Subjective     Elaine Toscano is a 69 y.o. female. Patient here today to follow up on her hyperglycemia and renal function, patient has not done blood work yet. Patient is not fasting, she states has checked glucose at home and this morning it was 121. Patient would also like to discuss her \"bipolar mediation\", Pristiq, states she doesn't feel like it's helping and didn't know if the dosage could be increased or if you could change to something else.  She states recently her moods are not as good. Feel depressed, Down a lot, every one is against me, feel like giving up, no suicidal thoughts. Father  3 years ago. Feel like step mother sister and brother are pushing her to the side. Lost best friend this summer to cancer. Praful is a good friend but gets on nerves, do not have anyone else to talk to. Have thought about counsiling but did not like group sessions.     Patient reports history of bipolar mood disorder had seen a psychiatrist for 7 years at [facility] got tired of going and just quit going.  Could not find any records regarding diagnosis of bipolar mood disorder or other mood disorder other than depression.  Reviewing Dr. Cardenas's notes I did find 1 note asking her to contact [facility] psych to ask about Topamax due to garbled words.  Patient herself despite saying she went to counseling for years with a diagnosis of bipolar mood disorder states she has not been on any mood stabilizers.  Specifically asked about lithium, Seroquel, Zyprexa, Abilify, Depakote, Lamictal, or Vrylar and she does not recognize any of these medications.  Note indicates she did take Topamax for several years for help with sleep and headaches.    The following portions of the patient's history were reviewed and updated as appropriate: allergies, current medications, past family history, past medical history, past social history, past surgical history and " "problem list.    Current Outpatient Medications on File Prior to Visit   Medication Sig   • alendronate (FOSAMAX) 70 MG tablet TAKE ONE TABLET BY MOUTH every WEEK   • allopurinol (ZYLOPRIM) 100 MG tablet TAKE TWO TABLETS BY MOUTH AT BEDTIME   • aspirin (ASPIR) 81 MG EC tablet Take 81 mg by mouth Daily.   • B-D 3CC LUER-CHRIS SYR 25GX1\" 25G X 1\" 3 ML misc USE AS DIRECTED EVERY 30 DAYS   • budesonide-formoterol (SYMBICORT) 160-4.5 MCG/ACT inhaler Inhale 2 puffs 2 (Two) Times a Day.   • busPIRone (BUSPAR) 10 MG tablet Take 1 tablet by mouth 2 (Two) Times a Day.   • calcium carb-cholecalciferol (CALCIUM PLUS VITAMIN D3) 600-800 MG-UNIT tablet Take 600 mg by mouth Daily.   • cefdinir (OMNICEF) 300 MG capsule Take 1 capsule by mouth Every 12 (Twelve) Hours.   • cyanocobalamin 1000 MCG/ML injection inject ONE ML INTRAMUSCULARLY every MONTH   • dicyclomine (BENTYL) 10 MG capsule Take 10 mg by mouth 4 (Four) Times a Day.   • furosemide (LASIX) 40 MG tablet TAKE ONE TABLET BY MOUTH TWICE DAILY   • HYDROcodone-acetaminophen (NORCO)  MG per tablet Take 1 oral tablet 4 times a day as needed for pain. dose increase, d/c previous dose   • linaclotide (LINZESS) 145 MCG capsule capsule Take 1 capsule by mouth Daily As Needed.   • omeprazole (priLOSEC) 40 MG capsule Take 40 mg by mouth 2 (Two) Times a Day.   • ondansetron (ZOFRAN) 4 MG tablet Take 1 tablet by mouth As Needed for Nausea or Vomiting.   • potassium chloride (K-DUR) 10 MEQ CR tablet Take 10 mEq by mouth Daily.   • propranolol (INDERAL) 60 MG tablet TAKE ONE TABLET BY MOUTH THREE TIMES DAILY   • sucralfate (CARAFATE) 1 g tablet Take 1 g by mouth 4 (Four) Times a Day.   • Syringe, Disposable, (SYRINGE 2-3 ML) 3 ML misc 1 each Every 30 (Thirty) Days.   • tolterodine LA (DETROL LA) 2 MG 24 hr capsule TAKE ONE CAPSULE BY MOUTH EVERY DAY   • traZODone (DESYREL) 50 MG tablet TAKE ONE TABLET BY MOUTH AT night AS NEEDED FOR SLEEP   • vitamin D (ERGOCALCIFEROL) 1.25 MG (55346 " "UT) capsule capsule TAKE ONE CAPSULE BY MOUTH one time PER WEEK   • desvenlafaxine (PRISTIQ) 50 MG 24 hr tablet TAKE ONE TABLET BY MOUTH EVERY MORNING   • fluconazole (Diflucan) 150 MG tablet Take 1 tablet by mouth 1 (One) Time Per Week.   • Needle, Disp, 24G X 1\" misc 1 each Every 30 (Thirty) Days.     No current facility-administered medications on file prior to visit.      Medications Discontinued During This Encounter   Medication Reason   • metoclopramide (REGLAN) 5 MG tablet Drug interaction       Review of Systems   Constitutional: Positive for fatigue. Negative for fever.   Respiratory: Positive for shortness of breath ( With exertion). Negative for cough.    Cardiovascular: Positive for leg swelling. Negative for chest pain and palpitations.   Gastrointestinal: Negative.    Genitourinary: Positive for frequency and urinary incontinence. Negative for dysuria.   Musculoskeletal: Positive for arthralgias, back pain and gait problem.   Neurological: Positive for dizziness, tremors, weakness, light-headedness and memory problem. Negative for seizures and syncope.   Psychiatric/Behavioral: Positive for sleep disturbance and depressed mood.        Objective   Vitals:    09/01/20 1500   BP: 120/80   BP Location: Right arm   Patient Position: Sitting   Cuff Size: Adult   Pulse: 74   Temp: 97.3 °F (36.3 °C)   TempSrc: Infrared   SpO2: 94%   Weight: 119 kg (263 lb)   Height: 157.5 cm (62\")      Body mass index is 48.1 kg/m².    Physical Exam   Constitutional: She is oriented to person, place, and time. She appears well-developed and well-nourished. No distress.   Neck: Normal range of motion. Neck supple.   Cardiovascular: Normal rate and regular rhythm.   No murmur heard.  Pulmonary/Chest: Effort normal and breath sounds normal. She has no wheezes.   Musculoskeletal: Normal range of motion. She exhibits no edema.   Neurological: She is alert and oriented to person, place, and time.   Skin: Skin is warm and dry. "   Psychiatric: She has a normal mood and affect. Her behavior is normal.   Dysthymic  Ruminating thoughts   Nursing note and vitals reviewed.      Lab Results   Component Value Date     (H) 09/01/2020    BUN 21 09/01/2020    CREATININE 1.32 (H) 09/01/2020    EGFRIFNONA 41 (L) 09/01/2020    EGFRIFAFRI 47 (L) 09/01/2020     09/01/2020    K 3.8 09/01/2020    CL 96 09/01/2020    CALCIUM 9.0 09/01/2020    ALBUMIN 3.8 09/01/2020    BILITOT 0.3 09/01/2020    ALKPHOS 109 09/01/2020    AST 11 09/01/2020    ALT 10 09/01/2020      Lab Results   Component Value Date    HGBA1C 5.8 (H) 09/01/2020    HGBA1C 5.8 (H) 06/01/2020    HGBA1C 5.8 (H) 11/05/2019        Procedures     Assessment/Plan   Diagnoses and all orders for this visit:    1. Severe episode of recurrent major depressive disorder, without psychotic features (CMS/HCC) (Primary)  -     QUEtiapine (SEROquel) 50 MG tablet; Take 1 tablet by mouth Every Night.  Dispense: 30 tablet; Refill: 0    2. Hyperglycemia    3. Essential hypertension    4. Chronic renal disease, stage III (CMS/HCC)    Had patient complete mood screening questionnaire and is not consistent with bipolar mood disorder.  Her history additionally does not reveal any previous episodes of vincent nor history of use of any mood stabilizers.  Patient does have significant refractory depression and will try a low-dose mood stabilizer Seroquel to try to improve her depressive symptoms and sleep.  Advised she cannot continue Reglan if we try Seroquel and she agrees to a trial of alprazolam, uncertain if it improves any of her GI symptoms anyway.  Additionally she is on trazodone for insomnia, if cannot get adequate sleep with Seroquel we will likely reduce trazodone.  Patient to get labs as previously ordered including A1c and CMP.    Medications Discontinued During This Encounter   Medication Reason   • metoclopramide (REGLAN) 5 MG tablet Drug interaction          Return in about 3 weeks (around  9/22/2020) for Recheck depression and review labs..        AVS given

## 2020-09-02 LAB
ALBUMIN SERPL-MCNC: 3.8 G/DL (ref 3.7–4.7)
ALBUMIN/GLOB SERPL: 1.6 {RATIO} (ref 1.2–2.2)
ALP SERPL-CCNC: 109 IU/L (ref 39–117)
ALT SERPL-CCNC: 10 IU/L (ref 0–32)
AST SERPL-CCNC: 11 IU/L (ref 0–40)
BILIRUB SERPL-MCNC: 0.3 MG/DL (ref 0–1.2)
BUN SERPL-MCNC: 21 MG/DL (ref 8–27)
BUN/CREAT SERPL: 16 (ref 12–28)
CALCIUM SERPL-MCNC: 9 MG/DL (ref 8.7–10.3)
CHLORIDE SERPL-SCNC: 96 MMOL/L (ref 96–106)
CO2 SERPL-SCNC: 26 MMOL/L (ref 20–29)
CREAT SERPL-MCNC: 1.32 MG/DL (ref 0.57–1)
GLOBULIN SER CALC-MCNC: 2.4 G/DL (ref 1.5–4.5)
GLUCOSE SERPL-MCNC: 102 MG/DL (ref 65–99)
HBA1C MFR BLD: 5.8 % (ref 4.8–5.6)
POTASSIUM SERPL-SCNC: 3.8 MMOL/L (ref 3.5–5.2)
PROT SERPL-MCNC: 6.2 G/DL (ref 6–8.5)
SODIUM SERPL-SCNC: 139 MMOL/L (ref 134–144)

## 2020-09-03 ENCOUNTER — TELEPHONE (OUTPATIENT)
Dept: FAMILY MEDICINE CLINIC | Facility: CLINIC | Age: 70
End: 2020-09-03

## 2020-09-10 ENCOUNTER — TELEPHONE (OUTPATIENT)
Dept: FAMILY MEDICINE CLINIC | Facility: CLINIC | Age: 70
End: 2020-09-10

## 2020-09-15 ENCOUNTER — TELEPHONE (OUTPATIENT)
Dept: FAMILY MEDICINE CLINIC | Facility: CLINIC | Age: 70
End: 2020-09-15

## 2020-09-15 NOTE — TELEPHONE ENCOUNTER
Please inform patient that while it is listed as a possible side effect for pediatric patients, Seroquel is not likely to cause an increase in blood pressure and is actually more likely to cause a decrease in blood pressure.  If her blood pressure has been continuously above 140/90 I would recommend increasing her propranolol 60 mg 3 times daily to take 120 mg twice a day and let me know in a couple days how that is doing.

## 2020-09-15 NOTE — TELEPHONE ENCOUNTER
Contacted patient, she stated it's been high for the last 3 days, states she is having some wheezing and been using her inhaler, no chest pains, states her pulse has been in the 70's. Please advise, she wondered if starting the seroquel could be effecting it?

## 2020-09-16 NOTE — TELEPHONE ENCOUNTER
Spoke with patient, she verbally understands and will call back in a couple days to see how blood pressure is doing

## 2020-09-21 RX ORDER — SYRINGE WITH NEEDLE, 1 ML 25GX5/8"
SYRINGE, EMPTY DISPOSABLE MISCELLANEOUS
Qty: 3 EACH | Refills: 3 | Status: SHIPPED | OUTPATIENT
Start: 2020-09-21 | End: 2021-03-19

## 2020-09-21 RX ORDER — CYANOCOBALAMIN 1000 UG/ML
INJECTION, SOLUTION INTRAMUSCULAR; SUBCUTANEOUS
Qty: 3 ML | Refills: 3 | Status: SHIPPED | OUTPATIENT
Start: 2020-09-21 | End: 2021-06-02 | Stop reason: SDUPTHER

## 2020-09-22 ENCOUNTER — OFFICE VISIT (OUTPATIENT)
Dept: FAMILY MEDICINE CLINIC | Facility: CLINIC | Age: 70
End: 2020-09-22

## 2020-09-22 VITALS
BODY MASS INDEX: 48.21 KG/M2 | OXYGEN SATURATION: 94 % | TEMPERATURE: 96.9 F | HEIGHT: 62 IN | SYSTOLIC BLOOD PRESSURE: 130 MMHG | HEART RATE: 65 BPM | DIASTOLIC BLOOD PRESSURE: 84 MMHG | WEIGHT: 262 LBS

## 2020-09-22 DIAGNOSIS — I10 ESSENTIAL HYPERTENSION: ICD-10-CM

## 2020-09-22 DIAGNOSIS — N18.4 RENAL FAILURE, CHRONIC, STAGE 4 (SEVERE) (HCC): ICD-10-CM

## 2020-09-22 DIAGNOSIS — G25.0 TREMOR, ESSENTIAL: ICD-10-CM

## 2020-09-22 DIAGNOSIS — F33.2 SEVERE EPISODE OF RECURRENT MAJOR DEPRESSIVE DISORDER, WITHOUT PSYCHOTIC FEATURES (HCC): Primary | ICD-10-CM

## 2020-09-22 DIAGNOSIS — Z23 NEED FOR INFLUENZA VACCINATION: ICD-10-CM

## 2020-09-22 DIAGNOSIS — G47.09 OTHER INSOMNIA: ICD-10-CM

## 2020-09-22 DIAGNOSIS — R73.9 HYPERGLYCEMIA: ICD-10-CM

## 2020-09-22 PROBLEM — Z09 HOSPITAL DISCHARGE FOLLOW-UP: Status: RESOLVED | Noted: 2020-05-06 | Resolved: 2020-09-22

## 2020-09-22 PROBLEM — Z76.89 ENCOUNTER FOR SUPPORT AND COORDINATION OF TRANSITION OF CARE: Status: RESOLVED | Noted: 2020-05-06 | Resolved: 2020-09-22

## 2020-09-22 PROBLEM — T81.9XXA COMPLICATION OF SURGICAL PROCEDURE: Status: RESOLVED | Noted: 2019-09-25 | Resolved: 2020-09-22

## 2020-09-22 PROCEDURE — 90694 VACC AIIV4 NO PRSRV 0.5ML IM: CPT | Performed by: FAMILY MEDICINE

## 2020-09-22 PROCEDURE — G0008 ADMIN INFLUENZA VIRUS VAC: HCPCS | Performed by: FAMILY MEDICINE

## 2020-09-22 PROCEDURE — 99214 OFFICE O/P EST MOD 30 MIN: CPT | Performed by: FAMILY MEDICINE

## 2020-09-22 RX ORDER — QUETIAPINE FUMARATE 100 MG/1
100 TABLET, FILM COATED ORAL NIGHTLY
Qty: 30 TABLET | Refills: 3 | Status: SHIPPED | OUTPATIENT
Start: 2020-09-22 | End: 2020-11-24

## 2020-09-22 RX ORDER — QUETIAPINE FUMARATE 50 MG/1
50 TABLET, FILM COATED ORAL NIGHTLY
Qty: 30 TABLET | Refills: 0 | Status: SHIPPED | OUTPATIENT
Start: 2020-09-22 | End: 2020-09-22

## 2020-09-22 RX ORDER — CHOLECALCIFEROL (VITAMIN D3) 125 MCG
5 CAPSULE ORAL NIGHTLY
Qty: 30 TABLET | Refills: 12
Start: 2020-09-22 | End: 2021-03-03

## 2020-09-22 RX ORDER — PROPRANOLOL HYDROCHLORIDE 80 MG/1
80 TABLET ORAL 3 TIMES DAILY
Qty: 90 TABLET | Refills: 3 | Status: SHIPPED | OUTPATIENT
Start: 2020-09-22 | End: 2021-01-05

## 2020-09-22 NOTE — PROGRESS NOTES
"Chief Complaint   Patient presents with   • Hypertension       Subjective     Elaine Toscano is a 70 y.o. female.   Patient is here for a f/u on depression, htn, and chronic renal disease.    Patient has had significant refractory depression/chronic dysthymia for many years.  Last visit we added in a mood stabilizer Seroquel to augment her antidepressant therapy.  There have been multiple calls since that time.  Patient had misunderstood and stopped Pristiq but this is been restarted additionally she has stopped trazodone and Seroquel has been increased from 50 mg to 100 mg at bedtime she has run out of Seroquel and did not have last night, did not sleep well. Sleeps better and moods are better, when she was taking it.  She has continued to take 5 mg melatonin 2 at bedtime.    Dr Rosario changed Deterol mayby to Myrbetriq, not having to go to bathroom through the night has helped with her sleep as well.  Have follow-up with him next week.    Hypertension, home readings are running between 131//100 with a wrist cuff however readings in the office are at goal.  Do have suspicion that it is not an accurate cuff    Hyperglycemia, A1c's have maintained at goal, home glucose readings ranging from 108-202, these are not necessarily fasting levels.    Patient states she is needing a refill of her seroquel today.      The following portions of the patient's history were reviewed and updated as appropriate: allergies, current medications, past family history, past medical history, past social history, past surgical history and problem list.    Current Outpatient Medications on File Prior to Visit   Medication Sig   • alendronate (FOSAMAX) 70 MG tablet TAKE ONE TABLET BY MOUTH every WEEK   • allopurinol (ZYLOPRIM) 100 MG tablet TAKE TWO TABLETS BY MOUTH AT BEDTIME   • aspirin (ASPIR) 81 MG EC tablet Take 81 mg by mouth Daily.   • B-D 3CC LUER-CHRIS SYR 25GX1\" 25G X 1\" 3 ML misc USE AS DIRECTED EVERY 30 DAYS   • B-D 3CC " "LUER-CHRIS SYR 25GX1\" 25G X 1\" 3 ML misc USE AS DIRECTED EVERY 30 DAYS   • budesonide-formoterol (SYMBICORT) 160-4.5 MCG/ACT inhaler Inhale 2 puffs 2 (Two) Times a Day.   • busPIRone (BUSPAR) 10 MG tablet Take 1 tablet by mouth 2 (Two) Times a Day.   • calcium carb-cholecalciferol (CALCIUM PLUS VITAMIN D3) 600-800 MG-UNIT tablet Take 600 mg by mouth Daily.   • cefdinir (OMNICEF) 300 MG capsule Take 1 capsule by mouth Every 12 (Twelve) Hours.   • cyanocobalamin 1000 MCG/ML injection inject ONE ML INTRAMUSCULARLY every MONTH   • desvenlafaxine (PRISTIQ) 50 MG 24 hr tablet TAKE ONE TABLET BY MOUTH EVERY MORNING   • dicyclomine (BENTYL) 10 MG capsule Take 10 mg by mouth 4 (Four) Times a Day.   • fluconazole (Diflucan) 150 MG tablet Take 1 tablet by mouth 1 (One) Time Per Week.   • furosemide (LASIX) 40 MG tablet TAKE ONE TABLET BY MOUTH TWICE DAILY   • HYDROcodone-acetaminophen (NORCO)  MG per tablet Take 1 oral tablet 4 times a day as needed for pain. dose increase, d/c previous dose   • linaclotide (LINZESS) 145 MCG capsule capsule Take 1 capsule by mouth Daily As Needed.   • Needle, Disp, 24G X 1\" misc 1 each Every 30 (Thirty) Days.   • omeprazole (priLOSEC) 40 MG capsule Take 40 mg by mouth 2 (Two) Times a Day.   • ondansetron (ZOFRAN) 4 MG tablet Take 1 tablet by mouth As Needed for Nausea or Vomiting.   • potassium chloride (K-DUR) 10 MEQ CR tablet Take 10 mEq by mouth Daily.   • sucralfate (CARAFATE) 1 g tablet Take 1 g by mouth 4 (Four) Times a Day.   • vitamin D (ERGOCALCIFEROL) 1.25 MG (02962 UT) capsule capsule TAKE ONE CAPSULE BY MOUTH one time PER WEEK   • [DISCONTINUED] propranolol (INDERAL) 60 MG tablet TAKE ONE TABLET BY MOUTH THREE TIMES DAILY   • [DISCONTINUED] tolterodine LA (DETROL LA) 2 MG 24 hr capsule TAKE ONE CAPSULE BY MOUTH EVERY DAY   • [DISCONTINUED] cyanocobalamin 1000 MCG/ML injection inject ONE ML INTRAMUSCULARLY every MONTH   • [DISCONTINUED] QUEtiapine (SEROquel) 50 MG tablet Take 1 " "tablet by mouth Every Night.   • [DISCONTINUED] Syringe, Disposable, (SYRINGE 2-3 ML) 3 ML misc 1 each Every 30 (Thirty) Days.     No current facility-administered medications on file prior to visit.      Medications Discontinued During This Encounter   Medication Reason   • QUEtiapine (SEROquel) 50 MG tablet Reorder   • tolterodine LA (DETROL LA) 2 MG 24 hr capsule Alternate therapy   • propranolol (INDERAL) 60 MG tablet Reorder   • QUEtiapine (SEROquel) 50 MG tablet        Review of Systems   Constitutional: Negative for fever.   Respiratory: Positive for shortness of breath ( With exertion chronic, at baseline). Negative for cough.    Cardiovascular: Positive for leg swelling. Negative for chest pain and palpitations.   Gastrointestinal: Negative.    Genitourinary: Positive for frequency and urinary incontinence. Negative for dysuria.   Musculoskeletal: Positive for arthralgias, back pain and gait problem.   Neurological: Positive for dizziness, tremors ( Improved on Inderal), weakness, light-headedness and memory problem. Negative for seizures and syncope.   Psychiatric/Behavioral: Positive for sleep disturbance and depressed mood.        Significantly improved with addition of Seroquel        Objective   Vitals:    09/22/20 0847   BP: 130/84   BP Location: Right arm   Patient Position: Sitting   Cuff Size: Adult   Pulse: 65   Temp: 96.9 °F (36.1 °C)   TempSrc: Infrared   SpO2: 94%   Weight: 119 kg (262 lb)   Height: 157.5 cm (62.01\")      Body mass index is 47.91 kg/m².    Physical Exam    Lab Results   Component Value Date     (H) 09/01/2020    BUN 21 09/01/2020    CREATININE 1.32 (H) 09/01/2020    EGFRIFNONA 41 (L) 09/01/2020    EGFRIFAFRI 47 (L) 09/01/2020     09/01/2020    K 3.8 09/01/2020    CL 96 09/01/2020    CALCIUM 9.0 09/01/2020    ALBUMIN 3.8 09/01/2020    BILITOT 0.3 09/01/2020    ALKPHOS 109 09/01/2020    AST 11 09/01/2020    ALT 10 09/01/2020      Hemoglobin A1C   Date Value Ref Range " Status   09/01/2020 5.8 (H) 4.8 - 5.6 % Final     Comment:              Prediabetes: 5.7 - 6.4           Diabetes: >6.4           Glycemic control for adults with diabetes: <7.0     06/01/2020 5.8 (H) 4.8 - 5.6 % Final     Comment:              Prediabetes: 5.7 - 6.4           Diabetes: >6.4           Glycemic control for adults with diabetes: <7.0     11/05/2019 5.8 (H) 4.8 - 5.6 % Final     Comment:              Prediabetes: 5.7 - 6.4           Diabetes: >6.4           Glycemic control for adults with diabetes: <7.0          Procedures     Assessment/Plan   Diagnoses and all orders for this visit:    1. Severe episode of recurrent major depressive disorder, without psychotic features (CMS/Formerly [facility]) (Primary)  -     Discontinue: QUEtiapine (SEROquel) 50 MG tablet; Take 1 tablet by mouth Every Night.  Dispense: 30 tablet; Refill: 0  -     QUEtiapine (SEROquel) 100 MG tablet; Take 1 tablet by mouth Every Night.  Dispense: 30 tablet; Refill: 3    2. Other insomnia  -     melatonin ([facility] Melatonin) 5 MG tablet tablet; Take 1 tablet by mouth Every Night.  Dispense: 30 tablet; Refill: 12    3. Need for influenza vaccination  -     Fluad Quad >65 years    4. Essential hypertension  -     propranolol (INDERAL) 80 MG tablet; Take 1 tablet by mouth 3 (Three) Times a Day.  Dispense: 90 tablet; Refill: 3    5. Tremor, essential  -     propranolol (INDERAL) 80 MG tablet; Take 1 tablet by mouth 3 (Three) Times a Day.  Dispense: 90 tablet; Refill: 3    6. Hyperglycemia  -     Hemoglobin A1c; Future  -     Comprehensive Metabolic Panel; Future    7. Renal failure, chronic, stage 4 (severe) (CMS/Formerly [facility])  -     Comprehensive Metabolic Panel; Future    Depression, significantly improved with addition of Seroquel, changing to 100 mg 1 tablet at bedtime.  Hypertension, at goal in office however home blood pressure readings are elevated.  Advised her to bring her wrist cuff into the office to check for accuracy.  Changing Inderal 60 mg 4 daily  to 80 mg 3 daily for ease of dosing.  Continue other medications per medication list.  Advised that most likely will not need to continue melatonin, reduce from 10 mg to 5 mg and if continuing to sleep well can discontinue altogether.  Influenza vaccination given today.    Medications Discontinued During This Encounter   Medication Reason   • QUEtiapine (SEROquel) 50 MG tablet Reorder   • tolterodine LA (DETROL LA) 2 MG 24 hr capsule Alternate therapy   • propranolol (INDERAL) 60 MG tablet Reorder   • QUEtiapine (SEROquel) 50 MG tablet           Return in about 3 months (around 12/22/2020) for Recheck.        AVS given

## 2020-09-24 RX ORDER — FUROSEMIDE 40 MG/1
TABLET ORAL
Qty: 180 TABLET | Refills: 0 | OUTPATIENT
Start: 2020-09-24

## 2020-09-24 RX ORDER — NYSTATIN 100000 [USP'U]/G
POWDER TOPICAL
Qty: 60 G | Refills: 3 | Status: SHIPPED | OUTPATIENT
Start: 2020-09-24 | End: 2021-04-09 | Stop reason: ALTCHOICE

## 2020-10-14 ENCOUNTER — TELEPHONE (OUTPATIENT)
Dept: FAMILY MEDICINE CLINIC | Facility: CLINIC | Age: 70
End: 2020-10-14

## 2020-10-14 NOTE — TELEPHONE ENCOUNTER
Patient called stating that she has noticed that every time she takes her blood pressure medication it makes her sick (vomiting, usually clear, bad headache). She is wondering if the medication should be changed? Please advise.

## 2020-10-14 NOTE — TELEPHONE ENCOUNTER
Please confirm if she is referring to Inderal?  (She is also on Lasix).  Is it occurring every time she takes the medicine, is she taking other medications at the same time?  Is she actually vomiting up the pills?  What have her blood pressure readings been doing?

## 2020-10-14 NOTE — TELEPHONE ENCOUNTER
Contacted patient and she now states that she thinks it's the medication that Dr Rosario started her on for peeing all the time at night and she doesn't know the name of it, but was started on it and the Seroquel at the same time and the vomiting and nausea has been going for about 3 weeks. Patient stated did not take the medication that Dr Rosario started her on last night to see if that is the cause or not, advised patient she probably needs to contact Dr Rosario's office in regards

## 2020-10-16 NOTE — TELEPHONE ENCOUNTER
Please find out if symptoms improved when she skipped medicine prescribed by Dr. Rosario, please find out what that medication was.  Please make sure she does talk to Dr. Rosario if that is the situation.  If not have her schedule an appointment with me to evaluate her nausea and vomiting.

## 2020-10-21 ENCOUNTER — OFFICE VISIT (OUTPATIENT)
Dept: FAMILY MEDICINE CLINIC | Facility: CLINIC | Age: 70
End: 2020-10-21

## 2020-10-21 VITALS
HEIGHT: 62 IN | SYSTOLIC BLOOD PRESSURE: 130 MMHG | HEART RATE: 74 BPM | OXYGEN SATURATION: 95 % | DIASTOLIC BLOOD PRESSURE: 80 MMHG | TEMPERATURE: 97.1 F | BODY MASS INDEX: 49.69 KG/M2 | WEIGHT: 270 LBS

## 2020-10-21 DIAGNOSIS — R11.2 NON-INTRACTABLE VOMITING WITH NAUSEA, UNSPECIFIED VOMITING TYPE: Primary | ICD-10-CM

## 2020-10-21 DIAGNOSIS — R11.0 NAUSEA: ICD-10-CM

## 2020-10-21 DIAGNOSIS — N30.00 ACUTE CYSTITIS WITHOUT HEMATURIA: ICD-10-CM

## 2020-10-21 DIAGNOSIS — K59.03 DRUG-INDUCED CONSTIPATION: ICD-10-CM

## 2020-10-21 LAB
BILIRUB BLD-MCNC: NEGATIVE MG/DL
CLARITY, POC: ABNORMAL
COLOR UR: YELLOW
GLUCOSE UR STRIP-MCNC: NEGATIVE MG/DL
KETONES UR QL: NEGATIVE
LEUKOCYTE EST, POC: ABNORMAL
NITRITE UR-MCNC: POSITIVE MG/ML
PH UR: 6 [PH] (ref 5–8)
PROT UR STRIP-MCNC: ABNORMAL MG/DL
RBC # UR STRIP: ABNORMAL /UL
SP GR UR: 1.02 (ref 1–1.03)
UROBILINOGEN UR QL: NORMAL

## 2020-10-21 PROCEDURE — 99214 OFFICE O/P EST MOD 30 MIN: CPT | Performed by: FAMILY MEDICINE

## 2020-10-21 PROCEDURE — 81003 URINALYSIS AUTO W/O SCOPE: CPT | Performed by: FAMILY MEDICINE

## 2020-10-21 RX ORDER — POTASSIUM CHLORIDE 750 MG/1
1 CAPSULE, EXTENDED RELEASE ORAL DAILY
COMMUNITY
End: 2020-10-21

## 2020-10-21 RX ORDER — ONDANSETRON 4 MG/1
4 TABLET, FILM COATED ORAL AS NEEDED
Qty: 30 TABLET | Refills: 1 | Status: SHIPPED | OUTPATIENT
Start: 2020-10-21 | End: 2020-11-30 | Stop reason: SDUPTHER

## 2020-10-21 RX ORDER — AMOXICILLIN 500 MG/1
500 CAPSULE ORAL 3 TIMES DAILY
Qty: 21 CAPSULE | Refills: 0 | Status: SHIPPED | OUTPATIENT
Start: 2020-10-21 | End: 2020-11-17

## 2020-10-21 RX ORDER — DESMOPRESSIN ACETATE 0.2 MG/1
1 TABLET ORAL
COMMUNITY
Start: 2020-10-19 | End: 2021-03-03 | Stop reason: SDUPTHER

## 2020-10-21 NOTE — PROGRESS NOTES
"Chief Complaint   Patient presents with   • Vomiting   • tino Dior     Elaine Toscano is a 70 y.o. female.  Patient presents today for nausea and occasional vomiting that she states started about 2 weeks ago after starting DDAVP prescribed by Dr. Rosario her urologist for nocturnal enuresis.  She states that the enuresis was improved with the medication however she stopped it last week and states nausea has improved but not resolved, she had nausea again and a small amount of emesis today.  She reports the emesis is typically small amount of clear fluid.  Zofran does help with the nausea.  She does report frequent abdominal pain especially in her lower abdomen.  She has had urine infections in the past.  She does have chronic constipation due to chronic narcotic use.  She states she typically takes Linzess about every 3 to 4 days if she has not had a bowel movement which does allow for a normal bowel movement.  It has been 3 days since her last bowel movement.  She does state DDAVP helped significantly with the nighttime urination and would like to restart if possible, Dr. Rosario had told her if she did not get relief of symptoms he would recommend surgical procedure.    The following portions of the patient's history were reviewed and updated as appropriate: allergies, current medications, past family history, past medical history, past social history, past surgical history and problem list.    Current Outpatient Medications on File Prior to Visit   Medication Sig   • alendronate (FOSAMAX) 70 MG tablet TAKE ONE TABLET BY MOUTH every WEEK   • allopurinol (ZYLOPRIM) 100 MG tablet TAKE TWO TABLETS BY MOUTH AT BEDTIME   • aspirin (ASPIR) 81 MG EC tablet Take 81 mg by mouth Daily.   • B-D 3CC LUER-CHRIS SYR 25GX1\" 25G X 1\" 3 ML misc USE AS DIRECTED EVERY 30 DAYS   • B-D 3CC LUER-CHRIS SYR 25GX1\" 25G X 1\" 3 ML misc USE AS DIRECTED EVERY 30 DAYS   • budesonide-formoterol (SYMBICORT) 160-4.5 MCG/ACT inhaler Inhale 2 " "puffs 2 (Two) Times a Day.   • busPIRone (BUSPAR) 10 MG tablet Take 1 tablet by mouth 2 (Two) Times a Day.   • calcium carb-cholecalciferol (CALCIUM PLUS VITAMIN D3) 600-800 MG-UNIT tablet Take 600 mg by mouth Daily.   • cyanocobalamin 1000 MCG/ML injection inject ONE ML INTRAMUSCULARLY every MONTH   • desmopressin (DDAVP) 0.2 MG tablet Take 1 tablet by mouth every night at bedtime.   • desvenlafaxine (PRISTIQ) 50 MG 24 hr tablet TAKE ONE TABLET BY MOUTH EVERY MORNING   • dicyclomine (BENTYL) 10 MG capsule Take 10 mg by mouth 4 (Four) Times a Day.   • fluconazole (Diflucan) 150 MG tablet Take 1 tablet by mouth 1 (One) Time Per Week.   • furosemide (LASIX) 40 MG tablet TAKE ONE TABLET BY MOUTH TWICE DAILY   • HYDROcodone-acetaminophen (NORCO)  MG per tablet Take 1 oral tablet 4 times a day as needed for pain. dose increase, d/c previous dose   • melatonin ([employer] Melatonin) 5 MG tablet tablet Take 1 tablet by mouth Every Night.   • Needle, Disp, 24G X 1\" misc 1 each Every 30 (Thirty) Days.   • nystatin (MYCOSTATIN) 454829 UNIT/GM powder APPLY TO THE AFFECTED AREA(S) BY TOPICAL ROUTE 2 TIMES PER DAY   • omeprazole (priLOSEC) 40 MG capsule Take 40 mg by mouth 2 (Two) Times a Day.   • potassium chloride (K-DUR) 10 MEQ CR tablet Take 10 mEq by mouth Daily.   • propranolol (INDERAL) 80 MG tablet Take 1 tablet by mouth 3 (Three) Times a Day.   • QUEtiapine (SEROquel) 100 MG tablet Take 1 tablet by mouth Every Night.   • sucralfate (CARAFATE) 1 g tablet Take 1 g by mouth 4 (Four) Times a Day.   • vitamin D (ERGOCALCIFEROL) 1.25 MG (59085 UT) capsule capsule TAKE ONE CAPSULE BY MOUTH one time PER WEEK   • [DISCONTINUED] linaclotide (LINZESS) 145 MCG capsule capsule Take 1 capsule by mouth Daily As Needed.   • [DISCONTINUED] ondansetron (ZOFRAN) 4 MG tablet Take 1 tablet by mouth As Needed for Nausea or Vomiting.   • [DISCONTINUED] cefdinir (OMNICEF) 300 MG capsule Take 1 capsule by mouth Every 12 (Twelve) Hours.   • " "[DISCONTINUED] cyanocobalamin 1000 MCG/ML injection inject ONE ML INTRAMUSCULARLY every MONTH   • [DISCONTINUED] potassium chloride (MICRO-K) 10 MEQ CR capsule Take 1 tablet by mouth Daily.   • [DISCONTINUED] Syringe, Disposable, (SYRINGE 2-3 ML) 3 ML misc 1 each Every 30 (Thirty) Days.     No current facility-administered medications on file prior to visit.      Medications Discontinued During This Encounter   Medication Reason   • cefdinir (OMNICEF) 300 MG capsule *Therapy completed   • potassium chloride (MICRO-K) 10 MEQ CR capsule *Therapy completed   • linaclotide (LINZESS) 145 MCG capsule capsule Reorder   • ondansetron (ZOFRAN) 4 MG tablet Reorder       Review of Systems   Constitutional: Negative for fatigue and fever.   Respiratory: Negative for cough and shortness of breath.    Cardiovascular: Negative for chest pain, palpitations and leg swelling.   Gastrointestinal: Positive for abdominal pain, constipation, nausea and vomiting.   Genitourinary: Positive for dysuria, frequency and urinary incontinence.        Nocturia   Skin: Positive for rash ( Recurrent yeast rash under breasts treated with nystatin).   Psychiatric/Behavioral:        History of depression and anxiety, states moods are currently good with current medications.        Objective   Vitals:    10/21/20 0827   BP: 130/80   BP Location: Left arm   Patient Position: Sitting   Cuff Size: Adult   Pulse: 74   Temp: 97.1 °F (36.2 °C)   TempSrc: Infrared   SpO2: 95%   Weight: 122 kg (270 lb)   Height: 157.5 cm (62.01\")      Body mass index is 49.37 kg/m².    Physical Exam  Vitals signs and nursing note reviewed.   Constitutional:       General: She is not in acute distress.     Appearance: She is well-developed. She is obese.   HENT:      Head: Normocephalic and atraumatic.   Cardiovascular:      Rate and Rhythm: Normal rate and regular rhythm.      Heart sounds: No murmur.   Pulmonary:      Effort: Pulmonary effort is normal.      Breath sounds: " Normal breath sounds. No wheezing.   Abdominal:      General: Bowel sounds are normal. There is distension.      Palpations: There is no mass.      Tenderness: There is abdominal tenderness. There is guarding. There is no right CVA tenderness, left CVA tenderness or rebound.      Comments: Tenderness throughout entire abdomen   Musculoskeletal: Normal range of motion.      Comments: Unsteady gait, compensated by using a cane.   Skin:     General: Skin is warm and dry.      Findings: No rash.   Neurological:      Mental Status: She is alert and oriented to person, place, and time.         Lab Results   Component Value Date     (H) 09/01/2020    BUN 21 09/01/2020    CREATININE 1.32 (H) 09/01/2020    EGFRIFNONA 41 (L) 09/01/2020    EGFRIFAFRI 47 (L) 09/01/2020     09/01/2020    K 3.8 09/01/2020    CL 96 09/01/2020    CALCIUM 9.0 09/01/2020    ALBUMIN 3.8 09/01/2020    BILITOT 0.3 09/01/2020    ALKPHOS 109 09/01/2020    AST 11 09/01/2020    ALT 10 09/01/2020      Lab Results   Component Value Date    HGBA1C 5.8 (H) 09/01/2020    HGBA1C 5.8 (H) 06/01/2020    HGBA1C 5.8 (H) 11/05/2019        Procedures     Assessment/Plan   Diagnoses and all orders for this visit:    1. Non-intractable vomiting with nausea, unspecified vomiting type (Primary)  -     POC Urinalysis Dipstick, Automated    2. Acute cystitis without hematuria  -     amoxicillin (AMOXIL) 500 MG capsule; Take 1 capsule by mouth 3 (Three) Times a Day.  Dispense: 21 capsule; Refill: 0    3. Drug-induced constipation  -     linaclotide (LINZESS) 72 MCG capsule capsule; Take 1 capsule by mouth Daily.  Dispense: 90 capsule; Refill: 3    4. Nausea  -     ondansetron (ZOFRAN) 4 MG tablet; Take 1 tablet by mouth As Needed for Nausea or Vomiting.  Dispense: 30 tablet; Refill: 1      Nausea with occasional vomiting likely multifactorial due to multiple medications, chronic constipation from narcotic use, reflux etc.  Treating for acute UTI and changing  treatment for constipation to a lower dose of Linzess every day as opposed to a larger dose after she becomes uncomfortable with her constipation.  Additionally should consider trial of reducing DDAVP to see if it could still give benefit with less nausea if it is truly contributing to her nausea.  Advised her to follow-up with Dr. Rosario regarding this issue.  Renewing Zofran for as needed use.    Medications Discontinued During This Encounter   Medication Reason   • cefdinir (OMNICEF) 300 MG capsule *Therapy completed   • potassium chloride (MICRO-K) 10 MEQ CR capsule *Therapy completed   • linaclotide (LINZESS) 145 MCG capsule capsule Reorder   • ondansetron (ZOFRAN) 4 MG tablet Reorder          Return in about 2 months (around 12/22/2020) for as previously scheduled.    Education reference material relevant to visit available in AVS through [employer] or printed copy given.

## 2020-10-26 DIAGNOSIS — J45.909 ASTHMA, UNSPECIFIED ASTHMA SEVERITY, UNSPECIFIED WHETHER COMPLICATED, UNSPECIFIED WHETHER PERSISTENT: ICD-10-CM

## 2020-10-26 RX ORDER — BUDESONIDE AND FORMOTEROL FUMARATE DIHYDRATE 160; 4.5 UG/1; UG/1
AEROSOL RESPIRATORY (INHALATION)
Qty: 10.2 G | Refills: 12 | Status: SHIPPED | OUTPATIENT
Start: 2020-10-26 | End: 2021-03-19

## 2020-11-06 ENCOUNTER — TELEPHONE (OUTPATIENT)
Dept: FAMILY MEDICINE CLINIC | Facility: CLINIC | Age: 70
End: 2020-11-06

## 2020-11-06 NOTE — TELEPHONE ENCOUNTER
Patient called and wanted to let  know that she was in the [facility] earlier today due to her potassium being too low, and also said she had fluid around her heart. They did chest xray and lab work on patient. George sending over fax to [facility] to obtain those records.

## 2020-11-06 NOTE — TELEPHONE ENCOUNTER
Thank you for requesting the records, please get her scheduled next week for emergency department follow-up and recheck labs.

## 2020-11-09 RX ORDER — ALLOPURINOL 100 MG/1
TABLET ORAL
Qty: 180 TABLET | Refills: 1 | Status: SHIPPED | OUTPATIENT
Start: 2020-11-09 | End: 2021-03-03 | Stop reason: SDUPTHER

## 2020-11-10 DIAGNOSIS — Z78.0 MENOPAUSE: ICD-10-CM

## 2020-11-12 ENCOUNTER — TELEPHONE (OUTPATIENT)
Dept: FAMILY MEDICINE CLINIC | Facility: CLINIC | Age: 70
End: 2020-11-12

## 2020-11-12 DIAGNOSIS — M81.0 OSTEOPOROSIS WITHOUT CURRENT PATHOLOGICAL FRACTURE, UNSPECIFIED OSTEOPOROSIS TYPE: Primary | ICD-10-CM

## 2020-11-12 NOTE — TELEPHONE ENCOUNTER
Patient calling and cant remember what medications she was supposed to take/increase. She also said if she is supposed to be taking an increase in medication that the pharmacy states she will need a new script because she will run out soon. Please call patient and clarify with her what she should be doing. Thanks!

## 2020-11-17 ENCOUNTER — OFFICE VISIT (OUTPATIENT)
Dept: FAMILY MEDICINE CLINIC | Facility: CLINIC | Age: 70
End: 2020-11-17

## 2020-11-17 VITALS
WEIGHT: 268 LBS | HEART RATE: 63 BPM | OXYGEN SATURATION: 98 % | SYSTOLIC BLOOD PRESSURE: 178 MMHG | BODY MASS INDEX: 49.32 KG/M2 | TEMPERATURE: 98 F | HEIGHT: 62 IN | DIASTOLIC BLOOD PRESSURE: 100 MMHG

## 2020-11-17 DIAGNOSIS — I10 ESSENTIAL HYPERTENSION: ICD-10-CM

## 2020-11-17 DIAGNOSIS — Z95.0 PRESENCE OF CARDIAC PACEMAKER: ICD-10-CM

## 2020-11-17 DIAGNOSIS — E87.6 HYPOKALEMIA: ICD-10-CM

## 2020-11-17 DIAGNOSIS — N18.32 STAGE 3B CHRONIC KIDNEY DISEASE (HCC): Primary | ICD-10-CM

## 2020-11-17 DIAGNOSIS — I50.9 CONGESTIVE HEART FAILURE, UNSPECIFIED HF CHRONICITY, UNSPECIFIED HEART FAILURE TYPE (HCC): ICD-10-CM

## 2020-11-17 PROCEDURE — 99214 OFFICE O/P EST MOD 30 MIN: CPT | Performed by: FAMILY MEDICINE

## 2020-11-17 RX ORDER — LISINOPRIL 20 MG/1
20 TABLET ORAL DAILY
Qty: 30 TABLET | Refills: 1 | Status: SHIPPED | OUTPATIENT
Start: 2020-11-17 | End: 2020-12-07 | Stop reason: SDUPTHER

## 2020-11-17 NOTE — PROGRESS NOTES
Chief Complaint   Patient presents with   • [facility] follow up       Subjective     Elaine Toscano is a 70 y.o. female.   Patient is here for transition of care for emergency department visit at [facility] for shortness of breath on 10/6/2020.  Hospital records reviewed:  chest x-ray mild cardiomegaly, tiny right upper lobe nodule, no acute infiltrates  Sodium 136, potassium 3.0, glucose 108, creatinine 1.23, GFR 44, hemoglobin 10.7, hematocrit 34.2, troponin 0 0.03, CPK less than 20, CK MB 0.8.  EKG, paced rhythm with left bundle branch block.  Patient was advised to take extra potassium chloride daily for 1 week.  Have increased lasix to 2 daily, had been skipping doses prior to going to hospital.     Patient states she was given 4 potassium pills in hospital to take and has been  taking 2 potassium pills for a week and now back to taking just one and was told she had fluid around her heart.   Patient states she is feeling better, less short of breath, swelling improved. DO elevate feet when sitting or laying down including overnight. Cannot get compression stockings on.   Did get power wheel chair yesterday.     Patient has an appointment with the [facility] clinic in [place] on December 4, but no upcoming appointments with Dr. Camejo, her cardiologist.    Patient is scheduled to see Dr. Cisse January 4, Dr. Graham Guido February 2, and urologist Dr. Rosario March 30.      The following portions of the patient's history were reviewed and updated as appropriate: allergies, current medications, past family history, past medical history, past social history, past surgical history and problem list.    Current Outpatient Medications on File Prior to Visit   Medication Sig   • alendronate (FOSAMAX) 70 MG tablet TAKE ONE TABLET BY MOUTH every WEEK (Patient taking differently: 70 mg. Give with at least 8 ounces of plain water first thing in the morning.  Patient should be instructed to stay upright (not to lie down)  "for at least 30 minutes and until after first food of the day (to reduce es)   • allopurinol (ZYLOPRIM) 100 MG tablet TAKE TWO TABLETS BY MOUTH AT BEDTIME   • aspirin (ASPIR) 81 MG EC tablet Take 81 mg by mouth Daily.   • B-D 3CC LUER-CHRIS SYR 25GX1\" 25G X 1\" 3 ML misc USE AS DIRECTED EVERY 30 DAYS   • B-D 3CC LUER-CHRIS SYR 25GX1\" 25G X 1\" 3 ML misc USE AS DIRECTED EVERY 30 DAYS   • busPIRone (BUSPAR) 10 MG tablet Take 1 tablet by mouth 2 (Two) Times a Day.   • calcium carb-cholecalciferol (CALCIUM PLUS VITAMIN D3) 600-800 MG-UNIT tablet Take 600 mg by mouth 2 (Two) Times a Day With Meals.   • cyanocobalamin 1000 MCG/ML injection inject ONE ML INTRAMUSCULARLY every MONTH   • desmopressin (DDAVP) 0.2 MG tablet Take 1 tablet by mouth every night at bedtime.   • desvenlafaxine (PRISTIQ) 50 MG 24 hr tablet TAKE ONE TABLET BY MOUTH EVERY MORNING   • dicyclomine (BENTYL) 10 MG capsule Take 10 mg by mouth 4 (Four) Times a Day.   • furosemide (LASIX) 40 MG tablet TAKE ONE TABLET BY MOUTH TWICE DAILY   • HYDROcodone-acetaminophen (NORCO)  MG per tablet Take 1 oral tablet 4 times a day as needed for pain. dose increase, d/c previous dose   • linaclotide (LINZESS) 72 MCG capsule capsule Take 1 capsule by mouth Daily.   • melatonin ([employer] Melatonin) 5 MG tablet tablet Take 1 tablet by mouth Every Night.   • Needle, Disp, 24G X 1\" misc 1 each Every 30 (Thirty) Days.   • nystatin (MYCOSTATIN) 149091 UNIT/GM powder APPLY TO THE AFFECTED AREA(S) BY TOPICAL ROUTE 2 TIMES PER DAY   • omeprazole (priLOSEC) 40 MG capsule Take 40 mg by mouth 2 (Two) Times a Day.   • ondansetron (ZOFRAN) 4 MG tablet Take 1 tablet by mouth As Needed for Nausea or Vomiting.   • potassium chloride (K-DUR) 10 MEQ CR tablet Take 10 mEq by mouth Daily.   • propranolol (INDERAL) 80 MG tablet Take 1 tablet by mouth 3 (Three) Times a Day.   • QUEtiapine (SEROquel) 100 MG tablet Take 1 tablet by mouth Every Night.   • sucralfate (CARAFATE) 1 g tablet Take 1 g by " "mouth 4 (Four) Times a Day.   • Symbicort 160-4.5 MCG/ACT inhaler INHALE TWO PUFFS TWICE DAILY   • vitamin D (ERGOCALCIFEROL) 1.25 MG (43394 UT) capsule capsule TAKE ONE CAPSULE BY MOUTH one time PER WEEK   • fluconazole (Diflucan) 150 MG tablet Take 1 tablet by mouth 1 (One) Time Per Week.   • [DISCONTINUED] allopurinol (ZYLOPRIM) 100 MG tablet TAKE TWO TABLETS BY MOUTH AT BEDTIME   • [DISCONTINUED] budesonide-formoterol (SYMBICORT) 160-4.5 MCG/ACT inhaler Inhale 2 puffs 2 (Two) Times a Day.   • [DISCONTINUED] cyanocobalamin 1000 MCG/ML injection inject ONE ML INTRAMUSCULARLY every MONTH   • [DISCONTINUED] Syringe, Disposable, (SYRINGE 2-3 ML) 3 ML misc 1 each Every 30 (Thirty) Days.     No current facility-administered medications on file prior to visit.      Medications Discontinued During This Encounter   Medication Reason   • amoxicillin (AMOXIL) 500 MG capsule *Therapy completed       Review of Systems   Constitutional: Negative for fever.   Respiratory: Positive for shortness of breath ( With exertion chronic, at baseline). Negative for cough.    Cardiovascular: Positive for leg swelling. Negative for chest pain and palpitations.   Gastrointestinal: Negative.    Genitourinary: Positive for urinary incontinence. Negative for dysuria.   Musculoskeletal: Positive for arthralgias, back pain and gait problem.   Neurological: Positive for dizziness, tremors ( Improved on Inderal), weakness, light-headedness and memory problem. Negative for seizures and syncope.   Psychiatric/Behavioral: Positive for sleep disturbance and depressed mood.        Significantly improved with addition of Seroquel        Objective   Vitals:    11/17/20 1053 11/17/20 1103   BP: 154/88 178/100   BP Location: Left arm Left arm   Patient Position: Sitting Sitting   Cuff Size: Adult Adult   Pulse: 63    Temp: 98 °F (36.7 °C)    TempSrc: Infrared    SpO2: 98%    Weight: 122 kg (268 lb)    Height: 157.5 cm (62.01\")       Body mass index is 49 " kg/m².    Physical Exam  Vitals signs and nursing note reviewed.   Constitutional:       General: She is not in acute distress.     Appearance: She is well-developed.   HENT:      Head: Normocephalic and atraumatic.   Cardiovascular:      Rate and Rhythm: Normal rate and regular rhythm.      Heart sounds: No murmur.   Pulmonary:      Effort: Pulmonary effort is normal.      Breath sounds: Normal breath sounds. No wheezing.   Musculoskeletal: Normal range of motion.      Right lower leg: Edema present.      Left lower leg: Edema present.   Skin:     General: Skin is warm and dry.      Findings: No rash.   Neurological:      Mental Status: She is alert and oriented to person, place, and time.      Gait: Gait abnormal.         Lab Results   Component Value Date     (H) 11/17/2020    BUN 19 11/17/2020    CREATININE 1.61 (H) 11/17/2020    EGFRIFNONA 32 (L) 11/17/2020    EGFRIFAFRI 37 (L) 11/17/2020     11/17/2020    K 4.7 11/17/2020    CL 99 11/17/2020    CALCIUM 9.5 11/17/2020    ALBUMIN 3.8 09/01/2020    BILITOT 0.3 09/01/2020    ALKPHOS 109 09/01/2020    AST 11 09/01/2020    ALT 10 09/01/2020      Lab Results   Component Value Date    HGBA1C 5.8 (H) 09/01/2020    HGBA1C 5.8 (H) 06/01/2020    HGBA1C 5.8 (H) 11/05/2019        Procedures     Assessment/Plan   Diagnoses and all orders for this visit:    1. Stage 3b chronic kidney disease (Primary)  -     Basic Metabolic Panel  -     lisinopril (PRINIVIL,ZESTRIL) 20 MG tablet; Take 1 tablet by mouth Daily.  Dispense: 30 tablet; Refill: 1    2. Congestive heart failure, unspecified HF chronicity, unspecified heart failure type (CMS/HCC)  -     Ambulatory Referral to Cardiology    3. Essential hypertension  -     lisinopril (PRINIVIL,ZESTRIL) 20 MG tablet; Take 1 tablet by mouth Daily.  Dispense: 30 tablet; Refill: 1    4. Presence of cardiac pacemaker  -     Ambulatory Referral to Cardiology    5. Hypokalemia  -     Basic Metabolic Panel    Recent emergency  department visit for increased edema, possible congestive heart failure with enlarged heart on chest x-ray, swelling is starting to improve but has not resolved, continue Lasix, checking potassium will just if needed.  Reminded to continue to limit salt in diet, elevate feet 10 to 15 minutes 3-4 times a day above the level of the heart, and wear compression stockings to improve edema.        Medications Discontinued During This Encounter   Medication Reason   • amoxicillin (AMOXIL) 500 MG capsule *Therapy completed          Return in about 5 weeks (around 12/22/2020) for as previously scheduled and recheck BP and CHF.    Education reference material relevant to visit available in AVS through [employer] or printed copy given.

## 2020-11-18 LAB
BUN SERPL-MCNC: 19 MG/DL (ref 8–27)
BUN/CREAT SERPL: 12 (ref 12–28)
CALCIUM SERPL-MCNC: 9.5 MG/DL (ref 8.7–10.3)
CHLORIDE SERPL-SCNC: 99 MMOL/L (ref 96–106)
CO2 SERPL-SCNC: 26 MMOL/L (ref 20–29)
CREAT SERPL-MCNC: 1.61 MG/DL (ref 0.57–1)
GLUCOSE SERPL-MCNC: 117 MG/DL (ref 65–99)
POTASSIUM SERPL-SCNC: 4.7 MMOL/L (ref 3.5–5.2)
SODIUM SERPL-SCNC: 143 MMOL/L (ref 134–144)

## 2020-11-24 ENCOUNTER — TELEPHONE (OUTPATIENT)
Dept: FAMILY MEDICINE CLINIC | Facility: CLINIC | Age: 70
End: 2020-11-24

## 2020-11-24 DIAGNOSIS — J06.9 ACUTE URI: Primary | ICD-10-CM

## 2020-11-24 DIAGNOSIS — F33.2 SEVERE EPISODE OF RECURRENT MAJOR DEPRESSIVE DISORDER, WITHOUT PSYCHOTIC FEATURES (HCC): ICD-10-CM

## 2020-11-24 RX ORDER — AMOXICILLIN 500 MG/1
500 CAPSULE ORAL 3 TIMES DAILY
Qty: 21 CAPSULE | Refills: 0 | Status: SHIPPED | OUTPATIENT
Start: 2020-11-24 | End: 2020-12-09

## 2020-11-24 RX ORDER — QUETIAPINE FUMARATE 100 MG/1
TABLET, FILM COATED ORAL
Qty: 30 TABLET | Refills: 3 | Status: SHIPPED | OUTPATIENT
Start: 2020-11-24 | End: 2021-02-05 | Stop reason: SDUPTHER

## 2020-11-24 NOTE — TELEPHONE ENCOUNTER
Patient called stating that she is starting to have symptoms of UTI. She states she is having painful urination with a burning sensation and urinary frequency. It is keeping her up at night. Patient states she gets UTI's frequently. Patient is requesting an antibiotic be called in for her. Please advise patient if script can be called in or if she needs to be seen.

## 2020-11-24 NOTE — TELEPHONE ENCOUNTER
Please inform patient I have sent in amoxicillin, if symptoms do not improve by time of completion of antibiotics or if they worsen have her schedule an appointment.

## 2020-11-30 DIAGNOSIS — R11.0 NAUSEA: ICD-10-CM

## 2020-11-30 RX ORDER — ONDANSETRON 4 MG/1
4 TABLET, FILM COATED ORAL AS NEEDED
Qty: 30 TABLET | Refills: 1 | Status: SHIPPED | OUTPATIENT
Start: 2020-11-30 | End: 2021-01-11

## 2020-12-04 ENCOUNTER — CLINICAL SUPPORT NO REQUIREMENTS (OUTPATIENT)
Dept: CARDIOLOGY | Facility: CLINIC | Age: 70
End: 2020-12-04

## 2020-12-04 DIAGNOSIS — Z95.0 PRESENCE OF CARDIAC PACEMAKER: ICD-10-CM

## 2020-12-04 DIAGNOSIS — I49.5 SICK SINUS SYNDROME (HCC): Primary | ICD-10-CM

## 2020-12-04 PROCEDURE — 93280 PM DEVICE PROGR EVAL DUAL: CPT | Performed by: INTERNAL MEDICINE

## 2020-12-07 DIAGNOSIS — I10 ESSENTIAL HYPERTENSION: ICD-10-CM

## 2020-12-07 DIAGNOSIS — N18.32 STAGE 3B CHRONIC KIDNEY DISEASE (HCC): ICD-10-CM

## 2020-12-07 RX ORDER — LISINOPRIL 20 MG/1
20 TABLET ORAL DAILY
Qty: 30 TABLET | Refills: 12 | Status: SHIPPED | OUTPATIENT
Start: 2020-12-07 | End: 2021-03-24 | Stop reason: HOSPADM

## 2020-12-09 ENCOUNTER — OFFICE VISIT (OUTPATIENT)
Dept: CARDIOLOGY | Facility: CLINIC | Age: 70
End: 2020-12-09

## 2020-12-09 VITALS
BODY MASS INDEX: 49.32 KG/M2 | HEART RATE: 63 BPM | WEIGHT: 268 LBS | HEIGHT: 62 IN | OXYGEN SATURATION: 92 % | SYSTOLIC BLOOD PRESSURE: 136 MMHG | DIASTOLIC BLOOD PRESSURE: 98 MMHG | TEMPERATURE: 97.1 F

## 2020-12-09 DIAGNOSIS — I50.32 CHRONIC DIASTOLIC CONGESTIVE HEART FAILURE (HCC): ICD-10-CM

## 2020-12-09 DIAGNOSIS — I49.5 SICK SINUS SYNDROME (HCC): Primary | ICD-10-CM

## 2020-12-09 DIAGNOSIS — Z95.0 PRESENCE OF CARDIAC PACEMAKER: ICD-10-CM

## 2020-12-09 DIAGNOSIS — I25.10 CORONARY ARTERY DISEASE INVOLVING NATIVE CORONARY ARTERY OF NATIVE HEART WITHOUT ANGINA PECTORIS: ICD-10-CM

## 2020-12-09 DIAGNOSIS — E78.00 PURE HYPERCHOLESTEROLEMIA: ICD-10-CM

## 2020-12-09 DIAGNOSIS — G47.33 OBSTRUCTIVE SLEEP APNEA: ICD-10-CM

## 2020-12-09 DIAGNOSIS — I10 ESSENTIAL HYPERTENSION: ICD-10-CM

## 2020-12-09 PROCEDURE — 99214 OFFICE O/P EST MOD 30 MIN: CPT | Performed by: INTERNAL MEDICINE

## 2020-12-09 NOTE — PROGRESS NOTES
Subjective:     Encounter Date:12/09/2020      Patient ID: Elaine Toscano is a 70 y.o. female.    Chief Complaint: CHF Follow-up   History of Present Illness 70-year-old white male with history of coronary disease history of congestive heart failure diastolic dysfunction hypertension hyperlipidemia obstructive sleep apnea history of sick sinus syndrome status post pacemaker placement presents to my office for follow-up.  Patient is currently stable without dizziness of chest pain but has some shortness of breath with exertion.  No complains of any PND orthopnea.  No palpitation dizziness syncope.  She has some swelling of the feet.  Patient has been taking all the medicines regularly.  Patient does not smoke.  She is using a CPAP machine regularly.  She does not smoke.    The following portions of the patient's history were reviewed and updated as appropriate: allergies, current medications, past family history, past medical history, past social history, past surgical history and problem list.  Past Medical History:   Diagnosis Date   • Anemia    • Broken foot     right   • Broken nose 07/2017   • CHF (congestive heart failure) (CMS/[facility])    • CHF (congestive heart failure) (CMS/[facility])    • Chronic low back pain    • CRI (chronic renal insufficiency)    • Depression    • DJD (degenerative joint disease)    • Fracture of ankle 8/12/2016   • GERD (gastroesophageal reflux disease)    • Gout    • Hypertension    • Hypothyroidism    • CRUZ (obstructive sleep apnea)     non-complaince with bipap   • Osteopenia    • Renal failure     stage 3   • Suicide attempt (CMS/[facility])    • TMJ (dislocation of temporomandibular joint)      Past Surgical History:   Procedure Laterality Date   • APPENDECTOMY  1986   • BACK SURGERY      2004---2005 L5-S1 spinal stenosis   • CARDIAC CATHETERIZATION  04/2018   • CHOLECYSTECTOMY  2008   • EPIDURAL BLOCK     • FOOT SURGERY Bilateral 2008   • FOOT SURGERY Bilateral 09/2019    Dr. Keenan- 2nd toe on  "bilateral feet    • GASTRIC BYPASS  2007   • INSERT / REPLACE / REMOVE PACEMAKER     • LAPAROSCOPIC GASTRIC BANDING  2004    removed   • OTHER SURGICAL HISTORY  01/2018    myelogram   • TOTAL KNEE ARTHROPLASTY Left      /98 (BP Location: Right arm, Patient Position: Sitting, Cuff Size: Large Adult)   Pulse 63   Temp 97.1 °F (36.2 °C) (Temporal)   Ht 157.5 cm (62\")   Wt 122 kg (268 lb)   LMP  (LMP Unknown)   SpO2 92%   BMI 49.02 kg/m²   Family History   Problem Relation Age of Onset   • Hypertension Father         PM implanted   • Prostate cancer Father    • Arthritis Sister    • Heart disease Sister    • Lung disease Paternal Grandmother    • COPD Other    • Lung disease Other        Current Outpatient Medications:   •  alendronate (FOSAMAX) 70 MG tablet, TAKE ONE TABLET BY MOUTH every WEEK (Patient taking differently: 70 mg. Give with at least 8 ounces of plain water first thing in the morning.  Patient should be instructed to stay upright (not to lie down) for at least 30 minutes and until after first food of the day (to reduce es), Disp: 13 tablet, Rfl: 3  •  allopurinol (ZYLOPRIM) 100 MG tablet, TAKE TWO TABLETS BY MOUTH AT BEDTIME, Disp: 180 tablet, Rfl: 1  •  aspirin (ASPIR) 81 MG EC tablet, Take 81 mg by mouth Daily., Disp: , Rfl:   •  B-D 3CC LUER-CHRIS SYR 25GX1\" 25G X 1\" 3 ML misc, USE AS DIRECTED EVERY 30 DAYS, Disp: , Rfl:   •  B-D 3CC LUER-CHRIS SYR 25GX1\" 25G X 1\" 3 ML misc, USE AS DIRECTED EVERY 30 DAYS, Disp: 3 each, Rfl: 3  •  busPIRone (BUSPAR) 10 MG tablet, Take 1 tablet by mouth 2 (Two) Times a Day., Disp: 60 tablet, Rfl: 6  •  calcium carb-cholecalciferol (CALCIUM PLUS VITAMIN D3) 600-800 MG-UNIT tablet, Take 600 mg by mouth 2 (Two) Times a Day With Meals., Disp: , Rfl:   •  cyanocobalamin 1000 MCG/ML injection, inject ONE ML INTRAMUSCULARLY every MONTH, Disp: 3 mL, Rfl: 3  •  desmopressin (DDAVP) 0.2 MG tablet, Take 1 tablet by mouth every night at bedtime., Disp: , Rfl:   •  " "desvenlafaxine (PRISTIQ) 50 MG 24 hr tablet, TAKE ONE TABLET BY MOUTH EVERY MORNING, Disp: 90 tablet, Rfl: 3  •  dicyclomine (BENTYL) 10 MG capsule, Take 10 mg by mouth 4 (Four) Times a Day., Disp: , Rfl: 0  •  furosemide (LASIX) 40 MG tablet, TAKE ONE TABLET BY MOUTH TWICE DAILY, Disp: 180 tablet, Rfl: 0  •  HYDROcodone-acetaminophen (NORCO)  MG per tablet, Take 1 oral tablet 4 times a day as needed for pain. dose increase, d/c previous dose, Disp: , Rfl:   •  linaclotide (LINZESS) 72 MCG capsule capsule, Take 1 capsule by mouth Daily., Disp: 90 capsule, Rfl: 3  •  lisinopril (PRINIVIL,ZESTRIL) 20 MG tablet, Take 1 tablet by mouth Daily., Disp: 30 tablet, Rfl: 12  •  melatonin ([employer] Melatonin) 5 MG tablet tablet, Take 1 tablet by mouth Every Night., Disp: 30 tablet, Rfl: 12  •  Needle, Disp, 24G X 1\" misc, 1 each Every 30 (Thirty) Days., Disp: 100 each, Rfl: 1  •  nystatin (MYCOSTATIN) 607033 UNIT/GM powder, APPLY TO THE AFFECTED AREA(S) BY TOPICAL ROUTE 2 TIMES PER DAY, Disp: 60 g, Rfl: 3  •  omeprazole (priLOSEC) 40 MG capsule, Take 40 mg by mouth 2 (Two) Times a Day., Disp: , Rfl: 5  •  ondansetron (ZOFRAN) 4 MG tablet, Take 1 tablet by mouth As Needed for Nausea or Vomiting., Disp: 30 tablet, Rfl: 1  •  potassium chloride (K-DUR) 10 MEQ CR tablet, Take 10 mEq by mouth Daily., Disp: , Rfl: 5  •  propranolol (INDERAL) 80 MG tablet, Take 1 tablet by mouth 3 (Three) Times a Day., Disp: 90 tablet, Rfl: 3  •  QUEtiapine (SEROquel) 100 MG tablet, TAKE ONE TABLET BY MOUTH every night, Disp: 30 tablet, Rfl: 3  •  sucralfate (CARAFATE) 1 g tablet, Take 1 g by mouth 4 (Four) Times a Day., Disp: , Rfl: 1  •  Symbicort 160-4.5 MCG/ACT inhaler, INHALE TWO PUFFS TWICE DAILY, Disp: 10.2 g, Rfl: 12  •  vitamin D (ERGOCALCIFEROL) 1.25 MG (14235 UT) capsule capsule, TAKE ONE CAPSULE BY MOUTH one time PER WEEK, Disp: 5 capsule, Rfl: 12  Allergies   Allergen Reactions   • Azithromycin Unknown (See Comments)   • Codeine Nausea " And Vomiting   • Dilaudid  [Hydromorphone Hcl] Unknown (See Comments)   • Isosorbide Nitrate Other (See Comments)   • Macrobid  [Nitrofurantoin] Rash   • Morphine Rash   • Other Itching   • Oxycontin  [Oxycodone] Delirium   • Pineapple Other (See Comments)     Mouth numbness   • Povidone Iodine Rash   • Tetanus Antitoxin Rash   • Denosumab Other (See Comments)   • Demerol [Meperidine] Unknown (See Comments)     Pt does not remember. She states it was a long time ago   • Etodolac Other (See Comments)   • Lodrane D [Brompheniramine-Pseudoeph] Other (See Comments)     drowsiness   • Shrimp Flavor Swelling     Sauce only    • Sulfa Antibiotics Unknown (See Comments)   • Tizanidine Other (See Comments)     Increased pain   • Imipramine Palpitations   • Iodine Itching and Rash     Social History     Socioeconomic History   • Marital status:      Spouse name: Not on file   • Number of children: Not on file   • Years of education: Not on file   • Highest education level: Not on file   Tobacco Use   • Smoking status: Never Smoker   • Smokeless tobacco: Never Used   Substance and Sexual Activity   • Alcohol use: No     Frequency: Never   • Drug use: No   • Sexual activity: Defer     Review of Systems   Constitution: Negative for fever and malaise/fatigue.   HENT: Negative for congestion and hearing loss.    Eyes: Negative for double vision and visual disturbance.   Cardiovascular: Positive for dyspnea on exertion and leg swelling. Negative for chest pain, claudication and syncope.   Respiratory: Negative for cough and shortness of breath.    Endocrine: Negative for cold intolerance.   Skin: Negative for color change and rash.   Musculoskeletal: Negative for arthritis and joint pain.   Gastrointestinal: Negative for abdominal pain and heartburn.   Genitourinary: Negative for hematuria.   Neurological: Negative for excessive daytime sleepiness and dizziness.   Psychiatric/Behavioral: Negative for depression. The patient  is not nervous/anxious.    All other systems reviewed and are negative.             Objective:     Constitutional:       Appearance: Well-developed.   Eyes:      General: No scleral icterus.     Conjunctiva/sclera: Conjunctivae normal.      Pupils: Pupils are equal, round, and reactive to light.   HENT:      Head: Normocephalic and atraumatic.   Neck:      Musculoskeletal: Normal range of motion and neck supple.      Vascular: No carotid bruit or JVD.   Pulmonary:      Effort: Pulmonary effort is normal.      Breath sounds: Normal breath sounds. No wheezing. No rales.   Cardiovascular:      Normal rate. Regular rhythm.      Murmurs: There is a systolic murmur.   Pulses:     Intact distal pulses.   Abdominal:      General: Bowel sounds are normal.      Palpations: Abdomen is soft.   Musculoskeletal: Normal range of motion.   Skin:     General: Skin is warm and dry.      Findings: No rash.   Neurological:      Mental Status: Alert.      Comments: No focal deficits       Procedures    Lab Review:       Assessment:          Diagnosis Plan   1. Sick sinus syndrome (CMS/HCC)     2. Presence of cardiac pacemaker     3. Essential hypertension     4. Pure hypercholesterolemia     5. Obstructive sleep apnea     6. Coronary artery disease involving native coronary artery of native heart without angina pectoris     7. Chronic diastolic congestive heart failure (CMS/HCC)            Plan:       Patient has nonobstructive disease with about 30 to 40% disease in the coronaries  Patient has normal LV systolic function but has LV diastolic dysfunction  Patient blood pressure and heart rate stable  Patient lipid levels are followed by the primary care doctor  Patient has sleep apnea using a CPAP machine  Patient has sick sinus syndrome and is status post pacemaker placement  Patient's pacemaker is working very well.  Continue current medicines and follow in 6 months

## 2020-12-21 ENCOUNTER — RESULTS ENCOUNTER (OUTPATIENT)
Dept: FAMILY MEDICINE CLINIC | Facility: CLINIC | Age: 70
End: 2020-12-21

## 2020-12-21 DIAGNOSIS — R73.9 HYPERGLYCEMIA: ICD-10-CM

## 2020-12-21 DIAGNOSIS — N18.4 RENAL FAILURE, CHRONIC, STAGE 4 (SEVERE) (HCC): ICD-10-CM

## 2020-12-21 RX ORDER — ALENDRONATE SODIUM 70 MG/1
TABLET ORAL
Qty: 13 TABLET | Refills: 3 | OUTPATIENT
Start: 2020-12-21

## 2020-12-21 RX ORDER — FUROSEMIDE 40 MG/1
TABLET ORAL
Qty: 180 TABLET | Refills: 3 | Status: SHIPPED | OUTPATIENT
Start: 2020-12-21 | End: 2021-01-22 | Stop reason: SDUPTHER

## 2020-12-29 RX ORDER — ALENDRONATE SODIUM 70 MG/1
70 TABLET ORAL
Qty: 13 TABLET | Refills: 3 | Status: SHIPPED | OUTPATIENT
Start: 2020-12-29 | End: 2021-05-14 | Stop reason: SDUPTHER

## 2021-01-04 DIAGNOSIS — I10 ESSENTIAL HYPERTENSION: ICD-10-CM

## 2021-01-04 DIAGNOSIS — G25.0 TREMOR, ESSENTIAL: ICD-10-CM

## 2021-01-05 RX ORDER — PROPRANOLOL HYDROCHLORIDE 80 MG/1
TABLET ORAL
Qty: 90 TABLET | Refills: 3 | Status: SHIPPED | OUTPATIENT
Start: 2021-01-05 | End: 2021-03-24 | Stop reason: HOSPADM

## 2021-01-11 DIAGNOSIS — R11.0 NAUSEA: ICD-10-CM

## 2021-01-11 RX ORDER — ONDANSETRON 4 MG/1
TABLET, FILM COATED ORAL
Qty: 30 TABLET | Refills: 1 | Status: SHIPPED | OUTPATIENT
Start: 2021-01-11 | End: 2021-02-03

## 2021-01-14 RX ORDER — BUSPIRONE HYDROCHLORIDE 10 MG/1
TABLET ORAL
Qty: 60 TABLET | Refills: 6 | Status: SHIPPED | OUTPATIENT
Start: 2021-01-14 | End: 2021-06-02 | Stop reason: SDUPTHER

## 2021-01-22 ENCOUNTER — OFFICE VISIT (OUTPATIENT)
Dept: FAMILY MEDICINE CLINIC | Facility: CLINIC | Age: 71
End: 2021-01-22

## 2021-01-22 VITALS
HEIGHT: 62 IN | TEMPERATURE: 97.8 F | BODY MASS INDEX: 49.13 KG/M2 | WEIGHT: 267 LBS | HEART RATE: 87 BPM | SYSTOLIC BLOOD PRESSURE: 152 MMHG | DIASTOLIC BLOOD PRESSURE: 107 MMHG | OXYGEN SATURATION: 97 %

## 2021-01-22 DIAGNOSIS — R53.83 OTHER FATIGUE: ICD-10-CM

## 2021-01-22 DIAGNOSIS — R50.9 FEVER, UNSPECIFIED FEVER CAUSE: Primary | ICD-10-CM

## 2021-01-22 DIAGNOSIS — R73.9 HYPERGLYCEMIA: ICD-10-CM

## 2021-01-22 DIAGNOSIS — R60.0 LOCALIZED EDEMA: ICD-10-CM

## 2021-01-22 LAB
EXPIRATION DATE: NORMAL
FLUAV AG NPH QL: NEGATIVE
FLUBV AG NPH QL: NEGATIVE
INTERNAL CONTROL: NORMAL
Lab: NORMAL

## 2021-01-22 PROCEDURE — 99214 OFFICE O/P EST MOD 30 MIN: CPT | Performed by: FAMILY MEDICINE

## 2021-01-22 PROCEDURE — 87804 INFLUENZA ASSAY W/OPTIC: CPT | Performed by: FAMILY MEDICINE

## 2021-01-22 RX ORDER — FUROSEMIDE 40 MG/1
TABLET ORAL
Qty: 180 TABLET | Refills: 3
Start: 2021-01-22 | End: 2021-03-24 | Stop reason: HOSPADM

## 2021-01-22 NOTE — PROGRESS NOTES
"No chief complaint on file.      Subjective     Elaine Toscano is a 70 y.o. female. Patient here complaining of low grade fever 99 (normal 96.5), excessive sweating,along with feeling very weak and tired.  She reports when she coughs she gets up clear fluid not.  Has not had exposure to Covid or other infections that she is aware of, states symptoms started Monday night, 4 days ago.  Daughter started with  similar symptoms later same day, had not had earlier exposure except on Monday with daughter.  She does have caregivers coming into her home and did go to pain management on Monday.    She reports nausea and vomiting started 2 weeks ago, not an uncommon thing for her and she has been using Zofran with some improvement    Low appitite, no weight loss, does report increased swelling and her legs especially her left ankle.  No redness or increased warmth.  She claims to prop her feet in recliner most of the day.  Also reports urinary incontinence and frequency again not an uncommon symptom for her and denies any dysuria or other symptoms of urinary tract infection.      The following portions of the patient's history were reviewed and updated as appropriate: allergies, current medications, past family history, past medical history, past social history, past surgical history and problem list.    Current Outpatient Medications on File Prior to Visit   Medication Sig   • alendronate (FOSAMAX) 70 MG tablet Take 1 tablet by mouth Every 7 (Seven) Days. Take with 8 ounces of water first thing in the morning.  remain upright for 30 minutes   • allopurinol (ZYLOPRIM) 100 MG tablet TAKE TWO TABLETS BY MOUTH AT BEDTIME   • aspirin (ASPIR) 81 MG EC tablet Take 81 mg by mouth Daily.   • B-D 3CC LUER-CHRIS SYR 25GX1\" 25G X 1\" 3 ML misc USE AS DIRECTED EVERY 30 DAYS   • B-D 3CC LUER-CHRIS SYR 25GX1\" 25G X 1\" 3 ML misc USE AS DIRECTED EVERY 30 DAYS   • busPIRone (BUSPAR) 10 MG tablet Take 1 tablet by mouth 2 (Two) Times a Day.   • calcium " "carb-cholecalciferol (CALCIUM PLUS VITAMIN D3) 600-800 MG-UNIT tablet Take 600 mg by mouth 2 (Two) Times a Day With Meals.   • cyanocobalamin 1000 MCG/ML injection inject ONE ML INTRAMUSCULARLY every MONTH   • desmopressin (DDAVP) 0.2 MG tablet Take 1 tablet by mouth every night at bedtime.   • desvenlafaxine (PRISTIQ) 50 MG 24 hr tablet TAKE ONE TABLET BY MOUTH EVERY MORNING   • dicyclomine (BENTYL) 10 MG capsule Take 10 mg by mouth 4 (Four) Times a Day.   • HYDROcodone-acetaminophen (NORCO)  MG per tablet Take 1 oral tablet 4 times a day as needed for pain. dose increase, d/c previous dose   • linaclotide (LINZESS) 72 MCG capsule capsule Take 1 capsule by mouth Daily.   • lisinopril (PRINIVIL,ZESTRIL) 20 MG tablet Take 1 tablet by mouth Daily.   • melatonin ([employer]S Melatonin) 5 MG tablet tablet Take 1 tablet by mouth Every Night.   • Needle, Disp, 24G X 1\" misc 1 each Every 30 (Thirty) Days.   • nystatin (MYCOSTATIN) 993508 UNIT/GM powder APPLY TO THE AFFECTED AREA(S) BY TOPICAL ROUTE 2 TIMES PER DAY   • omeprazole (priLOSEC) 40 MG capsule Take 40 mg by mouth 2 (Two) Times a Day.   • ondansetron (ZOFRAN) 4 MG tablet TAKE ONE TABLET BY MOUTH AS NEEDED FOR NAUSEA AND VOMITING   • potassium chloride (K-DUR) 10 MEQ CR tablet Take 10 mEq by mouth Daily.   • propranolol (INDERAL) 80 MG tablet TAKE ONE TABLET BY MOUTH THREE TIMES DAILY   • QUEtiapine (SEROquel) 100 MG tablet TAKE ONE TABLET BY MOUTH every night   • sucralfate (CARAFATE) 1 g tablet Take 1 g by mouth 4 (Four) Times a Day.   • vitamin D (ERGOCALCIFEROL) 1.25 MG (48623 UT) capsule capsule TAKE ONE CAPSULE BY MOUTH one time PER WEEK   • [DISCONTINUED] furosemide (LASIX) 40 MG tablet TAKE ONE TABLET BY MOUTH TWICE DAILY   • Symbicort 160-4.5 MCG/ACT inhaler INHALE TWO PUFFS TWICE DAILY   • [DISCONTINUED] allopurinol (ZYLOPRIM) 100 MG tablet TAKE TWO TABLETS BY MOUTH AT BEDTIME   • [DISCONTINUED] budesonide-formoterol (SYMBICORT) 160-4.5 MCG/ACT inhaler " "Inhale 2 puffs 2 (Two) Times a Day.   • [DISCONTINUED] cyanocobalamin 1000 MCG/ML injection inject ONE ML INTRAMUSCULARLY every MONTH   • [DISCONTINUED] lisinopril (PRINIVIL,ZESTRIL) 20 MG tablet Take 1 tablet by mouth Daily.   • [DISCONTINUED] ondansetron (ZOFRAN) 4 MG tablet Take 1 tablet by mouth As Needed for Nausea or Vomiting.   • [DISCONTINUED] Syringe, Disposable, (SYRINGE 2-3 ML) 3 ML misc 1 each Every 30 (Thirty) Days.     No current facility-administered medications on file prior to visit.      Medications Discontinued During This Encounter   Medication Reason   • furosemide (LASIX) 40 MG tablet Reorder       Review of Systems     Objective   Vitals:    01/22/21 1404   BP: (!) 152/107   BP Location: Left arm   Patient Position: Sitting   Cuff Size: Adult   Pulse: 87   Temp: 97.8 °F (36.6 °C)   TempSrc: Infrared   SpO2: 97%   Weight: 121 kg (267 lb)   Height: 157.5 cm (62.01\")      Body mass index is 48.82 kg/m².    Physical Exam  Vitals signs and nursing note reviewed.   Constitutional:       General: She is not in acute distress.     Appearance: She is well-developed.   HENT:      Head: Normocephalic and atraumatic.   Cardiovascular:      Rate and Rhythm: Normal rate and regular rhythm.      Heart sounds: No murmur.   Pulmonary:      Effort: Pulmonary effort is normal.      Breath sounds: Normal breath sounds. No wheezing.   Musculoskeletal: Normal range of motion.      Right lower leg: Edema present.      Left lower leg: Edema present.      Comments: 2+ pitting edema of left and 1+ pitting edema of the right lower extremity at foot and ankle.  There is no erythema, increased warmth, or evidence of skin breakdown or infection   Skin:     General: Skin is warm and dry.      Findings: No rash.   Neurological:      Mental Status: She is alert and oriented to person, place, and time.         Lab Results   Component Value Date     (H) 11/17/2020    BUN 19 11/17/2020    CREATININE 1.61 (H) 11/17/2020    " EGFRIFNONA 32 (L) 11/17/2020    EGFRIFAFRI 37 (L) 11/17/2020     11/17/2020    K 4.7 11/17/2020    CL 99 11/17/2020    CALCIUM 9.5 11/17/2020      Lab Results   Component Value Date    HGBA1C 5.8 (H) 09/01/2020    HGBA1C 5.8 (H) 06/01/2020    HGBA1C 5.8 (H) 11/05/2019      Lab Results   Component Value Date    RAPFLUA Negative 01/22/2021    RAPFLUB Negative 01/22/2021       Procedures     Assessment/Plan   Diagnoses and all orders for this visit:    1. Fever, unspecified fever cause (Primary)  -     COVID-19,[facility] ROUTINE, NP/OP SWAB IN TRANSPORT MEDIA OR ESWAB 72 HR TAT - Swab, Nasopharynx  -     POC Influenza A / B  -     Cancel: Comprehensive Metabolic Panel  -     Cancel: CBC & Differential  -     CBC & Differential; Future  -     Comprehensive Metabolic Panel; Future    2. Other fatigue  -     COVID-19,[facility] ROUTINE, NP/OP SWAB IN TRANSPORT MEDIA OR ESWAB 72 HR TAT - Swab, Nasopharynx  -     POC Influenza A / B  -     Cancel: Comprehensive Metabolic Panel  -     Cancel: CBC & Differential  -     CBC & Differential; Future  -     Comprehensive Metabolic Panel; Future    3. Localized edema  -     furosemide (LASIX) 40 MG tablet; 2 in am 1 at 1 pm  Dispense: 180 tablet; Refill: 3    4. Hyperglycemia  -     Hemoglobin A1c; Future    Cough, fatigue, and increased lower extremity edema in a patient with history of congestive heart failure and chronic renal impairment.  Testing for Covid but suspect symptoms may not be infectious but rather congestive heart failure.  If Covid test is negative and symptoms are not improving by Monday, need to look for other causes of infection and if need to stay on higher dose diuretic would want to check renal function advised to continue Zofran, Tylenol, increase fluids, increase Lasix from 80 mg daily to 80 mg in morning and 40 mg in afternoon.    Medications Discontinued During This Encounter   Medication Reason   • furosemide (LASIX) 40 MG tablet Reorder          Return  in about 1 week (around 1/29/2021) for Recheck lower extremity edema, fatigue, hyperglycemia etc..    Education reference material relevant to visit available in AVS through [employer] or printed copy given.

## 2021-01-24 LAB — SARS-COV-2 RNA RESP QL NAA+PROBE: NOT DETECTED

## 2021-01-26 RX ORDER — POTASSIUM CHLORIDE 750 MG/1
10 TABLET, FILM COATED, EXTENDED RELEASE ORAL DAILY
Qty: 30 TABLET | Refills: 5 | Status: CANCELLED | OUTPATIENT
Start: 2021-01-26

## 2021-01-28 ENCOUNTER — TELEPHONE (OUTPATIENT)
Dept: FAMILY MEDICINE CLINIC | Facility: CLINIC | Age: 71
End: 2021-01-28

## 2021-01-28 NOTE — TELEPHONE ENCOUNTER
Spoke with patient, informed her that the Covid vaccine is recommended, patient voiced understanding

## 2021-01-28 NOTE — TELEPHONE ENCOUNTER
Pt calling in to ask if it is safe for her to get the COVID vaccine since she has numerous drug allergies.

## 2021-02-01 ENCOUNTER — OFFICE VISIT (OUTPATIENT)
Dept: FAMILY MEDICINE CLINIC | Facility: CLINIC | Age: 71
End: 2021-02-01

## 2021-02-01 VITALS
BODY MASS INDEX: 49.13 KG/M2 | WEIGHT: 267 LBS | HEART RATE: 88 BPM | SYSTOLIC BLOOD PRESSURE: 141 MMHG | OXYGEN SATURATION: 94 % | DIASTOLIC BLOOD PRESSURE: 99 MMHG | HEIGHT: 62 IN | TEMPERATURE: 98 F

## 2021-02-01 DIAGNOSIS — R30.0 DYSURIA: ICD-10-CM

## 2021-02-01 DIAGNOSIS — N30.00 ACUTE CYSTITIS WITHOUT HEMATURIA: Primary | ICD-10-CM

## 2021-02-01 DIAGNOSIS — R19.7 DIARRHEA, UNSPECIFIED TYPE: ICD-10-CM

## 2021-02-01 DIAGNOSIS — N18.4 RENAL FAILURE, CHRONIC, STAGE 4 (SEVERE) (HCC): ICD-10-CM

## 2021-02-01 LAB
BILIRUB BLD-MCNC: NEGATIVE MG/DL
CLARITY, POC: ABNORMAL
COLOR UR: YELLOW
GLUCOSE UR STRIP-MCNC: NEGATIVE MG/DL
KETONES UR QL: NEGATIVE
LEUKOCYTE EST, POC: ABNORMAL
NITRITE UR-MCNC: POSITIVE MG/ML
PH UR: 6 [PH] (ref 5–8)
PROT UR STRIP-MCNC: NEGATIVE MG/DL
RBC # UR STRIP: ABNORMAL /UL
SP GR UR: 1.02 (ref 1–1.03)
UROBILINOGEN UR QL: NORMAL

## 2021-02-01 PROCEDURE — 99213 OFFICE O/P EST LOW 20 MIN: CPT | Performed by: FAMILY MEDICINE

## 2021-02-01 PROCEDURE — 81003 URINALYSIS AUTO W/O SCOPE: CPT | Performed by: FAMILY MEDICINE

## 2021-02-01 RX ORDER — CEPHALEXIN 500 MG/1
500 CAPSULE ORAL 2 TIMES DAILY
Qty: 14 CAPSULE | Refills: 0 | Status: SHIPPED | OUTPATIENT
Start: 2021-02-01 | End: 2021-03-03

## 2021-02-02 ENCOUNTER — APPOINTMENT (OUTPATIENT)
Dept: ONCOLOGY | Facility: CLINIC | Age: 71
End: 2021-02-02

## 2021-02-02 ENCOUNTER — TELEPHONE (OUTPATIENT)
Dept: ONCOLOGY | Facility: CLINIC | Age: 71
End: 2021-02-02

## 2021-02-02 LAB
BUN SERPL-MCNC: 17 MG/DL (ref 8–27)
BUN/CREAT SERPL: 10 (ref 12–28)
CALCIUM SERPL-MCNC: 9.2 MG/DL (ref 8.7–10.3)
CHLORIDE SERPL-SCNC: 101 MMOL/L (ref 96–106)
CO2 SERPL-SCNC: 27 MMOL/L (ref 20–29)
CREAT SERPL-MCNC: 1.69 MG/DL (ref 0.57–1)
GLUCOSE SERPL-MCNC: 108 MG/DL (ref 65–99)
POTASSIUM SERPL-SCNC: 4.2 MMOL/L (ref 3.5–5.2)
SODIUM SERPL-SCNC: 144 MMOL/L (ref 134–144)

## 2021-02-02 NOTE — TELEPHONE ENCOUNTER
PATIENT CALLING    PATIENT HAS BEEN SICK ALL NIGHT AND CANCELLED HER APPT FOR TODAY AND RESCHEDULED FOR 2-9-21.

## 2021-02-03 DIAGNOSIS — R11.0 NAUSEA: ICD-10-CM

## 2021-02-03 RX ORDER — ONDANSETRON 4 MG/1
TABLET, FILM COATED ORAL
Qty: 30 TABLET | Refills: 1 | Status: SHIPPED | OUTPATIENT
Start: 2021-02-03

## 2021-02-05 DIAGNOSIS — F33.2 SEVERE EPISODE OF RECURRENT MAJOR DEPRESSIVE DISORDER, WITHOUT PSYCHOTIC FEATURES (HCC): ICD-10-CM

## 2021-02-05 RX ORDER — QUETIAPINE FUMARATE 100 MG/1
100 TABLET, FILM COATED ORAL
Qty: 30 TABLET | Refills: 3 | Status: SHIPPED | OUTPATIENT
Start: 2021-02-05 | End: 2021-03-03 | Stop reason: SDUPTHER

## 2021-02-05 NOTE — TELEPHONE ENCOUNTER
PT CALLED STATING THE PHARMACY TOLD HER THE SEROQUEL IS NOT DUE UNTIL THE 12TH BUT SHE IS COMPLETELY OUT. STATES SHE ONLY TAKES ONE A NIGHT. SHE IS REQUESTING FOR A REFILL TO BE SENT.   SHE IS ALSO REQUESTING A CALL BACK TO DISCUSS POSSIBLE BROKEN GLASS IN HER FOOT.

## 2021-02-05 NOTE — TELEPHONE ENCOUNTER
Spoke with patient and informed her she probably needs to be seen at an urgent care to get the glass out and make sure it's not infected.

## 2021-02-05 NOTE — PROGRESS NOTES
Hematology-Oncology Follow-up Note       Elaine Toscano  1950    Primary Care Physician: Josefina Landeros MD  Referring Physician: Josefina Landeros*    Reason For Visit:  No chief complaint on file.      HPI:    Ms. Toscano is referred for evaluation of anemia.  She has a history of chronic kidney disease, congestive heart failure, hypertension, osteopenia.  CBC done at [facility] in January 2017 showed hemoglobin 9.7.  She was being followed recently at Dr. Morales’s office and CBC was repeated on 4/11/17 and, at that time, hemoglobin was 11.5.  Patient had an EGD and colonoscopy done by Dr. Cisse that showed erosive esophagitis, gastritis and duodenitis.  Colonoscopy was normal.  Patient has a history of gastric bypass.  The patient was started on iron supplements in April 2017 by Dr. Morales and her hemoglobin improved since then.  Her iron stores also improved in April 2017.    • 1/20/17 - PTH intact 124 (H).  Serum protein electrophoresis shows hypoalbuminemia.  • 1/21/17 - WBC 4.9, hemoglobin 9.5, platelet count 190,000.  Creatinine 3.3, BUN 73.  • 1/24/17 - Creatinine 2.1, BUN 37.    • 3/6/17 - EGD showed grade 3 to grade 4 erosive esophagitis and duodenitis.  Hayden-en-Y gastric bypass.  Colonoscopy was negative, but had poor prep.    • 4/7/17 - B12 >1500.  Ferritin 72.  Folate 8.1.  Iron saturation 23%, TIBC 299, serum Iron 68.    • 4/11/17 - WBC 5.5, hemoglobin 11.5, 0lct 274,000, MCV 93.4.  Retic count 1.7%.    4/25/2017 - The patient presents for initial consultation.  She denies any bleeding per rectum.  She has been taking one iron tablet since March.  She reports giving numerous stool sample tests for diagnosing occult blood, in the past, at Dr. Morales’s office.  She does have some chronic lower extremity edema due to CHF.  She has CKD and follows up with nephrologist, Dr. Tanmay Carroll.  • 7/5/17 - WBC 3.7, hemoglobin 11.2, platelet count 194,000, MCV 94.2.   Creatinine 1.97.  Serum iron 66, TIBC 272, iron saturation 24%.    • 8/23/17 - DEXAscan shows osteoporosis with a T-score of -2.8.    • 10/20/17 - Doppler of the lower extremities:  Normal.  No evidence of DVT.    • 11/13/17 - Creatinine 1.75.  Ferritin 156.  Serum iron 92, iron saturation 33%, TIBC 186.  WBC 4.2, hemoglobin 11.0, platelet count 229,000, MCV 94.9.    • 1/24/18 - Colonoscopy by Dr. Cisse:  Colon shows diverticulosis and internal hemorrhoids.    • 6/13/18 - Creatinine 1.93.  WBC 4.1, hemoglobin 11.2, platelet count 209,000, MCV 91.9.  Ferritin 258.  Iron saturation 39, serum 100, TIBC 158.  PTH intact 119.   • 8/3/18 - Upper GI endoscopy:  Shows mild chronic gastritis in the stomach and mild reflux esophagitis within the esophagus.     • 12/11/18 - Creatinine 1.8, BUN 24.  WBC 3.8, hemoglobin 11.1, platelet count 202,000, MCV 93.9.  Ferritin 203.  Iron saturation 26%, TIBC 255, serum iron 66.    • 12/14/18 - Cardiac stress test:  EF 52%.  Imaging shows inferior wall MI versus ischemia.  •  White count 7.4 hemoglobin 11.4 platelet count 232 MCV is 92.8  ferritin 224 serum iron 45 low, iron saturation 17% TIBC's 272 creatinine 1.68 BUN is 25  •  1/25/2020 creatinine 2.23, BUN 48, calcium 8.6, hemoglobin 11.1, hematocrit 36.3, platelets 234,   • 1/17/2020: WBC 5.1, hemoglobin 10.7, MCV 91.5, platelets 205          Subjective: 02/04/20  Here for a follow up appointment. Her female friend is present at today's appointment. She has not needed Procrit, as her HGB has been greater than ten. She is also no longer on an iron supplement. She states she did not get blood work done prior to this visit because she was sick. She states she is still having some fatigue and SOA. She also complains of back pain at today's visit. She sates that she has had diarrhea the past few days.   Patient denies noticing any blood in the stool or bleeding per rectum.    The following portions of the patient's history were reviewed  "and updated as appropriate: allergies, current medications, past family history, past medical history, past social history, past surgical history and problem list.     Past Medical History:   Diagnosis Date   • Anemia    • Broken foot     right   • Broken nose 07/2017   • CHF (congestive heart failure) (CMS/[facility])    • CHF (congestive heart failure) (CMS/[facility])    • Chronic low back pain    • CRI (chronic renal insufficiency)    • Depression    • DJD (degenerative joint disease)    • Fracture of ankle 8/12/2016   • GERD (gastroesophageal reflux disease)    • Gout    • Hypertension    • Hypothyroidism    • CRUZ (obstructive sleep apnea)     non-complaince with bipap   • Osteopenia    • Renal failure     stage 3   • Suicide attempt (CMS/[facility])    • TMJ (dislocation of temporomandibular joint)        Past Surgical History:   Procedure Laterality Date   • APPENDECTOMY  1986   • BACK SURGERY      2004---2005 L5-S1 spinal stenosis   • CARDIAC CATHETERIZATION  04/2018   • CHOLECYSTECTOMY  2008   • EPIDURAL BLOCK     • FOOT SURGERY Bilateral 2008   • FOOT SURGERY Bilateral 09/2019    Dr. Keenan- 2nd toe on bilateral feet    • GASTRIC BYPASS  2007   • INSERT / REPLACE / REMOVE PACEMAKER     • LAPAROSCOPIC GASTRIC BANDING  2004    removed   • OTHER SURGICAL HISTORY  01/2018    myelogram   • TOTAL KNEE ARTHROPLASTY Left          Current Outpatient Medications:   •  alendronate (FOSAMAX) 70 MG tablet, Take 1 tablet by mouth Every 7 (Seven) Days. Take with 8 ounces of water first thing in the morning.  remain upright for 30 minutes, Disp: 13 tablet, Rfl: 3  •  allopurinol (ZYLOPRIM) 100 MG tablet, TAKE TWO TABLETS BY MOUTH AT BEDTIME, Disp: 180 tablet, Rfl: 1  •  aspirin (ASPIR) 81 MG EC tablet, Take 81 mg by mouth Daily., Disp: , Rfl:   •  B-D 3CC LUER-CHRIS SYR 25GX1\" 25G X 1\" 3 ML misc, USE AS DIRECTED EVERY 30 DAYS, Disp: , Rfl:   •  B-D 3CC LUER-CHRIS SYR 25GX1\" 25G X 1\" 3 ML misc, USE AS DIRECTED EVERY 30 DAYS, Disp: 3 each, Rfl: 3  • " " busPIRone (BUSPAR) 10 MG tablet, Take 1 tablet by mouth 2 (Two) Times a Day., Disp: 60 tablet, Rfl: 6  •  calcium carb-cholecalciferol (CALCIUM PLUS VITAMIN D3) 600-800 MG-UNIT tablet, Take 600 mg by mouth 2 (Two) Times a Day With Meals., Disp: , Rfl:   •  cephalexin (Keflex) 500 MG capsule, Take 1 capsule by mouth 2 (Two) Times a Day., Disp: 14 capsule, Rfl: 0  •  cyanocobalamin 1000 MCG/ML injection, inject ONE ML INTRAMUSCULARLY every MONTH, Disp: 3 mL, Rfl: 3  •  desmopressin (DDAVP) 0.2 MG tablet, Take 1 tablet by mouth every night at bedtime., Disp: , Rfl:   •  desvenlafaxine (PRISTIQ) 50 MG 24 hr tablet, TAKE ONE TABLET BY MOUTH EVERY MORNING, Disp: 90 tablet, Rfl: 3  •  dicyclomine (BENTYL) 10 MG capsule, Take 10 mg by mouth 4 (Four) Times a Day., Disp: , Rfl: 0  •  furosemide (LASIX) 40 MG tablet, 2 in am 1 at 1 pm (Patient taking differently: 1 in am 1 at 1 pm), Disp: 180 tablet, Rfl: 3  •  HYDROcodone-acetaminophen (NORCO)  MG per tablet, Take 1 oral tablet 4 times a day as needed for pain. dose increase, d/c previous dose, Disp: , Rfl:   •  linaclotide (LINZESS) 72 MCG capsule capsule, Take 1 capsule by mouth Daily., Disp: 90 capsule, Rfl: 3  •  lisinopril (PRINIVIL,ZESTRIL) 20 MG tablet, Take 1 tablet by mouth Daily., Disp: 30 tablet, Rfl: 12  •  melatonin (CVS Melatonin) 5 MG tablet tablet, Take 1 tablet by mouth Every Night., Disp: 30 tablet, Rfl: 12  •  Needle, Disp, 24G X 1\" misc, 1 each Every 30 (Thirty) Days., Disp: 100 each, Rfl: 1  •  nystatin (MYCOSTATIN) 632178 UNIT/GM powder, APPLY TO THE AFFECTED AREA(S) BY TOPICAL ROUTE 2 TIMES PER DAY, Disp: 60 g, Rfl: 3  •  omeprazole (priLOSEC) 40 MG capsule, Take 40 mg by mouth 2 (Two) Times a Day., Disp: , Rfl: 5  •  ondansetron (ZOFRAN) 4 MG tablet, TAKE ONE TABLET BY MOUTH AS NEEDED FOR NAUSEA AND VOMITING, Disp: 30 tablet, Rfl: 1  •  potassium chloride (K-DUR) 10 MEQ CR tablet, Take 10 mEq by mouth Daily., Disp: , Rfl: 5  •  propranolol " (INDERAL) 80 MG tablet, TAKE ONE TABLET BY MOUTH THREE TIMES DAILY, Disp: 90 tablet, Rfl: 3  •  QUEtiapine (SEROquel) 100 MG tablet, TAKE ONE TABLET BY MOUTH every night, Disp: 30 tablet, Rfl: 3  •  sucralfate (CARAFATE) 1 g tablet, Take 1 g by mouth 4 (Four) Times a Day., Disp: , Rfl: 1  •  Symbicort 160-4.5 MCG/ACT inhaler, INHALE TWO PUFFS TWICE DAILY, Disp: 10.2 g, Rfl: 12  •  vitamin D (ERGOCALCIFEROL) 1.25 MG (18908 UT) capsule capsule, TAKE ONE CAPSULE BY MOUTH one time PER WEEK, Disp: 5 capsule, Rfl: 12    Allergies   Allergen Reactions   • Azithromycin Unknown (See Comments)   • Codeine Nausea And Vomiting   • Dilaudid  [Hydromorphone Hcl] Unknown (See Comments)   • Isosorbide Nitrate Other (See Comments)   • Macrobid  [Nitrofurantoin] Rash   • Morphine Rash   • Other Itching   • Oxycontin  [Oxycodone] Delirium   • Pineapple Other (See Comments)     Mouth numbness   • Povidone Iodine Rash   • Tetanus Antitoxin Rash   • Denosumab Other (See Comments)   • Demerol [Meperidine] Unknown (See Comments)     Pt does not remember. She states it was a long time ago   • Etodolac Other (See Comments)   • Lodrane D [Brompheniramine-Pseudoeph] Other (See Comments)     drowsiness   • Shrimp Flavor Swelling     Sauce only    • Sulfa Antibiotics Unknown (See Comments)   • Tizanidine Other (See Comments)     Increased pain   • Imipramine Palpitations   • Iodine Itching and Rash       Family History   Problem Relation Age of Onset   • Hypertension Father         PM implanted   • Prostate cancer Father    • Arthritis Sister    • Heart disease Sister    • Lung disease Paternal Grandmother    • COPD Other    • Lung disease Other        Cancer-related family history includes Prostate cancer in her father.    Social History     Tobacco Use   • Smoking status: Never Smoker   • Smokeless tobacco: Never Used   Substance Use Topics   • Alcohol use: No     Frequency: Never   • Drug use: No         ROS:       Objective:    There were no  vitals filed for this visit.    (1) Restricted in physically strenuous activity, ambulatory and able to do work of light nature    Physical Exam:     Physical Exam   Constitutional: She is oriented to person, place, and time. She appears well-developed. No distress.   OBESE   HENT:   Head: Normocephalic and atraumatic.   Eyes: Conjunctivae are normal. Right eye exhibits no discharge. No scleral icterus.   Neck: Normal range of motion. Neck supple. No thyromegaly present.   Cardiovascular: Normal rate, regular rhythm and normal heart sounds. Exam reveals no gallop and no friction rub.   Pulmonary/Chest: Effort normal. No stridor. No respiratory distress. She has no wheezes.   Abdominal: Soft. Bowel sounds are normal. She exhibits no mass. There is no abdominal tenderness. There is no rebound and no guarding.   Musculoskeletal: Normal range of motion. No tenderness.   Lymphadenopathy:     She has no cervical adenopathy.   Neurological: She is alert and oriented to person, place, and time. She exhibits normal muscle tone.   Skin: Skin is warm. No rash noted. She is not diaphoretic. No erythema.   Psychiatric: Her behavior is normal.   Nursing note and vitals reviewed.        Lab Results - Last 18 Months   Lab Units 01/25/21  1056 06/01/20  1012 04/25/20  0256   WBC x10E3/uL 6.4 7.4 5.30   HEMOGLOBIN g/dL 11.3 11.6 10.6*   HEMATOCRIT % 35.8 36.5 32.8*   PLATELETS x10E3/uL 277 266 209   MCV fL 87 90 89.5     Lab Results - Last 18 Months   Lab Units 02/01/21  1133 01/25/21  1056 11/17/20  1150 09/01/20  1559 06/01/20  1012 04/25/20  0256 04/24/20  1935   SODIUM mmol/L 144 142 143 139 142 143 141   POTASSIUM mmol/L 4.2 3.1* 4.7 3.8 3.5 3.7 4.5   CHLORIDE mmol/L 101 100 99 96 100 101 99   TOTAL CO2 mmol/L 27 26 26 26 25  --   --    CO2 mmol/L  --   --   --   --   --  32.0* 30.0*   BUN mg/dL 17 21 19 21 18 26* 26*   CREATININE mg/dL 1.69* 1.73* 1.61* 1.32* 1.26* 1.46* 1.60*   CALCIUM mg/dL 9.2 8.4* 9.5 9.0 9.1 8.5* 8.7    BILIRUBIN mg/dL  --  0.3  --  0.3 0.3  --   --    ALK PHOS IU/L  --  114  --  109 111  --   --    ALT (SGPT) IU/L  --  7  --  10 9  --   --    AST (SGOT) IU/L  --  11  --  11 13  --   --    GLUCOSE mg/dL  --   --   --   --   --  98 103*       Assessment/Plan     Assessment:    1. Anemia of chronic kidney disease:  Hemoglobin has been staying above 10, and close to 11.  She has a history of gastric bypass, gastritis and esophagitis, and therefore is also at risk of iron deficiency.  She has CKD stage 4 causing her anemia.  She is now off iron supplements.  Colonoscopy showed diverticulosis and internal hemorrhoids.    2. Chronic kidney disease:  Creatinine is stable around 1.9.    3. History of iron deficiency: Currently she is off iron.  Recent ferritin is in the high normal range, but iron is a little low..  She is currently not on any oral iron.    PLAN:  1. No need for Procrit, since hemoglobin is above 10.    2.  We will need to monitor iron levels and replace as needed..  3. Patient reports hematuria, advised to continue follow-up with urologist and her nephrologist.  4. Repeat CBC and iron panel in 1 year.      No orders of the defined types were placed in this encounter.               Thank you very much for providing the opportunity to participate in this patient’s care. Please do not hesitate to call if there are any other questions

## 2021-02-05 NOTE — TELEPHONE ENCOUNTER
Please inform patient I have sent in refill with note that it is okay by me to fill Seroquel early but I cannot override insurance coverage.  As far as possible glass in her foot if she has not already been to urgent care can offer her appointment this afternoon (I had a cancellation at 4:00)

## 2021-02-05 NOTE — TELEPHONE ENCOUNTER
Spoke with patient, she actually states to have gotten the piece of glass out of her foot and it's feeling much better, so she didn't go to urgent care and states will let us know if it gets worse.

## 2021-02-09 ENCOUNTER — APPOINTMENT (OUTPATIENT)
Dept: ONCOLOGY | Facility: CLINIC | Age: 71
End: 2021-02-09

## 2021-02-10 ENCOUNTER — TELEPHONE (OUTPATIENT)
Dept: FAMILY MEDICINE CLINIC | Facility: CLINIC | Age: 71
End: 2021-02-10

## 2021-02-10 DIAGNOSIS — E87.6 HYPOKALEMIA: ICD-10-CM

## 2021-02-10 DIAGNOSIS — N18.4 RENAL FAILURE, CHRONIC, STAGE 4 (SEVERE) (HCC): Primary | ICD-10-CM

## 2021-02-10 RX ORDER — POTASSIUM CHLORIDE 750 MG/1
10 TABLET, FILM COATED, EXTENDED RELEASE ORAL 2 TIMES DAILY
Qty: 180 TABLET | Refills: 1 | Status: SHIPPED | OUTPATIENT
Start: 2021-02-10 | End: 2021-04-09 | Stop reason: ALTCHOICE

## 2021-02-10 NOTE — TELEPHONE ENCOUNTER
Spoke with Kamille and informed here from previous notes that patient should be taking 4 times a day, and she stated thought that as well but patient told her no and she wasn't going to take it 4 times a day, but Kamille did state she would need refill so I have pended that for you.

## 2021-02-10 NOTE — TELEPHONE ENCOUNTER
DELICIA WITH [facility] CALLED AND NEEDS TO VERIFY DOSAGE FOR PATIENT'S PRESCRIPTION potassium chloride (K-DUR) 10 MEQ CR tablet  EITHER 1 TIME A DAY OR 4 TIMES A DAY.    DELICIA CAN BE REACHED AT HER CELL TODAY [phone] OR OFFICE [phone]  OK TO LEAVE A VERBAL

## 2021-02-12 NOTE — TELEPHONE ENCOUNTER
Spoke with Kamille again, and made her aware of the potassium change and for patient to come by next week for lab drawl.

## 2021-02-22 ENCOUNTER — TELEPHONE (OUTPATIENT)
Dept: FAMILY MEDICINE CLINIC | Facility: CLINIC | Age: 71
End: 2021-02-22

## 2021-02-22 NOTE — TELEPHONE ENCOUNTER
Received a phone call from patients daughter in law Earnestine Toscano, She wanted to let  know that Elaine fell this morning and the police had to come and kick her door in, when Earnestine showed up she said that Lindas pills were scattered all over the floor and was laying in her urine. Earnestine states that Elaine is not acting right and was taken to [facility] but are going to try and get her transferred to [facility]. Earnestine just wanted to let  know what was going on and will call and schedule a hospital follow up if need be.

## 2021-02-25 ENCOUNTER — TELEPHONE (OUTPATIENT)
Dept: FAMILY MEDICINE CLINIC | Facility: CLINIC | Age: 71
End: 2021-02-25

## 2021-02-25 NOTE — TELEPHONE ENCOUNTER
LUIS FROM [facility] IS REQUESTING A CALL BACK. PATIENT IS CURRENTLY STAYING WITH FAMILY. SHE IS IN HER CERTIFICATION WITH [facility]. LUIS STATED SHE HAS HAD TO CANCEL ALL THE APPOINTMENTS FOR THE PATIENT. LUIS STATED THEY CANNOT WAIT UNTIL HER APPOINTMENT. LUIS IS NEEDING TO KNOW WHAT THE PATIENT PLANS TO DO AND IF REVA [facility] IS AWARE OF WHAT'S GOING ON.     PLEASE ADVISE.     CALLBACK NUMBER - LUIS [phone]

## 2021-02-26 NOTE — TELEPHONE ENCOUNTER
I have reviewed these notes, patient is scheduled with me on March 3.  I will discuss with her at that time.

## 2021-02-26 NOTE — TELEPHONE ENCOUNTER
I spoke with the home health this morning and they are just concerned this is the family that todd has said she does not want to stay with. They will not even let the home health talk to todd when the care. Home health is concerned also do to it is time for her to recert and if they don't get her recerted before the expiration date than they will have to discharge her from their care and they don't want to do that.

## 2021-03-03 ENCOUNTER — OFFICE VISIT (OUTPATIENT)
Dept: FAMILY MEDICINE CLINIC | Facility: CLINIC | Age: 71
End: 2021-03-03

## 2021-03-03 VITALS
OXYGEN SATURATION: 94 % | HEART RATE: 87 BPM | SYSTOLIC BLOOD PRESSURE: 138 MMHG | HEIGHT: 62 IN | BODY MASS INDEX: 46.12 KG/M2 | TEMPERATURE: 96.6 F | RESPIRATION RATE: 18 BRPM | WEIGHT: 250.6 LBS | DIASTOLIC BLOOD PRESSURE: 83 MMHG

## 2021-03-03 DIAGNOSIS — R29.6 FREQUENT FALLS: ICD-10-CM

## 2021-03-03 DIAGNOSIS — F33.2 SEVERE EPISODE OF RECURRENT MAJOR DEPRESSIVE DISORDER, WITHOUT PSYCHOTIC FEATURES (HCC): ICD-10-CM

## 2021-03-03 DIAGNOSIS — I50.9 CONGESTIVE HEART FAILURE, UNSPECIFIED HF CHRONICITY, UNSPECIFIED HEART FAILURE TYPE (HCC): ICD-10-CM

## 2021-03-03 DIAGNOSIS — Z09 HOSPITAL DISCHARGE FOLLOW-UP: Primary | ICD-10-CM

## 2021-03-03 DIAGNOSIS — M1A.30X0 CHRONIC GOUT DUE TO RENAL IMPAIRMENT WITHOUT TOPHUS, UNSPECIFIED SITE: ICD-10-CM

## 2021-03-03 DIAGNOSIS — M51.36 DEGENERATION OF LUMBAR INTERVERTEBRAL DISC: ICD-10-CM

## 2021-03-03 DIAGNOSIS — R26.89 LOSS OF BALANCE: ICD-10-CM

## 2021-03-03 DIAGNOSIS — G47.09 OTHER INSOMNIA: ICD-10-CM

## 2021-03-03 DIAGNOSIS — M48.062 SPINAL STENOSIS OF LUMBAR REGION WITH NEUROGENIC CLAUDICATION: ICD-10-CM

## 2021-03-03 DIAGNOSIS — Z74.09 MOBILITY POOR: ICD-10-CM

## 2021-03-03 DIAGNOSIS — J30.9 ALLERGIC RHINITIS, UNSPECIFIED SEASONALITY, UNSPECIFIED TRIGGER: ICD-10-CM

## 2021-03-03 DIAGNOSIS — R07.89 CHEST DISCOMFORT: ICD-10-CM

## 2021-03-03 DIAGNOSIS — N39.46 MIXED STRESS AND URGE URINARY INCONTINENCE: ICD-10-CM

## 2021-03-03 DIAGNOSIS — I49.5 SICK SINUS SYNDROME (HCC): ICD-10-CM

## 2021-03-03 DIAGNOSIS — M79.89 SWELLING OF LOWER EXTREMITY: ICD-10-CM

## 2021-03-03 DIAGNOSIS — N18.4 RENAL FAILURE, CHRONIC, STAGE 4 (SEVERE) (HCC): ICD-10-CM

## 2021-03-03 DIAGNOSIS — N30.00 ACUTE CYSTITIS WITHOUT HEMATURIA: ICD-10-CM

## 2021-03-03 DIAGNOSIS — E66.01 MORBID OBESITY WITH BMI OF 45.0-49.9, ADULT (HCC): ICD-10-CM

## 2021-03-03 DIAGNOSIS — Z13.220 SCREENING FOR CHOLESTEROL LEVEL: ICD-10-CM

## 2021-03-03 DIAGNOSIS — K21.00 GASTROESOPHAGEAL REFLUX DISEASE WITH ESOPHAGITIS WITHOUT HEMORRHAGE: ICD-10-CM

## 2021-03-03 PROCEDURE — 99215 OFFICE O/P EST HI 40 MIN: CPT | Performed by: FAMILY MEDICINE

## 2021-03-03 PROCEDURE — G2212 PROLONG OUTPT/OFFICE VIS: HCPCS | Performed by: FAMILY MEDICINE

## 2021-03-03 RX ORDER — ALLOPURINOL 100 MG/1
200 TABLET ORAL
Qty: 180 TABLET | Refills: 3 | Status: SHIPPED | OUTPATIENT
Start: 2021-03-03 | End: 2021-06-02 | Stop reason: SDUPTHER

## 2021-03-03 RX ORDER — DESMOPRESSIN ACETATE 0.2 MG/1
0.2 TABLET ORAL
Qty: 30 TABLET | Refills: 12 | Status: SHIPPED | OUTPATIENT
Start: 2021-03-03 | End: 2021-04-09 | Stop reason: ALTCHOICE

## 2021-03-03 RX ORDER — FLUTICASONE PROPIONATE 50 MCG
2 SPRAY, SUSPENSION (ML) NASAL DAILY
Qty: 16 G | Refills: 12 | Status: SHIPPED | OUTPATIENT
Start: 2021-03-03

## 2021-03-03 RX ORDER — CHOLECALCIFEROL (VITAMIN D3) 125 MCG
10 CAPSULE ORAL NIGHTLY
Qty: 60 TABLET | Refills: 12
Start: 2021-03-03

## 2021-03-03 RX ORDER — QUETIAPINE FUMARATE 100 MG/1
100 TABLET, FILM COATED ORAL
Qty: 30 TABLET | Refills: 12 | Status: SHIPPED | OUTPATIENT
Start: 2021-03-03 | End: 2021-03-16 | Stop reason: DRUGHIGH

## 2021-03-03 NOTE — PROGRESS NOTES
Chief Complaint  Hospital Follow Up Visit    Subjective          History of Present Illness  Elaine Toscano presents to [facility] for follow up from the ER at [facility].  Her son Esteban and daughter-in-law Earnestine are attending her for today's visit.  She had fallen and had laid for over 14 hours with medication all round her.  Patient was seen at [facility]'s emergency department on February 22, 2021.  This record has been reviewed.  She was taken by EMS after being found down in her dining room apparently being there overnight and having hit her head and losing consciousness for an unknown known amount of time.  She reports a previous fall, tripping over her wheelchair 4 days earlier left ribs which are still sore but improving.  She was concerned she could have another urinary tract infection and was complaining of rib pain under her left breast.  CT of brain was performed, no acute intracranial posttraumatic abnormality.  Mild atrophy and chronic small vessel ischemic disease reported as stable chronic right sphenoid sinus disease.  Chest x-ray showed subtle opacities in the left lower lung possibly representing atelectasis versus a developing infection.  CBC was normal other than slightly elevated absolute neutrophil count.  Glucose was elevated at 159, BUN 35, creatinine 1.89, GFR 26.  UA was positive for glucose, trace blood, nitrite, small leukocyte, greater than 100 WBCs and 3-10 RBCs per high powered field.  She was diagnosed with pneumonia, UTI, and renal insufficiency and prescribed Levaquin 750 mg 1 p.o. twice daily she completed Levaquin yesterday.  She denies any symptoms of urinary tract infection or pneumonia, other than chest wall pain she denies any chest pain or shortness of breath except her baseline with exertion, no dysuria, no frequency.     She is staying with her family right now, but wants to return to her home. She needs therapy. She has gotten with the home health.  "She wants a hospital bed, stating it is very difficult for her to get out of bed.  She also has history of congestive heart failure, lower extremity edema, and reflux.     Patient is family are requesting a hospital bed, patient has difficulty getting in and out of bed, has a lot of lower extremity pain and low back pain from degenerative disc disease and spinal stenosis and being able to raise the head of her bed in the feet of her bed with help alleviate the pain.  Additionally patient has a history of congestive heart failure and elevating head of the bed above 30 degrees most of the time would help with dyspnea.    Current Outpatient Medications on File Prior to Visit   Medication Sig   • alendronate (FOSAMAX) 70 MG tablet Take 1 tablet by mouth Every 7 (Seven) Days. Take with 8 ounces of water first thing in the morning.  remain upright for 30 minutes   • aspirin (ASPIR) 81 MG EC tablet Take 81 mg by mouth Daily.   • B-D 3CC LUER-CHRIS SYR 25GX1\" 25G X 1\" 3 ML misc USE AS DIRECTED EVERY 30 DAYS   • B-D 3CC LUER-CHRIS SYR 25GX1\" 25G X 1\" 3 ML misc USE AS DIRECTED EVERY 30 DAYS   • busPIRone (BUSPAR) 10 MG tablet Take 1 tablet by mouth 2 (Two) Times a Day.   • calcium carb-cholecalciferol (CALCIUM PLUS VITAMIN D3) 600-800 MG-UNIT tablet Take 600 mg by mouth 2 (Two) Times a Day With Meals.   • cyanocobalamin 1000 MCG/ML injection inject ONE ML INTRAMUSCULARLY every MONTH   • dicyclomine (BENTYL) 10 MG capsule Take 10 mg by mouth 4 (Four) Times a Day.   • furosemide (LASIX) 40 MG tablet 2 in am 1 at 1 pm (Patient taking differently: 1 in am 1 at 1 pm)   • HYDROcodone-acetaminophen (NORCO)  MG per tablet Take 1 oral tablet 4 times a day as needed for pain. dose increase, d/c previous dose   • linaclotide (LINZESS) 72 MCG capsule capsule Take 1 capsule by mouth Daily.   • lisinopril (PRINIVIL,ZESTRIL) 20 MG tablet Take 1 tablet by mouth Daily.   • Needle, Disp, 24G X 1\" misc 1 each Every 30 (Thirty) Days.   • " "nystatin (MYCOSTATIN) 537386 UNIT/GM powder APPLY TO THE AFFECTED AREA(S) BY TOPICAL ROUTE 2 TIMES PER DAY   • omeprazole (priLOSEC) 40 MG capsule Take 40 mg by mouth 2 (Two) Times a Day.   • ondansetron (ZOFRAN) 4 MG tablet TAKE ONE TABLET BY MOUTH AS NEEDED FOR NAUSEA AND VOMITING   • potassium chloride 10 MEQ CR tablet Take 1 tablet by mouth 2 (Two) Times a Day.   • propranolol (INDERAL) 80 MG tablet TAKE ONE TABLET BY MOUTH THREE TIMES DAILY   • sucralfate (CARAFATE) 1 g tablet Take 1 g by mouth 4 (Four) Times a Day.   • Symbicort 160-4.5 MCG/ACT inhaler INHALE TWO PUFFS TWICE DAILY   • vitamin D (ERGOCALCIFEROL) 1.25 MG (45376 UT) capsule capsule TAKE ONE CAPSULE BY MOUTH one time PER WEEK   • [DISCONTINUED] allopurinol (ZYLOPRIM) 100 MG tablet TAKE TWO TABLETS BY MOUTH AT BEDTIME   • [DISCONTINUED] budesonide-formoterol (SYMBICORT) 160-4.5 MCG/ACT inhaler Inhale 2 puffs 2 (Two) Times a Day.   • [DISCONTINUED] cyanocobalamin 1000 MCG/ML injection inject ONE ML INTRAMUSCULARLY every MONTH   • [DISCONTINUED] lisinopril (PRINIVIL,ZESTRIL) 20 MG tablet Take 1 tablet by mouth Daily.   • [DISCONTINUED] ondansetron (ZOFRAN) 4 MG tablet Take 1 tablet by mouth As Needed for Nausea or Vomiting.   • [DISCONTINUED] Syringe, Disposable, (SYRINGE 2-3 ML) 3 ML misc 1 each Every 30 (Thirty) Days.     No current facility-administered medications on file prior to visit.       Objective   Vital Signs:   /83 (BP Location: Left arm, Patient Position: Sitting, Cuff Size: Large Adult)   Pulse 87   Temp 96.6 °F (35.9 °C) (Infrared)   Resp 18   Ht 157.5 cm (62.01\")   Wt 114 kg (250 lb 9.6 oz)   SpO2 94%   BMI 45.82 kg/m²     Physical Exam  Vitals and nursing note reviewed.   Constitutional:       General: She is not in acute distress.     Appearance: She is well-developed. She is obese. She is not ill-appearing.   HENT:      Head: Normocephalic and atraumatic.   Eyes:      General: No scleral icterus.     " Conjunctiva/sclera: Conjunctivae normal.      Comments: Pupils are equal and round   Cardiovascular:      Rate and Rhythm: Normal rate and regular rhythm.   Pulmonary:      Effort: Pulmonary effort is normal.      Breath sounds: Normal breath sounds. No wheezing or rales.   Musculoskeletal:      Comments: Reduced strength of hip flexors and some difficulty standing.   Skin:     General: Skin is dry.   Neurological:      Mental Status: She is alert and oriented to person, place, and time.   Psychiatric:         Mood and Affect: Mood normal.         Behavior: Behavior normal.         Thought Content: Thought content normal.         Judgment: Judgment normal.          Lab Results   Component Value Date     (H) 02/01/2021    BUN 17 02/01/2021    CREATININE 1.69 (H) 02/01/2021    EGFRIFNONA 30 (L) 02/01/2021    EGFRIFAFRI 34 (L) 02/01/2021     02/01/2021    K 4.2 02/01/2021     02/01/2021    CALCIUM 9.2 02/01/2021    ALBUMIN 3.9 01/25/2021    BILITOT 0.3 01/25/2021    ALKPHOS 114 01/25/2021    AST 11 01/25/2021    ALT 7 01/25/2021    WBC 6.4 01/25/2021    RBC 4.13 01/25/2021    HCT 35.8 01/25/2021    MCV 87 01/25/2021    MCH 27.4 01/25/2021        Lab Results   Component Value Date    HGBA1C 5.9 (H) 01/25/2021    HGBA1C 5.8 (H) 09/01/2020    HGBA1C 5.8 (H) 06/01/2020    HGBA1C 5.8 (H) 11/05/2019       Result Review :                       Assessment and Plan    Diagnoses and all orders for this visit:    1. Hospital discharge follow-up (Primary)    2. Acute cystitis without hematuria    3. Congestive heart failure, unspecified HF chronicity, unspecified heart failure type (CMS/[facility])  -     Hospital Bed    4. Sick sinus syndrome (CMS/[facility])    5. Morbid obesity with BMI of 45.0-49.9, adult (CMS/[facility])  -     Hospital Bed    6. Renal failure, chronic, stage 4 (severe) (CMS/[facility])  -     Hospital Bed  -     Comprehensive Metabolic Panel    7. Spinal stenosis of lumbar region with neurogenic claudication  -      Hospital Bed  -     Ambulatory Referral to Physical Therapy Evaluate and treat; Strengthening, ROM    8. Degeneration of lumbar intervertebral disc  -     Hospital Bed  -     Ambulatory Referral to Physical Therapy Evaluate and treat; Strengthening, ROM    9. Swelling of lower extremity  -     Hospital Bed    10. Frequent falls  -     Hospital Bed  -     Ambulatory Referral to Physical Therapy Evaluate and treat; Strengthening, ROM    11. Loss of balance  -     Hospital Bed  -     Ambulatory Referral to Physical Therapy Evaluate and treat; Strengthening, ROM    12. Mobility poor  -     Hospital Bed  -     Ambulatory Referral to Physical Therapy Evaluate and treat; Strengthening, ROM    13. Gastroesophageal reflux disease with esophagitis without hemorrhage  -     Hospital Bed    14. Other insomnia  -     melatonin ([employer] Melatonin) 5 MG tablet tablet; Take 2 tablets by mouth Every Night.  Dispense: 60 tablet; Refill: 12    15. Severe episode of recurrent major depressive disorder, without psychotic features (CMS/HCC)  -     Discontinue: QUEtiapine (SEROquel) 100 MG tablet; Take 1 tablet by mouth every night at bedtime.  Dispense: 30 tablet; Refill: 12    16. Screening for cholesterol level  -     Lipid Panel    17. Chest discomfort    18. Mixed stress and urge urinary incontinence  -     desmopressin (DDAVP) 0.2 MG tablet; Take 1 tablet by mouth every night at bedtime.  Dispense: 30 tablet; Refill: 12    19. Chronic gout due to renal impairment without tophus, unspecified site  -     allopurinol (ZYLOPRIM) 100 MG tablet; Take 2 tablets by mouth every night at bedtime.  Dispense: 180 tablet; Refill: 3    20. Allergic rhinitis, unspecified seasonality, unspecified trigger  -     fluticasone (Flonase) 50 MCG/ACT nasal spray; 2 sprays into the nostril(s) as directed by provider Daily.  Dispense: 16 g; Refill: 12      Recent fall with loss of consciousness with UTI and pneumonia symptomatically improved following treatment  with Levaquin.  Patient has ongoing lower extremity weakness and frequent falls due to spinal stenosis and lumbar disc disease being able to raise the head of her bed  the feet of her bed help alleviate the pain in lower extremity pain and low back pain from degenerative disc disease and spinal stenosis .  Additionally patient has a history of congestive heart failure and elevating head of the bed above 30 degrees most of the time would help with dyspnea. Additionally being able to prop the head of her bed to help with her reflux symptoms and being able to elevate the foot of her bed to help with her edema.  Patient requires frequent position changes to alleviate pain.  I have written an order for hospital bed to assist with these issues.  Additionally I am referring to physical therapy to try to reduce risk of falling and improve lower extremity strength.  Renewing medications as requested as above.  Medications Discontinued During This Encounter   Medication Reason   • cephalexin (Keflex) 500 MG capsule *Therapy completed   • melatonin ([employer] Melatonin) 5 MG tablet tablet    • desmopressin (DDAVP) 0.2 MG tablet Reorder   • allopurinol (ZYLOPRIM) 100 MG tablet Reorder   • QUEtiapine (SEROquel) 100 MG tablet Reorder       I spent 70 minutes caring for Elaine on this date of service. This time includes time spent by me in the following activities:preparing for the visit, reviewing tests, obtaining and/or reviewing a separately obtained history, performing a medically appropriate examination and/or evaluation , counseling and educating the patient/family/caregiver, ordering medications, tests, or procedures, referring and communicating with other health care professionals , documenting information in the medical record and care coordination  Follow Up     Return in about 5 weeks (around 4/9/2021) for as previously scheduled.  Or as needed new concerns.  Patient was given instructions and counseling regarding her  condition or for health maintenance advice. Please see specific information pulled into the AVS if appropriate.

## 2021-03-04 ENCOUNTER — TELEPHONE (OUTPATIENT)
Dept: FAMILY MEDICINE CLINIC | Facility: CLINIC | Age: 71
End: 2021-03-04

## 2021-03-04 NOTE — TELEPHONE ENCOUNTER
PATIENT STATES: that she would like to have home PT please advise      PATIENT CAN BE REACHED ON:[phone]

## 2021-03-05 NOTE — TELEPHONE ENCOUNTER
Spoke with patient and sent it to [facility] to see if they can do her physical therapy

## 2021-03-07 PROBLEM — J30.9 ALLERGIC RHINITIS: Status: ACTIVE | Noted: 2021-03-07

## 2021-03-08 NOTE — TELEPHONE ENCOUNTER
Please contact patient and let her know that [facility] does not have a contract with Medicaid.  Please find out if [facility] or any other nearby facility can offer in home physical therapy.

## 2021-03-08 NOTE — TELEPHONE ENCOUNTER
Gentleman from [facility] calling in stating the Home Health the pt has is medicaid based and they do not do physical therapy. He states an ambulatory referral needs to be placed for physical therapy.

## 2021-03-15 ENCOUNTER — TELEPHONE (OUTPATIENT)
Dept: FAMILY MEDICINE CLINIC | Facility: CLINIC | Age: 71
End: 2021-03-15

## 2021-03-15 DIAGNOSIS — E87.5 HYPERKALEMIA: Primary | ICD-10-CM

## 2021-03-15 DIAGNOSIS — I10 ESSENTIAL HYPERTENSION: ICD-10-CM

## 2021-03-15 NOTE — TELEPHONE ENCOUNTER
[facility] labs called with a critical lab     Patients potassium was 6.5 with labs done on 3/11/21 she is faxing the results over they was done at [facility]

## 2021-03-15 NOTE — TELEPHONE ENCOUNTER
Spoke with patient and family voiced they understood. They will hold the potassium for a week she is taking her lasix. Patient is going to have [facility] her [facility] next week    Patients family states her blood pressures have been low. It is running 90's over 60's. She does take lisinopril 20 and the propanol 80 tid also

## 2021-03-15 NOTE — TELEPHONE ENCOUNTER
Please confirm patient has been taking K-Dur 10 mEq 1 tablet twice daily.  Have her stop immediately.  Make sure she is taking her Lasix.  She will need to repeat a CMP in 1 week.

## 2021-03-15 NOTE — TELEPHONE ENCOUNTER
Tried to [facility] to see what was going on she is a patient already a patient with them and the number was busy

## 2021-03-15 NOTE — TELEPHONE ENCOUNTER
Spoke with [facility] and it was because the family was trying to change things and they are not on her hippa. Shine from [facility] said todd just had to call and change the appt.

## 2021-03-15 NOTE — TELEPHONE ENCOUNTER
Pt's son Esteban calling in stating the pt has an appt with Dr. Cisse in July, but she needs to be seen sooner due to the worsening condition of her throat. Esteban states Dr. Cisse's office advised him to have Dr. Rocha place a referral for the pt, so they could schedule a sooner appt.     Please contact Esteban with any questions @[phone]

## 2021-03-16 DIAGNOSIS — F33.2 SEVERE EPISODE OF RECURRENT MAJOR DEPRESSIVE DISORDER, WITHOUT PSYCHOTIC FEATURES (HCC): ICD-10-CM

## 2021-03-16 RX ORDER — DESVENLAFAXINE 25 MG/1
25 TABLET, EXTENDED RELEASE ORAL DAILY
Qty: 90 TABLET | Refills: 0 | Status: SHIPPED | OUTPATIENT
Start: 2021-03-16 | End: 2021-06-02 | Stop reason: SDUPTHER

## 2021-03-16 RX ORDER — QUETIAPINE FUMARATE 100 MG/1
100 TABLET, FILM COATED ORAL
Qty: 30 TABLET | Refills: 12 | Status: CANCELLED | OUTPATIENT
Start: 2021-03-16

## 2021-03-16 RX ORDER — QUETIAPINE FUMARATE 50 MG/1
50 TABLET, FILM COATED ORAL NIGHTLY
Qty: 90 TABLET | Refills: 0 | Status: SHIPPED | OUTPATIENT
Start: 2021-03-16 | End: 2021-05-12

## 2021-03-16 RX ORDER — DESVENLAFAXINE SUCCINATE 50 MG/1
50 TABLET, EXTENDED RELEASE ORAL EVERY MORNING
Qty: 90 TABLET | Refills: 3 | Status: CANCELLED | OUTPATIENT
Start: 2021-03-16

## 2021-03-17 ENCOUNTER — TELEPHONE (OUTPATIENT)
Dept: FAMILY MEDICINE CLINIC | Facility: CLINIC | Age: 71
End: 2021-03-17

## 2021-03-17 NOTE — TELEPHONE ENCOUNTER
[facility] CALLED STATING THAT THEY HAD REQUESTED AN AMENDMENT TO THE PAPERWORK FOR A HOSPITAL BED FOR THE PATIENT. SHE STATED THAT THIS HAS BEEN REQUESTED TWICE AND SHE STILL HASN'T HEARD ANYTHING.    PLEASE ADVISE  [phone] [facility]  [phone] - FAX

## 2021-03-18 ENCOUNTER — TELEPHONE (OUTPATIENT)
Dept: FAMILY MEDICINE CLINIC | Facility: CLINIC | Age: 71
End: 2021-03-18

## 2021-03-18 NOTE — TELEPHONE ENCOUNTER
Spoke with dennis her pain provider is not happy that her doses was cut in half. She said that she should be fine on that dose even though she is in renal failure. She is going to talk to the provider and let him know what is going on. I told her I gave them instruction to call the pain management to get additional instructions on it.

## 2021-03-18 NOTE — TELEPHONE ENCOUNTER
OBINNA Johnston from pain management calling in stating the pt's dosage of Norco was changed on 03/16, and she did not approve this to be changed. Sigrid states that the pt should be receiving 1 tablet 4 times a day, but Dr. Rocha had changed it to .5 tablet every 8 hours. Sigrid states she needs to discuss this with someone, as the pt's home health is now giving the pt her pain meds incorrectly.     Sigrid's direct cell phone number is [phone]

## 2021-03-18 NOTE — TELEPHONE ENCOUNTER
Left message for dennis   Per dr lovett  As far as pain medicine she should be able to reduce Norco, have her reduce to half tablet 3 times daily as needed and contact pain management for additional directions may continue Tylenol to an additional 500 mg 3 times daily.  In renal failure can use the same amount of Tylenol per dose up to 1 g per dose but should reduce to every 8 hours.

## 2021-03-19 ENCOUNTER — APPOINTMENT (OUTPATIENT)
Dept: CT IMAGING | Facility: HOSPITAL | Age: 71
End: 2021-03-19

## 2021-03-19 ENCOUNTER — HOSPITAL ENCOUNTER (OUTPATIENT)
Facility: HOSPITAL | Age: 71
Setting detail: OBSERVATION
Discharge: HOME HEALTH | End: 2021-03-24
Attending: EMERGENCY MEDICINE | Admitting: INTERNAL MEDICINE

## 2021-03-19 ENCOUNTER — TELEPHONE (OUTPATIENT)
Dept: FAMILY MEDICINE CLINIC | Facility: CLINIC | Age: 71
End: 2021-03-19

## 2021-03-19 ENCOUNTER — APPOINTMENT (OUTPATIENT)
Dept: GENERAL RADIOLOGY | Facility: HOSPITAL | Age: 71
End: 2021-03-19

## 2021-03-19 DIAGNOSIS — D64.9 NORMOCYTIC ANEMIA: ICD-10-CM

## 2021-03-19 DIAGNOSIS — G89.4 CHRONIC PAIN SYNDROME: ICD-10-CM

## 2021-03-19 DIAGNOSIS — E87.5 ACUTE HYPERKALEMIA: ICD-10-CM

## 2021-03-19 DIAGNOSIS — N39.0 ACUTE URINARY TRACT INFECTION: ICD-10-CM

## 2021-03-19 DIAGNOSIS — N18.9 CHRONIC KIDNEY DISEASE, UNSPECIFIED CKD STAGE: Primary | ICD-10-CM

## 2021-03-19 PROBLEM — N17.9 ACUTE RENAL FAILURE SUPERIMPOSED ON STAGE 4 CHRONIC KIDNEY DISEASE: Status: ACTIVE | Noted: 2021-03-19

## 2021-03-19 PROBLEM — N18.4 ACUTE RENAL FAILURE SUPERIMPOSED ON STAGE 4 CHRONIC KIDNEY DISEASE (HCC): Status: ACTIVE | Noted: 2021-03-19

## 2021-03-19 LAB
ALBUMIN SERPL-MCNC: 3.6 G/DL (ref 3.5–5.2)
ALBUMIN/GLOB SERPL: 1.2 G/DL
ALP SERPL-CCNC: 110 U/L (ref 39–117)
ALT SERPL W P-5'-P-CCNC: 14 U/L (ref 1–33)
ANION GAP SERPL CALCULATED.3IONS-SCNC: 11 MMOL/L (ref 5–15)
ANION GAP SERPL CALCULATED.3IONS-SCNC: 9 MMOL/L (ref 5–15)
AST SERPL-CCNC: 15 U/L (ref 1–32)
BACTERIA UR QL AUTO: ABNORMAL /HPF
BASOPHILS # BLD AUTO: 0.1 10*3/MM3 (ref 0–0.2)
BASOPHILS NFR BLD AUTO: 1.1 % (ref 0–1.5)
BILIRUB SERPL-MCNC: 0.3 MG/DL (ref 0–1.2)
BILIRUB UR QL STRIP: NEGATIVE
BUN SERPL-MCNC: 39 MG/DL (ref 8–23)
BUN SERPL-MCNC: 43 MG/DL (ref 8–23)
BUN/CREAT SERPL: 17.9 (ref 7–25)
BUN/CREAT SERPL: 18.5 (ref 7–25)
CALCIUM SPEC-SCNC: 9.3 MG/DL (ref 8.6–10.5)
CALCIUM SPEC-SCNC: 9.4 MG/DL (ref 8.6–10.5)
CHLORIDE SERPL-SCNC: 103 MMOL/L (ref 98–107)
CHLORIDE SERPL-SCNC: 106 MMOL/L (ref 98–107)
CK SERPL-CCNC: 21 U/L (ref 20–180)
CLARITY UR: ABNORMAL
CO2 SERPL-SCNC: 23 MMOL/L (ref 22–29)
CO2 SERPL-SCNC: 24 MMOL/L (ref 22–29)
COLOR UR: YELLOW
CREAT SERPL-MCNC: 2.18 MG/DL (ref 0.57–1)
CREAT SERPL-MCNC: 2.32 MG/DL (ref 0.57–1)
CRP SERPL-MCNC: 0.9 MG/DL (ref 0–0.5)
DEPRECATED RDW RBC AUTO: 47.7 FL (ref 37–54)
EOSINOPHIL # BLD AUTO: 0.3 10*3/MM3 (ref 0–0.4)
EOSINOPHIL NFR BLD AUTO: 7.3 % (ref 0.3–6.2)
ERYTHROCYTE [DISTWIDTH] IN BLOOD BY AUTOMATED COUNT: 15.7 % (ref 12.3–15.4)
GFR SERPL CREATININE-BSD FRML MDRD: 21 ML/MIN/1.73
GFR SERPL CREATININE-BSD FRML MDRD: 22 ML/MIN/1.73
GLOBULIN UR ELPH-MCNC: 2.9 GM/DL
GLUCOSE SERPL-MCNC: 103 MG/DL (ref 65–99)
GLUCOSE SERPL-MCNC: 104 MG/DL (ref 65–99)
GLUCOSE UR STRIP-MCNC: NEGATIVE MG/DL
HCT VFR BLD AUTO: 28.5 % (ref 34–46.6)
HGB BLD-MCNC: 9.4 G/DL (ref 12–15.9)
HGB UR QL STRIP.AUTO: NEGATIVE
HYALINE CASTS UR QL AUTO: ABNORMAL /LPF
INR PPP: 0.97 (ref 0.93–1.1)
KETONES UR QL STRIP: NEGATIVE
LEUKOCYTE ESTERASE UR QL STRIP.AUTO: ABNORMAL
LYMPHOCYTES # BLD AUTO: 1.2 10*3/MM3 (ref 0.7–3.1)
LYMPHOCYTES NFR BLD AUTO: 27.5 % (ref 19.6–45.3)
MAGNESIUM SERPL-MCNC: 2.1 MG/DL (ref 1.6–2.4)
MCH RBC QN AUTO: 28.7 PG (ref 26.6–33)
MCHC RBC AUTO-ENTMCNC: 33 G/DL (ref 31.5–35.7)
MCV RBC AUTO: 87 FL (ref 79–97)
MONOCYTES # BLD AUTO: 0.3 10*3/MM3 (ref 0.1–0.9)
MONOCYTES NFR BLD AUTO: 6.4 % (ref 5–12)
NEUTROPHILS NFR BLD AUTO: 2.6 10*3/MM3 (ref 1.7–7)
NEUTROPHILS NFR BLD AUTO: 57.7 % (ref 42.7–76)
NITRITE UR QL STRIP: NEGATIVE
NRBC BLD AUTO-RTO: 0.5 /100 WBC (ref 0–0.2)
PH UR STRIP.AUTO: 5.5 [PH] (ref 5–8)
PHOSPHATE SERPL-MCNC: 3.8 MG/DL (ref 2.5–4.5)
PLATELET # BLD AUTO: 168 10*3/MM3 (ref 140–450)
PMV BLD AUTO: 8.6 FL (ref 6–12)
POTASSIUM SERPL-SCNC: 5.3 MMOL/L (ref 3.5–5.2)
POTASSIUM SERPL-SCNC: 5.8 MMOL/L (ref 3.5–5.2)
PROT SERPL-MCNC: 6.5 G/DL (ref 6–8.5)
PROT UR QL STRIP: NEGATIVE
PROTHROMBIN TIME: 10.7 SECONDS (ref 9.6–11.7)
QT INTERVAL: 444 MS
RBC # BLD AUTO: 3.28 10*6/MM3 (ref 3.77–5.28)
RBC # UR: ABNORMAL /HPF
REF LAB TEST METHOD: ABNORMAL
SODIUM SERPL-SCNC: 135 MMOL/L (ref 136–145)
SODIUM SERPL-SCNC: 141 MMOL/L (ref 136–145)
SP GR UR STRIP: 1.02 (ref 1–1.03)
SQUAMOUS #/AREA URNS HPF: ABNORMAL /HPF
TROPONIN T SERPL-MCNC: <0.01 NG/ML (ref 0–0.03)
UROBILINOGEN UR QL STRIP: ABNORMAL
WBC # BLD AUTO: 4.5 10*3/MM3 (ref 3.4–10.8)
WBC UR QL AUTO: ABNORMAL /HPF

## 2021-03-19 PROCEDURE — 87186 SC STD MICRODIL/AGAR DIL: CPT | Performed by: EMERGENCY MEDICINE

## 2021-03-19 PROCEDURE — 80053 COMPREHEN METABOLIC PANEL: CPT | Performed by: EMERGENCY MEDICINE

## 2021-03-19 PROCEDURE — 70450 CT HEAD/BRAIN W/O DYE: CPT

## 2021-03-19 PROCEDURE — 81001 URINALYSIS AUTO W/SCOPE: CPT | Performed by: EMERGENCY MEDICINE

## 2021-03-19 PROCEDURE — 86140 C-REACTIVE PROTEIN: CPT | Performed by: EMERGENCY MEDICINE

## 2021-03-19 PROCEDURE — 84100 ASSAY OF PHOSPHORUS: CPT | Performed by: EMERGENCY MEDICINE

## 2021-03-19 PROCEDURE — 96374 THER/PROPH/DIAG INJ IV PUSH: CPT

## 2021-03-19 PROCEDURE — 96375 TX/PRO/DX INJ NEW DRUG ADDON: CPT

## 2021-03-19 PROCEDURE — P9612 CATHETERIZE FOR URINE SPEC: HCPCS

## 2021-03-19 PROCEDURE — 82550 ASSAY OF CK (CPK): CPT | Performed by: EMERGENCY MEDICINE

## 2021-03-19 PROCEDURE — 71045 X-RAY EXAM CHEST 1 VIEW: CPT

## 2021-03-19 PROCEDURE — 84484 ASSAY OF TROPONIN QUANT: CPT | Performed by: EMERGENCY MEDICINE

## 2021-03-19 PROCEDURE — 87040 BLOOD CULTURE FOR BACTERIA: CPT | Performed by: EMERGENCY MEDICINE

## 2021-03-19 PROCEDURE — 25010000002 CEFTRIAXONE PER 250 MG: Performed by: EMERGENCY MEDICINE

## 2021-03-19 PROCEDURE — G0378 HOSPITAL OBSERVATION PER HR: HCPCS

## 2021-03-19 PROCEDURE — 87086 URINE CULTURE/COLONY COUNT: CPT | Performed by: EMERGENCY MEDICINE

## 2021-03-19 PROCEDURE — 99285 EMERGENCY DEPT VISIT HI MDM: CPT

## 2021-03-19 PROCEDURE — 83735 ASSAY OF MAGNESIUM: CPT | Performed by: EMERGENCY MEDICINE

## 2021-03-19 PROCEDURE — 63710000001 INSULIN REGULAR HUMAN PER 5 UNITS: Performed by: EMERGENCY MEDICINE

## 2021-03-19 PROCEDURE — 93005 ELECTROCARDIOGRAM TRACING: CPT | Performed by: EMERGENCY MEDICINE

## 2021-03-19 PROCEDURE — 25010000002 CALCIUM GLUCONATE-NACL 1-0.675 GM/50ML-% SOLUTION: Performed by: EMERGENCY MEDICINE

## 2021-03-19 PROCEDURE — 85610 PROTHROMBIN TIME: CPT | Performed by: EMERGENCY MEDICINE

## 2021-03-19 PROCEDURE — 99220 PR INITIAL OBSERVATION CARE/DAY 70 MINUTES: CPT | Performed by: FAMILY MEDICINE

## 2021-03-19 PROCEDURE — 85025 COMPLETE CBC W/AUTO DIFF WBC: CPT | Performed by: EMERGENCY MEDICINE

## 2021-03-19 PROCEDURE — 87147 CULTURE TYPE IMMUNOLOGIC: CPT | Performed by: EMERGENCY MEDICINE

## 2021-03-19 RX ORDER — ACETAMINOPHEN 650 MG/1
650 SUPPOSITORY RECTAL EVERY 4 HOURS PRN
Status: DISCONTINUED | OUTPATIENT
Start: 2021-03-19 | End: 2021-03-24 | Stop reason: HOSPADM

## 2021-03-19 RX ORDER — CALCIUM GLUCONATE 20 MG/ML
1 INJECTION, SOLUTION INTRAVENOUS ONCE
Status: COMPLETED | OUTPATIENT
Start: 2021-03-19 | End: 2021-03-19

## 2021-03-19 RX ORDER — DEXTROSE MONOHYDRATE 25 G/50ML
50 INJECTION, SOLUTION INTRAVENOUS ONCE
Status: COMPLETED | OUTPATIENT
Start: 2021-03-19 | End: 2021-03-19

## 2021-03-19 RX ORDER — ONDANSETRON 4 MG/1
4 TABLET, FILM COATED ORAL EVERY 6 HOURS PRN
Status: DISCONTINUED | OUTPATIENT
Start: 2021-03-19 | End: 2021-03-24 | Stop reason: HOSPADM

## 2021-03-19 RX ORDER — PROPRANOLOL HYDROCHLORIDE 20 MG/1
40 TABLET ORAL 2 TIMES DAILY
Status: DISCONTINUED | OUTPATIENT
Start: 2021-03-19 | End: 2021-03-24 | Stop reason: HOSPADM

## 2021-03-19 RX ORDER — ACETAMINOPHEN 160 MG/5ML
650 SOLUTION ORAL EVERY 4 HOURS PRN
Status: DISCONTINUED | OUTPATIENT
Start: 2021-03-19 | End: 2021-03-24 | Stop reason: HOSPADM

## 2021-03-19 RX ORDER — ACETAMINOPHEN 325 MG/1
650 TABLET ORAL EVERY 4 HOURS PRN
Status: DISCONTINUED | OUTPATIENT
Start: 2021-03-19 | End: 2021-03-24 | Stop reason: HOSPADM

## 2021-03-19 RX ORDER — SUCRALFATE 1 G/1
1 TABLET ORAL
Status: DISCONTINUED | OUTPATIENT
Start: 2021-03-19 | End: 2021-03-24 | Stop reason: HOSPADM

## 2021-03-19 RX ORDER — QUETIAPINE FUMARATE 25 MG/1
50 TABLET, FILM COATED ORAL NIGHTLY
Status: DISCONTINUED | OUTPATIENT
Start: 2021-03-19 | End: 2021-03-24 | Stop reason: HOSPADM

## 2021-03-19 RX ORDER — ONDANSETRON 2 MG/ML
4 INJECTION INTRAMUSCULAR; INTRAVENOUS EVERY 6 HOURS PRN
Status: DISCONTINUED | OUTPATIENT
Start: 2021-03-19 | End: 2021-03-24 | Stop reason: HOSPADM

## 2021-03-19 RX ORDER — HYDROCODONE BITARTRATE AND ACETAMINOPHEN 10; 325 MG/1; MG/1
1 TABLET ORAL EVERY 6 HOURS PRN
Status: DISCONTINUED | OUTPATIENT
Start: 2021-03-19 | End: 2021-03-24 | Stop reason: HOSPADM

## 2021-03-19 RX ORDER — SODIUM CHLORIDE 0.9 % (FLUSH) 0.9 %
10 SYRINGE (ML) INJECTION EVERY 12 HOURS SCHEDULED
Status: DISCONTINUED | OUTPATIENT
Start: 2021-03-19 | End: 2021-03-24 | Stop reason: HOSPADM

## 2021-03-19 RX ORDER — ALUMINA, MAGNESIA, AND SIMETHICONE 2400; 2400; 240 MG/30ML; MG/30ML; MG/30ML
15 SUSPENSION ORAL EVERY 6 HOURS PRN
Status: DISCONTINUED | OUTPATIENT
Start: 2021-03-19 | End: 2021-03-24 | Stop reason: HOSPADM

## 2021-03-19 RX ORDER — SODIUM CHLORIDE 0.9 % (FLUSH) 0.9 %
10 SYRINGE (ML) INJECTION AS NEEDED
Status: DISCONTINUED | OUTPATIENT
Start: 2021-03-19 | End: 2021-03-24 | Stop reason: HOSPADM

## 2021-03-19 RX ORDER — NYSTATIN 100000 [USP'U]/G
POWDER TOPICAL EVERY 12 HOURS SCHEDULED
Status: DISCONTINUED | OUTPATIENT
Start: 2021-03-19 | End: 2021-03-24 | Stop reason: HOSPADM

## 2021-03-19 RX ORDER — CHOLECALCIFEROL (VITAMIN D3) 125 MCG
5 CAPSULE ORAL NIGHTLY PRN
Status: DISCONTINUED | OUTPATIENT
Start: 2021-03-19 | End: 2021-03-24 | Stop reason: HOSPADM

## 2021-03-19 RX ORDER — ALLOPURINOL 100 MG/1
200 TABLET ORAL DAILY
Status: DISCONTINUED | OUTPATIENT
Start: 2021-03-20 | End: 2021-03-24 | Stop reason: HOSPADM

## 2021-03-19 RX ORDER — DESVENLAFAXINE 25 MG/1
25 TABLET, EXTENDED RELEASE ORAL DAILY
Status: DISCONTINUED | OUTPATIENT
Start: 2021-03-20 | End: 2021-03-24 | Stop reason: HOSPADM

## 2021-03-19 RX ORDER — PANTOPRAZOLE SODIUM 40 MG/1
40 TABLET, DELAYED RELEASE ORAL EVERY MORNING
Refills: 5 | Status: DISCONTINUED | OUTPATIENT
Start: 2021-03-20 | End: 2021-03-24 | Stop reason: HOSPADM

## 2021-03-19 RX ORDER — BUSPIRONE HYDROCHLORIDE 10 MG/1
10 TABLET ORAL 2 TIMES DAILY
Status: DISCONTINUED | OUTPATIENT
Start: 2021-03-19 | End: 2021-03-24 | Stop reason: HOSPADM

## 2021-03-19 RX ORDER — ASPIRIN 81 MG/1
81 TABLET ORAL DAILY
Status: DISCONTINUED | OUTPATIENT
Start: 2021-03-20 | End: 2021-03-24 | Stop reason: HOSPADM

## 2021-03-19 RX ORDER — DICYCLOMINE HYDROCHLORIDE 10 MG/1
10 CAPSULE ORAL 4 TIMES DAILY
Status: DISCONTINUED | OUTPATIENT
Start: 2021-03-19 | End: 2021-03-24 | Stop reason: HOSPADM

## 2021-03-19 RX ORDER — SODIUM POLYSTYRENE SULFONATE 15 G/60ML
15 SUSPENSION ORAL; RECTAL ONCE
Status: COMPLETED | OUTPATIENT
Start: 2021-03-19 | End: 2021-03-19

## 2021-03-19 RX ADMIN — Medication 1 TABLET: at 21:13

## 2021-03-19 RX ADMIN — DICYCLOMINE HYDROCHLORIDE 10 MG: 10 CAPSULE ORAL at 21:13

## 2021-03-19 RX ADMIN — SODIUM BICARBONATE 50 MEQ: 84 INJECTION, SOLUTION INTRAVENOUS at 16:43

## 2021-03-19 RX ADMIN — CEFTRIAXONE SODIUM 1 G: 10 INJECTION, POWDER, FOR SOLUTION INTRAVENOUS at 16:37

## 2021-03-19 RX ADMIN — HYDROCODONE BITARTRATE AND ACETAMINOPHEN 1 TABLET: 10; 325 TABLET ORAL at 19:43

## 2021-03-19 RX ADMIN — SUCRALFATE 1 G: 1 TABLET ORAL at 21:13

## 2021-03-19 RX ADMIN — Medication 10 ML: at 21:12

## 2021-03-19 RX ADMIN — CALCIUM GLUCONATE 1 G: 20 INJECTION, SOLUTION INTRAVENOUS at 16:43

## 2021-03-19 RX ADMIN — BUSPIRONE HYDROCHLORIDE 10 MG: 10 TABLET ORAL at 21:12

## 2021-03-19 RX ADMIN — PROPRANOLOL HYDROCHLORIDE 40 MG: 20 TABLET ORAL at 21:12

## 2021-03-19 RX ADMIN — SODIUM POLYSTYRENE SULFONATE 15 G: 15 SUSPENSION ORAL; RECTAL at 16:44

## 2021-03-19 RX ADMIN — NYSTATIN: 100000 POWDER TOPICAL at 21:11

## 2021-03-19 RX ADMIN — SODIUM CHLORIDE 1000 ML: 9 INJECTION, SOLUTION INTRAVENOUS at 16:54

## 2021-03-19 RX ADMIN — INSULIN HUMAN 10 UNITS: 100 INJECTION, SOLUTION PARENTERAL at 16:43

## 2021-03-19 RX ADMIN — DEXTROSE MONOHYDRATE 50 ML: 500 INJECTION PARENTERAL at 16:54

## 2021-03-19 RX ADMIN — QUETIAPINE FUMARATE 50 MG: 25 TABLET ORAL at 21:12

## 2021-03-19 NOTE — TELEPHONE ENCOUNTER
Her pain management called back and states they called her nephrologist and states it is ok for her to stay on her pain medication the why it is prescribed. And they are going to make her an appt to see them asap

## 2021-03-19 NOTE — TELEPHONE ENCOUNTER
Kamille from [facility] calling in stating if the pt is to return to the previous dosage of her pain medication she needs a new order.

## 2021-03-19 NOTE — TELEPHONE ENCOUNTER
Caller: JUNAID    Relationship to patient: [facility]    Best call back number: [phone]    Patient is needing: JUNAID IS CALLING IN REGARDS TO NEEDING AN ADDENMENT TO OFFICE NOTES ON 03/03/21 [place] STATES MEDICARE WILL NOT COVER HOSPITAL BED UNLESS OFFICE NOTE SPECIFICALLY NEEDS TO STATE PATIENT NEEDS HEAD OF BEAD ELEVATED 30 DEGREES MOST OF THE TIME IN ORDER TO MOVE FORWARD WITH GETTING HOSPITAL BED        PLEASE ADVISE      [facility]  F#[phone]

## 2021-03-20 ENCOUNTER — APPOINTMENT (OUTPATIENT)
Dept: ULTRASOUND IMAGING | Facility: HOSPITAL | Age: 71
End: 2021-03-20

## 2021-03-20 LAB
ANION GAP SERPL CALCULATED.3IONS-SCNC: 9 MMOL/L (ref 5–15)
BASOPHILS # BLD AUTO: 0 10*3/MM3 (ref 0–0.2)
BASOPHILS NFR BLD AUTO: 0.4 % (ref 0–1.5)
BUN SERPL-MCNC: 37 MG/DL (ref 8–23)
BUN/CREAT SERPL: 17.9 (ref 7–25)
CALCIUM SPEC-SCNC: 9.2 MG/DL (ref 8.6–10.5)
CHLORIDE SERPL-SCNC: 105 MMOL/L (ref 98–107)
CO2 SERPL-SCNC: 25 MMOL/L (ref 22–29)
CREAT SERPL-MCNC: 2.07 MG/DL (ref 0.57–1)
DEPRECATED RDW RBC AUTO: 49 FL (ref 37–54)
EOSINOPHIL # BLD AUTO: 0.4 10*3/MM3 (ref 0–0.4)
EOSINOPHIL NFR BLD AUTO: 8.2 % (ref 0.3–6.2)
ERYTHROCYTE [DISTWIDTH] IN BLOOD BY AUTOMATED COUNT: 16.1 % (ref 12.3–15.4)
GFR SERPL CREATININE-BSD FRML MDRD: 24 ML/MIN/1.73
GLUCOSE SERPL-MCNC: 92 MG/DL (ref 65–99)
HCT VFR BLD AUTO: 29.8 % (ref 34–46.6)
HGB BLD-MCNC: 9.7 G/DL (ref 12–15.9)
LYMPHOCYTES # BLD AUTO: 1.4 10*3/MM3 (ref 0.7–3.1)
LYMPHOCYTES NFR BLD AUTO: 28 % (ref 19.6–45.3)
MCH RBC QN AUTO: 28.4 PG (ref 26.6–33)
MCHC RBC AUTO-ENTMCNC: 32.6 G/DL (ref 31.5–35.7)
MCV RBC AUTO: 87.1 FL (ref 79–97)
MONOCYTES # BLD AUTO: 0.4 10*3/MM3 (ref 0.1–0.9)
MONOCYTES NFR BLD AUTO: 8.4 % (ref 5–12)
NEUTROPHILS NFR BLD AUTO: 2.8 10*3/MM3 (ref 1.7–7)
NEUTROPHILS NFR BLD AUTO: 55 % (ref 42.7–76)
NRBC BLD AUTO-RTO: 0.1 /100 WBC (ref 0–0.2)
PHOSPHATE SERPL-MCNC: 4.2 MG/DL (ref 2.5–4.5)
PLATELET # BLD AUTO: 199 10*3/MM3 (ref 140–450)
PMV BLD AUTO: 7.6 FL (ref 6–12)
POTASSIUM SERPL-SCNC: 4.7 MMOL/L (ref 3.5–5.2)
PTH-INTACT SERPL-MCNC: 72.8 PG/ML (ref 15–65)
RBC # BLD AUTO: 3.42 10*6/MM3 (ref 3.77–5.28)
SODIUM SERPL-SCNC: 139 MMOL/L (ref 136–145)
URATE SERPL-MCNC: 4.4 MG/DL (ref 2.4–5.7)
WBC # BLD AUTO: 5.1 10*3/MM3 (ref 3.4–10.8)

## 2021-03-20 PROCEDURE — 84550 ASSAY OF BLOOD/URIC ACID: CPT | Performed by: FAMILY MEDICINE

## 2021-03-20 PROCEDURE — 97162 PT EVAL MOD COMPLEX 30 MIN: CPT

## 2021-03-20 PROCEDURE — 96375 TX/PRO/DX INJ NEW DRUG ADDON: CPT

## 2021-03-20 PROCEDURE — 85025 COMPLETE CBC W/AUTO DIFF WBC: CPT | Performed by: NURSE PRACTITIONER

## 2021-03-20 PROCEDURE — 25010000002 ONDANSETRON PER 1 MG: Performed by: NURSE PRACTITIONER

## 2021-03-20 PROCEDURE — 76775 US EXAM ABDO BACK WALL LIM: CPT

## 2021-03-20 PROCEDURE — 99225 PR SBSQ OBSERVATION CARE/DAY 25 MINUTES: CPT | Performed by: FAMILY MEDICINE

## 2021-03-20 PROCEDURE — G0378 HOSPITAL OBSERVATION PER HR: HCPCS

## 2021-03-20 PROCEDURE — 80048 BASIC METABOLIC PNL TOTAL CA: CPT | Performed by: NURSE PRACTITIONER

## 2021-03-20 PROCEDURE — 84100 ASSAY OF PHOSPHORUS: CPT | Performed by: FAMILY MEDICINE

## 2021-03-20 PROCEDURE — 83970 ASSAY OF PARATHORMONE: CPT | Performed by: FAMILY MEDICINE

## 2021-03-20 RX ORDER — SODIUM CHLORIDE 9 MG/ML
50 INJECTION, SOLUTION INTRAVENOUS CONTINUOUS
Status: DISCONTINUED | OUTPATIENT
Start: 2021-03-20 | End: 2021-03-20

## 2021-03-20 RX ORDER — FUROSEMIDE 40 MG/1
40 TABLET ORAL DAILY
Status: DISCONTINUED | OUTPATIENT
Start: 2021-03-21 | End: 2021-03-22

## 2021-03-20 RX ORDER — AMLODIPINE BESYLATE 5 MG/1
5 TABLET ORAL
Status: DISCONTINUED | OUTPATIENT
Start: 2021-03-20 | End: 2021-03-22

## 2021-03-20 RX ADMIN — QUETIAPINE FUMARATE 50 MG: 25 TABLET ORAL at 20:43

## 2021-03-20 RX ADMIN — SUCRALFATE 1 G: 1 TABLET ORAL at 08:32

## 2021-03-20 RX ADMIN — PROPRANOLOL HYDROCHLORIDE 40 MG: 20 TABLET ORAL at 20:43

## 2021-03-20 RX ADMIN — NYSTATIN: 100000 POWDER TOPICAL at 20:45

## 2021-03-20 RX ADMIN — Medication 1 TABLET: at 08:33

## 2021-03-20 RX ADMIN — BUSPIRONE HYDROCHLORIDE 10 MG: 10 TABLET ORAL at 08:33

## 2021-03-20 RX ADMIN — NYSTATIN: 100000 POWDER TOPICAL at 08:34

## 2021-03-20 RX ADMIN — SODIUM CHLORIDE 50 ML/HR: 9 INJECTION, SOLUTION INTRAVENOUS at 14:19

## 2021-03-20 RX ADMIN — HYDROCODONE BITARTRATE AND ACETAMINOPHEN 1 TABLET: 10; 325 TABLET ORAL at 14:15

## 2021-03-20 RX ADMIN — ALLOPURINOL 200 MG: 100 TABLET ORAL at 08:33

## 2021-03-20 RX ADMIN — SUCRALFATE 1 G: 1 TABLET ORAL at 20:43

## 2021-03-20 RX ADMIN — ASPIRIN 81 MG: 81 TABLET, COATED ORAL at 08:33

## 2021-03-20 RX ADMIN — SUCRALFATE 1 G: 1 TABLET ORAL at 17:13

## 2021-03-20 RX ADMIN — Medication 10 ML: at 20:42

## 2021-03-20 RX ADMIN — PROPRANOLOL HYDROCHLORIDE 40 MG: 20 TABLET ORAL at 08:33

## 2021-03-20 RX ADMIN — SUCRALFATE 1 G: 1 TABLET ORAL at 11:14

## 2021-03-20 RX ADMIN — DICYCLOMINE HYDROCHLORIDE 10 MG: 10 CAPSULE ORAL at 17:13

## 2021-03-20 RX ADMIN — DICYCLOMINE HYDROCHLORIDE 10 MG: 10 CAPSULE ORAL at 11:14

## 2021-03-20 RX ADMIN — Medication 1 TABLET: at 17:13

## 2021-03-20 RX ADMIN — BUSPIRONE HYDROCHLORIDE 10 MG: 10 TABLET ORAL at 20:43

## 2021-03-20 RX ADMIN — DICYCLOMINE HYDROCHLORIDE 10 MG: 10 CAPSULE ORAL at 08:33

## 2021-03-20 RX ADMIN — PANTOPRAZOLE SODIUM 40 MG: 40 TABLET, DELAYED RELEASE ORAL at 06:55

## 2021-03-20 RX ADMIN — AMLODIPINE BESYLATE 5 MG: 5 TABLET ORAL at 15:15

## 2021-03-20 RX ADMIN — HYDROCODONE BITARTRATE AND ACETAMINOPHEN 1 TABLET: 10; 325 TABLET ORAL at 06:57

## 2021-03-20 RX ADMIN — HYDROCODONE BITARTRATE AND ACETAMINOPHEN 1 TABLET: 10; 325 TABLET ORAL at 20:43

## 2021-03-20 RX ADMIN — Medication 10 ML: at 08:33

## 2021-03-20 RX ADMIN — DICYCLOMINE HYDROCHLORIDE 10 MG: 10 CAPSULE ORAL at 20:43

## 2021-03-20 RX ADMIN — ONDANSETRON 4 MG: 2 INJECTION INTRAMUSCULAR; INTRAVENOUS at 17:13

## 2021-03-21 ENCOUNTER — APPOINTMENT (OUTPATIENT)
Dept: ULTRASOUND IMAGING | Facility: HOSPITAL | Age: 71
End: 2021-03-21

## 2021-03-21 LAB
ALBUMIN SERPL-MCNC: 3 G/DL (ref 3.5–5.2)
ALP SERPL-CCNC: 99 U/L (ref 39–117)
ALT SERPL W P-5'-P-CCNC: 12 U/L (ref 1–33)
ANION GAP SERPL CALCULATED.3IONS-SCNC: 7 MMOL/L (ref 5–15)
AST SERPL-CCNC: 13 U/L (ref 1–32)
BACTERIA SPEC AEROBE CULT: ABNORMAL
BASOPHILS # BLD AUTO: 0 10*3/MM3 (ref 0–0.2)
BASOPHILS NFR BLD AUTO: 0.4 % (ref 0–1.5)
BILIRUB CONJ SERPL-MCNC: <0.2 MG/DL (ref 0–0.3)
BILIRUB INDIRECT SERPL-MCNC: ABNORMAL MG/DL
BILIRUB SERPL-MCNC: 0.2 MG/DL (ref 0–1.2)
BUN SERPL-MCNC: 33 MG/DL (ref 8–23)
BUN/CREAT SERPL: 16.7 (ref 7–25)
CALCIUM SPEC-SCNC: 9.1 MG/DL (ref 8.6–10.5)
CHLORIDE SERPL-SCNC: 103 MMOL/L (ref 98–107)
CO2 SERPL-SCNC: 26 MMOL/L (ref 22–29)
CREAT SERPL-MCNC: 1.98 MG/DL (ref 0.57–1)
DEPRECATED RDW RBC AUTO: 49 FL (ref 37–54)
EOSINOPHIL # BLD AUTO: 0.4 10*3/MM3 (ref 0–0.4)
EOSINOPHIL NFR BLD AUTO: 8.8 % (ref 0.3–6.2)
ERYTHROCYTE [DISTWIDTH] IN BLOOD BY AUTOMATED COUNT: 16 % (ref 12.3–15.4)
GFR SERPL CREATININE-BSD FRML MDRD: 25 ML/MIN/1.73
GLUCOSE SERPL-MCNC: 93 MG/DL (ref 65–99)
HCT VFR BLD AUTO: 28.5 % (ref 34–46.6)
HGB BLD-MCNC: 9.2 G/DL (ref 12–15.9)
LYMPHOCYTES # BLD AUTO: 1.4 10*3/MM3 (ref 0.7–3.1)
LYMPHOCYTES NFR BLD AUTO: 34.8 % (ref 19.6–45.3)
MAGNESIUM SERPL-MCNC: 1.8 MG/DL (ref 1.6–2.4)
MCH RBC QN AUTO: 28.1 PG (ref 26.6–33)
MCHC RBC AUTO-ENTMCNC: 32.1 G/DL (ref 31.5–35.7)
MCV RBC AUTO: 87.6 FL (ref 79–97)
MONOCYTES # BLD AUTO: 0.3 10*3/MM3 (ref 0.1–0.9)
MONOCYTES NFR BLD AUTO: 7.8 % (ref 5–12)
NEUTROPHILS NFR BLD AUTO: 2 10*3/MM3 (ref 1.7–7)
NEUTROPHILS NFR BLD AUTO: 48.2 % (ref 42.7–76)
NRBC BLD AUTO-RTO: 0.2 /100 WBC (ref 0–0.2)
PHOSPHATE SERPL-MCNC: 3.7 MG/DL (ref 2.5–4.5)
PLATELET # BLD AUTO: 198 10*3/MM3 (ref 140–450)
PMV BLD AUTO: 7.2 FL (ref 6–12)
POTASSIUM SERPL-SCNC: 4.4 MMOL/L (ref 3.5–5.2)
PROT SERPL-MCNC: 5.2 G/DL (ref 6–8.5)
RBC # BLD AUTO: 3.26 10*6/MM3 (ref 3.77–5.28)
SODIUM SERPL-SCNC: 136 MMOL/L (ref 136–145)
WBC # BLD AUTO: 4.1 10*3/MM3 (ref 3.4–10.8)

## 2021-03-21 PROCEDURE — 80076 HEPATIC FUNCTION PANEL: CPT | Performed by: FAMILY MEDICINE

## 2021-03-21 PROCEDURE — 85025 COMPLETE CBC W/AUTO DIFF WBC: CPT | Performed by: FAMILY MEDICINE

## 2021-03-21 PROCEDURE — 99226 PR SBSQ OBSERVATION CARE/DAY 35 MINUTES: CPT | Performed by: FAMILY MEDICINE

## 2021-03-21 PROCEDURE — 76705 ECHO EXAM OF ABDOMEN: CPT

## 2021-03-21 PROCEDURE — 84100 ASSAY OF PHOSPHORUS: CPT | Performed by: FAMILY MEDICINE

## 2021-03-21 PROCEDURE — 83735 ASSAY OF MAGNESIUM: CPT | Performed by: FAMILY MEDICINE

## 2021-03-21 PROCEDURE — G0378 HOSPITAL OBSERVATION PER HR: HCPCS

## 2021-03-21 PROCEDURE — 80048 BASIC METABOLIC PNL TOTAL CA: CPT | Performed by: FAMILY MEDICINE

## 2021-03-21 RX ORDER — DOXYCYCLINE 100 MG/1
100 TABLET ORAL EVERY 12 HOURS SCHEDULED
Status: DISCONTINUED | OUTPATIENT
Start: 2021-03-21 | End: 2021-03-24 | Stop reason: HOSPADM

## 2021-03-21 RX ADMIN — PANTOPRAZOLE SODIUM 40 MG: 40 TABLET, DELAYED RELEASE ORAL at 05:59

## 2021-03-21 RX ADMIN — BUSPIRONE HYDROCHLORIDE 10 MG: 10 TABLET ORAL at 23:38

## 2021-03-21 RX ADMIN — HYDROCODONE BITARTRATE AND ACETAMINOPHEN 1 TABLET: 10; 325 TABLET ORAL at 18:55

## 2021-03-21 RX ADMIN — PROPRANOLOL HYDROCHLORIDE 40 MG: 20 TABLET ORAL at 08:07

## 2021-03-21 RX ADMIN — DICYCLOMINE HYDROCHLORIDE 10 MG: 10 CAPSULE ORAL at 13:07

## 2021-03-21 RX ADMIN — Medication 1 TABLET: at 08:07

## 2021-03-21 RX ADMIN — FUROSEMIDE 40 MG: 40 TABLET ORAL at 08:07

## 2021-03-21 RX ADMIN — SUCRALFATE 1 G: 1 TABLET ORAL at 08:07

## 2021-03-21 RX ADMIN — SUCRALFATE 1 G: 1 TABLET ORAL at 23:37

## 2021-03-21 RX ADMIN — SUCRALFATE 1 G: 1 TABLET ORAL at 13:06

## 2021-03-21 RX ADMIN — BUSPIRONE HYDROCHLORIDE 10 MG: 10 TABLET ORAL at 08:07

## 2021-03-21 RX ADMIN — ASPIRIN 81 MG: 81 TABLET, COATED ORAL at 08:07

## 2021-03-21 RX ADMIN — PROPRANOLOL HYDROCHLORIDE 40 MG: 20 TABLET ORAL at 23:36

## 2021-03-21 RX ADMIN — DICYCLOMINE HYDROCHLORIDE 10 MG: 10 CAPSULE ORAL at 08:07

## 2021-03-21 RX ADMIN — HYDROCODONE BITARTRATE AND ACETAMINOPHEN 1 TABLET: 10; 325 TABLET ORAL at 05:59

## 2021-03-21 RX ADMIN — DICYCLOMINE HYDROCHLORIDE 10 MG: 10 CAPSULE ORAL at 23:37

## 2021-03-21 RX ADMIN — HYDROCODONE BITARTRATE AND ACETAMINOPHEN 1 TABLET: 10; 325 TABLET ORAL at 13:07

## 2021-03-21 RX ADMIN — NYSTATIN: 100000 POWDER TOPICAL at 08:08

## 2021-03-21 RX ADMIN — ALLOPURINOL 200 MG: 100 TABLET ORAL at 08:17

## 2021-03-21 RX ADMIN — QUETIAPINE FUMARATE 50 MG: 25 TABLET ORAL at 23:38

## 2021-03-21 RX ADMIN — AMLODIPINE BESYLATE 5 MG: 5 TABLET ORAL at 08:07

## 2021-03-21 RX ADMIN — DOXYCYCLINE 100 MG: 100 TABLET, FILM COATED ORAL at 13:06

## 2021-03-21 RX ADMIN — Medication 10 ML: at 08:08

## 2021-03-22 LAB
ANION GAP SERPL CALCULATED.3IONS-SCNC: 7 MMOL/L (ref 5–15)
BUN SERPL-MCNC: 32 MG/DL (ref 8–23)
BUN/CREAT SERPL: 15.2 (ref 7–25)
CALCIUM SPEC-SCNC: 9.1 MG/DL (ref 8.6–10.5)
CHLORIDE SERPL-SCNC: 104 MMOL/L (ref 98–107)
CO2 SERPL-SCNC: 28 MMOL/L (ref 22–29)
CREAT SERPL-MCNC: 2.11 MG/DL (ref 0.57–1)
GFR SERPL CREATININE-BSD FRML MDRD: 23 ML/MIN/1.73
GLUCOSE SERPL-MCNC: 92 MG/DL (ref 65–99)
POTASSIUM SERPL-SCNC: 4.2 MMOL/L (ref 3.5–5.2)
SARS-COV-2 ORF1AB RESP QL NAA+PROBE: NOT DETECTED
SODIUM SERPL-SCNC: 139 MMOL/L (ref 136–145)

## 2021-03-22 PROCEDURE — G0378 HOSPITAL OBSERVATION PER HR: HCPCS

## 2021-03-22 PROCEDURE — 25010000002 ONDANSETRON PER 1 MG: Performed by: NURSE PRACTITIONER

## 2021-03-22 PROCEDURE — 97116 GAIT TRAINING THERAPY: CPT

## 2021-03-22 PROCEDURE — 96376 TX/PRO/DX INJ SAME DRUG ADON: CPT

## 2021-03-22 PROCEDURE — 99225 PR SBSQ OBSERVATION CARE/DAY 25 MINUTES: CPT | Performed by: INTERNAL MEDICINE

## 2021-03-22 PROCEDURE — 80048 BASIC METABOLIC PNL TOTAL CA: CPT | Performed by: FAMILY MEDICINE

## 2021-03-22 PROCEDURE — U0004 COV-19 TEST NON-CDC HGH THRU: HCPCS | Performed by: FAMILY MEDICINE

## 2021-03-22 PROCEDURE — 97530 THERAPEUTIC ACTIVITIES: CPT

## 2021-03-22 RX ORDER — AMLODIPINE BESYLATE 2.5 MG/1
2.5 TABLET ORAL
Status: DISCONTINUED | OUTPATIENT
Start: 2021-03-23 | End: 2021-03-23

## 2021-03-22 RX ORDER — METOCLOPRAMIDE 5 MG/1
5 TABLET ORAL
Status: DISCONTINUED | OUTPATIENT
Start: 2021-03-22 | End: 2021-03-24 | Stop reason: HOSPADM

## 2021-03-22 RX ORDER — FUROSEMIDE 20 MG/1
20 TABLET ORAL DAILY
Status: DISCONTINUED | OUTPATIENT
Start: 2021-03-23 | End: 2021-03-23

## 2021-03-22 RX ADMIN — BUSPIRONE HYDROCHLORIDE 10 MG: 10 TABLET ORAL at 08:37

## 2021-03-22 RX ADMIN — DICYCLOMINE HYDROCHLORIDE 10 MG: 10 CAPSULE ORAL at 08:36

## 2021-03-22 RX ADMIN — Medication 10 ML: at 20:41

## 2021-03-22 RX ADMIN — ALLOPURINOL 200 MG: 100 TABLET ORAL at 08:37

## 2021-03-22 RX ADMIN — ONDANSETRON 4 MG: 2 INJECTION INTRAMUSCULAR; INTRAVENOUS at 10:40

## 2021-03-22 RX ADMIN — NYSTATIN: 100000 POWDER TOPICAL at 20:42

## 2021-03-22 RX ADMIN — PANTOPRAZOLE SODIUM 40 MG: 40 TABLET, DELAYED RELEASE ORAL at 08:39

## 2021-03-22 RX ADMIN — DOXYCYCLINE 100 MG: 100 TABLET, FILM COATED ORAL at 08:37

## 2021-03-22 RX ADMIN — Medication 1 TABLET: at 08:36

## 2021-03-22 RX ADMIN — PROPRANOLOL HYDROCHLORIDE 40 MG: 20 TABLET ORAL at 08:36

## 2021-03-22 RX ADMIN — METOCLOPRAMIDE 5 MG: 5 TABLET ORAL at 20:41

## 2021-03-22 RX ADMIN — DICYCLOMINE HYDROCHLORIDE 10 MG: 10 CAPSULE ORAL at 11:09

## 2021-03-22 RX ADMIN — HYDROCODONE BITARTRATE AND ACETAMINOPHEN 1 TABLET: 10; 325 TABLET ORAL at 03:08

## 2021-03-22 RX ADMIN — Medication 1 TABLET: at 17:06

## 2021-03-22 RX ADMIN — HYDROCODONE BITARTRATE AND ACETAMINOPHEN 1 TABLET: 10; 325 TABLET ORAL at 15:29

## 2021-03-22 RX ADMIN — AMLODIPINE BESYLATE 5 MG: 5 TABLET ORAL at 08:36

## 2021-03-22 RX ADMIN — Medication 10 ML: at 08:37

## 2021-03-22 RX ADMIN — HYDROCODONE BITARTRATE AND ACETAMINOPHEN 1 TABLET: 10; 325 TABLET ORAL at 09:21

## 2021-03-22 RX ADMIN — DOXYCYCLINE 100 MG: 100 TABLET, FILM COATED ORAL at 20:41

## 2021-03-22 RX ADMIN — BUSPIRONE HYDROCHLORIDE 10 MG: 10 TABLET ORAL at 20:40

## 2021-03-22 RX ADMIN — SUCRALFATE 1 G: 1 TABLET ORAL at 11:09

## 2021-03-22 RX ADMIN — SUCRALFATE 1 G: 1 TABLET ORAL at 17:06

## 2021-03-22 RX ADMIN — NYSTATIN: 100000 POWDER TOPICAL at 08:39

## 2021-03-22 RX ADMIN — HYDROCODONE BITARTRATE AND ACETAMINOPHEN 1 TABLET: 10; 325 TABLET ORAL at 21:49

## 2021-03-22 RX ADMIN — SUCRALFATE 1 G: 1 TABLET ORAL at 20:40

## 2021-03-22 RX ADMIN — QUETIAPINE FUMARATE 50 MG: 25 TABLET ORAL at 20:41

## 2021-03-22 RX ADMIN — DICYCLOMINE HYDROCHLORIDE 10 MG: 10 CAPSULE ORAL at 17:06

## 2021-03-22 RX ADMIN — SUCRALFATE 1 G: 1 TABLET ORAL at 08:36

## 2021-03-22 RX ADMIN — DICYCLOMINE HYDROCHLORIDE 10 MG: 10 CAPSULE ORAL at 20:41

## 2021-03-22 RX ADMIN — FUROSEMIDE 40 MG: 40 TABLET ORAL at 08:37

## 2021-03-22 RX ADMIN — PROPRANOLOL HYDROCHLORIDE 40 MG: 20 TABLET ORAL at 20:41

## 2021-03-22 RX ADMIN — ASPIRIN 81 MG: 81 TABLET, COATED ORAL at 08:36

## 2021-03-23 LAB
ALBUMIN SERPL-MCNC: 3.3 G/DL (ref 3.5–5.2)
ANION GAP SERPL CALCULATED.3IONS-SCNC: 10 MMOL/L (ref 5–15)
BUN SERPL-MCNC: 36 MG/DL (ref 8–23)
BUN/CREAT SERPL: 16.1 (ref 7–25)
CALCIUM SPEC-SCNC: 9.7 MG/DL (ref 8.6–10.5)
CHLORIDE SERPL-SCNC: 101 MMOL/L (ref 98–107)
CO2 SERPL-SCNC: 27 MMOL/L (ref 22–29)
CREAT SERPL-MCNC: 2.23 MG/DL (ref 0.57–1)
DEPRECATED RDW RBC AUTO: 51.2 FL (ref 37–54)
ERYTHROCYTE [DISTWIDTH] IN BLOOD BY AUTOMATED COUNT: 16.5 % (ref 12.3–15.4)
GFR SERPL CREATININE-BSD FRML MDRD: 22 ML/MIN/1.73
GLUCOSE SERPL-MCNC: 85 MG/DL (ref 65–99)
HCT VFR BLD AUTO: 31.1 % (ref 34–46.6)
HGB BLD-MCNC: 10 G/DL (ref 12–15.9)
MCH RBC QN AUTO: 28 PG (ref 26.6–33)
MCHC RBC AUTO-ENTMCNC: 32.1 G/DL (ref 31.5–35.7)
MCV RBC AUTO: 87.1 FL (ref 79–97)
PHOSPHATE SERPL-MCNC: 3.7 MG/DL (ref 2.5–4.5)
PLATELET # BLD AUTO: 211 10*3/MM3 (ref 140–450)
PMV BLD AUTO: 7.6 FL (ref 6–12)
POTASSIUM SERPL-SCNC: 4.2 MMOL/L (ref 3.5–5.2)
RBC # BLD AUTO: 3.57 10*6/MM3 (ref 3.77–5.28)
SODIUM SERPL-SCNC: 138 MMOL/L (ref 136–145)
WBC # BLD AUTO: 5.5 10*3/MM3 (ref 3.4–10.8)

## 2021-03-23 PROCEDURE — 85027 COMPLETE CBC AUTOMATED: CPT | Performed by: INTERNAL MEDICINE

## 2021-03-23 PROCEDURE — 80069 RENAL FUNCTION PANEL: CPT | Performed by: INTERNAL MEDICINE

## 2021-03-23 PROCEDURE — 99225 PR SBSQ OBSERVATION CARE/DAY 25 MINUTES: CPT | Performed by: INTERNAL MEDICINE

## 2021-03-23 PROCEDURE — G0378 HOSPITAL OBSERVATION PER HR: HCPCS

## 2021-03-23 RX ADMIN — DICYCLOMINE HYDROCHLORIDE 10 MG: 10 CAPSULE ORAL at 16:53

## 2021-03-23 RX ADMIN — SUCRALFATE 1 G: 1 TABLET ORAL at 20:21

## 2021-03-23 RX ADMIN — AMLODIPINE BESYLATE 2.5 MG: 2.5 TABLET ORAL at 08:48

## 2021-03-23 RX ADMIN — HYDROCODONE BITARTRATE AND ACETAMINOPHEN 1 TABLET: 10; 325 TABLET ORAL at 21:35

## 2021-03-23 RX ADMIN — DOXYCYCLINE 100 MG: 100 TABLET, FILM COATED ORAL at 08:48

## 2021-03-23 RX ADMIN — QUETIAPINE FUMARATE 50 MG: 25 TABLET ORAL at 20:21

## 2021-03-23 RX ADMIN — PANTOPRAZOLE SODIUM 40 MG: 40 TABLET, DELAYED RELEASE ORAL at 06:24

## 2021-03-23 RX ADMIN — PROPRANOLOL HYDROCHLORIDE 40 MG: 20 TABLET ORAL at 08:48

## 2021-03-23 RX ADMIN — SUCRALFATE 1 G: 1 TABLET ORAL at 16:53

## 2021-03-23 RX ADMIN — Medication 1 TABLET: at 08:48

## 2021-03-23 RX ADMIN — DICYCLOMINE HYDROCHLORIDE 10 MG: 10 CAPSULE ORAL at 20:21

## 2021-03-23 RX ADMIN — Medication 10 ML: at 08:49

## 2021-03-23 RX ADMIN — METOCLOPRAMIDE 5 MG: 5 TABLET ORAL at 08:49

## 2021-03-23 RX ADMIN — DOXYCYCLINE 100 MG: 100 TABLET, FILM COATED ORAL at 20:21

## 2021-03-23 RX ADMIN — NYSTATIN: 100000 POWDER TOPICAL at 08:52

## 2021-03-23 RX ADMIN — SUCRALFATE 1 G: 1 TABLET ORAL at 08:49

## 2021-03-23 RX ADMIN — PROPRANOLOL HYDROCHLORIDE 40 MG: 20 TABLET ORAL at 20:21

## 2021-03-23 RX ADMIN — HYDROCODONE BITARTRATE AND ACETAMINOPHEN 1 TABLET: 10; 325 TABLET ORAL at 15:35

## 2021-03-23 RX ADMIN — METOCLOPRAMIDE 5 MG: 5 TABLET ORAL at 16:53

## 2021-03-23 RX ADMIN — METOCLOPRAMIDE 5 MG: 5 TABLET ORAL at 11:06

## 2021-03-23 RX ADMIN — DICYCLOMINE HYDROCHLORIDE 10 MG: 10 CAPSULE ORAL at 08:48

## 2021-03-23 RX ADMIN — ALLOPURINOL 200 MG: 100 TABLET ORAL at 08:48

## 2021-03-23 RX ADMIN — SUCRALFATE 1 G: 1 TABLET ORAL at 11:06

## 2021-03-23 RX ADMIN — DICYCLOMINE HYDROCHLORIDE 10 MG: 10 CAPSULE ORAL at 11:06

## 2021-03-23 RX ADMIN — Medication 1 TABLET: at 16:53

## 2021-03-23 RX ADMIN — BUSPIRONE HYDROCHLORIDE 10 MG: 10 TABLET ORAL at 08:48

## 2021-03-23 RX ADMIN — ASPIRIN 81 MG: 81 TABLET, COATED ORAL at 08:48

## 2021-03-23 RX ADMIN — Medication 10 ML: at 20:21

## 2021-03-23 RX ADMIN — HYDROCODONE BITARTRATE AND ACETAMINOPHEN 1 TABLET: 10; 325 TABLET ORAL at 06:24

## 2021-03-23 RX ADMIN — NYSTATIN: 100000 POWDER TOPICAL at 20:22

## 2021-03-23 RX ADMIN — BUSPIRONE HYDROCHLORIDE 10 MG: 10 TABLET ORAL at 20:21

## 2021-03-24 ENCOUNTER — READMISSION MANAGEMENT (OUTPATIENT)
Dept: CALL CENTER | Facility: HOSPITAL | Age: 71
End: 2021-03-24

## 2021-03-24 VITALS
HEART RATE: 86 BPM | BODY MASS INDEX: 46.94 KG/M2 | DIASTOLIC BLOOD PRESSURE: 81 MMHG | HEIGHT: 62 IN | TEMPERATURE: 99.1 F | WEIGHT: 255.07 LBS | OXYGEN SATURATION: 96 % | SYSTOLIC BLOOD PRESSURE: 125 MMHG | RESPIRATION RATE: 18 BRPM

## 2021-03-24 PROBLEM — N17.9 ACUTE RENAL FAILURE SUPERIMPOSED ON STAGE 4 CHRONIC KIDNEY DISEASE: Status: ACTIVE | Noted: 2021-03-24

## 2021-03-24 PROBLEM — N39.0 ACUTE UTI (URINARY TRACT INFECTION): Status: RESOLVED | Noted: 2021-03-19 | Resolved: 2021-03-24

## 2021-03-24 PROBLEM — N18.4 ACUTE RENAL FAILURE SUPERIMPOSED ON STAGE 4 CHRONIC KIDNEY DISEASE (HCC): Status: RESOLVED | Noted: 2021-03-19 | Resolved: 2021-03-24

## 2021-03-24 PROBLEM — N18.4 ACUTE RENAL FAILURE SUPERIMPOSED ON STAGE 4 CHRONIC KIDNEY DISEASE: Status: ACTIVE | Noted: 2021-03-24

## 2021-03-24 PROBLEM — N17.9 ACUTE RENAL FAILURE SUPERIMPOSED ON STAGE 4 CHRONIC KIDNEY DISEASE (HCC): Status: RESOLVED | Noted: 2021-03-19 | Resolved: 2021-03-24

## 2021-03-24 PROBLEM — N18.4 CHRONIC KIDNEY DISEASE (CKD), STAGE IV (SEVERE) (HCC): Status: ACTIVE | Noted: 2020-05-06

## 2021-03-24 LAB
ANION GAP SERPL CALCULATED.3IONS-SCNC: 11 MMOL/L (ref 5–15)
BACTERIA SPEC AEROBE CULT: NORMAL
BUN SERPL-MCNC: 39 MG/DL (ref 8–23)
BUN/CREAT SERPL: 19.2 (ref 7–25)
CALCIUM SPEC-SCNC: 9 MG/DL (ref 8.6–10.5)
CHLORIDE SERPL-SCNC: 101 MMOL/L (ref 98–107)
CO2 SERPL-SCNC: 24 MMOL/L (ref 22–29)
CREAT SERPL-MCNC: 2.03 MG/DL (ref 0.57–1)
DEPRECATED RDW RBC AUTO: 50.3 FL (ref 37–54)
ERYTHROCYTE [DISTWIDTH] IN BLOOD BY AUTOMATED COUNT: 16.2 % (ref 12.3–15.4)
GFR SERPL CREATININE-BSD FRML MDRD: 24 ML/MIN/1.73
GLUCOSE SERPL-MCNC: 80 MG/DL (ref 65–99)
HCT VFR BLD AUTO: 27.9 % (ref 34–46.6)
HGB BLD-MCNC: 9 G/DL (ref 12–15.9)
MCH RBC QN AUTO: 28.5 PG (ref 26.6–33)
MCHC RBC AUTO-ENTMCNC: 32.4 G/DL (ref 31.5–35.7)
MCV RBC AUTO: 88 FL (ref 79–97)
PLATELET # BLD AUTO: 210 10*3/MM3 (ref 140–450)
PMV BLD AUTO: 7.5 FL (ref 6–12)
POTASSIUM SERPL-SCNC: 4.2 MMOL/L (ref 3.5–5.2)
RBC # BLD AUTO: 3.16 10*6/MM3 (ref 3.77–5.28)
SODIUM SERPL-SCNC: 136 MMOL/L (ref 136–145)
WBC # BLD AUTO: 6 10*3/MM3 (ref 3.4–10.8)

## 2021-03-24 PROCEDURE — 99217 PR OBSERVATION CARE DISCHARGE MANAGEMENT: CPT | Performed by: INTERNAL MEDICINE

## 2021-03-24 PROCEDURE — G0378 HOSPITAL OBSERVATION PER HR: HCPCS

## 2021-03-24 PROCEDURE — 97116 GAIT TRAINING THERAPY: CPT

## 2021-03-24 PROCEDURE — 80048 BASIC METABOLIC PNL TOTAL CA: CPT | Performed by: INTERNAL MEDICINE

## 2021-03-24 PROCEDURE — 97530 THERAPEUTIC ACTIVITIES: CPT

## 2021-03-24 PROCEDURE — 85027 COMPLETE CBC AUTOMATED: CPT | Performed by: INTERNAL MEDICINE

## 2021-03-24 PROCEDURE — 97110 THERAPEUTIC EXERCISES: CPT

## 2021-03-24 RX ORDER — DOXYCYCLINE 100 MG/1
100 TABLET ORAL EVERY 12 HOURS SCHEDULED
Qty: 14 TABLET | Refills: 0 | Status: SHIPPED | OUTPATIENT
Start: 2021-03-24 | End: 2021-03-31

## 2021-03-24 RX ORDER — METOCLOPRAMIDE 5 MG/1
5 TABLET ORAL
Qty: 90 TABLET | Refills: 0 | Status: SHIPPED | OUTPATIENT
Start: 2021-03-24 | End: 2021-06-02 | Stop reason: SDUPTHER

## 2021-03-24 RX ORDER — PROPRANOLOL HYDROCHLORIDE 40 MG/1
40 TABLET ORAL 2 TIMES DAILY
Qty: 60 TABLET | Refills: 0 | Status: SHIPPED | OUTPATIENT
Start: 2021-03-24 | End: 2021-05-11 | Stop reason: SDUPTHER

## 2021-03-24 RX ADMIN — METOCLOPRAMIDE 5 MG: 5 TABLET ORAL at 11:57

## 2021-03-24 RX ADMIN — PANTOPRAZOLE SODIUM 40 MG: 40 TABLET, DELAYED RELEASE ORAL at 08:03

## 2021-03-24 RX ADMIN — METOCLOPRAMIDE 5 MG: 5 TABLET ORAL at 08:03

## 2021-03-24 RX ADMIN — SUCRALFATE 1 G: 1 TABLET ORAL at 11:57

## 2021-03-24 RX ADMIN — SUCRALFATE 1 G: 1 TABLET ORAL at 08:03

## 2021-03-24 RX ADMIN — DOXYCYCLINE 100 MG: 100 TABLET, FILM COATED ORAL at 08:03

## 2021-03-24 RX ADMIN — NYSTATIN: 100000 POWDER TOPICAL at 08:16

## 2021-03-24 RX ADMIN — Medication 10 ML: at 08:03

## 2021-03-24 RX ADMIN — HYDROCODONE BITARTRATE AND ACETAMINOPHEN 1 TABLET: 10; 325 TABLET ORAL at 03:58

## 2021-03-24 RX ADMIN — Medication 1 TABLET: at 08:03

## 2021-03-24 RX ADMIN — ALLOPURINOL 200 MG: 100 TABLET ORAL at 08:03

## 2021-03-24 RX ADMIN — DICYCLOMINE HYDROCHLORIDE 10 MG: 10 CAPSULE ORAL at 08:03

## 2021-03-24 RX ADMIN — ASPIRIN 81 MG: 81 TABLET, COATED ORAL at 08:03

## 2021-03-24 RX ADMIN — DICYCLOMINE HYDROCHLORIDE 10 MG: 10 CAPSULE ORAL at 11:57

## 2021-03-24 RX ADMIN — BUSPIRONE HYDROCHLORIDE 10 MG: 10 TABLET ORAL at 08:03

## 2021-03-24 RX ADMIN — HYDROCODONE BITARTRATE AND ACETAMINOPHEN 1 TABLET: 10; 325 TABLET ORAL at 11:57

## 2021-03-24 NOTE — OUTREACH NOTE
Prep Survey      Responses   [facility] patient discharged from?  [facility]   Is LACE score < 7 ?  No   Emergency Room discharge w/ pulse ox?  No   Eligibility  [facility]   Date of Admission  03/19/21   Date of Discharge  03/24/21   Discharge Disposition  Home or Self Care   Discharge diagnosis  Acute renal failure superimposed on stage 4 chronic kidney disease,   Acute UTI     Does the patient have one of the following disease processes/diagnoses(primary or secondary)?  Other   Does the patient have Home health ordered?  Yes   What is the Home health agency?   [facility]    Is there a DME ordered?  Yes   What DME was ordered?  [facility] - bed   Prep survey completed?  Yes          Chrissy Gaitan RN

## 2021-03-25 ENCOUNTER — TRANSITIONAL CARE MANAGEMENT TELEPHONE ENCOUNTER (OUTPATIENT)
Dept: CALL CENTER | Facility: HOSPITAL | Age: 71
End: 2021-03-25

## 2021-03-25 NOTE — PROGRESS NOTES
CALLED PATIENT AND TRIED TO SCHEDULE HER FOR Monday 04/05/21 WITH , SHE SAID SHE WANTED TO WAIT TILL HER DAUGHTER IN LAW GOT HOME TO ASK HER IF THAT APPT WOULD BE OKAY, THIS IS THE SOONEST APPOINTMENT WITH  SHE IS OUT ALL NEXT WEEK. STILL WAITING FOR PATIENT TO CALL THE OFFICE BACK.

## 2021-03-25 NOTE — PROGRESS NOTES
SPOKE WITH PATIENTS DAUGHTER IN LAW COLTON TRINIDAD, SHE WANTS TO KEEP HER UPCOMING APPOINTMENT WITH  ON 04/09/21, SHE HAS A FEW UPCOMING APPOINTMENTS SHE NEEDS TO KEEP AND SAYS THE 9TH WILL WORK FOR THEM. I OFFERED HER AN APPOINTMENT ON 04/05/21 THE FIRST DAY  WILL BE BACK IN OFFICE AND PATIENT DOES NOT WANT TO COME IN THAT DAY AS I SAID SHE HAS UPCOMING APPTS THAT WEEK. SO PATIENT WILL BE HERE TO SEE REVA ON 04/09/21

## 2021-03-25 NOTE — OUTREACH NOTE
Call Center TCM Note      Responses   [facility] patient discharged from?  [facility]   Does the patient have one of the following disease processes/diagnoses(primary or secondary)?  Other   TCM attempt successful?  Yes   Call start time  1239   Call end time  1244   Discharge diagnosis  Acute renal failure superimposed on stage 4 chronic kidney disease,   Acute UTI     Meds reviewed with patient/caregiver?  Yes   Is the patient having any side effects they believe may be caused by any medication additions or changes?  No   Does the patient have all medications ordered at discharge?  No [Will  today]   Nursing Interventions  No intervention needed, Nurse provided patient education   Is the patient taking all medications as directed (includes completed medication regime)?  No [Her daughter in law will  today. ]   What is preventing the patient from taking all medications as directed?  Other   Nursing Interventions  Nurse provided patient education   Does the patient have a primary care provider?   Yes   Does the patient have an appointment with their PCP within 7 days of discharge?  Greater than 7 days   Comments regarding PCP  Patient has a previously scheduled wellness exam with Dr Landeros on 4/9/2021-I could not find a sooner appt.  Message sent to office as this will not fall in TCM timeframe.    What is preventing the patient from scheduling follow up appointments within 7 days of discharge?  Earlier appointment not available   Nursing Interventions  Verified appointment date/time/provider   Has the patient kept scheduled appointments due by today?  N/A   What is the Home health agency?   [facility]    Has home health visited the patient within 72 hours of discharge?  Call prior to 72 hours   [facility]   Has all DME been delivered?  Yes   Psychosocial issues?  No   Did the patient receive a copy of their discharge instructions?  Yes    Nursing interventions  Reviewed instructions with patient   What is the patient's perception of their health status since discharge?  Improving   Is the patient/caregiver able to teach back signs and symptoms related to disease process for when to call PCP?  Yes   Is the patient/caregiver able to teach back signs and symptoms related to disease process for when to call 911?  Yes   Is the patient/caregiver able to teach back the hierarchy of who to call/visit for symptoms/problems? PCP, Specialist, Home health nurse, Urgent Care, ED, 911  Yes   If the patient is a current smoker, are they able to teach back resources for cessation?  Not a smoker   TCM call completed?  Yes          Juan Carlos Parish RN    3/25/2021, 12:44 EDT

## 2021-03-30 ENCOUNTER — TELEPHONE (OUTPATIENT)
Dept: FAMILY MEDICINE CLINIC | Facility: CLINIC | Age: 71
End: 2021-03-30

## 2021-03-30 NOTE — TELEPHONE ENCOUNTER
PATIENT AND DAUGHTER N LAW ARE CALLING IN ABOUT GETTING SOME ORDERS FOR BLOOD WORK FOR HOME HEALTH NURSE. THEY WOULD LIKE TO HAVE CALLBACK TO DISCUSS WHAT IS NEEDED.      DAUGHTER LLOYD SEGURA IS NOT ON [facility] BUT PATIENT WAS THERE AS WELL PT IS WANTING TO HAVE NURSE COME TO SEE HER.    PLEASE ADVISE    CALLBACK NUMBER IS  [phone]

## 2021-03-30 NOTE — TELEPHONE ENCOUNTER
CALLED AND SPOKE WITH THEM PATIENT AND TOLD HER TO GET WITH THE NEPHROLOGIST TO SEE IF HE WANTS TO DO LABS WITH THE RECENT KIDNEY FAILURE.

## 2021-04-01 ENCOUNTER — LAB REQUISITION (OUTPATIENT)
Dept: LAB | Facility: HOSPITAL | Age: 71
End: 2021-04-01

## 2021-04-01 DIAGNOSIS — E11.22 TYPE 2 DIABETES MELLITUS WITH DIABETIC CHRONIC KIDNEY DISEASE (HCC): ICD-10-CM

## 2021-04-01 DIAGNOSIS — I50.9 HEART FAILURE, UNSPECIFIED (HCC): ICD-10-CM

## 2021-04-01 DIAGNOSIS — I13.0 HYPERTENSIVE HEART AND CHRONIC KIDNEY DISEASE WITH HEART FAILURE AND STAGE 1 THROUGH STAGE 4 CHRONIC KIDNEY DISEASE, OR UNSPECIFIED CHRONIC KIDNEY DISEASE (HCC): ICD-10-CM

## 2021-04-01 LAB
25(OH)D3 SERPL-MCNC: 58.2 NG/ML (ref 30–100)
ANION GAP SERPL CALCULATED.3IONS-SCNC: 8 MMOL/L (ref 5–15)
BASOPHILS # BLD AUTO: 0 10*3/MM3 (ref 0–0.2)
BASOPHILS NFR BLD AUTO: 0.6 % (ref 0–1.5)
BILIRUB UR QL STRIP: NEGATIVE
BUN SERPL-MCNC: 34 MG/DL (ref 8–23)
BUN/CREAT SERPL: 22.8 (ref 7–25)
CALCIUM SPEC-SCNC: 9.3 MG/DL (ref 8.6–10.5)
CHLORIDE SERPL-SCNC: 104 MMOL/L (ref 98–107)
CLARITY UR: CLEAR
CO2 SERPL-SCNC: 25 MMOL/L (ref 22–29)
COLOR UR: YELLOW
CREAT SERPL-MCNC: 1.49 MG/DL (ref 0.57–1)
CREAT UR-MCNC: 84.3 MG/DL
DEPRECATED RDW RBC AUTO: 54.3 FL (ref 37–54)
EOSINOPHIL # BLD AUTO: 0.4 10*3/MM3 (ref 0–0.4)
EOSINOPHIL NFR BLD AUTO: 7.2 % (ref 0.3–6.2)
ERYTHROCYTE [DISTWIDTH] IN BLOOD BY AUTOMATED COUNT: 17.4 % (ref 12.3–15.4)
GFR SERPL CREATININE-BSD FRML MDRD: 34 ML/MIN/1.73
GLUCOSE SERPL-MCNC: 84 MG/DL (ref 65–99)
GLUCOSE UR STRIP-MCNC: NEGATIVE MG/DL
HCT VFR BLD AUTO: 28.9 % (ref 34–46.6)
HGB BLD-MCNC: 9.2 G/DL (ref 12–15.9)
HGB UR QL STRIP.AUTO: NEGATIVE
KETONES UR QL STRIP: NEGATIVE
LEUKOCYTE ESTERASE UR QL STRIP.AUTO: NEGATIVE
LYMPHOCYTES # BLD AUTO: 1.4 10*3/MM3 (ref 0.7–3.1)
LYMPHOCYTES NFR BLD AUTO: 29 % (ref 19.6–45.3)
MCH RBC QN AUTO: 28.1 PG (ref 26.6–33)
MCHC RBC AUTO-ENTMCNC: 31.8 G/DL (ref 31.5–35.7)
MCV RBC AUTO: 88.6 FL (ref 79–97)
MONOCYTES # BLD AUTO: 0.4 10*3/MM3 (ref 0.1–0.9)
MONOCYTES NFR BLD AUTO: 8.5 % (ref 5–12)
NEUTROPHILS NFR BLD AUTO: 2.7 10*3/MM3 (ref 1.7–7)
NEUTROPHILS NFR BLD AUTO: 54.7 % (ref 42.7–76)
NITRITE UR QL STRIP: NEGATIVE
NRBC BLD AUTO-RTO: 0.1 /100 WBC (ref 0–0.2)
PH UR STRIP.AUTO: 5.5 [PH] (ref 5–8)
PHOSPHATE SERPL-MCNC: 3.2 MG/DL (ref 2.5–4.5)
PLATELET # BLD AUTO: 223 10*3/MM3 (ref 140–450)
PMV BLD AUTO: 7.8 FL (ref 6–12)
POTASSIUM SERPL-SCNC: 4.6 MMOL/L (ref 3.5–5.2)
PROT UR QL STRIP: NEGATIVE
PROT UR-MCNC: 44 MG/DL
PROT/CREAT UR: 521.9 MG/G CREA (ref 0–200)
PTH-INTACT SERPL-MCNC: 53.1 PG/ML (ref 15–65)
RBC # BLD AUTO: 3.26 10*6/MM3 (ref 3.77–5.28)
SODIUM SERPL-SCNC: 137 MMOL/L (ref 136–145)
SP GR UR STRIP: 1.02 (ref 1–1.03)
URATE SERPL-MCNC: 4.1 MG/DL (ref 2.4–5.7)
UROBILINOGEN UR QL STRIP: NORMAL
WBC # BLD AUTO: 4.9 10*3/MM3 (ref 3.4–10.8)

## 2021-04-01 PROCEDURE — 84156 ASSAY OF PROTEIN URINE: CPT | Performed by: FAMILY MEDICINE

## 2021-04-01 PROCEDURE — 85025 COMPLETE CBC W/AUTO DIFF WBC: CPT | Performed by: FAMILY MEDICINE

## 2021-04-01 PROCEDURE — 83970 ASSAY OF PARATHORMONE: CPT | Performed by: FAMILY MEDICINE

## 2021-04-01 PROCEDURE — 84550 ASSAY OF BLOOD/URIC ACID: CPT | Performed by: FAMILY MEDICINE

## 2021-04-01 PROCEDURE — 80048 BASIC METABOLIC PNL TOTAL CA: CPT | Performed by: FAMILY MEDICINE

## 2021-04-01 PROCEDURE — 82306 VITAMIN D 25 HYDROXY: CPT | Performed by: FAMILY MEDICINE

## 2021-04-01 PROCEDURE — 84100 ASSAY OF PHOSPHORUS: CPT | Performed by: FAMILY MEDICINE

## 2021-04-01 PROCEDURE — 81003 URINALYSIS AUTO W/O SCOPE: CPT | Performed by: FAMILY MEDICINE

## 2021-04-01 PROCEDURE — 82570 ASSAY OF URINE CREATININE: CPT | Performed by: FAMILY MEDICINE

## 2021-04-07 ENCOUNTER — READMISSION MANAGEMENT (OUTPATIENT)
Dept: CALL CENTER | Facility: HOSPITAL | Age: 71
End: 2021-04-07

## 2021-04-07 NOTE — OUTREACH NOTE
Medical Week 3 Survey      Responses   [facility] patient discharged from?  [facility]   Does the patient have one of the following disease processes/diagnoses(primary or secondary)?  Other   Week 3 attempt successful?  Yes   Call start time  1155   Call end time  1157   Discharge diagnosis  Acute renal failure superimposed on stage 4 chronic kidney disease,   Acute UTI     Meds reviewed with patient/caregiver?  Yes   Is the patient having any side effects they believe may be caused by any medication additions or changes?  No   Does the patient have all medications ordered at discharge?  Yes   Is the patient taking all medications as directed (includes completed medication regime)?  Yes   Does the patient have a primary care provider?   Yes   Comments regarding PCP  PCP APPOINTMENT IS 4/9/21   Does the patient have an appointment with their PCP within 7 days of discharge?  Greater than 7 days   What is preventing the patient from scheduling follow up appointments within 7 days of discharge?  Earlier appointment not available   Nursing Interventions  Verified appointment date/time/provider   Has the patient kept scheduled appointments due by today?  Yes   What is the Home health agency?   [facility]    Has home health visited the patient within 72 hours of discharge?  Yes   Has all DME been delivered?  Yes   Psychosocial issues?  No   Did the patient receive a copy of their discharge instructions?  Yes   Nursing interventions  Reviewed instructions with patient   What is the patient's perception of their health status since discharge?  Improving   Is the patient/caregiver able to teach back signs and symptoms related to disease process for when to call PCP?  Yes   Is the patient/caregiver able to teach back signs and symptoms related to disease process for when to call 911?  Yes   Is the patient/caregiver able to teach back the hierarchy of who to call/visit for symptoms/problems? PCP,  Specialist, Home health nurse, Urgent Care, ED, 911  Yes   If the patient is a current smoker, are they able to teach back resources for cessation?  Not a smoker   Week 3 Call Completed?  Yes          Michaela Patel LPN

## 2021-04-09 ENCOUNTER — TELEPHONE (OUTPATIENT)
Dept: FAMILY MEDICINE CLINIC | Facility: CLINIC | Age: 71
End: 2021-04-09

## 2021-04-09 ENCOUNTER — OFFICE VISIT (OUTPATIENT)
Dept: FAMILY MEDICINE CLINIC | Facility: CLINIC | Age: 71
End: 2021-04-09

## 2021-04-09 VITALS
OXYGEN SATURATION: 97 % | TEMPERATURE: 98.7 F | RESPIRATION RATE: 16 BRPM | WEIGHT: 254.4 LBS | HEART RATE: 64 BPM | BODY MASS INDEX: 46.81 KG/M2 | HEIGHT: 62 IN

## 2021-04-09 DIAGNOSIS — Z86.39 HISTORY OF DIABETES MELLITUS, TYPE II: ICD-10-CM

## 2021-04-09 DIAGNOSIS — Z11.59 NEED FOR HEPATITIS C SCREENING TEST: ICD-10-CM

## 2021-04-09 DIAGNOSIS — N18.4 RENAL FAILURE, CHRONIC, STAGE 4 (SEVERE) (HCC): Primary | ICD-10-CM

## 2021-04-09 DIAGNOSIS — N18.4 ACUTE RENAL FAILURE SUPERIMPOSED ON STAGE 4 CHRONIC KIDNEY DISEASE, UNSPECIFIED ACUTE RENAL FAILURE TYPE (HCC): ICD-10-CM

## 2021-04-09 DIAGNOSIS — E66.01 MORBID OBESITY WITH BMI OF 45.0-49.9, ADULT (HCC): ICD-10-CM

## 2021-04-09 DIAGNOSIS — Z74.09 MOBILITY POOR: ICD-10-CM

## 2021-04-09 DIAGNOSIS — R73.09 ELEVATED HEMOGLOBIN A1C: ICD-10-CM

## 2021-04-09 DIAGNOSIS — M48.062 SPINAL STENOSIS OF LUMBAR REGION WITH NEUROGENIC CLAUDICATION: ICD-10-CM

## 2021-04-09 DIAGNOSIS — N17.9 ACUTE RENAL FAILURE SUPERIMPOSED ON STAGE 4 CHRONIC KIDNEY DISEASE, UNSPECIFIED ACUTE RENAL FAILURE TYPE (HCC): ICD-10-CM

## 2021-04-09 DIAGNOSIS — R29.6 FREQUENT FALLS: ICD-10-CM

## 2021-04-09 DIAGNOSIS — Z00.00 ENCOUNTER FOR SUBSEQUENT ANNUAL WELLNESS VISIT IN MEDICARE PATIENT: ICD-10-CM

## 2021-04-09 LAB
BILIRUB BLD-MCNC: NEGATIVE MG/DL
CLARITY, POC: CLEAR
COLOR UR: YELLOW
GLUCOSE UR STRIP-MCNC: NEGATIVE MG/DL
KETONES UR QL: NEGATIVE
LEUKOCYTE EST, POC: NEGATIVE
NITRITE UR-MCNC: NEGATIVE MG/ML
PH UR: 5 [PH] (ref 5–8)
PROT UR STRIP-MCNC: NEGATIVE MG/DL
RBC # UR STRIP: ABNORMAL /UL
SP GR UR: 1.02 (ref 1–1.03)
UROBILINOGEN UR QL: NORMAL

## 2021-04-09 PROCEDURE — 1160F RVW MEDS BY RX/DR IN RCRD: CPT | Performed by: FAMILY MEDICINE

## 2021-04-09 PROCEDURE — G0439 PPPS, SUBSEQ VISIT: HCPCS | Performed by: FAMILY MEDICINE

## 2021-04-09 PROCEDURE — 99214 OFFICE O/P EST MOD 30 MIN: CPT | Performed by: FAMILY MEDICINE

## 2021-04-09 PROCEDURE — 81003 URINALYSIS AUTO W/O SCOPE: CPT | Performed by: FAMILY MEDICINE

## 2021-04-09 PROCEDURE — 1170F FXNL STATUS ASSESSED: CPT | Performed by: FAMILY MEDICINE

## 2021-04-09 RX ORDER — FUROSEMIDE 40 MG/1
40 TABLET ORAL DAILY
COMMUNITY
End: 2021-05-14 | Stop reason: SDUPTHER

## 2021-04-09 NOTE — TELEPHONE ENCOUNTER
V[facility] WITH [facility] CALLED STATING THAT THE DAUGHTER OF THE PATIENT CALLED SAID THAT THE PATIENT CAME IN FOR BLOOD WORK ON 04/08/21 BUT WAS UNABLE TO PROVIDE THE BLOOD. SHE WOULD LIKE TO SEE IF HOME HEALTH COULD DO THE BLOOD DRAW INSTEAD.    HOME HEALTH NEEDS ORDERS FOR BLOOD DRAW  PLEASE ADVISE  [phone]  FAX: [phone]

## 2021-04-09 NOTE — PROGRESS NOTES
The ABCs of the Annual Wellness Visit  Subsequent Medicare Wellness Visit    Chief Complaint   Patient presents with   • Medicare Wellness-subsequent       Subjective   History of Present Illness:  Elaine Toscano is a 70 y.o. female who presents for a Subsequent Medicare Wellness Visit.  However her daughter also thought she was supposed to be following up from recent hospitalization.  She is at [facility] discharged on March 24 for another fall and renal insufficiency.  She is continuing with home health including physical therapy.  She is doing well with her home exercises.  She is currently staying with her son and daughter-in-law, Earnestine.      Patient states she is not a diabetic and wants to know why my chart says she needs a  diabetic eye exam.  Patient's daughter-in-law states several years ago she did carry the diagnosis of diabetes when she lived in [facility], she had gastric bypass surgery lost a lot of weight and has not needed treatment for diabetes since that time.  Patient states she has [facility] she believes in January of this year but Earnestine is questioning that that may have been over a year ago.     Patient is taking 40 mg lasix for the swelling, until resolves.  Saw Dr Gonzalez yesterday ordering BMP for today.   Mammogram 11/9/20 BI RAD negative 1  Dexa Scan 11/9/20 -3.9  Colonoscopy normal 1/24/18 repeat 10 years   Pap hysterectomy     ATTENTION  What is the year: correct  What is the month of the year: correct  What is the day of the week?: correct  What is the date?: correct  MEMORY  Repeat address three times, only score third attempt: [address], [place]: 3  HOW MANY ANIMALS DID THE PATIENT NAME  Verbal Fluency -- Animal Names (0-25): 9-10  CLOCK DRAWING  Clock Drawing: All Correct  MEMORY RECALL  Tell me what you remember about that name and address we were repeating at the beginning: 3  ACE TOTAL SCORE  Total ACE Score -  <25/30 strongly suggests cognitive impairment; <21/30 almost certainly shows dementia: 18      HEALTH RISK ASSESSMENT    Recent Hospitalizations:  Recently treated at the following:  [facility]     Current Medical Providers:  Patient Care Team:  Josefina Landeros MD as PCP - General (Family Medicine)  Josefina Landeros MD as Consulting Physician (Family Medicine)  Sánchez Larson MD as Consulting Physician (Cardiology)    Smoking Status:  Social History     Tobacco Use   Smoking Status Never Smoker   Smokeless Tobacco Never Used       Alcohol Consumption:  Social History     Substance and Sexual Activity   Alcohol Use No       Depression Screen:   PHQ-2/PHQ-9 Depression Screening 4/9/2021   Little interest or pleasure in doing things 3   Feeling down, depressed, or hopeless 3   Trouble falling or staying asleep, or sleeping too much 0   Feeling tired or having little energy 3   Poor appetite or overeating 0   Feeling bad about yourself - or that you are a failure or have let yourself or your family down 3   Trouble concentrating on things, such as reading the newspaper or watching television 3   Moving or speaking so slowly that other people could have noticed. Or the opposite - being so fidgety or restless that you have been moving around a lot more than usual 0   Thoughts that you would be better off dead, or of hurting yourself in some way 0   Total Score 15   If you checked off any problems, how difficult have these problems made it for you to do your work, take care of things at home, or get along with other people? Somewhat difficult       Fall Risk Screen:  STEADI Fall Risk Assessment was completed, and patient is at HIGH risk for falls. Assessment completed on:4/9/2021    Health Habits and Functional and Cognitive Screening:  Functional & Cognitive Status 4/9/2021   Do you have difficulty preparing food and eating? Yes   Do you have difficulty bathing yourself, getting dressed or  grooming yourself? Yes   Do you have difficulty using the toilet? No   Do you have difficulty moving around from place to place? Yes   Do you have trouble with steps or getting out of a bed or a chair? Yes   Current Diet Well Balanced Diet   Dental Exam Not up to date   Eye Exam Up to date   Exercise (times per week) 1 times per week   Current Exercises Include Other        Exercise Comment HOME HEALTH PHYSICAL THERAPY    Current Exercise Activities Include (No Data)   Do you need help using the phone?  No   Are you deaf or do you have serious difficulty hearing?  No   Do you need help with transportation? Yes   Do you need help shopping? Yes   Do you need help preparing meals?  Yes   Do you need help with housework?  Yes   Do you need help with laundry? Yes   Do you need help taking your medications? Yes   Do you need help managing money? No   Do you ever drive or ride in a car without wearing a seat belt? Yes   Have you felt unusual stress, anger or loneliness in the last month? Yes   Who do you live with? Sibling   If you need help, do you have trouble finding someone available to you? No   Have you been bothered in the last four weeks by sexual problems? No   Do you have difficulty concentrating, remembering or making decisions? Yes         Does the patient have evidence of cognitive impairment? Yes    Asprin use counseling:Taking ASA appropriately as indicated    Age-appropriate Screening Schedule:  Refer to the list below for future screening recommendations based on patient's age, sex and/or medical conditions. Orders for these recommended tests are listed in the plan section. The patient has been provided with a written plan.    Health Maintenance   Topic Date Due   • ZOSTER VACCINE (1 of 2) Never done   • DIABETIC EYE EXAM  06/11/2019   • HEMOGLOBIN A1C  07/25/2021   • INFLUENZA VACCINE  08/01/2021   • DIABETIC FOOT EXAM  11/18/2021   • LIPID PANEL  03/13/2022   • URINE MICROALBUMIN  04/01/2022   •  MAMMOGRAM  11/09/2022   • DXA SCAN  11/09/2022   • COLONOSCOPY  01/24/2028   • TDAP/TD VACCINES (3 - Td) 07/24/2029          The following portions of the patient's history were reviewed and updated as appropriate: allergies, current medications, past family history, past medical history, past social history, past surgical history and problem list.    Outpatient Medications Prior to Visit   Medication Sig Dispense Refill   • alendronate (FOSAMAX) 70 MG tablet Take 1 tablet by mouth Every 7 (Seven) Days. Take with 8 ounces of water first thing in the morning.  remain upright for 30 minutes (Patient taking differently: Take 70 mg by mouth Every 7 (Seven) Days. Saturday) 13 tablet 3   • allopurinol (ZYLOPRIM) 100 MG tablet Take 2 tablets by mouth every night at bedtime. 180 tablet 3   • aspirin (ASPIR) 81 MG EC tablet Take 81 mg by mouth Daily.     • busPIRone (BUSPAR) 10 MG tablet Take 1 tablet by mouth 2 (Two) Times a Day. 60 tablet 6   • calcium carb-cholecalciferol (CALCIUM PLUS VITAMIN D3) 600-800 MG-UNIT tablet Take 600 mg by mouth 2 (Two) Times a Day With Meals.     • cyanocobalamin 1000 MCG/ML injection inject ONE ML INTRAMUSCULARLY every MONTH 3 mL 3   • Desvenlafaxine Succinate ER (Pristiq) 25 MG tablet sustained-release 24 hour Take 1 tablet by mouth Daily. (Patient taking differently: Take 25 mg by mouth Daily. 12.5mg BID) 90 tablet 0   • dicyclomine (BENTYL) 10 MG capsule Take 10 mg by mouth 4 (Four) Times a Day.  0   • fluticasone (Flonase) 50 MCG/ACT nasal spray 2 sprays into the nostril(s) as directed by provider Daily. (Patient taking differently: 2 sprays into the nostril(s) as directed by provider Daily As Needed.) 16 g 12   • furosemide (LASIX) 40 MG tablet Take 40 mg by mouth Daily. Until swelling is gone per nephrologist     • HYDROcodone-acetaminophen (NORCO)  MG per tablet Take 1 tablet by mouth Every 6 (Six) Hours As Needed.     • linaclotide (LINZESS) 72 MCG capsule capsule Take 1  capsule by mouth Daily. 90 capsule 3   • melatonin ([employer] Melatonin) 5 MG tablet tablet Take 2 tablets by mouth Every Night. 60 tablet 12   • metoclopramide (REGLAN) 5 MG tablet Take 1 tablet by mouth 3 (Three) Times a Day Before Meals. 90 tablet 0   • omeprazole (priLOSEC) 40 MG capsule Take 40 mg by mouth 2 (Two) Times a Day.  5   • ondansetron (ZOFRAN) 4 MG tablet TAKE ONE TABLET BY MOUTH AS NEEDED FOR NAUSEA AND VOMITING 30 tablet 1   • propranolol (INDERAL) 40 MG tablet Take 1 tablet by mouth 2 (Two) Times a Day. 60 tablet 0   • QUEtiapine (SEROquel) 50 MG tablet Take 1 tablet by mouth Every Night. 90 tablet 0   • sucralfate (CARAFATE) 1 g tablet Take 1 g by mouth 4 (Four) Times a Day.  1   • vitamin D (ERGOCALCIFEROL) 1.25 MG (81551 UT) capsule capsule TAKE ONE CAPSULE BY MOUTH one time PER WEEK (Patient taking differently: Saturday) 5 capsule 12   • desmopressin (DDAVP) 0.2 MG tablet Take 1 tablet by mouth every night at bedtime. 30 tablet 12   • nystatin (MYCOSTATIN) 902640 UNIT/GM powder APPLY TO THE AFFECTED AREA(S) BY TOPICAL ROUTE 2 TIMES PER DAY 60 g 3   • potassium chloride 10 MEQ CR tablet Take 1 tablet by mouth 2 (Two) Times a Day. 180 tablet 1     No facility-administered medications prior to visit.       Patient Active Problem List   Diagnosis   • History of anemia due to chronic kidney disease   • History of iron deficiency   • Dysuria   • Congestive heart failure (CMS/[facility])   • Depression   • Edema   • Gastroesophageal reflux disease   • Gout   • Hypertension   • Hypokalemia   • Hypothyroidism   • Loose total knee arthroplasty (CMS/[facility])   • Hyperlipidemia   • Low back pain   • Asthma   • Mixed stress and urge urinary incontinence   • Vitamin D deficiency   • Swelling of lower extremity   • Spinal stenosis of lumbar region   • Osteoporosis   • Obstructive sleep apnea syndrome in adult   • B12 deficiency   • Morbid obesity with BMI of 45.0-49.9, adult (CMS/[facility])   • Abnormal cardiovascular stress test  "  • Pain in right shoulder   • Breast lump   • Chest discomfort   • Dysphagia, unspecified   • Frequent falls   • Greater trochanteric bursitis   • Hand pain, left   • Hyperglycemia   • Loss of balance   • Mobility poor   • MRSA carrier   • Numbness and tingling sensation of skin   • Other spondylosis with radiculopathy, lumbar region   • Pain of right hip joint   • Leg pain, bilateral   • Peripheral axonal neuropathy   • Presence of cardiac pacemaker   • Sick sinus syndrome (CMS/HCC)   • TMJ syndrome   • Degeneration of lumbar intervertebral disc   • Irritable bowel syndrome   • Peptic ulcer   • Anemia   • Renal failure, chronic, stage 4 (severe) (CMS/HCC)   • Chest pain   • Chronic kidney disease (CKD), stage IV (severe) (CMS/HCC)   • Other insomnia   • Allergic rhinitis   • Acute renal failure superimposed on stage 4 chronic kidney disease (CMS/HCC)       Advanced Care Planning:  ACP discussion was held with the patient during this visit. Patient does not have an advance directive, information provided.    Review of Systems    Compared to one year ago, the patient feels her physical health is worse.  Compared to one year ago, the patient feels her mental health is worse.    Reviewed chart for potential of high risk medication in the elderly: yes  Reviewed chart for potential of harmful drug interactions in the elderly:yes    Objective         Vitals:    04/09/21 1056   Pulse: 64   Resp: 16   Temp: 98.7 °F (37.1 °C)   TempSrc: Infrared   SpO2: 97%   Weight: 115 kg (254 lb 6.4 oz)   Height: 157.5 cm (62\")       Body mass index is 46.53 kg/m².  Discussed the patient's BMI with her. The BMI is above average; BMI management plan is completed.    Physical Exam    Lab Results   Component Value Date     (H) 02/01/2021    HGBA1C 5.9 (H) 01/25/2021      Office Visit on 04/09/2021   Component Date Value Ref Range Status   • Color 04/09/2021 Yellow  Yellow, Straw, Dark Yellow, Daphne Final   • Clarity, UA 04/09/2021 " Clear  Clear Final   • Specific Gravity  04/09/2021 1.020  1.005 - 1.030 Final   • pH, Urine 04/09/2021 5.0  5.0 - 8.0 Final   • Leukocytes 04/09/2021 Negative  Negative Final   • Nitrite, UA 04/09/2021 Negative  Negative Final   • Protein, POC 04/09/2021 Negative  Negative mg/dL Final   • Glucose, UA 04/09/2021 Negative  Negative, 1000 mg/dL (3+) mg/dL Final   • Ketones, UA 04/09/2021 Negative  Negative Final   • Urobilinogen, UA 04/09/2021 Normal  Normal Final   • Bilirubin 04/09/2021 Negative  Negative Final   • Blood, UA 04/09/2021 Trace* Negative Final   Lab Requisition on 04/01/2021   Component Date Value Ref Range Status   • Glucose 04/01/2021 84  65 - 99 mg/dL Final   • BUN 04/01/2021 34* 8 - 23 mg/dL Final   • Creatinine 04/01/2021 1.49* 0.57 - 1.00 mg/dL Final   • Sodium 04/01/2021 137  136 - 145 mmol/L Final   • Potassium 04/01/2021 4.6  3.5 - 5.2 mmol/L Final   • Chloride 04/01/2021 104  98 - 107 mmol/L Final   • CO2 04/01/2021 25.0  22.0 - 29.0 mmol/L Final   • Calcium 04/01/2021 9.3  8.6 - 10.5 mg/dL Final   • eGFR Non African Amer 04/01/2021 34* >60 mL/min/1.73 Final   • BUN/Creatinine Ratio 04/01/2021 22.8  7.0 - 25.0 Final   • Anion Gap 04/01/2021 8.0  5.0 - 15.0 mmol/L Final   • Uric Acid 04/01/2021 4.1  2.4 - 5.7 mg/dL Final   • PTH, Intact 04/01/2021 53.1  15.0 - 65.0 pg/mL Final   • Phosphorus 04/01/2021 3.2  2.5 - 4.5 mg/dL Final   • 25 Hydroxy, Vitamin D 04/01/2021 58.2  30.0 - 100.0 ng/ml Final   • Color, UA 04/01/2021 Yellow  Yellow, Straw Final   • Appearance, UA 04/01/2021 Clear  Clear Final   • pH, UA 04/01/2021 5.5  5.0 - 8.0 Final   • Specific Gravity, UA 04/01/2021 1.016  1.005 - 1.030 Final   • Glucose, UA 04/01/2021 Negative  Negative Final   • Ketones, UA 04/01/2021 Negative  Negative Final   • Bilirubin, UA 04/01/2021 Negative  Negative Final   • Blood, UA 04/01/2021 Negative  Negative Final   • Protein, UA 04/01/2021 Negative  Negative Final   • Leuk Esterase, UA 04/01/2021  Negative  Negative Final   • Nitrite, UA 04/01/2021 Negative  Negative Final   • Urobilinogen, UA 04/01/2021 0.2 E.U./dL  0.2 - 1.0 E.U./dL Final   • Protein/Creatinine Ratio, Urine 04/01/2021 521.9* 0.0 - 200.0 mg/G Crea Final   • Creatinine, Urine 04/01/2021 84.3  mg/dL Final   • Total Protein, Urine 04/01/2021 44.0  mg/dL Final   • WBC 04/01/2021 4.90  3.40 - 10.80 10*3/mm3 Final   • RBC 04/01/2021 3.26* 3.77 - 5.28 10*6/mm3 Final   • Hemoglobin 04/01/2021 9.2* 12.0 - 15.9 g/dL Final   • Hematocrit 04/01/2021 28.9* 34.0 - 46.6 % Final   • MCV 04/01/2021 88.6  79.0 - 97.0 fL Final   • MCH 04/01/2021 28.1  26.6 - 33.0 pg Final   • MCHC 04/01/2021 31.8  31.5 - 35.7 g/dL Final   • RDW 04/01/2021 17.4* 12.3 - 15.4 % Final   • RDW-SD 04/01/2021 54.3* 37.0 - 54.0 fl Final   • MPV 04/01/2021 7.8  6.0 - 12.0 fL Final   • Platelets 04/01/2021 223  140 - 450 10*3/mm3 Final   • Neutrophil % 04/01/2021 54.7  42.7 - 76.0 % Final   • Lymphocyte % 04/01/2021 29.0  19.6 - 45.3 % Final   • Monocyte % 04/01/2021 8.5  5.0 - 12.0 % Final   • Eosinophil % 04/01/2021 7.2* 0.3 - 6.2 % Final   • Basophil % 04/01/2021 0.6  0.0 - 1.5 % Final   • Neutrophils, Absolute 04/01/2021 2.70  1.70 - 7.00 10*3/mm3 Final   • Lymphocytes, Absolute 04/01/2021 1.40  0.70 - 3.10 10*3/mm3 Final   • Monocytes, Absolute 04/01/2021 0.40  0.10 - 0.90 10*3/mm3 Final   • Eosinophils, Absolute 04/01/2021 0.40  0.00 - 0.40 10*3/mm3 Final   • Basophils, Absolute 04/01/2021 0.00  0.00 - 0.20 10*3/mm3 Final   • nRBC 04/01/2021 0.1  0.0 - 0.2 /100 WBC Final   No results displayed because visit has over 200 results.      Office Visit on 02/01/2021   Component Date Value Ref Range Status   • Glucose 02/01/2021 108* 65 - 99 mg/dL Final   • BUN 02/01/2021 17  8 - 27 mg/dL Final   • Creatinine 02/01/2021 1.69* 0.57 - 1.00 mg/dL Final   • eGFR Non African Am 02/01/2021 30* >59 mL/min/1.73 Final   • eGFR African Am 02/01/2021 34* >59 mL/min/1.73 Final   • BUN/Creatinine  Ratio 02/01/2021 10* 12 - 28 Final   • Sodium 02/01/2021 144  134 - 144 mmol/L Final   • Potassium 02/01/2021 4.2  3.5 - 5.2 mmol/L Final   • Chloride 02/01/2021 101  96 - 106 mmol/L Final   • Total CO2 02/01/2021 27  20 - 29 mmol/L Final   • Calcium 02/01/2021 9.2  8.7 - 10.3 mg/dL Final   • Color 02/01/2021 Yellow  Yellow, Straw, Dark Yellow, Daphne Final   • Clarity, UA 02/01/2021 Cloudy* Clear Final   • Specific Gravity  02/01/2021 1.020  1.005 - 1.030 Final   • pH, Urine 02/01/2021 6.0  5.0 - 8.0 Final   • Leukocytes 02/01/2021 Small (1+)* Negative Final   • Nitrite, UA 02/01/2021 Positive* Negative Final   • Protein, POC 02/01/2021 Negative  Negative mg/dL Final   • Glucose, UA 02/01/2021 Negative  Negative, 1000 mg/dL (3+) mg/dL Final   • Ketones, UA 02/01/2021 Negative  Negative Final   • Urobilinogen, UA 02/01/2021 Normal  Normal Final   • Bilirubin 02/01/2021 Negative  Negative Final   • Blood, UA 02/01/2021 Small* Negative Final   Results Encounter on 01/25/2021   Component Date Value Ref Range Status   • WBC 01/25/2021 6.4  3.4 - 10.8 x10E3/uL Final   • RBC 01/25/2021 4.13  3.77 - 5.28 x10E6/uL Final   • Hemoglobin 01/25/2021 11.3  11.1 - 15.9 g/dL Final   • Hematocrit 01/25/2021 35.8  34.0 - 46.6 % Final   • MCV 01/25/2021 87  79 - 97 fL Final   • MCH 01/25/2021 27.4  26.6 - 33.0 pg Final   • MCHC 01/25/2021 31.6  31.5 - 35.7 g/dL Final   • RDW 01/25/2021 15.7* 11.7 - 15.4 % Final   • Platelets 01/25/2021 277  150 - 450 x10E3/uL Final   • Neutrophil Rel % 01/25/2021 66  Not Estab. % Final   • Lymphocyte Rel % 01/25/2021 24  Not Estab. % Final   • Monocyte Rel % 01/25/2021 6  Not Estab. % Final   • Eosinophil Rel % 01/25/2021 3  Not Estab. % Final   • Basophil Rel % 01/25/2021 1  Not Estab. % Final   • Neutrophils Absolute 01/25/2021 4.3  1.4 - 7.0 x10E3/uL Final   • Lymphocytes Absolute 01/25/2021 1.5  0.7 - 3.1 x10E3/uL Final   • Monocytes Absolute 01/25/2021 0.4  0.1 - 0.9 x10E3/uL Final   •  Eosinophils Absolute 01/25/2021 0.2  0.0 - 0.4 x10E3/uL Final   • Basophils Absolute 01/25/2021 0.0  0.0 - 0.2 x10E3/uL Final   • Immature Granulocyte Rel % 01/25/2021 0  Not Estab. % Final   • Immature Grans Absolute 01/25/2021 0.0  0.0 - 0.1 x10E3/uL Final   • Glucose 01/25/2021 174* 65 - 99 mg/dL Final   • BUN 01/25/2021 21  8 - 27 mg/dL Final   • Creatinine 01/25/2021 1.73* 0.57 - 1.00 mg/dL Final   • eGFR Non African Am 01/25/2021 29* >59 mL/min/1.73 Final   • eGFR African Am 01/25/2021 34* >59 mL/min/1.73 Final   • BUN/Creatinine Ratio 01/25/2021 12  12 - 28 Final   • Sodium 01/25/2021 142  134 - 144 mmol/L Final   • Potassium 01/25/2021 3.1* 3.5 - 5.2 mmol/L Final   • Chloride 01/25/2021 100  96 - 106 mmol/L Final   • Total CO2 01/25/2021 26  20 - 29 mmol/L Final   • Calcium 01/25/2021 8.4* 8.7 - 10.3 mg/dL Final   • Total Protein 01/25/2021 6.1  6.0 - 8.5 g/dL Final   • Albumin 01/25/2021 3.9  3.7 - 4.7 g/dL Final   • Globulin 01/25/2021 2.2  1.5 - 4.5 g/dL Final   • A/G Ratio 01/25/2021 1.8  1.2 - 2.2 Final   • Total Bilirubin 01/25/2021 0.3  0.0 - 1.2 mg/dL Final   • Alkaline Phosphatase 01/25/2021 114  39 - 117 IU/L Final   • AST (SGOT) 01/25/2021 11  0 - 40 IU/L Final   • ALT (SGPT) 01/25/2021 7  0 - 32 IU/L Final   • Hemoglobin A1C 01/25/2021 5.9* 4.8 - 5.6 % Final    Comment:          Prediabetes: 5.7 - 6.4           Diabetes: >6.4           Glycemic control for adults with diabetes: <7.0     Office Visit on 01/22/2021   Component Date Value Ref Range Status   • SARS-CoV-2, JADE 01/22/2021 Not Detected  Not Detected Final    Comment: This nucleic acid amplification test was developed and its performance  characteristics determined by [employer]. Nucleic acid  amplification tests include RT-PCR and TMA. This test has not been  FDA cleared or approved. This test has been authorized by FDA under  an Emergency Use Authorization (EUA). This test is only authorized  for the duration of time the  declaration that circumstances exist  justifying the authorization of the emergency use of in vitro  diagnostic tests for detection of SARS-CoV-2 virus and/or diagnosis  of COVID-19 infection under section 564(b)(1) of the Act, 21 U.S.C.  360bbb-3(b) (1), unless the authorization is terminated or revoked  sooner.  When diagnostic testing is negative, the possibility of a false  negative result should be considered in the context of a patient's  recent exposures and the presence of clinical signs and symptoms  consistent with COVID-19. An individual without symptoms of COVID-19  and who is not shedding SARS-CoV-2 virus wo                           uld expect to have a  negative (not detected) result in this assay.     • Rapid Influenza A Ag 01/22/2021 Negative  Negative Final   • Rapid Influenza B Ag 01/22/2021 Negative  Negative Final   • Internal Control 01/22/2021 Passed  Passed Final   • Lot Number 01/22/2021 [id number]   Final   • Expiration Date 01/22/2021 [id number]   Final         Assessment/Plan   Medicare Risks and Personalized Health Plan  CMS Preventative Services Quick Reference  Advance Directive Discussion  Dementia/Memory   Fall Risk  Immunizations Discussed/Encouraged (specific immunizations; Shingrix )  Inactivity/Sedentary  Obesity/Overweight     The above risks/problems have been discussed with the patient.  Pertinent information has been shared with the patient in the After Visit Summary.  Follow up plans and orders are seen below in the Assessment/Plan Section.    Diagnoses and all orders for this visit:    1. Renal failure, chronic, stage 4 (severe) (CMS/HCC) (Primary)  -     POCT urinalysis dipstick, automated    2. Encounter for subsequent annual wellness visit in Medicare patient    3. Acute renal failure superimposed on stage 4 chronic kidney disease, unspecified acute renal failure type (CMS/HCC)    4. Need for hepatitis C screening test  -     Hepatitis C Antibody    5. Frequent falls    6.  Spinal stenosis of lumbar region with neurogenic claudication    7. Mobility poor    8. Morbid obesity with BMI of 45.0-49.9, adult (CMS/[facility])    9. History of diabetes mellitus, type II  -     Hemoglobin A1c    10. Elevated hemoglobin A1c  -     Hemoglobin A1c    Preventative health care, did discuss memory impairment and weight, need for weight reduction and continued strengthening through physical therapy to help reduce risk of falling.  Advise she is safer living with her son and daughter-in-law at this time, they will follow through with trying to get her hospital bed as ordered at last visit.  She does have a med alert system but does not wear it regularly and does not keep it charged.    Patient has a reported history of diabetes however A1c's have consistently been just above normal range, not in diabetic range for at least the past couple of years without medication.  A1c's have remained just above normal limit, repeating A1c along with hepatitis C screen and BMP ordered by nephrology today.    Stage IV renal insufficiency, she is due for labs for nephrology, BMP today, reminded her to keep follow-up appointments.    Daughter-in-law will check into potential coverage for Shingrix, patient is interested in taking vaccination but only if covered by insurance.  Advised may be less expensive through pharmacies.    We will look into seeing when her last eye exam was and if more than a year she will reschedule.      Follow Up:  Return in about 4 weeks (around 5/7/2021) for Recheck chronic medical conditions/deconditioning/falls.     An After Visit Summary and PPPS were given to the patient.

## 2021-04-09 NOTE — PROGRESS NOTES
The ABCs of the Annual Wellness Visit  Initial Medicare Wellness Visit    Chief Complaint   Patient presents with   • Medicare Wellness-Initial Visit       Subjective   History of Present Illness:  Elaine Toscano is a 70 y.o. female who presents for an Initial Medicare Wellness Visit.  Patient is here for a medicare wellness today. Patient states she is not a diabetic and wants to know about the diabetic eye exam. Patient is taking 40 mg lasix for the swelling.   Mammogram 11/9/20 BI RAD negative 1  Dexa Scan 11/9/20 -3.9  Colonoscopy normal 1/24/18 repeat 10 years   Pap hysterectomy       HEALTH RISK ASSESSMENT    Recent Hospitalizations:  Recently treated at the following:  [facility]     Current Medical Providers:  Patient Care Team:  Josefina Landeros MD as PCP - General (Family Medicine)  Josefina Landeros MD as Consulting Physician (Family Medicine)  Sánchez Larson MD as Consulting Physician (Cardiology)    Smoking Status:  Social History     Tobacco Use   Smoking Status Never Smoker   Smokeless Tobacco Never Used       Alcohol Consumption:  Social History     Substance and Sexual Activity   Alcohol Use No       Depression Screen:   PHQ-2/PHQ-9 Depression Screening 4/9/2021   Little interest or pleasure in doing things 3   Feeling down, depressed, or hopeless 3   Trouble falling or staying asleep, or sleeping too much 0   Feeling tired or having little energy 3   Poor appetite or overeating 0   Feeling bad about yourself - or that you are a failure or have let yourself or your family down 3   Trouble concentrating on things, such as reading the newspaper or watching television 3   Moving or speaking so slowly that other people could have noticed. Or the opposite - being so fidgety or restless that you have been moving around a lot more than usual 0   Thoughts that you would be better off dead, or of hurting yourself in some way 0   Total Score 15   If you checked off any problems, how  "difficult have these problems made it for you to do your work, take care of things at home, or get along with other people? Somewhat difficult       Fall Risk Screen:  ERNESTINA Fall Risk Assessment was completed, and patient is at HIGH risk for falls. Assessment completed on:4/9/2021    Health Habits and Functional and Cognitive Screening:  Functional & Cognitive Status 4/9/2021   Do you have difficulty preparing food and eating? Yes   Do you have difficulty bathing yourself, getting dressed or grooming yourself? Yes   Do you have difficulty using the toilet? No   Do you have difficulty moving around from place to place? Yes   Do you have trouble with steps or getting out of a bed or a chair? Yes   Current Diet Well Balanced Diet   Dental Exam Not up to date   Eye Exam Up to date   Exercise (times per week) 1 times per week   Current Exercise Activities Include ***   Do you need help using the phone?  No   Are you deaf or do you have serious difficulty hearing?  No   Do you need help with transportation? Yes   Do you need help shopping? Yes   Do you need help preparing meals?  Yes   Do you need help with housework?  Yes   Do you need help with laundry? Yes   Do you need help taking your medications? Yes   Do you need help managing money? No   Do you ever drive or ride in a car without wearing a seat belt? Yes   Have you felt unusual stress, anger or loneliness in the last month? Yes   Who do you live with? Sibling   If you need help, do you have trouble finding someone available to you? No   Have you been bothered in the last four weeks by sexual problems? No   Do you have difficulty concentrating, remembering or making decisions? Yes         Does the patient have evidence of cognitive impairment? {Yes/No w/ pre-defaulted No:32697::\"No\"}    Asprin use counseling:{Aspirin :91380}    Age-appropriate Screening Schedule:  Refer to the list below for future screening recommendations based on patient's age, sex " "and/or medical conditions. Orders for these recommended tests are listed in the plan section. The patient has been provided with a written plan.    Health Maintenance   Topic Date Due   • ZOSTER VACCINE (1 of 2) Never done   • DIABETIC EYE EXAM  06/11/2019   • HEMOGLOBIN A1C  07/25/2021   • INFLUENZA VACCINE  08/01/2021   • DIABETIC FOOT EXAM  11/18/2021   • LIPID PANEL  03/13/2022   • URINE MICROALBUMIN  04/01/2022   • MAMMOGRAM  11/09/2022   • DXA SCAN  11/09/2022   • COLONOSCOPY  01/24/2028   • TDAP/TD VACCINES (3 - Td) 07/24/2029          The following portions of the patient's history were reviewed and updated as appropriate: {history reviewed:20406::\"allergies\",\"current medications\",\"past family history\",\"past medical history\",\"past social history\",\"past surgical history\",\"problem list\"}.    Outpatient Medications Prior to Visit   Medication Sig Dispense Refill   • alendronate (FOSAMAX) 70 MG tablet Take 1 tablet by mouth Every 7 (Seven) Days. Take with 8 ounces of water first thing in the morning.  remain upright for 30 minutes (Patient taking differently: Take 70 mg by mouth Every 7 (Seven) Days. Saturday) 13 tablet 3   • allopurinol (ZYLOPRIM) 100 MG tablet Take 2 tablets by mouth every night at bedtime. 180 tablet 3   • aspirin (ASPIR) 81 MG EC tablet Take 81 mg by mouth Daily.     • busPIRone (BUSPAR) 10 MG tablet Take 1 tablet by mouth 2 (Two) Times a Day. 60 tablet 6   • calcium carb-cholecalciferol (CALCIUM PLUS VITAMIN D3) 600-800 MG-UNIT tablet Take 600 mg by mouth 2 (Two) Times a Day With Meals.     • cyanocobalamin 1000 MCG/ML injection inject ONE ML INTRAMUSCULARLY every MONTH 3 mL 3   • Desvenlafaxine Succinate ER (Pristiq) 25 MG tablet sustained-release 24 hour Take 1 tablet by mouth Daily. (Patient taking differently: Take 25 mg by mouth Daily. 12.5mg BID) 90 tablet 0   • dicyclomine (BENTYL) 10 MG capsule Take 10 mg by mouth 4 (Four) Times a Day.  0   • fluticasone (Flonase) 50 MCG/ACT nasal " spray 2 sprays into the nostril(s) as directed by provider Daily. (Patient taking differently: 2 sprays into the nostril(s) as directed by provider Daily As Needed.) 16 g 12   • HYDROcodone-acetaminophen (NORCO)  MG per tablet Take 1 tablet by mouth Every 6 (Six) Hours As Needed.     • linaclotide (LINZESS) 72 MCG capsule capsule Take 1 capsule by mouth Daily. 90 capsule 3   • melatonin ([employer] Melatonin) 5 MG tablet tablet Take 2 tablets by mouth Every Night. 60 tablet 12   • metoclopramide (REGLAN) 5 MG tablet Take 1 tablet by mouth 3 (Three) Times a Day Before Meals. 90 tablet 0   • omeprazole (priLOSEC) 40 MG capsule Take 40 mg by mouth 2 (Two) Times a Day.  5   • ondansetron (ZOFRAN) 4 MG tablet TAKE ONE TABLET BY MOUTH AS NEEDED FOR NAUSEA AND VOMITING 30 tablet 1   • propranolol (INDERAL) 40 MG tablet Take 1 tablet by mouth 2 (Two) Times a Day. 60 tablet 0   • QUEtiapine (SEROquel) 50 MG tablet Take 1 tablet by mouth Every Night. 90 tablet 0   • sucralfate (CARAFATE) 1 g tablet Take 1 g by mouth 4 (Four) Times a Day.  1   • vitamin D (ERGOCALCIFEROL) 1.25 MG (43949 UT) capsule capsule TAKE ONE CAPSULE BY MOUTH one time PER WEEK (Patient taking differently: Saturday) 5 capsule 12   • desmopressin (DDAVP) 0.2 MG tablet Take 1 tablet by mouth every night at bedtime. 30 tablet 12   • nystatin (MYCOSTATIN) 228926 UNIT/GM powder APPLY TO THE AFFECTED AREA(S) BY TOPICAL ROUTE 2 TIMES PER DAY 60 g 3   • potassium chloride 10 MEQ CR tablet Take 1 tablet by mouth 2 (Two) Times a Day. 180 tablet 1     No facility-administered medications prior to visit.       Patient Active Problem List   Diagnosis   • History of anemia due to chronic kidney disease   • History of iron deficiency   • Dysuria   • Congestive heart failure (CMS/HCC)   • Depression   • Edema   • Gastroesophageal reflux disease   • Gout   • Hypertension   • Hypokalemia   • Hypothyroidism   • Loose total knee arthroplasty (CMS/HCC)   • Hyperlipidemia   •  "Low back pain   • Asthma   • Mixed stress and urge urinary incontinence   • Vitamin D deficiency   • Swelling of lower extremity   • Spinal stenosis of lumbar region   • Osteoporosis   • Obstructive sleep apnea syndrome in adult   • B12 deficiency   • Morbid obesity with BMI of 45.0-49.9, adult (CMS/[facility])   • Abnormal cardiovascular stress test   • Pain in right shoulder   • Breast lump   • Chest discomfort   • Dysphagia, unspecified   • Frequent falls   • Greater trochanteric bursitis   • Hand pain, left   • Hyperglycemia   • Loss of balance   • Mobility poor   • MRSA carrier   • Numbness and tingling sensation of skin   • Other spondylosis with radiculopathy, lumbar region   • Pain of right hip joint   • Leg pain, bilateral   • Peripheral axonal neuropathy   • Presence of cardiac pacemaker   • Sick sinus syndrome (CMS/[facility])   • TMJ syndrome   • Degeneration of lumbar intervertebral disc   • Irritable bowel syndrome   • Peptic ulcer   • Anemia   • Renal failure, chronic, stage 4 (severe) (CMS/[facility])   • Chest pain   • Chronic kidney disease (CKD), stage IV (severe) (CMS/[facility])   • Other insomnia   • Allergic rhinitis   • Acute renal failure superimposed on stage 4 chronic kidney disease (CMS/[facility])       Advanced Care Planning:  ACP discussion was held with the patient during this visit. Patient does not have an advance directive, information provided.    Review of Systems    Compared to one year ago, the patient feels her physical health is {better worse same:06099}.  Compared to one year ago, the patient feels her mental health is {better worse same:15773}.    Reviewed chart for potential of high risk medication in the elderly: {Response;Yes/No/NA:2236181545::\"yes\"}  Reviewed chart for potential of harmful drug interactions in the elderly:{Response;Yes/No/NA:0190305205::\"yes\"}    Objective         Vitals:    04/09/21 1056   Pulse: 64   Resp: 16   Temp: 98.7 °F (37.1 °C)   TempSrc: Infrared   SpO2: 97%   Weight: 115 kg " "(254 lb 6.4 oz)   Height: 157.5 cm (62\")       Body mass index is 46.53 kg/m².  Discussed the patient's BMI with her. The BMI {BMI plan ([facility] measure 421):45157}.    Physical Exam    Lab Results   Component Value Date     (H) 02/01/2021    HGBA1C 5.9 (H) 01/25/2021        Assessment/Plan   Medicare Risks and Personalized Health Plan  CMS Preventative Services Quick Reference  {Medicare Wellness Risk Factors and Personalized Health Plan:30203}    The above risks/problems have been discussed with the patient.  Pertinent information has been shared with the patient in the After Visit Summary.  Follow up plans and orders are seen below in the Assessment/Plan Section.    There are no diagnoses linked to this encounter.  Follow Up:  No follow-ups on file.     An After Visit Summary and PPPS were given to the patient.           "

## 2021-04-10 ENCOUNTER — LAB REQUISITION (OUTPATIENT)
Dept: LAB | Facility: HOSPITAL | Age: 71
End: 2021-04-10

## 2021-04-10 DIAGNOSIS — Z00.00 ENCOUNTER FOR GENERAL ADULT MEDICAL EXAMINATION WITHOUT ABNORMAL FINDINGS: ICD-10-CM

## 2021-04-10 LAB
ANION GAP SERPL CALCULATED.3IONS-SCNC: 12 MMOL/L (ref 5–15)
BUN SERPL-MCNC: 22 MG/DL (ref 8–23)
BUN/CREAT SERPL: 15.1 (ref 7–25)
CALCIUM SPEC-SCNC: 9 MG/DL (ref 8.6–10.5)
CHLORIDE SERPL-SCNC: 104 MMOL/L (ref 98–107)
CO2 SERPL-SCNC: 25 MMOL/L (ref 22–29)
CREAT SERPL-MCNC: 1.46 MG/DL (ref 0.57–1)
GFR SERPL CREATININE-BSD FRML MDRD: 35 ML/MIN/1.73
GLUCOSE SERPL-MCNC: 97 MG/DL (ref 65–99)
HAV IGM SERPL QL IA: NORMAL
HBV CORE IGM SERPL QL IA: NORMAL
HBV SURFACE AG SERPL QL IA: NORMAL
HCV AB SER DONR QL: NORMAL
POTASSIUM SERPL-SCNC: 4.4 MMOL/L (ref 3.5–5.2)
SODIUM SERPL-SCNC: 141 MMOL/L (ref 136–145)

## 2021-04-10 PROCEDURE — 80048 BASIC METABOLIC PNL TOTAL CA: CPT | Performed by: FAMILY MEDICINE

## 2021-04-10 PROCEDURE — 83036 HEMOGLOBIN GLYCOSYLATED A1C: CPT | Performed by: FAMILY MEDICINE

## 2021-04-10 PROCEDURE — 80074 ACUTE HEPATITIS PANEL: CPT | Performed by: FAMILY MEDICINE

## 2021-04-12 ENCOUNTER — TELEPHONE (OUTPATIENT)
Dept: FAMILY MEDICINE CLINIC | Facility: CLINIC | Age: 71
End: 2021-04-12

## 2021-04-12 LAB — HBA1C MFR BLD: 6 % (ref 3.5–5.6)

## 2021-04-12 NOTE — TELEPHONE ENCOUNTER
HUB TO READ    LEFT MESSAGE    Please inform patient renal function is stable, acute hepatitis antibody panel and urinalysis are negative

## 2021-04-13 ENCOUNTER — TELEPHONE (OUTPATIENT)
Dept: FAMILY MEDICINE CLINIC | Facility: CLINIC | Age: 71
End: 2021-04-13

## 2021-04-13 NOTE — TELEPHONE ENCOUNTER
Caller: SEFERINO TRINIDAD    Relationship: SON    Best call back number: [phone]    What is the best time to reach you: ANYTIME    Who are you requesting to speak with (clinical staff, provider,  specific staff member): DR. COWART    Do you know the name of the person who called: N/A    What was the call regarding: N/A    Do you require a callback: YES          PATIENT'S SON STATED THAT THE PATIENT LIVES WITH HIM AND THIS MORNING AS SOON AS THE PHYSICAL THERAPIST ARRIVED, THE PATIENT HAD A FALL. THIS IS THE SECOND FALL SHE HAS HAD SINCE LIVING WITH HER SON AND HE WOULD LIKE TO DISCUSS ASSISTED LIVING ALTERNATIVES WITH DR. COWART. SEFERINO WOULD LIKE DR. COWART TO CALL HIM ASA REGARDING THIS MATTER.      PLEASE ADVISE

## 2021-04-14 ENCOUNTER — READMISSION MANAGEMENT (OUTPATIENT)
Dept: CALL CENTER | Facility: HOSPITAL | Age: 71
End: 2021-04-14

## 2021-04-14 NOTE — OUTREACH NOTE
Medical Week 4 Survey      Responses   [facility] patient discharged from?  [facility]   Does the patient have one of the following disease processes/diagnoses(primary or secondary)?  Other   Week 4 attempt successful?  Yes   Call start time  1040   Call end time  1043   Discharge diagnosis  Acute renal failure superimposed on stage 4 chronic kidney disease,   Acute UTI     Is patient permission given to speak with other caregiver?  Yes   Person spoke with today (if not patient) and relationship  PATIENT WAS ON THE PHONE AND THEN HANDED THE PHONE TO HER DAUGHTER IN LAW, REQUESTING THE DAUGHTER IN LAW SPEAK FOR HER.    Meds reviewed with patient/caregiver?  Yes   Is the patient having any side effects they believe may be caused by any medication additions or changes?  No   Is the patient taking all medications as directed (includes completed medication regime)?  Yes   Has the patient kept scheduled appointments due by today?  Yes   Is the patient still receiving Home Health Services?  Yes   Home health comments  PATIENT IS RECEIVING PT   Psychosocial issues?  No   What is the patient's perception of their health status since discharge?  Improving   Is the patient/caregiver able to teach back signs and symptoms related to disease process for when to call PCP?  Yes   Is the patient/caregiver able to teach back signs and symptoms related to disease process for when to call 911?  Yes   Is the patient/caregiver able to teach back the hierarchy of who to call/visit for symptoms/problems? PCP, Specialist, Home health nurse, Urgent Care, ED, 911  Yes   If the patient is a current smoker, are they able to teach back resources for cessation?  Not a smoker   Additional teach back comments  DAUGHTER IN LAW STATES HER PCP CALLED STATING PATIENT'S A1C IS ELEVATED, BUT PCP IS MANAGING THIS.   Week 4 Call Completed?  Yes   Would the patient like one additional call?  No   Graduated  Yes   Is the patient interested in additional  calls from an ambulatory ?  NOTE:  applies to high risk patients requiring additional follow-up.  No   Did the patient feel the follow up calls were helpful during their recovery period?  Yes          Michaela Patel LPN

## 2021-04-15 ENCOUNTER — TELEPHONE (OUTPATIENT)
Dept: FAMILY MEDICINE CLINIC | Facility: CLINIC | Age: 71
End: 2021-04-15

## 2021-04-26 ENCOUNTER — TELEPHONE (OUTPATIENT)
Dept: FAMILY MEDICINE CLINIC | Facility: CLINIC | Age: 71
End: 2021-04-26

## 2021-04-26 RX ORDER — SYRINGE WITH NEEDLE, 1 ML 25GX5/8"
SYRINGE, EMPTY DISPOSABLE MISCELLANEOUS
Qty: 3 EACH | Refills: 3 | OUTPATIENT
Start: 2021-04-26

## 2021-04-26 NOTE — TELEPHONE ENCOUNTER
I have received a very long and confusing reply to question whether she is still taking vitamin B12 and needed a refill.  Has she been in the hospital again?  Does she need a hospital follow-up to review these medications?  Has she had follow-up with Dr. Stewart, he should be managing water pill/Lasix.  It appears from the response that she is still taking B12 and will need a refill for the syringes.  Please ask if they had additional questions.  I cannot answer whether she should be taking half or full doses of all of her medications without an appointment.  Please make sure she is scheduled as soon as possible for either hospital follow-up or regular follow-up if there are any concerns about her medications.

## 2021-04-26 NOTE — TELEPHONE ENCOUNTER
Caller: COLTON TRINIDAD    Relationship: Other Relative    Best call back number: [phone] COLTON    PATIENT RESIDES WITH SON SEFERINO BARTON  AND COLTON HIS WIFE.    What medications are you currently taking:   Current Outpatient Medications on File Prior to Visit   Medication Sig Dispense Refill   • alendronate (FOSAMAX) 70 MG tablet Take 1 tablet by mouth Every 7 (Seven) Days. Take with 8 ounces of water first thing in the morning.  remain upright for 30 minutes (Patient taking differently: Take 70 mg by mouth Every 7 (Seven) Days. Saturday) 13 tablet 3   • allopurinol (ZYLOPRIM) 100 MG tablet Take 2 tablets by mouth every night at bedtime. 180 tablet 3   • aspirin (ASPIR) 81 MG EC tablet Take 81 mg by mouth Daily.     • busPIRone (BUSPAR) 10 MG tablet Take 1 tablet by mouth 2 (Two) Times a Day. 60 tablet 6   • calcium carb-cholecalciferol (CALCIUM PLUS VITAMIN D3) 600-800 MG-UNIT tablet Take 600 mg by mouth 2 (Two) Times a Day With Meals.     • cyanocobalamin 1000 MCG/ML injection inject ONE ML INTRAMUSCULARLY every MONTH 3 mL 3   • Desvenlafaxine Succinate ER (Pristiq) 25 MG tablet sustained-release 24 hour Take 1 tablet by mouth Daily. (Patient taking differently: Take 25 mg by mouth Daily. 12.5mg BID) 90 tablet 0   • dicyclomine (BENTYL) 10 MG capsule Take 10 mg by mouth 4 (Four) Times a Day.  0   • fluticasone (Flonase) 50 MCG/ACT nasal spray 2 sprays into the nostril(s) as directed by provider Daily. (Patient taking differently: 2 sprays into the nostril(s) as directed by provider Daily As Needed.) 16 g 12   • furosemide (LASIX) 40 MG tablet Take 40 mg by mouth Daily. Until swelling is gone per nephrologist     • HYDROcodone-acetaminophen (NORCO)  MG per tablet Take 1 tablet by mouth Every 6 (Six) Hours As Needed.     • linaclotide (LINZESS) 72 MCG capsule capsule Take 1 capsule by mouth Daily. 90 capsule 3   • melatonin ([employer] Melatonin) 5 MG tablet tablet Take 2 tablets by mouth Every Night. 60  tablet 12   • metoclopramide (REGLAN) 5 MG tablet Take 1 tablet by mouth 3 (Three) Times a Day Before Meals. 90 tablet 0   • omeprazole (priLOSEC) 40 MG capsule Take 40 mg by mouth 2 (Two) Times a Day.  5   • ondansetron (ZOFRAN) 4 MG tablet TAKE ONE TABLET BY MOUTH AS NEEDED FOR NAUSEA AND VOMITING 30 tablet 1   • propranolol (INDERAL) 40 MG tablet Take 1 tablet by mouth 2 (Two) Times a Day. 60 tablet 0   • QUEtiapine (SEROquel) 50 MG tablet Take 1 tablet by mouth Every Night. 90 tablet 0   • sucralfate (CARAFATE) 1 g tablet Take 1 g by mouth 4 (Four) Times a Day.  1   • vitamin D (ERGOCALCIFEROL) 1.25 MG (80755 UT) capsule capsule TAKE ONE CAPSULE BY MOUTH one time PER WEEK (Patient taking differently: Saturday) 5 capsule 12   • [DISCONTINUED] allopurinol (ZYLOPRIM) 100 MG tablet TAKE TWO TABLETS BY MOUTH AT BEDTIME 180 tablet 1   • [DISCONTINUED] budesonide-formoterol (SYMBICORT) 160-4.5 MCG/ACT inhaler Inhale 2 puffs 2 (Two) Times a Day. 1 inhaler 12   • [DISCONTINUED] cyanocobalamin 1000 MCG/ML injection inject ONE ML INTRAMUSCULARLY every MONTH 12 mL 0   • [DISCONTINUED] lisinopril (PRINIVIL,ZESTRIL) 20 MG tablet Take 1 tablet by mouth Daily. 30 tablet 1   • [DISCONTINUED] ondansetron (ZOFRAN) 4 MG tablet Take 1 tablet by mouth As Needed for Nausea or Vomiting. 30 tablet 1   • [DISCONTINUED] Syringe, Disposable, (SYRINGE 2-3 ML) 3 ML misc 1 each Every 30 (Thirty) Days. 100 each 1     No current facility-administered medications on file prior to visit.        Who prescribed you this medication: DR COWART    What are your concerns: COLTON STATED THAT PATIENT WAS TAKEN MEDICATIONS AND DR COWART CUT THE MEDICATIONS IN HALF AND WHEN PATIENT WAS ADMITTED TO HOSPITAL AND WAS RELEASED THE HOSPITAL HAS REISSUED ALL MEDICATIONS TO THE FULL ORIGINAL DOSAGE.       COLTON FEELS THAT THE MEDICATIONS AREN'T WORKING STATED THEY ARE JUST SITTING IN PATIENTS KIDNEYS AND NOT GETTING FLUSHED THROUGH PATIENTS BODY.    [facility]  "STATED TO DISCONTINUE THE POTASIUM AND WATER PILL.  DR POWERS PUT PATIENT ON A 40 MG WATER PILL UNTIL PATIENTS WEIGHT COMES DOWN.  PATIENTS WEIGHT ISN'T DECREASING DUE TO THE SWELLING FROM THE WATER PILL.    How long have you been taking these medications: COLTON GAVE THE PATIENT A WATER PILL EVERY OTHER DAY FOR A WEEK AND A HALF.  THE VISITING NURSE KYM FROM [facility] STATED THAT THE PATIENT NEEDED A BMP DONE AND COLTON STATED THEY HAVE NOT HEARD ANYTHING IN REGARDS TO THE BMP APPOINTMENT.  COLTON STATED THAT DR COWART MAY WANT TO REACH OUT TO KYM TO GET HER INPUT.    PATIENT IS BEING RESTRICTED TO 60 OZ OF FLUID PER DAY.  THE KIDNEY DR IS THE ONE WHO HAS RESTRICTED HER FLUID INTAKE.    COLTON DOES NOT HAVE THE MEDICATIONS LIST TO GIVE EACH ONE THAT PATIENT IS TAKING.    COLTON IS NOT LISTED ON THE  VERBAL AND IDA GAVE A VERBAL TO TALK WITH COLTON ON HER BEHALF.    COLTON STATED THAT THESE ARE THE PILLS THAT PATIENT IS TAKING AND HOW MANY TIMES A DAY.    BUSPAR 2X DAILY  REGLAN 3X DAILY  DICYCLOMINE 4X DAILY  SUCRALFATE 4X DAILY  PROPRANOLOL 2 X DAILY  SEROQUEL 50 MG NIGHTLY  MELATONIN 10 MG NIGHTLY  CALCIUM 2X DAILY  VITAMIN D Sunday ONLY ONCE WEEKLY  \"BONE PILL\" Saturday ONLY  ONCE WEEKLY  HYDROCODONE 4X DAILY  PRISTIQ ONCE DAILY IN MORNING  B12 ONCE A MONTH      "

## 2021-04-26 NOTE — TELEPHONE ENCOUNTER
Patient does not need refilled she has plenty of the syringes and mediation. She has not been in the hospital recently. She has a follow up on may 12 with us. Earnestine is going to call home health and dr carrasquillo tomorrow. She states dr carrasquillo told her to just take all her medication.

## 2021-04-26 NOTE — TELEPHONE ENCOUNTER
HUB TO READ    LEFT MESSAGE    Please contact patient, or caregivers and confirm she is still taking B12 and needs a prescription for syringes.  There is a notation that the prescription for vitamin B12, syringes, and other medications was discontinued on 3/19/2021 by Melisa Solorzano.  If she/caregivers states she is still taking B12 please go through the rest of the medication list as well to make sure that medications were not removed incorrectly.

## 2021-04-30 ENCOUNTER — APPOINTMENT (OUTPATIENT)
Dept: GENERAL RADIOLOGY | Facility: HOSPITAL | Age: 71
End: 2021-04-30

## 2021-04-30 ENCOUNTER — HOSPITAL ENCOUNTER (EMERGENCY)
Facility: HOSPITAL | Age: 71
Discharge: HOME | End: 2021-04-30
Admitting: EMERGENCY MEDICINE

## 2021-04-30 ENCOUNTER — TELEPHONE (OUTPATIENT)
Dept: FAMILY MEDICINE CLINIC | Facility: CLINIC | Age: 71
End: 2021-04-30

## 2021-04-30 VITALS
DIASTOLIC BLOOD PRESSURE: 82 MMHG | BODY MASS INDEX: 46.56 KG/M2 | SYSTOLIC BLOOD PRESSURE: 172 MMHG | WEIGHT: 253 LBS | OXYGEN SATURATION: 96 % | RESPIRATION RATE: 16 BRPM | TEMPERATURE: 97.8 F | HEART RATE: 76 BPM | HEIGHT: 62 IN

## 2021-04-30 DIAGNOSIS — M25.561 ACUTE PAIN OF RIGHT KNEE: ICD-10-CM

## 2021-04-30 DIAGNOSIS — M25.551 HIP PAIN, ACUTE, RIGHT: ICD-10-CM
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
## 2021-04-30 DIAGNOSIS — S80.01XA CONTUSION OF RIGHT KNEE, INITIAL ENCOUNTER: ICD-10-CM

## 2021-04-30 PROCEDURE — 99283 EMERGENCY DEPT VISIT LOW MDM: CPT

## 2021-04-30 PROCEDURE — 73502 X-RAY EXAM HIP UNI 2-3 VIEWS: CPT

## 2021-04-30 PROCEDURE — 73562 X-RAY EXAM OF KNEE 3: CPT

## 2021-04-30 NOTE — TELEPHONE ENCOUNTER
LEFT MSG FOR PATIENT TO CALL BACK AND RESCHEDULE 5-12-21 APPT AND LET US KNOW WHICH LOCATION SHE WANTS.

## 2021-05-01 NOTE — DISCHARGE INSTRUCTIONS
Rest and may take tylenol and/or ibuprofen, as directed for your joint pain from fall.  Drink plenty of water if you take ibuprofen.  If pain persists, please schedule an appmnt with your PCP early next week.

## 2021-05-11 RX ORDER — ERGOCALCIFEROL 1.25 MG/1
CAPSULE ORAL
Qty: 5 CAPSULE | Refills: 12 | Status: SHIPPED | OUTPATIENT
Start: 2021-05-11 | End: 2021-06-02 | Stop reason: SDUPTHER

## 2021-05-11 RX ORDER — PROPRANOLOL HYDROCHLORIDE 40 MG/1
40 TABLET ORAL 2 TIMES DAILY
Qty: 60 TABLET | Refills: 0 | Status: SHIPPED | OUTPATIENT
Start: 2021-05-11 | End: 2021-06-02 | Stop reason: SDUPTHER

## 2021-05-12 DIAGNOSIS — F33.2 SEVERE EPISODE OF RECURRENT MAJOR DEPRESSIVE DISORDER, WITHOUT PSYCHOTIC FEATURES (HCC): ICD-10-CM

## 2021-05-12 RX ORDER — QUETIAPINE FUMARATE 50 MG/1
50 TABLET, FILM COATED ORAL NIGHTLY
Qty: 90 TABLET | Refills: 0 | Status: SHIPPED | OUTPATIENT
Start: 2021-05-12 | End: 2021-06-02 | Stop reason: SDUPTHER

## 2021-05-14 ENCOUNTER — OFFICE VISIT (OUTPATIENT)
Dept: FAMILY MEDICINE CLINIC | Facility: CLINIC | Age: 71
End: 2021-05-14

## 2021-05-14 VITALS
HEIGHT: 62 IN | RESPIRATION RATE: 20 BRPM | HEART RATE: 96 BPM | WEIGHT: 258 LBS | BODY MASS INDEX: 47.48 KG/M2 | TEMPERATURE: 97.8 F | SYSTOLIC BLOOD PRESSURE: 174 MMHG | DIASTOLIC BLOOD PRESSURE: 103 MMHG

## 2021-05-14 DIAGNOSIS — R41.3 MEMORY IMPAIRMENT: ICD-10-CM

## 2021-05-14 DIAGNOSIS — I50.9 CONGESTIVE HEART FAILURE, UNSPECIFIED HF CHRONICITY, UNSPECIFIED HEART FAILURE TYPE (HCC): ICD-10-CM

## 2021-05-14 DIAGNOSIS — R60.0 LOCALIZED EDEMA: ICD-10-CM

## 2021-05-14 DIAGNOSIS — I10 ESSENTIAL HYPERTENSION: ICD-10-CM

## 2021-05-14 DIAGNOSIS — F31.32 BIPOLAR AFFECTIVE DISORDER, CURRENTLY DEPRESSED, MODERATE (HCC): ICD-10-CM

## 2021-05-14 DIAGNOSIS — R29.6 FREQUENT FALLS: ICD-10-CM

## 2021-05-14 DIAGNOSIS — M81.0 OSTEOPOROSIS, UNSPECIFIED OSTEOPOROSIS TYPE, UNSPECIFIED PATHOLOGICAL FRACTURE PRESENCE: ICD-10-CM

## 2021-05-14 DIAGNOSIS — N18.4 CHRONIC KIDNEY DISEASE (CKD), STAGE IV (SEVERE) (HCC): Primary | ICD-10-CM

## 2021-05-14 DIAGNOSIS — E87.6 HYPOKALEMIA: ICD-10-CM

## 2021-05-14 PROCEDURE — G2212 PROLONG OUTPT/OFFICE VIS: HCPCS | Performed by: FAMILY MEDICINE

## 2021-05-14 PROCEDURE — 99215 OFFICE O/P EST HI 40 MIN: CPT | Performed by: FAMILY MEDICINE

## 2021-05-14 RX ORDER — FUROSEMIDE 40 MG/1
40 TABLET ORAL DAILY
Qty: 30 TABLET
Start: 2021-05-14

## 2021-05-14 RX ORDER — POTASSIUM CHLORIDE 750 MG/1
TABLET, FILM COATED, EXTENDED RELEASE ORAL
Qty: 30 TABLET | Refills: 0 | Status: SHIPPED | OUTPATIENT
Start: 2021-05-14 | End: 2021-07-06

## 2021-05-14 RX ORDER — ALENDRONATE SODIUM 70 MG/1
70 TABLET ORAL
Qty: 13 TABLET | Refills: 3 | Status: SHIPPED | OUTPATIENT
Start: 2021-05-14 | End: 2021-06-02 | Stop reason: SDUPTHER

## 2021-05-14 NOTE — PROGRESS NOTES
Chief Complaint  Chronic Renal Failure and Fall    Subjective          History of Present Illness  Elaine Toscano presents today for follow-up on chronic renal failure and fall.  She is attended today by her son and daughter-in-law whom she is currently living with.  Son brings in with him papers that he states his  gave him to complete for him to get guardianship over his mother.  Patient admits she does not understand her medical care very well but other than bipolar disorder does not have any official diagnoses of mental impairment.    Patients main complaint today is swelling.  She has not had any lasix since Saturday of last week, stopped by Dr Carroll when in Hospital at [facility].  States her nephrologist,  Told her to take medication until the swelling improved and then discontinue.  Patient's daughter in law states the swelling started again Wednesday night. They state they are hearing her wheezing at times.  They have started measuring her ankle and when she is not swollen it is 10 inches and she is swollen it is up to 13 inches in diameter    Patient has a fall on April 30 she was in [facility] [place], operated by [facility].  Records have been reviewed, no acute bony abnormalities there were degenerative changes of both hips right knee.    The daughter-in-law is stating that she is no longer enrolled in any kind of home health services.  Previous agency will not come residents in [facility] has not been getting the services they feel they need either.  The daughter in law states she is looking into palliative care through hospice with further questioning clearly has no idea what palliative care means, I do not consider Elaine at the end of her life or terminal, is clear she is just wanting some services to help with grooming etc.  They state all she wants to do is sit around,           Current Outpatient Medications on File Prior to Visit   Medication Sig   • allopurinol (ZYLOPRIM) 100 MG tablet  Take 2 tablets by mouth every night at bedtime.   • aspirin (ASPIR) 81 MG EC tablet Take 81 mg by mouth Daily.   • busPIRone (BUSPAR) 10 MG tablet Take 1 tablet by mouth 2 (Two) Times a Day.   • calcium carb-cholecalciferol (CALCIUM PLUS VITAMIN D3) 600-800 MG-UNIT tablet Take 600 mg by mouth 2 (Two) Times a Day With Meals.   • Desvenlafaxine Succinate ER (Pristiq) 25 MG tablet sustained-release 24 hour Take 1 tablet by mouth Daily. (Patient taking differently: Take 25 mg by mouth Daily. 12.5mg BID)   • dicyclomine (BENTYL) 10 MG capsule Take 10 mg by mouth 4 (Four) Times a Day.   • fluticasone (Flonase) 50 MCG/ACT nasal spray 2 sprays into the nostril(s) as directed by provider Daily. (Patient taking differently: 2 sprays into the nostril(s) as directed by provider Daily As Needed.)   • HYDROcodone-acetaminophen (NORCO)  MG per tablet Take 1 tablet by mouth Every 6 (Six) Hours As Needed.   • linaclotide (LINZESS) 72 MCG capsule capsule Take 1 capsule by mouth Daily.   • metoclopramide (REGLAN) 5 MG tablet Take 1 tablet by mouth 3 (Three) Times a Day Before Meals.   • omeprazole (priLOSEC) 40 MG capsule Take 40 mg by mouth 2 (Two) Times a Day.   • ondansetron (ZOFRAN) 4 MG tablet TAKE ONE TABLET BY MOUTH AS NEEDED FOR NAUSEA AND VOMITING   • propranolol (INDERAL) 40 MG tablet Take 1 tablet by mouth 2 (Two) Times a Day.   • QUEtiapine (SEROquel) 50 MG tablet Take 1 tablet by mouth Every Night.   • sucralfate (CARAFATE) 1 g tablet Take 1 g by mouth 4 (Four) Times a Day.   • vitamin D (ERGOCALCIFEROL) 1.25 MG (17883 UT) capsule capsule TAKE ONE CAPSULE BY MOUTH one time PER WEEK   • [DISCONTINUED] alendronate (FOSAMAX) 70 MG tablet Take 1 tablet by mouth Every 7 (Seven) Days. Take with 8 ounces of water first thing in the morning.  remain upright for 30 minutes (Patient taking differently: Take 70 mg by mouth Every 7 (Seven) Days. Saturday)   • cyanocobalamin 1000 MCG/ML injection inject ONE ML INTRAMUSCULARLY  "every MONTH   • melatonin ([employer] Melatonin) 5 MG tablet tablet Take 2 tablets by mouth Every Night.   • [DISCONTINUED] allopurinol (ZYLOPRIM) 100 MG tablet TAKE TWO TABLETS BY MOUTH AT BEDTIME   • [DISCONTINUED] budesonide-formoterol (SYMBICORT) 160-4.5 MCG/ACT inhaler Inhale 2 puffs 2 (Two) Times a Day.   • [DISCONTINUED] cyanocobalamin 1000 MCG/ML injection inject ONE ML INTRAMUSCULARLY every MONTH   • [DISCONTINUED] furosemide (LASIX) 40 MG tablet Take 40 mg by mouth Daily. Until swelling is gone per nephrologist   • [DISCONTINUED] lisinopril (PRINIVIL,ZESTRIL) 20 MG tablet Take 1 tablet by mouth Daily.   • [DISCONTINUED] ondansetron (ZOFRAN) 4 MG tablet Take 1 tablet by mouth As Needed for Nausea or Vomiting.   • [DISCONTINUED] Syringe, Disposable, (SYRINGE 2-3 ML) 3 ML misc 1 each Every 30 (Thirty) Days.     No current facility-administered medications on file prior to visit.       Objective   Vital Signs:   BP (!) 174/103 (BP Location: Right arm, Patient Position: Sitting, Cuff Size: Adult)   Pulse 96   Temp 97.8 °F (36.6 °C) (Infrared)   Resp 20   Ht 157.5 cm (62\")   Wt 117 kg (258 lb)   BMI 47.19 kg/m²     Physical Exam  Vitals and nursing note reviewed.   Constitutional:       General: She is not in acute distress.     Appearance: She is well-developed.   HENT:      Head: Normocephalic and atraumatic.   Cardiovascular:      Rate and Rhythm: Normal rate and regular rhythm.      Heart sounds: No murmur heard.     Pulmonary:      Effort: Pulmonary effort is normal.      Breath sounds: No wheezing, rhonchi or rales.   Musculoskeletal:         General: Normal range of motion.      Right lower leg: Edema present.      Left lower leg: Edema present.      Comments: 2+ pitting edema of lower extremities right slightly greater than left there is some bubbling of the skin without active blisters on the anterior shin, there is no erythema or sign of skin breakdown.   Skin:     General: Skin is warm and dry.      " Findings: No rash.   Neurological:      Mental Status: She is alert and oriented to person, place, and time.   Psychiatric:      Comments: Memory somewhat impaired, patient comes somewhat tearful at times          Lab Results   Component Value Date    BUN 22 04/10/2021    CREATININE 1.46 (H) 04/10/2021    EGFRIFNONA 35 (L) 04/10/2021     04/10/2021    K 4.4 04/10/2021     04/10/2021    CALCIUM 9.0 04/10/2021    ALBUMIN 3.30 (L) 03/23/2021    BILITOT 0.2 03/21/2021    ALKPHOS 99 03/21/2021    AST 13 03/21/2021    ALT 12 03/21/2021    CKTOTAL 21 03/19/2021    WBC 4.90 04/01/2021    RBC 3.26 (L) 04/01/2021    HCT 28.9 (L) 04/01/2021    MCV 88.6 04/01/2021    MCH 28.1 04/01/2021    INR 0.97 03/19/2021    DPWO13VF 58.2 04/01/2021    URICACID 4.1 04/01/2021        Lab Results   Component Value Date    HGBA1C 6.0 (H) 04/10/2021    HGBA1C 5.9 (H) 01/25/2021    HGBA1C 5.8 (H) 09/01/2020    HGBA1C 5.8 (H) 06/01/2020    HGBA1C 5.8 (H) 11/05/2019       Result Review :                       Assessment and Plan    Diagnoses and all orders for this visit:    1. Chronic kidney disease (CKD), stage IV (severe) (CMS/[facility]) (Primary)    2. Localized edema  -     potassium chloride 10 MEQ CR tablet; 1 tablet with each lasix  Dispense: 30 tablet; Refill: 0    3. Congestive heart failure, unspecified HF chronicity, unspecified heart failure type (CMS/[facility])  -     furosemide (LASIX) 40 MG tablet; Take 1 tablet by mouth Daily. Until ankle diameter <10.5 inches and weight <255 #  Dispense: 30 tablet; Refill: o  -     potassium chloride 10 MEQ CR tablet; 1 tablet with each lasix  Dispense: 30 tablet; Refill: 0    4. Osteoporosis, unspecified osteoporosis type, unspecified pathological fracture presence  -     alendronate (FOSAMAX) 70 MG tablet; Take 1 tablet by mouth Every 7 (Seven) Days. Take with 8 ounces of water first thing in the morning.  remain upright for 30 minutes  Dispense: 13 tablet; Refill: 3    5. Memory impairment  -      Ambulatory Referral to Psychiatry    6. Bipolar affective disorder, currently depressed, moderate (CMS/HCC)  -     Ambulatory Referral to Psychiatry    7. Essential hypertension    8. Frequent falls    9. Hypokalemia    Patient has history of renal failure and congestive heart failure, discussed how diuresis is like walking a tight rope to little and she will be in fluid overload too much and we can harm the kidneys.  Advised to avoid have to watch weights and since she has started doing measurements around the ankle as this could be helpful tool as well.  I am estimating that her current dry weight is probably about 250 to 255 pounds.  When she is over 255 we should continue Lasix on a daily basis if weight drops below this ankle measurement is measuring less than 10 inches, Lasix can be held until she developed symptoms of fluid overload again.  Blood pressure is high today, hopefully restarting Lasix will improve this.  Have her return in 1 week and adjust other blood pressure medications if it is remaining elevated.  Advised her to keep upcoming appointments with Dr. Stewart on June 4 and Dr. Camejo later this summer.  I am uncertain of need for son to take guardianship and not certain that this is in patient's best interest.  I have declined to complete paperwork today, I am placing referral to psychiatry for more clear definition capability.    Medications Discontinued During This Encounter   Medication Reason   • alendronate (FOSAMAX) 70 MG tablet Reorder   • furosemide (LASIX) 40 MG tablet Reorder       I spent 75 minutes caring for Elaine on this date of service. This time includes time spent by me in the following activities:preparing for the visit, reviewing tests, obtaining and/or reviewing a separately obtained history, performing a medically appropriate examination and/or evaluation , counseling and educating the patient/family/caregiver, ordering medications, tests, or procedures, referring and  communicating with other health care professionals , documenting information in the medical record and care coordination  Follow Up     Return in about 1 week (around 5/21/2021) for Recheck Renal failure, CHF, edema, fluid overload.    Patient was given instructions and counseling regarding her condition or for health maintenance advice. Please see specific information pulled into the AVS if appropriate.

## 2021-05-17 ENCOUNTER — TELEPHONE (OUTPATIENT)
Dept: FAMILY MEDICINE CLINIC | Facility: CLINIC | Age: 71
End: 2021-05-17

## 2021-05-17 NOTE — TELEPHONE ENCOUNTER
HUB TO READ    SENT MY CHART    After reviewing Dr. Stewart's note, any Dr. Cisse's notes as well, specifically need to know if he has been asked if patient can reduce omeprazole/change to H2 blocker i.e. Pepcid due to renal failure.  Additionally patient was asking if she could stop sucralfate and Reglan.  Can you please ask so we can get this clarified.     For future reference: Reviewed Dr. Stewart's note 4/8/2021. Lasix had been discontinued while in the hospital from 3/19/2021 to 3/24/2021 due to acute kidney injury and hyperkalemia, prerenal azotemia, most likely due to hypertensive nephrosclerosis.  He did advised to use Lasix 40 mg daily as needed for weight gain of 2 pounds.  He did recommend stopping omeprazole using an H2 blocker due to renal risk, but advised her to check with Dr. Cisse to see if that would be okay.

## 2021-05-19 ENCOUNTER — TELEPHONE (OUTPATIENT)
Dept: FAMILY MEDICINE CLINIC | Facility: CLINIC | Age: 71
End: 2021-05-19

## 2021-05-20 ENCOUNTER — HOSPITAL ENCOUNTER (OUTPATIENT)
Facility: HOSPITAL | Age: 71
Setting detail: OBSERVATION
Discharge: HOME | End: 2021-05-21
Attending: EMERGENCY MEDICINE | Admitting: EMERGENCY MEDICINE

## 2021-05-20 ENCOUNTER — APPOINTMENT (OUTPATIENT)
Dept: CT IMAGING | Facility: HOSPITAL | Age: 71
End: 2021-05-20

## 2021-05-20 ENCOUNTER — TELEPHONE (OUTPATIENT)
Dept: FAMILY MEDICINE CLINIC | Facility: CLINIC | Age: 71
End: 2021-05-20

## 2021-05-20 ENCOUNTER — APPOINTMENT (OUTPATIENT)
Dept: GENERAL RADIOLOGY | Facility: HOSPITAL | Age: 71
End: 2021-05-20

## 2021-05-20 DIAGNOSIS — R11.2 NAUSEA AND VOMITING, INTRACTABILITY OF VOMITING NOT SPECIFIED, UNSPECIFIED VOMITING TYPE: ICD-10-CM

## 2021-05-20 DIAGNOSIS — R10.13 EPIGASTRIC PAIN: ICD-10-CM

## 2021-05-20 DIAGNOSIS — R05.9 COUGH: Primary | ICD-10-CM

## 2021-05-20 DIAGNOSIS — N39.0 URINARY TRACT INFECTION WITHOUT HEMATURIA, SITE UNSPECIFIED: ICD-10-CM

## 2021-05-20 LAB
ALBUMIN SERPL-MCNC: 3.6 G/DL (ref 3.5–5.2)
ALBUMIN/GLOB SERPL: 1.4 G/DL
ALP SERPL-CCNC: 103 U/L (ref 39–117)
ALT SERPL W P-5'-P-CCNC: 8 U/L (ref 1–33)
ANION GAP SERPL CALCULATED.3IONS-SCNC: 10 MMOL/L (ref 5–15)
APTT PPP: 28.9 SECONDS (ref 24–31)
AST SERPL-CCNC: 11 U/L (ref 1–32)
B PARAPERT DNA SPEC QL NAA+PROBE: NOT DETECTED
B PERT DNA SPEC QL NAA+PROBE: NOT DETECTED
BACTERIA UR QL AUTO: ABNORMAL /HPF
BASOPHILS # BLD AUTO: 0.1 10*3/MM3 (ref 0–0.2)
BASOPHILS NFR BLD AUTO: 0.6 % (ref 0–1.5)
BILIRUB SERPL-MCNC: 0.7 MG/DL (ref 0–1.2)
BILIRUB UR QL STRIP: NEGATIVE
BUN SERPL-MCNC: 17 MG/DL (ref 8–23)
BUN/CREAT SERPL: 12.6 (ref 7–25)
C PNEUM DNA NPH QL NAA+NON-PROBE: NOT DETECTED
CALCIUM SPEC-SCNC: 9 MG/DL (ref 8.6–10.5)
CHLORIDE SERPL-SCNC: 100 MMOL/L (ref 98–107)
CLARITY UR: ABNORMAL
CO2 SERPL-SCNC: 31 MMOL/L (ref 22–29)
COLOR UR: YELLOW
CREAT SERPL-MCNC: 1.35 MG/DL (ref 0.57–1)
DEPRECATED RDW RBC AUTO: 53.8 FL (ref 37–54)
EOSINOPHIL # BLD AUTO: 0.1 10*3/MM3 (ref 0–0.4)
EOSINOPHIL NFR BLD AUTO: 0.7 % (ref 0.3–6.2)
ERYTHROCYTE [DISTWIDTH] IN BLOOD BY AUTOMATED COUNT: 17.3 % (ref 12.3–15.4)
FLUAV SUBTYP SPEC NAA+PROBE: NOT DETECTED
FLUBV RNA ISLT QL NAA+PROBE: NOT DETECTED
GFR SERPL CREATININE-BSD FRML MDRD: 39 ML/MIN/1.73
GLOBULIN UR ELPH-MCNC: 2.5 GM/DL
GLUCOSE SERPL-MCNC: 104 MG/DL (ref 65–99)
GLUCOSE UR STRIP-MCNC: NEGATIVE MG/DL
HADV DNA SPEC NAA+PROBE: NOT DETECTED
HCOV 229E RNA SPEC QL NAA+PROBE: NOT DETECTED
HCOV HKU1 RNA SPEC QL NAA+PROBE: NOT DETECTED
HCOV NL63 RNA SPEC QL NAA+PROBE: NOT DETECTED
HCOV OC43 RNA SPEC QL NAA+PROBE: NOT DETECTED
HCT VFR BLD AUTO: 31 % (ref 34–46.6)
HGB BLD-MCNC: 9.8 G/DL (ref 12–15.9)
HGB UR QL STRIP.AUTO: NEGATIVE
HMPV RNA NPH QL NAA+NON-PROBE: NOT DETECTED
HPIV1 RNA SPEC QL NAA+PROBE: NOT DETECTED
HPIV2 RNA SPEC QL NAA+PROBE: NOT DETECTED
HPIV3 RNA NPH QL NAA+PROBE: NOT DETECTED
HPIV4 P GENE NPH QL NAA+PROBE: NOT DETECTED
HYALINE CASTS UR QL AUTO: ABNORMAL /LPF
INR PPP: 0.99 (ref 0.93–1.1)
KETONES UR QL STRIP: NEGATIVE
LEUKOCYTE ESTERASE UR QL STRIP.AUTO: ABNORMAL
LIPASE SERPL-CCNC: 8 U/L (ref 13–60)
LYMPHOCYTES # BLD AUTO: 1.1 10*3/MM3 (ref 0.7–3.1)
LYMPHOCYTES NFR BLD AUTO: 13.6 % (ref 19.6–45.3)
M PNEUMO IGG SER IA-ACNC: NOT DETECTED
MCH RBC QN AUTO: 27.5 PG (ref 26.6–33)
MCHC RBC AUTO-ENTMCNC: 31.7 G/DL (ref 31.5–35.7)
MCV RBC AUTO: 86.6 FL (ref 79–97)
MONOCYTES # BLD AUTO: 0.6 10*3/MM3 (ref 0.1–0.9)
MONOCYTES NFR BLD AUTO: 7 % (ref 5–12)
NEUTROPHILS NFR BLD AUTO: 6.6 10*3/MM3 (ref 1.7–7)
NEUTROPHILS NFR BLD AUTO: 78.1 % (ref 42.7–76)
NITRITE UR QL STRIP: POSITIVE
NRBC BLD AUTO-RTO: 0 /100 WBC (ref 0–0.2)
NT-PROBNP SERPL-MCNC: ABNORMAL PG/ML (ref 0–900)
PH UR STRIP.AUTO: 6 [PH] (ref 5–8)
PLATELET # BLD AUTO: 294 10*3/MM3 (ref 140–450)
PMV BLD AUTO: 8.3 FL (ref 6–12)
POTASSIUM SERPL-SCNC: 3.9 MMOL/L (ref 3.5–5.2)
PROT SERPL-MCNC: 6.1 G/DL (ref 6–8.5)
PROT UR QL STRIP: ABNORMAL
PROTHROMBIN TIME: 10.9 SECONDS (ref 9.6–11.7)
RBC # BLD AUTO: 3.58 10*6/MM3 (ref 3.77–5.28)
RBC # UR: ABNORMAL /HPF
REF LAB TEST METHOD: ABNORMAL
RHINOVIRUS RNA SPEC NAA+PROBE: NOT DETECTED
RSV RNA NPH QL NAA+NON-PROBE: NOT DETECTED
SARS-COV-2 RNA NPH QL NAA+NON-PROBE: NOT DETECTED
SODIUM SERPL-SCNC: 141 MMOL/L (ref 136–145)
SP GR UR STRIP: 1.02 (ref 1–1.03)
SQUAMOUS #/AREA URNS HPF: ABNORMAL /HPF
TROPONIN T SERPL-MCNC: 0.03 NG/ML (ref 0–0.03)
TROPONIN T SERPL-MCNC: <0.01 NG/ML (ref 0–0.03)
UROBILINOGEN UR QL STRIP: ABNORMAL
WBC # BLD AUTO: 8.5 10*3/MM3 (ref 3.4–10.8)
WBC UR QL AUTO: ABNORMAL /HPF

## 2021-05-20 PROCEDURE — 93005 ELECTROCARDIOGRAM TRACING: CPT | Performed by: EMERGENCY MEDICINE

## 2021-05-20 PROCEDURE — 80053 COMPREHEN METABOLIC PANEL: CPT | Performed by: NURSE PRACTITIONER

## 2021-05-20 PROCEDURE — 0202U NFCT DS 22 TRGT SARS-COV-2: CPT | Performed by: NURSE PRACTITIONER

## 2021-05-20 PROCEDURE — 85610 PROTHROMBIN TIME: CPT | Performed by: NURSE PRACTITIONER

## 2021-05-20 PROCEDURE — 81001 URINALYSIS AUTO W/SCOPE: CPT | Performed by: NURSE PRACTITIONER

## 2021-05-20 PROCEDURE — 87186 SC STD MICRODIL/AGAR DIL: CPT | Performed by: NURSE PRACTITIONER

## 2021-05-20 PROCEDURE — 83880 ASSAY OF NATRIURETIC PEPTIDE: CPT | Performed by: NURSE PRACTITIONER

## 2021-05-20 PROCEDURE — 83690 ASSAY OF LIPASE: CPT | Performed by: NURSE PRACTITIONER

## 2021-05-20 PROCEDURE — G0378 HOSPITAL OBSERVATION PER HR: HCPCS

## 2021-05-20 PROCEDURE — 96372 THER/PROPH/DIAG INJ SC/IM: CPT

## 2021-05-20 PROCEDURE — 96375 TX/PRO/DX INJ NEW DRUG ADDON: CPT

## 2021-05-20 PROCEDURE — 85730 THROMBOPLASTIN TIME PARTIAL: CPT | Performed by: NURSE PRACTITIONER

## 2021-05-20 PROCEDURE — 93005 ELECTROCARDIOGRAM TRACING: CPT

## 2021-05-20 PROCEDURE — 84484 ASSAY OF TROPONIN QUANT: CPT | Performed by: NURSE PRACTITIONER

## 2021-05-20 PROCEDURE — 71045 X-RAY EXAM CHEST 1 VIEW: CPT

## 2021-05-20 PROCEDURE — 25010000002 CEFTRIAXONE PER 250 MG: Performed by: NURSE PRACTITIONER

## 2021-05-20 PROCEDURE — 96365 THER/PROPH/DIAG IV INF INIT: CPT

## 2021-05-20 PROCEDURE — 96376 TX/PRO/DX INJ SAME DRUG ADON: CPT

## 2021-05-20 PROCEDURE — 87077 CULTURE AEROBIC IDENTIFY: CPT | Performed by: NURSE PRACTITIONER

## 2021-05-20 PROCEDURE — 25010000002 ONDANSETRON PER 1 MG: Performed by: NURSE PRACTITIONER

## 2021-05-20 PROCEDURE — 85025 COMPLETE CBC W/AUTO DIFF WBC: CPT | Performed by: NURSE PRACTITIONER

## 2021-05-20 PROCEDURE — 74176 CT ABD & PELVIS W/O CONTRAST: CPT

## 2021-05-20 PROCEDURE — 25010000002 HEPARIN (PORCINE) PER 1000 UNITS: Performed by: NURSE PRACTITIONER

## 2021-05-20 PROCEDURE — 87086 URINE CULTURE/COLONY COUNT: CPT | Performed by: NURSE PRACTITIONER

## 2021-05-20 PROCEDURE — 87088 URINE BACTERIA CULTURE: CPT | Performed by: NURSE PRACTITIONER

## 2021-05-20 PROCEDURE — 99285 EMERGENCY DEPT VISIT HI MDM: CPT

## 2021-05-20 RX ORDER — SODIUM CHLORIDE 0.9 % (FLUSH) 0.9 %
10 SYRINGE (ML) INJECTION AS NEEDED
Status: DISCONTINUED | OUTPATIENT
Start: 2021-05-20 | End: 2021-05-21 | Stop reason: HOSPADM

## 2021-05-20 RX ORDER — ACETAMINOPHEN 650 MG/1
650 SUPPOSITORY RECTAL EVERY 4 HOURS PRN
Status: DISCONTINUED | OUTPATIENT
Start: 2021-05-20 | End: 2021-05-21 | Stop reason: HOSPADM

## 2021-05-20 RX ORDER — HYDROCODONE BITARTRATE AND ACETAMINOPHEN 10; 325 MG/1; MG/1
1 TABLET ORAL EVERY 6 HOURS PRN
Status: DISCONTINUED | OUTPATIENT
Start: 2021-05-20 | End: 2021-05-21 | Stop reason: HOSPADM

## 2021-05-20 RX ORDER — ONDANSETRON 4 MG/1
4 TABLET, FILM COATED ORAL EVERY 6 HOURS PRN
Status: DISCONTINUED | OUTPATIENT
Start: 2021-05-20 | End: 2021-05-21 | Stop reason: HOSPADM

## 2021-05-20 RX ORDER — BUSPIRONE HYDROCHLORIDE 10 MG/1
10 TABLET ORAL 2 TIMES DAILY
Status: DISCONTINUED | OUTPATIENT
Start: 2021-05-20 | End: 2021-05-21 | Stop reason: HOSPADM

## 2021-05-20 RX ORDER — ONDANSETRON 2 MG/ML
4 INJECTION INTRAMUSCULAR; INTRAVENOUS ONCE
Status: COMPLETED | OUTPATIENT
Start: 2021-05-20 | End: 2021-05-20

## 2021-05-20 RX ORDER — PROPRANOLOL HYDROCHLORIDE 20 MG/1
40 TABLET ORAL 2 TIMES DAILY
Status: DISCONTINUED | OUTPATIENT
Start: 2021-05-20 | End: 2021-05-21 | Stop reason: HOSPADM

## 2021-05-20 RX ORDER — DESVENLAFAXINE 25 MG/1
25 TABLET, EXTENDED RELEASE ORAL DAILY
Status: DISCONTINUED | OUTPATIENT
Start: 2021-05-21 | End: 2021-05-21 | Stop reason: HOSPADM

## 2021-05-20 RX ORDER — QUETIAPINE FUMARATE 25 MG/1
50 TABLET, FILM COATED ORAL NIGHTLY
Status: DISCONTINUED | OUTPATIENT
Start: 2021-05-20 | End: 2021-05-21 | Stop reason: HOSPADM

## 2021-05-20 RX ORDER — DICYCLOMINE HYDROCHLORIDE 10 MG/1
10 CAPSULE ORAL 4 TIMES DAILY
Status: DISCONTINUED | OUTPATIENT
Start: 2021-05-20 | End: 2021-05-21 | Stop reason: HOSPADM

## 2021-05-20 RX ORDER — SODIUM CHLORIDE 0.9 % (FLUSH) 0.9 %
10 SYRINGE (ML) INJECTION EVERY 12 HOURS SCHEDULED
Status: DISCONTINUED | OUTPATIENT
Start: 2021-05-20 | End: 2021-05-21 | Stop reason: HOSPADM

## 2021-05-20 RX ORDER — METOCLOPRAMIDE 5 MG/1
5 TABLET ORAL
Status: DISCONTINUED | OUTPATIENT
Start: 2021-05-20 | End: 2021-05-21 | Stop reason: HOSPADM

## 2021-05-20 RX ORDER — ASPIRIN 81 MG/1
81 TABLET ORAL DAILY
Status: DISCONTINUED | OUTPATIENT
Start: 2021-05-21 | End: 2021-05-21 | Stop reason: HOSPADM

## 2021-05-20 RX ORDER — HEPARIN SODIUM 5000 [USP'U]/ML
5000 INJECTION, SOLUTION INTRAVENOUS; SUBCUTANEOUS EVERY 12 HOURS SCHEDULED
Status: DISCONTINUED | OUTPATIENT
Start: 2021-05-20 | End: 2021-05-21 | Stop reason: HOSPADM

## 2021-05-20 RX ORDER — FAMOTIDINE 20 MG/1
20 TABLET, FILM COATED ORAL
Status: DISCONTINUED | OUTPATIENT
Start: 2021-05-20 | End: 2021-05-21 | Stop reason: HOSPADM

## 2021-05-20 RX ORDER — ONDANSETRON 2 MG/ML
4 INJECTION INTRAMUSCULAR; INTRAVENOUS EVERY 6 HOURS PRN
Status: DISCONTINUED | OUTPATIENT
Start: 2021-05-20 | End: 2021-05-21 | Stop reason: HOSPADM

## 2021-05-20 RX ORDER — ALLOPURINOL 100 MG/1
200 TABLET ORAL DAILY
Status: DISCONTINUED | OUTPATIENT
Start: 2021-05-20 | End: 2021-05-21 | Stop reason: HOSPADM

## 2021-05-20 RX ORDER — FAMOTIDINE 20 MG/1
20 TABLET, FILM COATED ORAL 2 TIMES DAILY
COMMUNITY
End: 2021-06-02 | Stop reason: SDUPTHER

## 2021-05-20 RX ORDER — ACETAMINOPHEN 325 MG/1
650 TABLET ORAL EVERY 4 HOURS PRN
Status: DISCONTINUED | OUTPATIENT
Start: 2021-05-20 | End: 2021-05-21 | Stop reason: HOSPADM

## 2021-05-20 RX ORDER — ACETAMINOPHEN 160 MG/5ML
650 SOLUTION ORAL EVERY 4 HOURS PRN
Status: DISCONTINUED | OUTPATIENT
Start: 2021-05-20 | End: 2021-05-21 | Stop reason: HOSPADM

## 2021-05-20 RX ADMIN — Medication 10 ML: at 21:31

## 2021-05-20 RX ADMIN — FAMOTIDINE 20 MG: 20 TABLET, FILM COATED ORAL at 18:40

## 2021-05-20 RX ADMIN — HEPARIN SODIUM 5000 UNITS: 5000 INJECTION INTRAVENOUS; SUBCUTANEOUS at 21:31

## 2021-05-20 RX ADMIN — DICYCLOMINE HYDROCHLORIDE 10 MG: 10 CAPSULE ORAL at 21:31

## 2021-05-20 RX ADMIN — WATER 1 G: 100 INJECTION, SOLUTION INTRAVENOUS at 15:11

## 2021-05-20 RX ADMIN — ONDANSETRON 4 MG: 2 INJECTION INTRAMUSCULAR; INTRAVENOUS at 11:37

## 2021-05-20 RX ADMIN — METOCLOPRAMIDE 5 MG: 5 TABLET ORAL at 18:40

## 2021-05-20 RX ADMIN — LIDOCAINE HYDROCHLORIDE: 20 SOLUTION ORAL; TOPICAL at 11:37

## 2021-05-20 RX ADMIN — BUSPIRONE HYDROCHLORIDE 10 MG: 10 TABLET ORAL at 21:31

## 2021-05-20 RX ADMIN — HYDROCODONE BITARTRATE AND ACETAMINOPHEN 1 TABLET: 10; 325 TABLET ORAL at 18:40

## 2021-05-20 RX ADMIN — QUETIAPINE FUMARATE 50 MG: 25 TABLET ORAL at 21:31

## 2021-05-20 RX ADMIN — Medication 10 ML: at 15:11

## 2021-05-20 RX ADMIN — ALLOPURINOL 200 MG: 100 TABLET ORAL at 18:40

## 2021-05-20 RX ADMIN — PROPRANOLOL HYDROCHLORIDE 40 MG: 20 TABLET ORAL at 21:30

## 2021-05-20 NOTE — TELEPHONE ENCOUNTER
Caller: COLTON TRINIDAD     Relationship:     Best call back number: [phone]    What is the best time to reach you: ANY     Who are you requesting to speak with (clinical staff, provider,  specific staff member): CLINICAL STAFF     What was the call regarding:PATIENTS DAUGHTER IN LAW CALLED STATING  PATIENTS BLOOD PRESSURE /106 AND HER PULSE  PULSE. COLTON ALSO STATES THAT SHE HAS SHORTNESS OF AIR WELL SHE IS UP WALKING AROUND. COLTON STATES SHE IS HAVING A PAIN UNDER HER BREAST BUT STATES IT IS NOT A CHEST PAIN. PATIENT ALSO HAS A COUGH.     COLTON STATES SHE MIGHT CALL AN AMBULANCE FOR PATIENT BUT IS NOT SURE. I INFORMED COLTON THAT IF SHE DOES EXPERIENCE CHEST PAIN THAT PATIENT SHOULD GO TO THE ER.     Do you require a callback: YES

## 2021-05-20 NOTE — TELEPHONE ENCOUNTER
Audrey reached out to us (Dr Benton) regarding this matter- she was advised to call the patient/family back and have EMS contacted- when Audrey called her- EMS had already been contacted.

## 2021-05-21 ENCOUNTER — READMISSION MANAGEMENT (OUTPATIENT)
Dept: CALL CENTER | Facility: HOSPITAL | Age: 71
End: 2021-05-21

## 2021-05-21 VITALS
WEIGHT: 249.34 LBS | RESPIRATION RATE: 20 BRPM | SYSTOLIC BLOOD PRESSURE: 162 MMHG | DIASTOLIC BLOOD PRESSURE: 83 MMHG | OXYGEN SATURATION: 92 % | TEMPERATURE: 98.7 F | HEIGHT: 63 IN | HEART RATE: 72 BPM | BODY MASS INDEX: 44.18 KG/M2

## 2021-05-21 LAB
ANION GAP SERPL CALCULATED.3IONS-SCNC: 10 MMOL/L (ref 5–15)
BASOPHILS # BLD AUTO: 0 10*3/MM3 (ref 0–0.2)
BASOPHILS NFR BLD AUTO: 0.6 % (ref 0–1.5)
BUN SERPL-MCNC: 17 MG/DL (ref 8–23)
BUN/CREAT SERPL: 12.7 (ref 7–25)
CALCIUM SPEC-SCNC: 8.4 MG/DL (ref 8.6–10.5)
CHLORIDE SERPL-SCNC: 102 MMOL/L (ref 98–107)
CO2 SERPL-SCNC: 30 MMOL/L (ref 22–29)
CREAT SERPL-MCNC: 1.34 MG/DL (ref 0.57–1)
DEPRECATED RDW RBC AUTO: 52.9 FL (ref 37–54)
EOSINOPHIL # BLD AUTO: 0.2 10*3/MM3 (ref 0–0.4)
EOSINOPHIL NFR BLD AUTO: 2.8 % (ref 0.3–6.2)
ERYTHROCYTE [DISTWIDTH] IN BLOOD BY AUTOMATED COUNT: 16.9 % (ref 12.3–15.4)
GFR SERPL CREATININE-BSD FRML MDRD: 39 ML/MIN/1.73
GLUCOSE SERPL-MCNC: 76 MG/DL (ref 65–99)
HCT VFR BLD AUTO: 29.3 % (ref 34–46.6)
HGB BLD-MCNC: 9.2 G/DL (ref 12–15.9)
LYMPHOCYTES # BLD AUTO: 1.3 10*3/MM3 (ref 0.7–3.1)
LYMPHOCYTES NFR BLD AUTO: 21.2 % (ref 19.6–45.3)
MCH RBC QN AUTO: 27.7 PG (ref 26.6–33)
MCHC RBC AUTO-ENTMCNC: 31.5 G/DL (ref 31.5–35.7)
MCV RBC AUTO: 88 FL (ref 79–97)
MONOCYTES # BLD AUTO: 0.6 10*3/MM3 (ref 0.1–0.9)
MONOCYTES NFR BLD AUTO: 10.1 % (ref 5–12)
NEUTROPHILS NFR BLD AUTO: 3.9 10*3/MM3 (ref 1.7–7)
NEUTROPHILS NFR BLD AUTO: 65.3 % (ref 42.7–76)
NRBC BLD AUTO-RTO: 0 /100 WBC (ref 0–0.2)
PLATELET # BLD AUTO: 192 10*3/MM3 (ref 140–450)
PMV BLD AUTO: 7.8 FL (ref 6–12)
POTASSIUM SERPL-SCNC: 3.8 MMOL/L (ref 3.5–5.2)
RBC # BLD AUTO: 3.33 10*6/MM3 (ref 3.77–5.28)
SODIUM SERPL-SCNC: 142 MMOL/L (ref 136–145)
WBC # BLD AUTO: 5.9 10*3/MM3 (ref 3.4–10.8)

## 2021-05-21 PROCEDURE — 80048 BASIC METABOLIC PNL TOTAL CA: CPT | Performed by: NURSE PRACTITIONER

## 2021-05-21 PROCEDURE — 25010000002 CEFTRIAXONE PER 250 MG: Performed by: NURSE PRACTITIONER

## 2021-05-21 PROCEDURE — 94618 PULMONARY STRESS TESTING: CPT

## 2021-05-21 PROCEDURE — 85025 COMPLETE CBC W/AUTO DIFF WBC: CPT | Performed by: NURSE PRACTITIONER

## 2021-05-21 PROCEDURE — 25010000002 HEPARIN (PORCINE) PER 1000 UNITS: Performed by: NURSE PRACTITIONER

## 2021-05-21 PROCEDURE — G0378 HOSPITAL OBSERVATION PER HR: HCPCS

## 2021-05-21 PROCEDURE — 96372 THER/PROPH/DIAG INJ SC/IM: CPT

## 2021-05-21 RX ORDER — PANTOPRAZOLE SODIUM 40 MG/1
40 TABLET, DELAYED RELEASE ORAL
Status: DISCONTINUED | OUTPATIENT
Start: 2021-05-21 | End: 2021-05-21 | Stop reason: HOSPADM

## 2021-05-21 RX ORDER — PANTOPRAZOLE SODIUM 40 MG/1
40 TABLET, DELAYED RELEASE ORAL DAILY
Qty: 14 TABLET | Refills: 0 | Status: SHIPPED | OUTPATIENT
Start: 2021-05-21 | End: 2021-06-04

## 2021-05-21 RX ORDER — CEFDINIR 300 MG/1
300 CAPSULE ORAL 2 TIMES DAILY
Qty: 14 CAPSULE | Refills: 0 | Status: SHIPPED | OUTPATIENT
Start: 2021-05-21 | End: 2021-05-28

## 2021-05-21 RX ADMIN — METOCLOPRAMIDE 5 MG: 5 TABLET ORAL at 09:01

## 2021-05-21 RX ADMIN — HEPARIN SODIUM 5000 UNITS: 5000 INJECTION INTRAVENOUS; SUBCUTANEOUS at 09:02

## 2021-05-21 RX ADMIN — PROPRANOLOL HYDROCHLORIDE 40 MG: 20 TABLET ORAL at 10:12

## 2021-05-21 RX ADMIN — HYDROCODONE BITARTRATE AND ACETAMINOPHEN 1 TABLET: 10; 325 TABLET ORAL at 06:32

## 2021-05-21 RX ADMIN — ASPIRIN 81 MG: 81 TABLET, COATED ORAL at 09:01

## 2021-05-21 RX ADMIN — PANTOPRAZOLE SODIUM 40 MG: 40 TABLET, DELAYED RELEASE ORAL at 09:01

## 2021-05-21 RX ADMIN — CEFTRIAXONE SODIUM 1 G: 1 INJECTION, POWDER, FOR SOLUTION INTRAMUSCULAR; INTRAVENOUS at 09:00

## 2021-05-21 RX ADMIN — METOCLOPRAMIDE 5 MG: 5 TABLET ORAL at 06:32

## 2021-05-21 RX ADMIN — BUSPIRONE HYDROCHLORIDE 10 MG: 10 TABLET ORAL at 09:01

## 2021-05-21 RX ADMIN — FAMOTIDINE 20 MG: 20 TABLET, FILM COATED ORAL at 06:32

## 2021-05-21 RX ADMIN — DICYCLOMINE HYDROCHLORIDE 10 MG: 10 CAPSULE ORAL at 09:01

## 2021-05-21 RX ADMIN — Medication 10 ML: at 09:00

## 2021-05-21 RX ADMIN — HYDROCODONE BITARTRATE AND ACETAMINOPHEN 1 TABLET: 10; 325 TABLET ORAL at 12:37

## 2021-05-22 LAB
BACTERIA SPEC AEROBE CULT: ABNORMAL
BACTERIA SPEC AEROBE CULT: ABNORMAL

## 2021-05-23 LAB — QT INTERVAL: 445 MS

## 2021-05-24 ENCOUNTER — TELEPHONE (OUTPATIENT)
Dept: FAMILY MEDICINE CLINIC | Facility: CLINIC | Age: 71
End: 2021-05-24

## 2021-05-24 ENCOUNTER — TRANSITIONAL CARE MANAGEMENT TELEPHONE ENCOUNTER (OUTPATIENT)
Dept: CALL CENTER | Facility: HOSPITAL | Age: 71
End: 2021-05-24

## 2021-05-24 NOTE — PROGRESS NOTES
Multiple points of message need clarification.  First, I thought they had decided not to go with hospice/palliative care.  If they are going this route are they withdrawing all medical care?  Second, hospital records indicate they did do a 6-minute walk test qualifying her for oxygen, oxygen use does need to be managed through her pulmonologist.  Third because she was in the hospital she canceled her follow-up with me and does not have any future upcoming appointments.  Unless they are withdrawing all medical care to enroll her in hospice and does not plan to continue to follow up with me she does need an appointment to reevaluate congestive heart failure, renal failure, edema, and diuretic use and because of her recent hospital encounter, would recommend scheduling this is a hospital follow-up.

## 2021-05-24 NOTE — TELEPHONE ENCOUNTER
Caller: COLTON TRINIDAD    Best call back number: [phone]    What is the best time to reach you: ANYTIME     Who are you requesting to speak with (clinical staff, provider,  specific staff member): CLINICAL    What was the call regarding: PATIENT WAS PUT ON TWO LITERS OF O2 AFTER BEING DISCHARGED FROM THE HOSPITAL. PATIENT'S DAUGHTER IN LAW IS HAVING TROUBLE ADMINISTERING THE O2 AND HAS SOME QUESTIONS. PLEASE ADVISE.     Do you require a callback: YES

## 2021-05-24 NOTE — OUTREACH NOTE
Call Center TCM Note      Responses   [facility] patient discharged from?  [facility]   Does the patient have one of the following disease processes/diagnoses(primary or secondary)?  Other   TCM attempt successful?  Yes   Call start time  1000   Call Status  Left message   Call end time  1041   Discharge diagnosis  epigastric pain, UTI   Is patient permission given to speak with other caregiver?  Yes   Meds reviewed with patient/caregiver?  Yes   Is the patient having any side effects they believe may be caused by any medication additions or changes?  No   Does the patient have all medications ordered at discharge?  Yes   Is the patient taking all medications as directed (includes completed medication regime)?  Yes   Does the patient have a primary care provider?   Yes   Comments regarding PCP  [facility] daughter states   Nursing Interventions  Verified appointment date/time/provider, Educated patient on importance of making appointment, Advised patient to make appointment   Has the patient kept scheduled appointments due by today?  N/A   Psychosocial issues?  No   Did the patient receive a copy of their discharge instructions?  Yes   Nursing interventions  Reviewed instructions with patient   What is the patient's perception of their health status since discharge?  Improving   Is the patient/caregiver able to teach back signs and symptoms related to disease process for when to call PCP?  Yes   Is the patient/caregiver able to teach back signs and symptoms related to disease process for when to call 911?  Yes   Is the patient/caregiver able to teach back the hierarchy of who to call/visit for symptoms/problems? PCP, Specialist, Home health nurse, Urgent Care, ED, 911  Yes   If the patient is a current smoker, are they able to teach back resources for cessation?  Not a smoker   Additional teach back comments  Daughter states 02 sats are 94 % on room air.  She reports she is not keeping her 02 on at 3 BNC during the day as ordered. She does wear her 02 at night.    TCM call completed?  Yes          Juan Carlos Parish RN    5/24/2021, 10:42 EDT

## 2021-05-24 NOTE — TELEPHONE ENCOUNTER
Spoke with daughter in law I let her know she needs to get with dr centeno since he is the one in charge of her oxygen. She voiced she understood and will give them call

## 2021-05-24 NOTE — CASE MANAGEMENT/SOCIAL WORK
Case Management Discharge Note                Selected Continued Care - Discharged on 5/21/2021 Admission date: 5/20/2021 - Discharge disposition: Home or Self Care          Selected Continued Care - Prior Encounters Includes selections from prior encounters from 2/19/2021 to 5/21/2021    Discharged on 3/24/2021 Admission date: 3/19/2021 - Discharge disposition: Home-Health Care [facility]    Home Medical Care     Service Provider Selected Services Address Phone Fax Patient Preferred    [facility] [address] [address] [phone] --                         Final Discharge Disposition Code: 01 - home or self-care

## 2021-05-24 NOTE — TELEPHONE ENCOUNTER
HUB TO READ    MY CHART MESSAGE    Multiple points of message need clarification.  First, I thought they had decided not to go with hospice/palliative care.  If they are going this route are they withdrawing all medical care?  Second, hospital records indicate they did do a 6-minute walk test qualifying her for oxygen, oxygen use does need to be managed through her pulmonologist.  Third because she was in the hospital she canceled her follow-up with me and does not have any future upcoming appointments.  Unless they are withdrawing all medical care to enroll her in hospice and does not plan to continue to follow up with me she does need an appointment to reevaluate congestive heart failure, renal failure, edema, and diuretic use and because of her recent hospital encounter, would recommend scheduling this is a hospital follow-up.   English

## 2021-05-25 ENCOUNTER — TELEPHONE (OUTPATIENT)
Dept: FAMILY MEDICINE CLINIC | Facility: CLINIC | Age: 71
End: 2021-05-25

## 2021-05-25 NOTE — TELEPHONE ENCOUNTER
Patricia it looks like patient is switching to Annalisa from Aziza Cardona with [facility] would like the nurse to call her to discuss patient  [phone]

## 2021-05-26 NOTE — TELEPHONE ENCOUNTER
Tried calling and they said Aziza was in a meeting.  Annalisa has never seen this pt nor have I.  Neither of us know anything about pt.  SG

## 2021-06-02 ENCOUNTER — OFFICE VISIT (OUTPATIENT)
Dept: FAMILY MEDICINE CLINIC | Facility: CLINIC | Age: 71
End: 2021-06-02

## 2021-06-02 VITALS
DIASTOLIC BLOOD PRESSURE: 93 MMHG | HEART RATE: 81 BPM | HEIGHT: 63 IN | SYSTOLIC BLOOD PRESSURE: 146 MMHG | OXYGEN SATURATION: 99 % | WEIGHT: 242.6 LBS | BODY MASS INDEX: 42.98 KG/M2 | TEMPERATURE: 97.3 F

## 2021-06-02 DIAGNOSIS — Z12.31 OTHER SCREENING MAMMOGRAM: ICD-10-CM

## 2021-06-02 DIAGNOSIS — M1A.30X0 CHRONIC GOUT DUE TO RENAL IMPAIRMENT WITHOUT TOPHUS, UNSPECIFIED SITE: ICD-10-CM

## 2021-06-02 DIAGNOSIS — F33.2 SEVERE EPISODE OF RECURRENT MAJOR DEPRESSIVE DISORDER, WITHOUT PSYCHOTIC FEATURES (HCC): ICD-10-CM

## 2021-06-02 DIAGNOSIS — I10 ESSENTIAL HYPERTENSION: Primary | ICD-10-CM

## 2021-06-02 DIAGNOSIS — N18.9 HISTORY OF ANEMIA DUE TO CHRONIC KIDNEY DISEASE: ICD-10-CM

## 2021-06-02 DIAGNOSIS — N18.31 STAGE 3A CHRONIC KIDNEY DISEASE (HCC): ICD-10-CM

## 2021-06-02 DIAGNOSIS — M81.0 OSTEOPOROSIS, UNSPECIFIED OSTEOPOROSIS TYPE, UNSPECIFIED PATHOLOGICAL FRACTURE PRESENCE: ICD-10-CM

## 2021-06-02 DIAGNOSIS — Z86.2 HISTORY OF ANEMIA DUE TO CHRONIC KIDNEY DISEASE: ICD-10-CM

## 2021-06-02 DIAGNOSIS — E66.01 CLASS 3 SEVERE OBESITY DUE TO EXCESS CALORIES WITH SERIOUS COMORBIDITY AND BODY MASS INDEX (BMI) OF 40.0 TO 44.9 IN ADULT (HCC): ICD-10-CM

## 2021-06-02 DIAGNOSIS — E66.01 MORBID OBESITY WITH BMI OF 45.0-49.9, ADULT (HCC): ICD-10-CM

## 2021-06-02 PROBLEM — N17.9 ACUTE RENAL FAILURE SUPERIMPOSED ON STAGE 4 CHRONIC KIDNEY DISEASE: Status: RESOLVED | Noted: 2021-03-24 | Resolved: 2021-06-02

## 2021-06-02 PROBLEM — N18.4 RENAL FAILURE, CHRONIC, STAGE 4 (SEVERE) (HCC): Status: RESOLVED | Noted: 2020-01-21 | Resolved: 2021-06-02

## 2021-06-02 PROBLEM — E66.813 CLASS 3 SEVERE OBESITY DUE TO EXCESS CALORIES WITH SERIOUS COMORBIDITY AND BODY MASS INDEX (BMI) OF 40.0 TO 44.9 IN ADULT: Status: ACTIVE | Noted: 2021-06-02

## 2021-06-02 PROBLEM — N18.4 ACUTE RENAL FAILURE SUPERIMPOSED ON STAGE 4 CHRONIC KIDNEY DISEASE (HCC): Status: RESOLVED | Noted: 2021-03-24 | Resolved: 2021-06-02

## 2021-06-02 PROBLEM — N18.4 CHRONIC KIDNEY DISEASE (CKD), STAGE IV (SEVERE) (HCC): Status: RESOLVED | Noted: 2020-05-06 | Resolved: 2021-06-02

## 2021-06-02 PROCEDURE — 99214 OFFICE O/P EST MOD 30 MIN: CPT | Performed by: NURSE PRACTITIONER

## 2021-06-02 RX ORDER — ERGOCALCIFEROL 1.25 MG/1
50000 CAPSULE ORAL
Qty: 4 CAPSULE | Refills: 6 | Status: SHIPPED | OUTPATIENT
Start: 2021-06-02 | End: 2022-02-13

## 2021-06-02 RX ORDER — DICYCLOMINE HYDROCHLORIDE 10 MG/1
10 CAPSULE ORAL 4 TIMES DAILY
Qty: 360 CAPSULE | Refills: 1 | Status: SHIPPED | OUTPATIENT
Start: 2021-06-02 | End: 2021-12-09

## 2021-06-02 RX ORDER — PROPRANOLOL HYDROCHLORIDE 40 MG/1
40 TABLET ORAL 2 TIMES DAILY
Qty: 60 TABLET | Refills: 0 | Status: SHIPPED | OUTPATIENT
Start: 2021-06-02 | End: 2021-06-14

## 2021-06-02 RX ORDER — HYDRALAZINE HYDROCHLORIDE 50 MG/1
50 TABLET, FILM COATED ORAL 2 TIMES DAILY
Qty: 60 TABLET | Refills: 2 | Status: SHIPPED | OUTPATIENT
Start: 2021-06-02 | End: 2021-06-14

## 2021-06-02 RX ORDER — BUSPIRONE HYDROCHLORIDE 10 MG/1
10 TABLET ORAL 2 TIMES DAILY
Qty: 180 TABLET | Refills: 2 | Status: SHIPPED | OUTPATIENT
Start: 2021-06-02 | End: 2022-03-01

## 2021-06-02 RX ORDER — ASPIRIN 81 MG/1
81 TABLET ORAL DAILY
Qty: 90 TABLET | Refills: 2 | Status: SHIPPED | OUTPATIENT
Start: 2021-06-02 | End: 2022-03-17

## 2021-06-02 RX ORDER — FAMOTIDINE 20 MG/1
20 TABLET, FILM COATED ORAL 2 TIMES DAILY
Qty: 180 TABLET | Refills: 1 | Status: SHIPPED | OUTPATIENT
Start: 2021-06-02 | End: 2022-11-08

## 2021-06-02 RX ORDER — METOCLOPRAMIDE 5 MG/1
5 TABLET ORAL
Qty: 90 TABLET | Refills: 0 | Status: SHIPPED | OUTPATIENT
Start: 2021-06-02 | End: 2021-07-06

## 2021-06-02 RX ORDER — QUETIAPINE FUMARATE 50 MG/1
50 TABLET, FILM COATED ORAL NIGHTLY
Qty: 90 TABLET | Refills: 0 | Status: SHIPPED | OUTPATIENT
Start: 2021-06-02 | End: 2021-09-17

## 2021-06-02 RX ORDER — ALLOPURINOL 100 MG/1
200 TABLET ORAL
Qty: 180 TABLET | Refills: 3 | Status: SHIPPED | OUTPATIENT
Start: 2021-06-02 | End: 2021-09-07

## 2021-06-02 RX ORDER — FERROUS SULFATE 324(65)MG
324 TABLET, DELAYED RELEASE (ENTERIC COATED) ORAL
Qty: 30 TABLET | Refills: 2 | Status: SHIPPED | OUTPATIENT
Start: 2021-06-02 | End: 2021-08-19 | Stop reason: SDUPTHER

## 2021-06-02 RX ORDER — ALENDRONATE SODIUM 70 MG/1
70 TABLET ORAL
Qty: 4 TABLET | Refills: 2 | Status: SHIPPED | OUTPATIENT
Start: 2021-06-02 | End: 2022-03-20

## 2021-06-02 RX ORDER — CYANOCOBALAMIN 1000 UG/ML
1000 INJECTION, SOLUTION INTRAMUSCULAR; SUBCUTANEOUS
Qty: 1 ML | Refills: 6 | Status: SHIPPED | OUTPATIENT
Start: 2021-06-02 | End: 2022-04-12

## 2021-06-02 RX ORDER — DESVENLAFAXINE 25 MG/1
25 TABLET, EXTENDED RELEASE ORAL DAILY
Qty: 90 TABLET | Refills: 0 | Status: SHIPPED | OUTPATIENT
Start: 2021-06-02 | End: 2021-09-07

## 2021-06-02 NOTE — TELEPHONE ENCOUNTER
PATIENT CALLED REQUESTING TO SPEAK WITH DR. COWART. THE PATIENT STATED THAT SHE NEEDS TO DISCUSS HER HEALTH WITH HER, BUT SHE DID NOT WANT TO GIVE ME ANY MORE INFORMATION.    PLEASE CALL THE PATIENT [phone] WHEN THIS MESSAGE HAS BEEN RECEIVED AND ADVISE.  
SPOKE WITH IDA SHE WANTS TO KNOW IF SHE CAN GO HOME TO LIVE BY HERSELF I TOLD ASKED HER WHAT HER AND DR COWART TALKED ABOUT AND SHE STATES SHE TOLD HER SHE DOES NOT WANT HER TO LIVE ALONE SO I TOLD HER THAT THIS IS GOING TO CONTINUE TO BE THE ANSWER. PATIENT VOICED SHE UNDERSTOOD   
show

## 2021-06-02 NOTE — PATIENT INSTRUCTIONS
Keep appointment for mammogram  Ferrous sulfate daily  Hydralazine 50 mg twice a day monitor blood pressures and call office  Call Dr. Cisse to see when your colonoscopy and EGD are due  Continue diet and exercise with walker as much as possible follow-up 3 to 6 months fasting

## 2021-06-02 NOTE — PROGRESS NOTES
Elaine Toscano is a 70 y.o. female.     70-year-old obese white female with history of CKD, mild dementia, depression, GERD, CHF, hypothyroidism, osteoporosis, anemia, hypertension, sleep apnea, gastric bypass, chronic pain who goes to pain management and hyperlipidemia who comes in today to be established as a new patient  Blood pressure elevated today 146/92 heart rate 80 she denies any chest pain, dyspnea, tachycardia or dizziness he is currently not take anything for blood pressure on her to place her on hydralazine 50 mg twice daily  Monitor blood pressures and call them to the office  Patient last hemoglobin was 9.2 I am placing her back on iron tablets.  Her creatinine 6 months ago was over 2 it is now 1.3 and patient does have a nephrologist.  Labs were just drawn a couple weeks ago  Patient has pretty severe osteoporosis but was unable to tolerate Prolia shots and is currently on Fosamax and is up-to-date on DEXA scan  Weight is down 15 pounds at 243 with a BMI of 43.0 and she is now living with her son and daughter-in-law and they have been watching what she eats and she is beginning to lose weight  Patient has had both Covid vaccines she is up-to-date on eye exams, DEXA scan and they are going to call Dr. Cisse to see when her colonoscopy and EGD are due.  I am scheduling her mammogram at [place]    Mammogram  Ferrous sulfate 324 daily  Hydralazine 50 mg twice daily  Continue diet and exercise with walker as much as possible               The following portions of the patient's history were reviewed and updated as appropriate: allergies, current medications, past family history, past medical history, past social history, past surgical history and problem list.    Vitals:    06/02/21 1000 06/02/21 1003   BP: (!) 145/102 146/93   BP Location: Right arm Right arm   Patient Position: Sitting Sitting   Cuff Size: Adult Adult   Pulse: 81    Temp: 97.3 °F (36.3 °C)    TempSrc: Infrared    SpO2: 99%    Weight:  "110 kg (242 lb 9.6 oz)    Height: 160 cm (62.99\")      Body mass index is 42.99 kg/m².    Past Medical History:   Diagnosis Date   • Anemia    • Broken foot     right   • Broken nose 07/2017   • CHF (congestive heart failure) (CMS/[facility])    • CHF (congestive heart failure) (CMS/[facility])    • Chronic low back pain    • CRI (chronic renal insufficiency)    • Depression    • DJD (degenerative joint disease)    • Fracture of ankle 8/12/2016   • GERD (gastroesophageal reflux disease)    • Gout    • Hypertension    • Hypothyroidism    • CRUZ (obstructive sleep apnea)     non-complaince with bipap   • Osteopenia    • Renal failure     stage 3   • Suicide attempt (CMS/[facility])    • TMJ (dislocation of temporomandibular joint)      Past Surgical History:   Procedure Laterality Date   • APPENDECTOMY  1986   • BACK SURGERY      2004---2005 L5-S1 spinal stenosis   • CARDIAC CATHETERIZATION  04/2018   • CHOLECYSTECTOMY  2008   • EPIDURAL BLOCK     • FOOT SURGERY Bilateral 2008   • FOOT SURGERY Bilateral 09/2019    Dr. Keenan- 2nd toe on bilateral feet    • GASTRIC BYPASS  2007   • INSERT / REPLACE / REMOVE PACEMAKER     • LAPAROSCOPIC GASTRIC BANDING  2004    removed   • OTHER SURGICAL HISTORY  01/2018    myelogram   • TOTAL KNEE ARTHROPLASTY Left      Family History   Problem Relation Age of Onset   • Hypertension Father         PM implanted   • Prostate cancer Father    • Arthritis Sister    • Heart disease Sister    • Lung disease Paternal Grandmother    • COPD Other    • Lung disease Other      Immunization History   Administered Date(s) Administered   • COVID-19 (MODERNA) 02/03/2021, 03/04/2021   • FLUAD TRI 65YR+ 09/30/2019   • Flu Vaccine Quad PF >18YRS 09/26/2016   • Fluad Quad 65+ 09/30/2019, 09/22/2020   • Fluzone High Dose =>65 Years ([employer] ONLY) 09/08/2015, 09/11/2017, 11/02/2018, 09/30/2019   • H1N1 Inj 12/03/2009   • Influenza Quad Vaccine (Inpatient) 09/11/2017   • Influenza Whole 10/29/2012   • Pneumococcal Conjugate " 13-Valent (PCV13) 06/16/2015   • Pneumococcal Polysaccharide (PPSV23) 11/01/2016   • Td 04/27/2018   • Tdap 07/24/2019       Admission on 05/20/2021, Discharged on 05/21/2021   Component Date Value Ref Range Status   • QT Interval 05/20/2021 445  ms Final   • Glucose 05/20/2021 104* 65 - 99 mg/dL Final   • BUN 05/20/2021 17  8 - 23 mg/dL Final   • Creatinine 05/20/2021 1.35* 0.57 - 1.00 mg/dL Final   • Sodium 05/20/2021 141  136 - 145 mmol/L Final   • Potassium 05/20/2021 3.9  3.5 - 5.2 mmol/L Final   • Chloride 05/20/2021 100  98 - 107 mmol/L Final   • CO2 05/20/2021 31.0* 22.0 - 29.0 mmol/L Final   • Calcium 05/20/2021 9.0  8.6 - 10.5 mg/dL Final   • Total Protein 05/20/2021 6.1  6.0 - 8.5 g/dL Final   • Albumin 05/20/2021 3.60  3.50 - 5.20 g/dL Final   • ALT (SGPT) 05/20/2021 8  1 - 33 U/L Final   • AST (SGOT) 05/20/2021 11  1 - 32 U/L Final   • Alkaline Phosphatase 05/20/2021 103  39 - 117 U/L Final   • Total Bilirubin 05/20/2021 0.7  0.0 - 1.2 mg/dL Final   • eGFR Non  Amer 05/20/2021 39* >60 mL/min/1.73 Final   • Globulin 05/20/2021 2.5  gm/dL Final   • A/G Ratio 05/20/2021 1.4  g/dL Final   • BUN/Creatinine Ratio 05/20/2021 12.6  7.0 - 25.0 Final   • Anion Gap 05/20/2021 10.0  5.0 - 15.0 mmol/L Final   • Protime 05/20/2021 10.9  9.6 - 11.7 Seconds Final   • INR 05/20/2021 0.99  0.93 - 1.10 Final   • PTT 05/20/2021 28.9  24.0 - 31.0 seconds Final   • Lipase 05/20/2021 8* 13 - 60 U/L Final   • Color, UA 05/20/2021 Yellow  Yellow, Straw Final   • Appearance, UA 05/20/2021 Hazy* Clear Final   • pH, UA 05/20/2021 6.0  5.0 - 8.0 Final   • Specific Gravity, UA 05/20/2021 1.019  1.005 - 1.030 Final   • Glucose, UA 05/20/2021 Negative  Negative Final   • Ketones, UA 05/20/2021 Negative  Negative Final   • Bilirubin, UA 05/20/2021 Negative  Negative Final   • Blood, UA 05/20/2021 Negative  Negative Final   • Protein, UA 05/20/2021 30 mg/dL (1+)* Negative Final   • Leuk Esterase, UA 05/20/2021 Moderate (2+)*  Negative Final   • Nitrite, UA 05/20/2021 Positive* Negative Final   • Urobilinogen, UA 05/20/2021 1.0 E.U./dL  0.2 - 1.0 E.U./dL Final   • Troponin T 05/20/2021 0.028  0.000 - 0.030 ng/mL Final   • proBNP 05/20/2021 12,774.0* 0.0 - 900.0 pg/mL Final   • WBC 05/20/2021 8.50  3.40 - 10.80 10*3/mm3 Final   • RBC 05/20/2021 3.58* 3.77 - 5.28 10*6/mm3 Final   • Hemoglobin 05/20/2021 9.8* 12.0 - 15.9 g/dL Final   • Hematocrit 05/20/2021 31.0* 34.0 - 46.6 % Final   • MCV 05/20/2021 86.6  79.0 - 97.0 fL Final   • MCH 05/20/2021 27.5  26.6 - 33.0 pg Final   • MCHC 05/20/2021 31.7  31.5 - 35.7 g/dL Final   • RDW 05/20/2021 17.3* 12.3 - 15.4 % Final   • RDW-SD 05/20/2021 53.8  37.0 - 54.0 fl Final   • MPV 05/20/2021 8.3  6.0 - 12.0 fL Final   • Platelets 05/20/2021 294  140 - 450 10*3/mm3 Final   • Neutrophil % 05/20/2021 78.1* 42.7 - 76.0 % Final   • Lymphocyte % 05/20/2021 13.6* 19.6 - 45.3 % Final   • Monocyte % 05/20/2021 7.0  5.0 - 12.0 % Final   • Eosinophil % 05/20/2021 0.7  0.3 - 6.2 % Final   • Basophil % 05/20/2021 0.6  0.0 - 1.5 % Final   • Neutrophils, Absolute 05/20/2021 6.60  1.70 - 7.00 10*3/mm3 Final   • Lymphocytes, Absolute 05/20/2021 1.10  0.70 - 3.10 10*3/mm3 Final   • Monocytes, Absolute 05/20/2021 0.60  0.10 - 0.90 10*3/mm3 Final   • Eosinophils, Absolute 05/20/2021 0.10  0.00 - 0.40 10*3/mm3 Final   • Basophils, Absolute 05/20/2021 0.10  0.00 - 0.20 10*3/mm3 Final   • nRBC 05/20/2021 0.0  0.0 - 0.2 /100 WBC Final   • ADENOVIRUS, PCR 05/20/2021 Not Detected  Not Detected Final   • Coronavirus 229E 05/20/2021 Not Detected  Not Detected Final   • Coronavirus HKU1 05/20/2021 Not Detected  Not Detected Final   • Coronavirus NL63 05/20/2021 Not Detected  Not Detected Final   • Coronavirus OC43 05/20/2021 Not Detected  Not Detected Final   • COVID19 05/20/2021 Not Detected  Not Detected - Ref. Range Final   • Human Metapneumovirus 05/20/2021 Not Detected  Not Detected Final   • Human Rhinovirus/Enterovirus  05/20/2021 Not Detected  Not Detected Final   • Influenza A PCR 05/20/2021 Not Detected  Not Detected Final   • Influenza B PCR 05/20/2021 Not Detected  Not Detected Final   • Parainfluenza Virus 1 05/20/2021 Not Detected  Not Detected Final   • Parainfluenza Virus 2 05/20/2021 Not Detected  Not Detected Final   • Parainfluenza Virus 3 05/20/2021 Not Detected  Not Detected Final   • Parainfluenza Virus 4 05/20/2021 Not Detected  Not Detected Final   • RSV, PCR 05/20/2021 Not Detected  Not Detected Final   • Bordetella pertussis pcr 05/20/2021 Not Detected  Not Detected Final   • Bordetella parapertussis PCR 05/20/2021 Not Detected  Not Detected Final   • Chlamydophila pneumoniae PCR 05/20/2021 Not Detected  Not Detected Final   • Mycoplasma pneumo by PCR 05/20/2021 Not Detected  Not Detected Final   • RBC, UA 05/20/2021 6-12* None Seen /HPF Final   • WBC, UA 05/20/2021 31-50* None Seen /HPF Final   • Bacteria, UA 05/20/2021 3+* None Seen /HPF Final   • Squamous Epithelial Cells, UA 05/20/2021 0-2  None Seen, 0-2 /HPF Final   • Hyaline Casts, UA 05/20/2021 3-6  None Seen /LPF Final   • Methodology 05/20/2021 Automated Microscopy   Final   • Urine Culture 05/20/2021 >100,000 CFU/mL Escherichia coli*  Final   • Urine Culture 05/20/2021 50,000 CFU/mL Klebsiella pneumoniae ssp pneumoniae*  Final   • Troponin T 05/20/2021 <0.010  0.000 - 0.030 ng/mL Final   • Glucose 05/21/2021 76  65 - 99 mg/dL Final   • BUN 05/21/2021 17  8 - 23 mg/dL Final   • Creatinine 05/21/2021 1.34* 0.57 - 1.00 mg/dL Final   • Sodium 05/21/2021 142  136 - 145 mmol/L Final   • Potassium 05/21/2021 3.8  3.5 - 5.2 mmol/L Final   • Chloride 05/21/2021 102  98 - 107 mmol/L Final   • CO2 05/21/2021 30.0* 22.0 - 29.0 mmol/L Final   • Calcium 05/21/2021 8.4* 8.6 - 10.5 mg/dL Final   • eGFR Non African Amer 05/21/2021 39* >60 mL/min/1.73 Final   • BUN/Creatinine Ratio 05/21/2021 12.7  7.0 - 25.0 Final   • Anion Gap 05/21/2021 10.0  5.0 - 15.0 mmol/L Final    • WBC 05/21/2021 5.90  3.40 - 10.80 10*3/mm3 Final   • RBC 05/21/2021 3.33* 3.77 - 5.28 10*6/mm3 Final   • Hemoglobin 05/21/2021 9.2* 12.0 - 15.9 g/dL Final   • Hematocrit 05/21/2021 29.3* 34.0 - 46.6 % Final   • MCV 05/21/2021 88.0  79.0 - 97.0 fL Final   • MCH 05/21/2021 27.7  26.6 - 33.0 pg Final   • MCHC 05/21/2021 31.5  31.5 - 35.7 g/dL Final   • RDW 05/21/2021 16.9* 12.3 - 15.4 % Final   • RDW-SD 05/21/2021 52.9  37.0 - 54.0 fl Final   • MPV 05/21/2021 7.8  6.0 - 12.0 fL Final   • Platelets 05/21/2021 192  140 - 450 10*3/mm3 Final   • Neutrophil % 05/21/2021 65.3  42.7 - 76.0 % Final   • Lymphocyte % 05/21/2021 21.2  19.6 - 45.3 % Final   • Monocyte % 05/21/2021 10.1  5.0 - 12.0 % Final   • Eosinophil % 05/21/2021 2.8  0.3 - 6.2 % Final   • Basophil % 05/21/2021 0.6  0.0 - 1.5 % Final   • Neutrophils, Absolute 05/21/2021 3.90  1.70 - 7.00 10*3/mm3 Final   • Lymphocytes, Absolute 05/21/2021 1.30  0.70 - 3.10 10*3/mm3 Final   • Monocytes, Absolute 05/21/2021 0.60  0.10 - 0.90 10*3/mm3 Final   • Eosinophils, Absolute 05/21/2021 0.20  0.00 - 0.40 10*3/mm3 Final   • Basophils, Absolute 05/21/2021 0.00  0.00 - 0.20 10*3/mm3 Final   • nRBC 05/21/2021 0.0  0.0 - 0.2 /100 WBC Final         Review of Systems   Constitutional: Negative.    HENT: Negative.    Respiratory: Negative.    Cardiovascular: Negative.    Gastrointestinal: Negative.    Genitourinary: Negative.    Musculoskeletal: Negative.    Skin: Negative.    Neurological: Negative.    Psychiatric/Behavioral: Negative.        Objective   Physical Exam  Constitutional:       Appearance: Normal appearance.   HENT:      Head: Normocephalic.   Cardiovascular:      Rate and Rhythm: Normal rate and regular rhythm.      Pulses: Normal pulses.      Heart sounds: Normal heart sounds.   Pulmonary:      Effort: Pulmonary effort is normal.      Breath sounds: Normal breath sounds.   Abdominal:      General: Bowel sounds are normal.   Musculoskeletal:      Cervical back:  Normal range of motion.      Comments: Ambulates with walker   Skin:     General: Skin is warm and dry.   Neurological:      General: No focal deficit present.      Mental Status: She is alert and oriented to person, place, and time.      Comments: Mild dementia   Psychiatric:         Mood and Affect: Mood normal.         Behavior: Behavior normal.         Procedures    Assessment/Plan   Diagnoses and all orders for this visit:    1. Essential hypertension (Primary)    2. Osteoporosis, unspecified osteoporosis type, unspecified pathological fracture presence  -     alendronate (FOSAMAX) 70 MG tablet; Take 1 tablet by mouth Every 7 (Seven) Days. Take with 8 ounces of water first thing in the morning.  remain upright for 30 minutes  Takes on Wed  Dispense: 4 tablet; Refill: 2    3. Chronic gout due to renal impairment without tophus, unspecified site  -     allopurinol (ZYLOPRIM) 100 MG tablet; Take 2 tablets by mouth every night at bedtime.  Dispense: 180 tablet; Refill: 3    4. Severe episode of recurrent major depressive disorder, without psychotic features (CMS/[facility])  -     Desvenlafaxine Succinate ER (Pristiq) 25 MG tablet sustained-release 24 hour; Take 1 tablet by mouth Daily.  Dispense: 90 tablet; Refill: 0  -     QUEtiapine (SEROquel) 50 MG tablet; Take 1 tablet by mouth Every Night.  Dispense: 90 tablet; Refill: 0    5. Morbid obesity with BMI of 45.0-49.9, adult (CMS/[facility])    6. History of anemia due to chronic kidney disease    7. Stage 3a chronic kidney disease (CMS/[facility])    8. Class 3 severe obesity due to excess calories with serious comorbidity and body mass index (BMI) of 40.0 to 44.9 in adult (CMS/[facility])    Other orders  -     aspirin (aspirin) 81 MG EC tablet; Take 1 tablet by mouth Daily.  Dispense: 90 tablet; Refill: 2  -     busPIRone (BUSPAR) 10 MG tablet; Take 1 tablet by mouth 2 (Two) Times a Day.  Dispense: 180 tablet; Refill: 2  -     calcium carb-cholecalciferol (Calcium Plus Vitamin D3) 600-800  MG-UNIT tablet; Take 1 tablet by mouth 2 (Two) Times a Day With Meals.  Dispense: 60 tablet; Refill: 2  -     cyanocobalamin 1000 MCG/ML injection; Inject 1 mL into the appropriate muscle as directed by prescriber Every 28 (Twenty-Eight) Days.  Dispense: 1 mL; Refill: 6  -     dicyclomine (BENTYL) 10 MG capsule; Take 1 capsule by mouth 4 (Four) Times a Day.  Dispense: 360 capsule; Refill: 1  -     famotidine (PEPCID) 20 MG tablet; Take 1 tablet by mouth 2 (Two) Times a Day.  Dispense: 180 tablet; Refill: 1  -     metoclopramide (REGLAN) 5 MG tablet; Take 1 tablet by mouth 3 (Three) Times a Day Before Meals.  Dispense: 90 tablet; Refill: 0  -     vitamin D (ERGOCALCIFEROL) 1.25 MG (32845 UT) capsule capsule; Take 1 capsule by mouth Every 7 (Seven) Days. Sun  Dispense: 4 capsule; Refill: 6  -     propranolol (INDERAL) 40 MG tablet; Take 1 tablet by mouth 2 (Two) Times a Day.  Dispense: 60 tablet; Refill: 0  -     ferrous sulfate 324 MG tablet delayed-release; Take 1 tablet by mouth Daily With Breakfast.  Dispense: 30 tablet; Refill: 2  -     hydrALAZINE (APRESOLINE) 50 MG tablet; Take 1 tablet by mouth 2 (Two) Times a Day.  Dispense: 60 tablet; Refill: 2          Current Outpatient Medications:   •  alendronate (FOSAMAX) 70 MG tablet, Take 1 tablet by mouth Every 7 (Seven) Days. Take with 8 ounces of water first thing in the morning.  remain upright for 30 minutes Takes on Wed, Disp: 4 tablet, Rfl: 2  •  allopurinol (ZYLOPRIM) 100 MG tablet, Take 2 tablets by mouth every night at bedtime., Disp: 180 tablet, Rfl: 3  •  aspirin (aspirin) 81 MG EC tablet, Take 1 tablet by mouth Daily., Disp: 90 tablet, Rfl: 2  •  busPIRone (BUSPAR) 10 MG tablet, Take 1 tablet by mouth 2 (Two) Times a Day., Disp: 180 tablet, Rfl: 2  •  calcium carb-cholecalciferol (Calcium Plus Vitamin D3) 600-800 MG-UNIT tablet, Take 1 tablet by mouth 2 (Two) Times a Day With Meals., Disp: 60 tablet, Rfl: 2  •  cyanocobalamin 1000 MCG/ML injection,  Inject 1 mL into the appropriate muscle as directed by prescriber Every 28 (Twenty-Eight) Days., Disp: 1 mL, Rfl: 6  •  Desvenlafaxine Succinate ER (Pristiq) 25 MG tablet sustained-release 24 hour, Take 1 tablet by mouth Daily., Disp: 90 tablet, Rfl: 0  •  dicyclomine (BENTYL) 10 MG capsule, Take 1 capsule by mouth 4 (Four) Times a Day., Disp: 360 capsule, Rfl: 1  •  famotidine (PEPCID) 20 MG tablet, Take 1 tablet by mouth 2 (Two) Times a Day., Disp: 180 tablet, Rfl: 1  •  fluticasone (Flonase) 50 MCG/ACT nasal spray, 2 sprays into the nostril(s) as directed by provider Daily. (Patient taking differently: 2 sprays into the nostril(s) as directed by provider Daily As Needed.), Disp: 16 g, Rfl: 12  •  furosemide (LASIX) 40 MG tablet, Take 1 tablet by mouth Daily. Until ankle diameter <10.5 inches and weight <255 # (Patient taking differently: Take 40 mg by mouth Daily As Needed. Until ankle diameter <10.5 inches and weight <255 #), Disp: 30 tablet, Rfl: o  •  HYDROcodone-acetaminophen (NORCO)  MG per tablet, Take 1 tablet by mouth Every 6 (Six) Hours As Needed., Disp: , Rfl:   •  linaclotide (LINZESS) 72 MCG capsule capsule, Take 1 capsule by mouth Daily. (Patient taking differently: Take 72 mcg by mouth Daily As Needed.), Disp: 90 capsule, Rfl: 3  •  melatonin ([employer] Melatonin) 5 MG tablet tablet, Take 2 tablets by mouth Every Night., Disp: 60 tablet, Rfl: 12  •  metoclopramide (REGLAN) 5 MG tablet, Take 1 tablet by mouth 3 (Three) Times a Day Before Meals., Disp: 90 tablet, Rfl: 0  •  ondansetron (ZOFRAN) 4 MG tablet, TAKE ONE TABLET BY MOUTH AS NEEDED FOR NAUSEA AND VOMITING (Patient taking differently: Take 4 mg by mouth Every 8 (Eight) Hours As Needed.), Disp: 30 tablet, Rfl: 1  •  pantoprazole (PROTONIX) 40 MG EC tablet, Take 1 tablet by mouth Daily for 14 days., Disp: 14 tablet, Rfl: 0  •  potassium chloride 10 MEQ CR tablet, 1 tablet with each lasix (Patient taking differently: Take 10 mEq by mouth 2 (Two)  Times a Day As Needed. Takes with lasix), Disp: 30 tablet, Rfl: 0  •  propranolol (INDERAL) 40 MG tablet, Take 1 tablet by mouth 2 (Two) Times a Day., Disp: 60 tablet, Rfl: 0  •  QUEtiapine (SEROquel) 50 MG tablet, Take 1 tablet by mouth Every Night., Disp: 90 tablet, Rfl: 0  •  vitamin D (ERGOCALCIFEROL) 1.25 MG (96092 UT) capsule capsule, Take 1 capsule by mouth Every 7 (Seven) Days. Sun, Disp: 4 capsule, Rfl: 6  •  ferrous sulfate 324 MG tablet delayed-release, Take 1 tablet by mouth Daily With Breakfast., Disp: 30 tablet, Rfl: 2  •  hydrALAZINE (APRESOLINE) 50 MG tablet, Take 1 tablet by mouth 2 (Two) Times a Day., Disp: 60 tablet, Rfl: 2

## 2021-06-04 ENCOUNTER — CLINICAL SUPPORT NO REQUIREMENTS (OUTPATIENT)
Dept: CARDIOLOGY | Facility: CLINIC | Age: 71
End: 2021-06-04

## 2021-06-04 DIAGNOSIS — Z95.0 PRESENCE OF CARDIAC PACEMAKER: Primary | ICD-10-CM

## 2021-06-04 DIAGNOSIS — I49.5 SICK SINUS SYNDROME (HCC): ICD-10-CM

## 2021-06-04 PROCEDURE — 93280 PM DEVICE PROGR EVAL DUAL: CPT | Performed by: INTERNAL MEDICINE

## 2021-06-09 ENCOUNTER — READMISSION MANAGEMENT (OUTPATIENT)
Dept: CALL CENTER | Facility: HOSPITAL | Age: 71
End: 2021-06-09

## 2021-06-09 NOTE — OUTREACH NOTE
Medical Week 3 Survey      Responses   [facility] patient discharged from?  [facility]   Does the patient have one of the following disease processes/diagnoses(primary or secondary)?  Other   Week 3 attempt successful?  Yes   Call start time  0815   Call end time  0818   Discharge diagnosis  epigastric pain, UTI   Is patient permission given to speak with other caregiver?  Yes   Person spoke with today (if not patient) and relationship  PATIENT AND DAUGHTER IN LAW   Meds reviewed with patient/caregiver?  Yes   Is the patient having any side effects they believe may be caused by any medication additions or changes?  No   Does the patient have all medications ordered at discharge?  Yes   Is the patient taking all medications as directed (includes completed medication regime)?  Yes   Does the patient have a primary care provider?   Yes   Comments regarding PCP  PATIENT HAS SEEN MAGAN ZIEGLER   Has the patient kept scheduled appointments due by today?  Yes   Has home health visited the patient within 72 hours of discharge?  N/A   Psychosocial issues?  No   Did the patient receive a copy of their discharge instructions?  Yes   Nursing interventions  Reviewed instructions with patient   What is the patient's perception of their health status since discharge?  Improving   Is the patient/caregiver able to teach back signs and symptoms related to disease process for when to call PCP?  Yes   Is the patient/caregiver able to teach back signs and symptoms related to disease process for when to call 911?  Yes   Is the patient/caregiver able to teach back the hierarchy of who to call/visit for symptoms/problems? PCP, Specialist, Home health nurse, Urgent Care, ED, 911  Yes   If the patient is a current smoker, are they able to teach back resources for cessation?  Not a smoker   Additional teach back comments  DAUGHTER IN LAW STATES PATIENT IS DOING VERY WELL   Week 3 Call Completed?  Yes          Michaela Patel LPN

## 2021-06-14 ENCOUNTER — TELEPHONE (OUTPATIENT)
Dept: FAMILY MEDICINE CLINIC | Facility: CLINIC | Age: 71
End: 2021-06-14

## 2021-06-14 RX ORDER — PROPRANOLOL HYDROCHLORIDE 80 MG/1
80 TABLET ORAL 2 TIMES DAILY
Qty: 180 TABLET | Refills: 1 | Status: SHIPPED | OUTPATIENT
Start: 2021-06-14 | End: 2021-12-23

## 2021-06-14 NOTE — TELEPHONE ENCOUNTER
Lets stop hydralazine I increased her propranolol to 80 twice a day let us see what her blood pressures do with that since she is already on it at a lower dose

## 2021-06-14 NOTE — TELEPHONE ENCOUNTER
Caller: COLTON     Relationship: DAUGHTER IN LAW    Best call back number:[phone]    What is the best time to reach you: ANY     Who are you requesting to speak with (clinical staff, provider,  specific staff member): BELKIS       What was the call regarding: WANTS TO SPEAK WITH BELKIS ABOUT SOME CONCERNS     Do you require a callback: YES

## 2021-07-03 DIAGNOSIS — R60.0 LOCALIZED EDEMA: ICD-10-CM

## 2021-07-03 DIAGNOSIS — I50.9 CONGESTIVE HEART FAILURE, UNSPECIFIED HF CHRONICITY, UNSPECIFIED HEART FAILURE TYPE (HCC): ICD-10-CM

## 2021-07-06 RX ORDER — METOCLOPRAMIDE 5 MG/1
TABLET ORAL
Qty: 90 TABLET | Refills: 0 | Status: SHIPPED | OUTPATIENT
Start: 2021-07-06 | End: 2021-08-10

## 2021-07-06 RX ORDER — POTASSIUM CHLORIDE 750 MG/1
10 TABLET, FILM COATED, EXTENDED RELEASE ORAL 2 TIMES DAILY PRN
Qty: 60 TABLET | Refills: 1 | Status: SHIPPED | OUTPATIENT
Start: 2021-07-06 | End: 2021-09-07 | Stop reason: ALTCHOICE

## 2021-08-02 ENCOUNTER — APPOINTMENT (OUTPATIENT)
Dept: MAMMOGRAPHY | Facility: HOSPITAL | Age: 71
End: 2021-08-02

## 2021-08-10 RX ORDER — METOCLOPRAMIDE 5 MG/1
TABLET ORAL
Qty: 90 TABLET | Refills: 0 | Status: SHIPPED | OUTPATIENT
Start: 2021-08-10 | End: 2021-09-07 | Stop reason: ALTCHOICE

## 2021-08-10 NOTE — TELEPHONE ENCOUNTER
As I told patient she needs to contact Dr. Cisse about staying on this medication and he needs to be ordering it this medication has a lot of bad side effects and if she does not need to be on it she needs to find out from Dr. Cisse if she can go off of it  Since he does not send me notes I am not sure why he put her on it

## 2021-08-11 ENCOUNTER — TELEPHONE (OUTPATIENT)
Dept: FAMILY MEDICINE CLINIC | Facility: CLINIC | Age: 71
End: 2021-08-11

## 2021-08-11 NOTE — TELEPHONE ENCOUNTER
PATIENT CALLED TO GET OPINION FROM [facility], IF IT'D BE OK TO STAY HOME ALONE WHILE FAMILY IS OUT OF TOWN, D/T HER HAVING FALLS.    PLEASE ADVISE    [phone]

## 2021-08-11 NOTE — TELEPHONE ENCOUNTER
I already addressed this with the daughter on an email I really do not know what her situation at home is and whether she is safe or not this has not been discussed during our office meetings

## 2021-08-16 ENCOUNTER — TELEPHONE (OUTPATIENT)
Dept: FAMILY MEDICINE CLINIC | Facility: CLINIC | Age: 71
End: 2021-08-16

## 2021-08-16 NOTE — TELEPHONE ENCOUNTER
Caller: COLTON TRINIDAD    Relationship to patient: Child    Best call back number: [phone]    Patient is needing: PATIENT'S DAUGHTER IN LAW WANTED TO KNOW IF SHE GIVES PATIENT LASIX, WOULD IT BE OKAY TO ALSO GIVE HER POTASSIUM.     PLEASE ASSIST

## 2021-08-17 NOTE — TELEPHONE ENCOUNTER
Should  usually always give Lasix and potassium together but it depends on her potassium level and renal function    How often is she is giving the Lasix?  Isn't this [facility]'s  Patient?  Do they go to nephrology?    No labs since May should always be checking creatinine and electrolytes at least every 3 months

## 2021-08-19 RX ORDER — HYDRALAZINE HYDROCHLORIDE 50 MG/1
TABLET, FILM COATED ORAL
COMMUNITY
Start: 2021-08-10 | End: 2021-09-07

## 2021-08-19 RX ORDER — PROPRANOLOL HYDROCHLORIDE 40 MG/1
TABLET ORAL
COMMUNITY
Start: 2021-06-14 | End: 2021-09-07 | Stop reason: DRUGHIGH

## 2021-08-19 RX ORDER — FERROUS SULFATE 325(65) MG
TABLET ORAL
COMMUNITY
Start: 2021-08-10 | End: 2021-09-13

## 2021-08-19 RX ORDER — PANTOPRAZOLE SODIUM 40 MG/1
TABLET, DELAYED RELEASE ORAL
COMMUNITY
Start: 2022-04-11 | End: 2022-11-08

## 2021-09-03 ENCOUNTER — TELEPHONE (OUTPATIENT)
Dept: CARDIOLOGY | Facility: CLINIC | Age: 71
End: 2021-09-03

## 2021-09-03 NOTE — TELEPHONE ENCOUNTER
COLTON TRINIDAD, DAUGHTER IN -[phone].  PATIENT HAS BEEN HAVING CHEST PAIN EVERY ONCE IN A WHILE. SHE THINKS IT IS RELATED TO THE REGLAN 5 MG PATIENT TAKES.  BELIEVES THIS MEDICATION IS BAD FOR HEART. DR. SHAH DID NOT START HER ON THIS MEDICATION.   ALL VITALS LOOK GOOD.

## 2021-09-03 NOTE — TELEPHONE ENCOUNTER
Please review and advise. Are you aware of the side effects of Reglan and could that be the cause of her Chest Pain?

## 2021-09-03 NOTE — TELEPHONE ENCOUNTER
Reglan should not usually cause chest pain but she can stop for few days and watch of this chest pain goes away

## 2021-09-07 ENCOUNTER — OFFICE VISIT (OUTPATIENT)
Dept: FAMILY MEDICINE CLINIC | Facility: CLINIC | Age: 71
End: 2021-09-07

## 2021-09-07 VITALS
HEART RATE: 82 BPM | BODY MASS INDEX: 40.48 KG/M2 | TEMPERATURE: 97.5 F | OXYGEN SATURATION: 97 % | DIASTOLIC BLOOD PRESSURE: 86 MMHG | HEIGHT: 62 IN | WEIGHT: 220 LBS | SYSTOLIC BLOOD PRESSURE: 148 MMHG

## 2021-09-07 DIAGNOSIS — Z74.09 MOBILITY POOR: ICD-10-CM

## 2021-09-07 DIAGNOSIS — N18.31 STAGE 3A CHRONIC KIDNEY DISEASE (HCC): Primary | ICD-10-CM

## 2021-09-07 DIAGNOSIS — E66.01 CLASS 3 SEVERE OBESITY DUE TO EXCESS CALORIES WITH SERIOUS COMORBIDITY AND BODY MASS INDEX (BMI) OF 40.0 TO 44.9 IN ADULT (HCC): ICD-10-CM

## 2021-09-07 DIAGNOSIS — M79.604 LEG PAIN, BILATERAL: ICD-10-CM

## 2021-09-07 DIAGNOSIS — M79.605 LEG PAIN, BILATERAL: ICD-10-CM

## 2021-09-07 DIAGNOSIS — R29.898 WEAKNESS OF BOTH LOWER EXTREMITIES: ICD-10-CM

## 2021-09-07 DIAGNOSIS — D50.9 IRON DEFICIENCY ANEMIA, UNSPECIFIED IRON DEFICIENCY ANEMIA TYPE: ICD-10-CM

## 2021-09-07 PROCEDURE — 99214 OFFICE O/P EST MOD 30 MIN: CPT | Performed by: NURSE PRACTITIONER

## 2021-09-07 RX ORDER — ALLOPURINOL 100 MG/1
200 TABLET ORAL DAILY
COMMUNITY
End: 2022-08-29

## 2021-09-07 NOTE — PATIENT INSTRUCTIONS
Lab work today  Schedule mammogram  Home health referral  Keep appointment with GI  Continue diet and exercise  Health shakes and multivitamin

## 2021-09-07 NOTE — PROGRESS NOTES
Elaine Toscano is a 70 y.o. female.     70-year-old white female with history of CKD, mild dementia, depression, GERD, CHF, osteoporosis, anemia, hypertension, sleep apnea, gastric bypass, chronic pain goes to pain management and hyperlipidemia who comes in today for follow-up visit  Blood pressure 148/84 heart rate 82 she denies any chest pain, dyspnea, tachycardia or dizziness.  Patient states at home pressure runs in the 120s 130s over 70s and we did increase hydralazine on last visit    Patient has gone back to her post bariatric surgery diet and has lost 23 pounds since I saw her in June with a weight of 220 and a BMI of 40.2 and I encouraged patient to keep working on this.  Also encouraged her to be taken a multivitamin and health shakes for protein and vitamin minerals    Patient does go to nephrology her last creatinine was 1.32 however her hemoglobin has remained under 10 for the past year and I placed her on iron back in June will be doing blood work today to see if her hemoglobin has gone back over 10 if not she may need Procrit and oncology referral    Patient is wanting to move out and live by herself again she is currently living with her daughter.  She is not getting up and doing much she sits in the chair most today and I am ordering physical therapy for her and I told her she needs to have a discussion with her family and with the therapist on what are they think she is capable of living alone.    Patient had recent EGD and colonoscopy.  EGD showed H. pylori and she is currently on Protonix and has a follow-up visit with GI coming up    She is up-to-date on Covid vaccines eye exam EGD colonoscopy and DEXA scan.  Daughter has got to schedule her mammogram            Lab work today  Schedule mammogram  Home health referral  Keep appointment with GI  Continue diet and exercise  Health shakes and multivitamin       The following portions of the patient's history were reviewed and updated as  "appropriate: allergies, current medications, past family history, past medical history, past social history, past surgical history and problem list.    Vitals:    09/07/21 1432   BP: 148/86   BP Location: Right arm   Patient Position: Sitting   Cuff Size: Large Adult   Pulse: 82   Temp: 97.5 °F (36.4 °C)   TempSrc: Temporal   SpO2: 97%   Weight: 99.8 kg (220 lb)   Height: 157.5 cm (62\")     Body mass index is 40.24 kg/m².    Past Medical History:   Diagnosis Date   • Acute renal failure superimposed on stage 4 chronic kidney disease (CMS/[facility]) 3/24/2021   • Anemia    • Broken foot     right   • Broken nose 07/2017   • CHF (congestive heart failure) (CMS/[facility])    • CHF (congestive heart failure) (CMS/[facility])    • Chronic kidney disease (CKD), stage IV (severe) (CMS/[facility]) 5/6/2020   • Chronic low back pain    • CRI (chronic renal insufficiency)    • Depression    • DJD (degenerative joint disease)    • Fracture of ankle 8/12/2016   • GERD (gastroesophageal reflux disease)    • Gout    • Hypertension    • Hypothyroidism    • CRUZ (obstructive sleep apnea)     non-complaince with bipap   • Osteopenia    • Renal failure     stage 3   • Renal failure, chronic, stage 4 (severe) (CMS/[facility]) 1/21/2020   • Suicide attempt (CMS/[facility])    • TMJ (dislocation of temporomandibular joint)      Past Surgical History:   Procedure Laterality Date   • APPENDECTOMY  1986   • BACK SURGERY      2004---2005 L5-S1 spinal stenosis   • CARDIAC CATHETERIZATION  04/2018   • CHOLECYSTECTOMY  2008   • EPIDURAL BLOCK     • FOOT SURGERY Bilateral 2008   • FOOT SURGERY Bilateral 09/2019    Dr. Keenan- 2nd toe on bilateral feet    • GASTRIC BYPASS  2007   • INSERT / REPLACE / REMOVE PACEMAKER     • LAPAROSCOPIC GASTRIC BANDING  2004    removed   • OTHER SURGICAL HISTORY  01/2018    myelogram   • TOTAL KNEE ARTHROPLASTY Left      Family History   Problem Relation Age of Onset   • Hypertension Father         PM implanted   • Prostate cancer Father    • Arthritis " Sister    • Heart disease Sister    • Lung disease Paternal Grandmother    • COPD Other    • Lung disease Other      Immunization History   Administered Date(s) Administered   • COVID-19 (MODERNA) 02/03/2021, 03/04/2021   • FLUAD TRI 65YR+ 09/30/2019   • Flu Vaccine Quad PF >18YRS 09/26/2016   • Fluad Quad 65+ 09/30/2019, 09/22/2020   • Fluzone High Dose =>65 Years ([employer] ONLY) 09/08/2015, 09/11/2017, 11/02/2018, 09/30/2019   • H1N1 Inj 12/03/2009   • Influenza Quad Vaccine (Inpatient) 09/11/2017   • Influenza Whole 10/29/2012   • Pneumococcal Conjugate 13-Valent (PCV13) 06/16/2015   • Pneumococcal Polysaccharide (PPSV23) 11/01/2016   • Td 04/27/2018   • Tdap 07/24/2019       Admission on 05/20/2021, Discharged on 05/21/2021   Component Date Value Ref Range Status   • QT Interval 05/20/2021 445  ms Final   • Glucose 05/20/2021 104* 65 - 99 mg/dL Final   • BUN 05/20/2021 17  8 - 23 mg/dL Final   • Creatinine 05/20/2021 1.35* 0.57 - 1.00 mg/dL Final   • Sodium 05/20/2021 141  136 - 145 mmol/L Final   • Potassium 05/20/2021 3.9  3.5 - 5.2 mmol/L Final   • Chloride 05/20/2021 100  98 - 107 mmol/L Final   • CO2 05/20/2021 31.0* 22.0 - 29.0 mmol/L Final   • Calcium 05/20/2021 9.0  8.6 - 10.5 mg/dL Final   • Total Protein 05/20/2021 6.1  6.0 - 8.5 g/dL Final   • Albumin 05/20/2021 3.60  3.50 - 5.20 g/dL Final   • ALT (SGPT) 05/20/2021 8  1 - 33 U/L Final   • AST (SGOT) 05/20/2021 11  1 - 32 U/L Final   • Alkaline Phosphatase 05/20/2021 103  39 - 117 U/L Final   • Total Bilirubin 05/20/2021 0.7  0.0 - 1.2 mg/dL Final   • eGFR Non  Amer 05/20/2021 39* >60 mL/min/1.73 Final   • Globulin 05/20/2021 2.5  gm/dL Final   • A/G Ratio 05/20/2021 1.4  g/dL Final   • BUN/Creatinine Ratio 05/20/2021 12.6  7.0 - 25.0 Final   • Anion Gap 05/20/2021 10.0  5.0 - 15.0 mmol/L Final   • Protime 05/20/2021 10.9  9.6 - 11.7 Seconds Final   • INR 05/20/2021 0.99  0.93 - 1.10 Final   • PTT 05/20/2021 28.9  24.0 - 31.0 seconds Final   •  Lipase 05/20/2021 8* 13 - 60 U/L Final   • Color, UA 05/20/2021 Yellow  Yellow, Straw Final   • Appearance, UA 05/20/2021 Hazy* Clear Final   • pH, UA 05/20/2021 6.0  5.0 - 8.0 Final   • Specific Gravity, UA 05/20/2021 1.019  1.005 - 1.030 Final   • Glucose, UA 05/20/2021 Negative  Negative Final   • Ketones, UA 05/20/2021 Negative  Negative Final   • Bilirubin, UA 05/20/2021 Negative  Negative Final   • Blood, UA 05/20/2021 Negative  Negative Final   • Protein, UA 05/20/2021 30 mg/dL (1+)* Negative Final   • Leuk Esterase, UA 05/20/2021 Moderate (2+)* Negative Final   • Nitrite, UA 05/20/2021 Positive* Negative Final   • Urobilinogen, UA 05/20/2021 1.0 E.U./dL  0.2 - 1.0 E.U./dL Final   • Troponin T 05/20/2021 0.028  0.000 - 0.030 ng/mL Final   • proBNP 05/20/2021 12,774.0* 0.0 - 900.0 pg/mL Final   • WBC 05/20/2021 8.50  3.40 - 10.80 10*3/mm3 Final   • RBC 05/20/2021 3.58* 3.77 - 5.28 10*6/mm3 Final   • Hemoglobin 05/20/2021 9.8* 12.0 - 15.9 g/dL Final   • Hematocrit 05/20/2021 31.0* 34.0 - 46.6 % Final   • MCV 05/20/2021 86.6  79.0 - 97.0 fL Final   • MCH 05/20/2021 27.5  26.6 - 33.0 pg Final   • MCHC 05/20/2021 31.7  31.5 - 35.7 g/dL Final   • RDW 05/20/2021 17.3* 12.3 - 15.4 % Final   • RDW-SD 05/20/2021 53.8  37.0 - 54.0 fl Final   • MPV 05/20/2021 8.3  6.0 - 12.0 fL Final   • Platelets 05/20/2021 294  140 - 450 10*3/mm3 Final   • Neutrophil % 05/20/2021 78.1* 42.7 - 76.0 % Final   • Lymphocyte % 05/20/2021 13.6* 19.6 - 45.3 % Final   • Monocyte % 05/20/2021 7.0  5.0 - 12.0 % Final   • Eosinophil % 05/20/2021 0.7  0.3 - 6.2 % Final   • Basophil % 05/20/2021 0.6  0.0 - 1.5 % Final   • Neutrophils, Absolute 05/20/2021 6.60  1.70 - 7.00 10*3/mm3 Final   • Lymphocytes, Absolute 05/20/2021 1.10  0.70 - 3.10 10*3/mm3 Final   • Monocytes, Absolute 05/20/2021 0.60  0.10 - 0.90 10*3/mm3 Final   • Eosinophils, Absolute 05/20/2021 0.10  0.00 - 0.40 10*3/mm3 Final   • Basophils, Absolute 05/20/2021 0.10  0.00 - 0.20  10*3/mm3 Final   • nRBC 05/20/2021 0.0  0.0 - 0.2 /100 WBC Final   • ADENOVIRUS, PCR 05/20/2021 Not Detected  Not Detected Final   • Coronavirus 229E 05/20/2021 Not Detected  Not Detected Final   • Coronavirus HKU1 05/20/2021 Not Detected  Not Detected Final   • Coronavirus NL63 05/20/2021 Not Detected  Not Detected Final   • Coronavirus OC43 05/20/2021 Not Detected  Not Detected Final   • COVID19 05/20/2021 Not Detected  Not Detected - Ref. Range Final   • Human Metapneumovirus 05/20/2021 Not Detected  Not Detected Final   • Human Rhinovirus/Enterovirus 05/20/2021 Not Detected  Not Detected Final   • Influenza A PCR 05/20/2021 Not Detected  Not Detected Final   • Influenza B PCR 05/20/2021 Not Detected  Not Detected Final   • Parainfluenza Virus 1 05/20/2021 Not Detected  Not Detected Final   • Parainfluenza Virus 2 05/20/2021 Not Detected  Not Detected Final   • Parainfluenza Virus 3 05/20/2021 Not Detected  Not Detected Final   • Parainfluenza Virus 4 05/20/2021 Not Detected  Not Detected Final   • RSV, PCR 05/20/2021 Not Detected  Not Detected Final   • Bordetella pertussis pcr 05/20/2021 Not Detected  Not Detected Final   • Bordetella parapertussis PCR 05/20/2021 Not Detected  Not Detected Final   • Chlamydophila pneumoniae PCR 05/20/2021 Not Detected  Not Detected Final   • Mycoplasma pneumo by PCR 05/20/2021 Not Detected  Not Detected Final   • RBC, UA 05/20/2021 6-12* None Seen /HPF Final   • WBC, UA 05/20/2021 31-50* None Seen /HPF Final   • Bacteria, UA 05/20/2021 3+* None Seen /HPF Final   • Squamous Epithelial Cells, UA 05/20/2021 0-2  None Seen, 0-2 /HPF Final   • Hyaline Casts, UA 05/20/2021 3-6  None Seen /LPF Final   • Methodology 05/20/2021 Automated Microscopy   Final   • Urine Culture 05/20/2021 >100,000 CFU/mL Escherichia coli*  Final   • Urine Culture 05/20/2021 50,000 CFU/mL Klebsiella pneumoniae ssp pneumoniae*  Final   • Troponin T 05/20/2021 <0.010  0.000 - 0.030 ng/mL Final   • Glucose  05/21/2021 76  65 - 99 mg/dL Final   • BUN 05/21/2021 17  8 - 23 mg/dL Final   • Creatinine 05/21/2021 1.34* 0.57 - 1.00 mg/dL Final   • Sodium 05/21/2021 142  136 - 145 mmol/L Final   • Potassium 05/21/2021 3.8  3.5 - 5.2 mmol/L Final   • Chloride 05/21/2021 102  98 - 107 mmol/L Final   • CO2 05/21/2021 30.0* 22.0 - 29.0 mmol/L Final   • Calcium 05/21/2021 8.4* 8.6 - 10.5 mg/dL Final   • eGFR Non African Amer 05/21/2021 39* >60 mL/min/1.73 Final   • BUN/Creatinine Ratio 05/21/2021 12.7  7.0 - 25.0 Final   • Anion Gap 05/21/2021 10.0  5.0 - 15.0 mmol/L Final   • WBC 05/21/2021 5.90  3.40 - 10.80 10*3/mm3 Final   • RBC 05/21/2021 3.33* 3.77 - 5.28 10*6/mm3 Final   • Hemoglobin 05/21/2021 9.2* 12.0 - 15.9 g/dL Final   • Hematocrit 05/21/2021 29.3* 34.0 - 46.6 % Final   • MCV 05/21/2021 88.0  79.0 - 97.0 fL Final   • MCH 05/21/2021 27.7  26.6 - 33.0 pg Final   • MCHC 05/21/2021 31.5  31.5 - 35.7 g/dL Final   • RDW 05/21/2021 16.9* 12.3 - 15.4 % Final   • RDW-SD 05/21/2021 52.9  37.0 - 54.0 fl Final   • MPV 05/21/2021 7.8  6.0 - 12.0 fL Final   • Platelets 05/21/2021 192  140 - 450 10*3/mm3 Final   • Neutrophil % 05/21/2021 65.3  42.7 - 76.0 % Final   • Lymphocyte % 05/21/2021 21.2  19.6 - 45.3 % Final   • Monocyte % 05/21/2021 10.1  5.0 - 12.0 % Final   • Eosinophil % 05/21/2021 2.8  0.3 - 6.2 % Final   • Basophil % 05/21/2021 0.6  0.0 - 1.5 % Final   • Neutrophils, Absolute 05/21/2021 3.90  1.70 - 7.00 10*3/mm3 Final   • Lymphocytes, Absolute 05/21/2021 1.30  0.70 - 3.10 10*3/mm3 Final   • Monocytes, Absolute 05/21/2021 0.60  0.10 - 0.90 10*3/mm3 Final   • Eosinophils, Absolute 05/21/2021 0.20  0.00 - 0.40 10*3/mm3 Final   • Basophils, Absolute 05/21/2021 0.00  0.00 - 0.20 10*3/mm3 Final   • nRBC 05/21/2021 0.0  0.0 - 0.2 /100 WBC Final         Review of Systems   HENT: Negative.    Respiratory: Negative.    Cardiovascular: Negative.    Gastrointestinal: Negative.    Genitourinary: Negative.    Musculoskeletal:         Bilateral leg pain   Skin: Negative.    Neurological: Positive for weakness.   Psychiatric/Behavioral: Negative.        Objective   Physical Exam  Constitutional:       Appearance: Normal appearance.   HENT:      Head: Normocephalic.   Cardiovascular:      Rate and Rhythm: Normal rate and regular rhythm.      Pulses: Normal pulses.      Heart sounds: Normal heart sounds.   Pulmonary:      Effort: Pulmonary effort is normal.      Breath sounds: Normal breath sounds.   Abdominal:      General: Bowel sounds are normal.   Musculoskeletal:      Comments: Ambulates with cane but does not ambulate very often   Skin:     General: Skin is warm and dry.   Neurological:      General: No focal deficit present.      Mental Status: She is alert and oriented to person, place, and time.   Psychiatric:         Mood and Affect: Mood normal.         Procedures    Assessment/Plan   Diagnoses and all orders for this visit:    1. Stage 3a chronic kidney disease (CMS/[facility]) (Primary)  -     Basic Metabolic Panel    2. Iron deficiency anemia, unspecified iron deficiency anemia type  -     CBC & Differential    3. Leg pain, bilateral  -     Ambulatory Referral to Home Health    4. Class 3 severe obesity due to excess calories with serious comorbidity and body mass index (BMI) of 40.0 to 44.9 in adult (CMS/[facility])    5. Mobility poor    6. Weakness of both lower extremities          Current Outpatient Medications:   •  alendronate (FOSAMAX) 70 MG tablet, Take 1 tablet by mouth Every 7 (Seven) Days. Take with 8 ounces of water first thing in the morning.  remain upright for 30 minutes Takes on Wed, Disp: 4 tablet, Rfl: 2  •  allopurinol (ZYLOPRIM) 100 MG tablet, Take 200 mg by mouth Daily., Disp: , Rfl:   •  aspirin (aspirin) 81 MG EC tablet, Take 1 tablet by mouth Daily., Disp: 90 tablet, Rfl: 2  •  busPIRone (BUSPAR) 10 MG tablet, Take 1 tablet by mouth 2 (Two) Times a Day., Disp: 180 tablet, Rfl: 2  •  calcium carb-cholecalciferol (Calcium Plus  Vitamin D3) 600-800 MG-UNIT tablet, Take 1 tablet by mouth 2 (Two) Times a Day With Meals., Disp: 60 tablet, Rfl: 2  •  cyanocobalamin 1000 MCG/ML injection, Inject 1 mL into the appropriate muscle as directed by prescriber Every 28 (Twenty-Eight) Days., Disp: 1 mL, Rfl: 6  •  dicyclomine (BENTYL) 10 MG capsule, Take 1 capsule by mouth 4 (Four) Times a Day., Disp: 360 capsule, Rfl: 1  •  famotidine (PEPCID) 20 MG tablet, Take 1 tablet by mouth 2 (Two) Times a Day., Disp: 180 tablet, Rfl: 1  •  FeroSul 325 (65 Fe) MG tablet, , Disp: , Rfl:   •  fluticasone (Flonase) 50 MCG/ACT nasal spray, 2 sprays into the nostril(s) as directed by provider Daily. (Patient taking differently: 2 sprays into the nostril(s) as directed by provider Daily As Needed.), Disp: 16 g, Rfl: 12  •  furosemide (LASIX) 40 MG tablet, Take 1 tablet by mouth Daily. Until ankle diameter <10.5 inches and weight <255 # (Patient taking differently: Take 40 mg by mouth Daily As Needed. Until ankle diameter <10.5 inches and weight <255 #), Disp: 30 tablet, Rfl: o  •  HYDROcodone-acetaminophen (NORCO)  MG per tablet, Take 1 tablet by mouth Every 6 (Six) Hours As Needed., Disp: , Rfl:   •  linaclotide (LINZESS) 72 MCG capsule capsule, Take 1 capsule by mouth Daily. (Patient taking differently: Take 72 mcg by mouth Daily As Needed.), Disp: 90 capsule, Rfl: 3  •  melatonin ([employer] Melatonin) 5 MG tablet tablet, Take 2 tablets by mouth Every Night., Disp: 60 tablet, Rfl: 12  •  ondansetron (ZOFRAN) 4 MG tablet, TAKE ONE TABLET BY MOUTH AS NEEDED FOR NAUSEA AND VOMITING (Patient taking differently: Take 4 mg by mouth Every 8 (Eight) Hours As Needed.), Disp: 30 tablet, Rfl: 1  •  pantoprazole (PROTONIX) 40 MG EC tablet, pantoprazole 40 mg tablet,delayed release, Disp: , Rfl:   •  propranolol (INDERAL) 80 MG tablet, Take 1 tablet by mouth 2 (Two) Times a Day., Disp: 180 tablet, Rfl: 1  •  QUEtiapine (SEROquel) 50 MG tablet, Take 1 tablet by mouth Every Night.,  Disp: 90 tablet, Rfl: 0  •  sertraline (Zoloft) 50 MG tablet, Take 1 tablet by mouth Daily., Disp: 30 tablet, Rfl: 2  •  vitamin D (ERGOCALCIFEROL) 1.25 MG (05919 UT) capsule capsule, Take 1 capsule by mouth Every 7 (Seven) Days. Sun, Disp: 4 capsule, Rfl: 6

## 2021-09-08 ENCOUNTER — TELEPHONE (OUTPATIENT)
Dept: FAMILY MEDICINE CLINIC | Facility: CLINIC | Age: 71
End: 2021-09-08

## 2021-09-08 ENCOUNTER — HOME HEALTH ADMISSION (OUTPATIENT)
Dept: HOME HEALTH SERVICES | Facility: HOME HEALTHCARE | Age: 71
End: 2021-09-08

## 2021-09-08 NOTE — TELEPHONE ENCOUNTER
BETITO WITH [facility] STATES THE REFERRAL WE SENT FOR PATIENT DIDN'T GIVE A DIAGNOSIS ONLY LISTED LEG PAIN AND THAT IS CONSIDERED A SYMPTOM NOT A DIAGNOSIS, CAN WE SEND A NEW REFERRAL WITH PROPER DIAGNOSIS??    AMEND ONE IN Epic OR PLACE NEW ONE IN [facility]      PHONE #: [phone]

## 2021-09-10 ENCOUNTER — HOSPITAL ENCOUNTER (OUTPATIENT)
Dept: GENERAL RADIOLOGY | Facility: HOSPITAL | Age: 71
Discharge: HOME | End: 2021-09-10

## 2021-09-10 DIAGNOSIS — M54.50 LOW BACK PAIN: ICD-10-CM

## 2021-09-10 DIAGNOSIS — M54.2 CERVICALGIA: ICD-10-CM

## 2021-09-10 PROCEDURE — 72110 X-RAY EXAM L-2 SPINE 4/>VWS: CPT

## 2021-09-10 PROCEDURE — 72050 X-RAY EXAM NECK SPINE 4/5VWS: CPT

## 2021-09-11 LAB
BASOPHILS # BLD AUTO: 0 X10E3/UL (ref 0–0.2)
BASOPHILS NFR BLD AUTO: 1 %
BUN SERPL-MCNC: 20 MG/DL (ref 8–27)
BUN/CREAT SERPL: 16 (ref 12–28)
CALCIUM SERPL-MCNC: 8.8 MG/DL (ref 8.7–10.3)
CHLORIDE SERPL-SCNC: 107 MMOL/L (ref 96–106)
CO2 SERPL-SCNC: 20 MMOL/L (ref 20–29)
CREAT SERPL-MCNC: 1.25 MG/DL (ref 0.57–1)
EOSINOPHIL # BLD AUTO: 0.2 X10E3/UL (ref 0–0.4)
EOSINOPHIL NFR BLD AUTO: 3 %
ERYTHROCYTE [DISTWIDTH] IN BLOOD BY AUTOMATED COUNT: 17 % (ref 11.7–15.4)
GLUCOSE SERPL-MCNC: 113 MG/DL (ref 65–99)
HCT VFR BLD AUTO: 34.7 % (ref 34–46.6)
HGB BLD-MCNC: 10.6 G/DL (ref 11.1–15.9)
IMM GRANULOCYTES # BLD AUTO: 0 X10E3/UL (ref 0–0.1)
IMM GRANULOCYTES NFR BLD AUTO: 0 %
LYMPHOCYTES # BLD AUTO: 1.1 X10E3/UL (ref 0.7–3.1)
LYMPHOCYTES NFR BLD AUTO: 18 %
MCH RBC QN AUTO: 27.6 PG (ref 26.6–33)
MCHC RBC AUTO-ENTMCNC: 30.5 G/DL (ref 31.5–35.7)
MCV RBC AUTO: 90 FL (ref 79–97)
MONOCYTES # BLD AUTO: 0.4 X10E3/UL (ref 0.1–0.9)
MONOCYTES NFR BLD AUTO: 6 %
NEUTROPHILS # BLD AUTO: 4.3 X10E3/UL (ref 1.4–7)
NEUTROPHILS NFR BLD AUTO: 72 %
PLATELET # BLD AUTO: 230 X10E3/UL (ref 150–450)
POTASSIUM SERPL-SCNC: 5.2 MMOL/L (ref 3.5–5.2)
RBC # BLD AUTO: 3.84 X10E6/UL (ref 3.77–5.28)
SODIUM SERPL-SCNC: 142 MMOL/L (ref 134–144)
WBC # BLD AUTO: 5.9 X10E3/UL (ref 3.4–10.8)

## 2021-09-13 RX ORDER — FERROUS SULFATE 325(65) MG
TABLET ORAL
Qty: 30 TABLET | Refills: 2 | Status: SHIPPED | OUTPATIENT
Start: 2021-09-13 | End: 2021-12-09

## 2021-09-13 RX ORDER — METOCLOPRAMIDE 5 MG/1
TABLET ORAL
Qty: 90 TABLET | Refills: 0 | Status: SHIPPED | OUTPATIENT
Start: 2021-09-13 | End: 2021-10-15

## 2021-09-14 ENCOUNTER — HOME CARE VISIT (OUTPATIENT)
Dept: HOME HEALTH SERVICES | Facility: HOME HEALTHCARE | Age: 71
End: 2021-09-14

## 2021-09-14 VITALS
TEMPERATURE: 98.5 F | HEART RATE: 65 BPM | OXYGEN SATURATION: 95 % | SYSTOLIC BLOOD PRESSURE: 138 MMHG | DIASTOLIC BLOOD PRESSURE: 88 MMHG

## 2021-09-14 PROCEDURE — G0151 HHCP-SERV OF PT,EA 15 MIN: HCPCS

## 2021-09-14 NOTE — HOME HEALTH
PT EVAL  Patient is a 71 year old  female,referred for skilled PT interventions due to a decline in functional mobility with persistent lower back pain. Patient previously known to us and was receiving skilled PT at the time  SOCIAL SUPPORT/HOME LIVING SITUATION. Lives with son. steps to ge in and out of the home with handrail. Has a rollator for safety with ambulation and transfers  PLOF. Px reports being independent without an AD prior to recent decline.  Vital Signs. Please see [facility]/eval on this visit  Homebound status. Due to dec act tolerance, weakness, decline in transfers and ambulation. Patient requires a taxing effort to leave home, putting patient at risk for falls or injury  Skilled PT needed to improve patient's functional mobility, improve safety for transfers and ambulation and return patient to PLOF.  At time of eval patient was Mod A for transfers, SBA for bed mobility, Min A for gait without an AD with forward head posture, dec blanquita and step length.

## 2021-09-17 DIAGNOSIS — F33.2 SEVERE EPISODE OF RECURRENT MAJOR DEPRESSIVE DISORDER, WITHOUT PSYCHOTIC FEATURES (HCC): ICD-10-CM

## 2021-09-17 PROCEDURE — G0180 MD CERTIFICATION HHA PATIENT: HCPCS | Performed by: NURSE PRACTITIONER

## 2021-09-17 RX ORDER — DESVENLAFAXINE 25 MG/1
TABLET, EXTENDED RELEASE ORAL
Qty: 90 TABLET | Refills: 0 | Status: SHIPPED | OUTPATIENT
Start: 2021-09-17 | End: 2021-10-29

## 2021-09-17 RX ORDER — POTASSIUM CHLORIDE 750 MG/1
TABLET, FILM COATED, EXTENDED RELEASE ORAL
Qty: 60 TABLET | Refills: 1 | Status: SHIPPED | OUTPATIENT
Start: 2021-09-17

## 2021-09-17 RX ORDER — QUETIAPINE FUMARATE 50 MG/1
TABLET, FILM COATED ORAL
Qty: 90 TABLET | Refills: 0 | Status: SHIPPED | OUTPATIENT
Start: 2021-09-17 | End: 2022-03-17

## 2021-09-17 RX ORDER — HYDRALAZINE HYDROCHLORIDE 50 MG/1
TABLET, FILM COATED ORAL
Qty: 60 TABLET | Refills: 2 | Status: SHIPPED | OUTPATIENT
Start: 2021-09-17 | End: 2022-02-13

## 2021-09-21 ENCOUNTER — HOME CARE VISIT (OUTPATIENT)
Dept: HOME HEALTH SERVICES | Facility: HOME HEALTHCARE | Age: 71
End: 2021-09-21

## 2021-09-21 VITALS — HEART RATE: 78 BPM | TEMPERATURE: 97.9 F | SYSTOLIC BLOOD PRESSURE: 132 MMHG | DIASTOLIC BLOOD PRESSURE: 68 MMHG

## 2021-09-21 PROCEDURE — G0157 HHC PT ASSISTANT EA 15: HCPCS

## 2021-09-22 NOTE — HOME HEALTH
pt is up and ready for PT session,   pt is motivated to improve and very much wants to return to plf.      pt visiting with cg on arrival.   pt states she has been ambulating better but remains very concerned regarding continued and ongoing balance deficits but denies any falls.        pt was compliant throughout PT session but struggled at times to transfer to standing and had limited standing tolerance.  pt was educated on proper stance and lumbar stab when standing and displayed difficulty extending the trunk.     pt was fatigued to end PT session but was pleased to have participated

## 2021-09-23 ENCOUNTER — HOME CARE VISIT (OUTPATIENT)
Dept: HOME HEALTH SERVICES | Facility: HOME HEALTHCARE | Age: 71
End: 2021-09-23

## 2021-09-23 VITALS
DIASTOLIC BLOOD PRESSURE: 68 MMHG | TEMPERATURE: 97.5 F | SYSTOLIC BLOOD PRESSURE: 134 MMHG | RESPIRATION RATE: 17 BRPM | HEART RATE: 84 BPM | OXYGEN SATURATION: 97 %

## 2021-09-23 PROCEDURE — G0157 HHC PT ASSISTANT EA 15: HCPCS

## 2021-09-24 NOTE — HOME HEALTH
pt up and prepared for PT session,  pt states she has been performing hep as able but admits she was fatigued to end last PT session but is motivated to participate today.    no falls and no med changes reported this date.       pt visiting with cg,  daughter in law, son on arrival.   daughter in law very much wants pt to return to plf stating she is currently at 50 percent plf.        pt was compliant but very weak today with numerous rest periods needed and cues to perform ther ex properly with full contract/hold at end rom as able.     pt was often forward flexed when standing with cues needed to extend the trunk and to maintain proper cane placement during gait trg and to maintain proper lorie.

## 2021-09-28 ENCOUNTER — HOME CARE VISIT (OUTPATIENT)
Dept: HOME HEALTH SERVICES | Facility: HOME HEALTHCARE | Age: 71
End: 2021-09-28

## 2021-09-28 VITALS
TEMPERATURE: 97.7 F | HEART RATE: 82 BPM | DIASTOLIC BLOOD PRESSURE: 64 MMHG | RESPIRATION RATE: 17 BRPM | OXYGEN SATURATION: 96 % | SYSTOLIC BLOOD PRESSURE: 140 MMHG

## 2021-09-28 PROCEDURE — G0157 HHC PT ASSISTANT EA 15: HCPCS

## 2021-09-29 NOTE — HOME HEALTH
pt up visiting with her cg on arrival stating she feels fair today but with no mentioned c/o's .  pt states she is motivated to improve and feels she is making slow gains     pt was compiant throughout PT session but still requiring education to perform properly and within the correct plane.  pt was given several cues today to fully contract/hold at end rom and to deep plb as well.    pt was educated on proper use of the cane and encouraged use of the cane for safety.    pt needed to sit during gait trg,  reporting her LEs were too weak and she was unable to continue.  pt reported no increased back pain today but did have lingering soreness in her back

## 2021-09-30 ENCOUNTER — HOME CARE VISIT (OUTPATIENT)
Dept: HOME HEALTH SERVICES | Facility: HOME HEALTHCARE | Age: 71
End: 2021-09-30

## 2021-09-30 VITALS — DIASTOLIC BLOOD PRESSURE: 70 MMHG | TEMPERATURE: 98 F | OXYGEN SATURATION: 97 % | SYSTOLIC BLOOD PRESSURE: 130 MMHG

## 2021-09-30 PROCEDURE — G0151 HHCP-SERV OF PT,EA 15 MIN: HCPCS

## 2021-10-04 ENCOUNTER — TELEPHONE (OUTPATIENT)
Dept: FAMILY MEDICINE CLINIC | Facility: CLINIC | Age: 71
End: 2021-10-04

## 2021-10-04 RX ORDER — SYRINGE W-NEEDLE,DISPOSAB,3 ML 25GX5/8"
1 SYRINGE, EMPTY DISPOSABLE MISCELLANEOUS
Qty: 50 EACH | Refills: 0 | Status: SHIPPED | OUTPATIENT
Start: 2021-10-04

## 2021-10-04 NOTE — TELEPHONE ENCOUNTER
Pt requesting syringes and needles for b-12 injection be sent to [facility] in [place], no specific details included in the fax.

## 2021-10-15 RX ORDER — METOCLOPRAMIDE 5 MG/1
TABLET ORAL
Qty: 90 TABLET | Refills: 0 | Status: SHIPPED | OUTPATIENT
Start: 2021-10-15 | End: 2022-07-13

## 2021-10-29 ENCOUNTER — OFFICE VISIT (OUTPATIENT)
Dept: PSYCHIATRY | Facility: CLINIC | Age: 71
End: 2021-10-29

## 2021-10-29 DIAGNOSIS — F41.1 GENERALIZED ANXIETY DISORDER: Chronic | ICD-10-CM

## 2021-10-29 DIAGNOSIS — F33.1 MAJOR DEPRESSIVE DISORDER, RECURRENT EPISODE, MODERATE (HCC): Primary | Chronic | ICD-10-CM

## 2021-10-29 PROCEDURE — 90792 PSYCH DIAG EVAL W/MED SRVCS: CPT | Performed by: PSYCHIATRY & NEUROLOGY

## 2021-10-29 RX ORDER — SERTRALINE HYDROCHLORIDE 100 MG/1
100 TABLET, FILM COATED ORAL DAILY
Qty: 30 TABLET | Refills: 5 | Status: SHIPPED | OUTPATIENT
Start: 2021-10-29 | End: 2022-05-11

## 2021-10-29 NOTE — PATIENT INSTRUCTIONS
"[email or web address]   Generalized Anxiety Disorder, Adult  Generalized anxiety disorder (DONIS) is a mental health condition. Unlike normal worries, anxiety related to DONIS is not triggered by a specific event. These worries do not fade or get better with time. DONIS interferes with relationships, work, and school.  DONIS symptoms can vary from mild to severe. People with severe DONIS can have intense waves of anxiety with physical symptoms that are similar to panic attacks.  What are the causes?  The exact cause of DONIS is not known, but the following are believed to have an impact:  · Differences in natural brain chemicals.  · Genes passed down from parents to children.  · Differences in the way threats are perceived.  · Development during childhood.  · Personality.  What increases the risk?  The following factors may make you more likely to develop this condition:  · Being female.  · Having a family history of anxiety disorders.  · Being very shy.  · Experiencing very stressful life events, such as the death of a loved one.  · Having a very stressful family environment.  What are the signs or symptoms?  People with DONIS often worry excessively about many things in their lives, such as their health and family. Symptoms may also include:  · Mental and emotional symptoms:  ? Worrying excessively about natural disasters.  ? Fear of being late.  ? Difficulty concentrating.  ? Fears that others are judging your performance.  · Physical symptoms:  ? Fatigue.  ? Headaches, muscle tension, muscle twitches, trembling, or feeling shaky.  ? Feeling like your heart is pounding or beating very fast.  ? Feeling out of breath or like you cannot take a deep breath.  ? Having trouble falling asleep or staying asleep, or experiencing restlessness.  ? Sweating.  ? Nausea, diarrhea, or irritable bowel syndrome (IBS).  · Behavioral symptoms:  ? Experiencing erratic moods or irritability.  ? Avoidance of new situations.  ? Avoidance of " people.  ? Extreme difficulty making decisions.  How is this diagnosed?  This condition is diagnosed based on your symptoms and medical history. You will also have a physical exam. Your health care provider may perform tests to rule out other possible causes of your symptoms.  To be diagnosed with DONIS, a person must have anxiety that:  · Is out of his or her control.  · Affects several different aspects of his or her life, such as work and relationships.  · Causes distress that makes him or her unable to take part in normal activities.  · Includes at least three symptoms of DONIS, such as restlessness, fatigue, trouble concentrating, irritability, muscle tension, or sleep problems.  Before your health care provider can confirm a diagnosis of DONIS, these symptoms must be present more days than they are not, and they must last for 6 months or longer.  How is this treated?  This condition may be treated with:  · Medicine. Antidepressant medicine is usually prescribed for long-term daily control. Anti-anxiety medicines may be added in severe cases, especially when panic attacks occur.  · Talk therapy (psychotherapy). Certain types of talk therapy can be helpful in treating DONIS by providing support, education, and guidance. Options include:  ? Cognitive behavioral therapy (CBT). People learn coping skills and self-calming techniques to ease their physical symptoms. They learn to identify unrealistic thoughts and behaviors and to replace them with more appropriate thoughts and behaviors.  ? Acceptance and commitment therapy (ACT). This treatment teaches people how to be mindful as a way to cope with unwanted thoughts and feelings.  ? Biofeedback. This process trains you to manage your body's response (physiological response) through breathing techniques and relaxation methods. You will work with a therapist while machines are used to monitor your physical symptoms.  · Stress management techniques. These include yoga,  meditation, and exercise.  A mental health specialist can help determine which treatment is best for you. Some people see improvement with one type of therapy. However, other people require a combination of therapies.  Follow these instructions at home:  Lifestyle  · Maintain a consistent routine and schedule.  · Anticipate stressful situations. Create a plan, and allow extra time to work with your plan.  · Practice stress management or self-calming techniques that you have learned from your therapist or your health care provider.  General instructions  · Take over-the-counter and prescription medicines only as told by your health care provider.  · Understand that you are likely to have setbacks. Accept this and be kind to yourself as you persist to take better care of yourself.  · Recognize and accept your accomplishments, even if you  them as small.  · Keep all follow-up visits as told by your health care provider. This is important.  Contact a health care provider if:  · Your symptoms do not get better.  · Your symptoms get worse.  · You have signs of depression, such as:  ? A persistently sad or irritable mood.  ? Loss of enjoyment in activities that used to bring you maik.  ? Change in weight or eating.  ? Changes in sleeping habits.  ? Avoiding friends or family members.  ? Loss of energy for normal tasks.  ? Feelings of guilt or worthlessness.  Get help right away if:  · You have serious thoughts about hurting yourself or others.  If you ever feel like you may hurt yourself or others, or have thoughts about taking your own life, get help right away. Go to your nearest emergency department or:  · Call your local emergency services (701 in the [place]  · Call a suicide crisis helpline, such as the National Suicide Prevention Lifeline at 1-758.224.7460. This is open 24 hours a day in the [place]  · Text the Crisis Text Line at 231384 (in the [place]  Summary  · Generalized anxiety disorder (DONIS) is a mental  health condition that involves worry that is not triggered by a specific event.  · People with DONIS often worry excessively about many things in their lives, such as their health and family.  · DONIS may cause symptoms such as restlessness, trouble concentrating, sleep problems, frequent sweating, nausea, diarrhea, headaches, and trembling or muscle twitching.  · A mental health specialist can help determine which treatment is best for you. Some people see improvement with one type of therapy. However, other people require a combination of therapies.  This information is not intended to replace advice given to you by your health care provider. Make sure you discuss any questions you have with your health care provider.  Document Revised: 10/07/2020 Document Reviewed: 10/07/2020  Elsevier Patient Education © 2021 [employer]

## 2021-10-29 NOTE — PROGRESS NOTES
"Subjective   Elaine Toscano is a 71 y.o. female who presents today for initial evaluation     Chief Complaint:  \"family doctor wanted me to see you\"     History of Present Illness: the pt suffers from depression and anxiety for many years, was on different meds,   Recently depression became worse   Depression is rated as 8/10, depends on the situation, now she is losing her dog (17 yo ) , more days when depressed, all day long, her son is taking care of her since Feb 2021 , he is her POA, but now wants to get her place again. The pt feels she is a burden , avoids family activities   The pt has memory issues, mainly short term, forgetful,       The following portions of the patient's history were reviewed and updated as appropriate: allergies, current medications, past family history, past medical history, past social history, past surgical history and problem list.    PAST PSYCHIATRIC HISTORY  Axis I  Affective/Bipoloar Disorder, Anxiety/Panic Disorder  Axis II  Defer     PAST OUTPATIENT TREATMENT  Diagnosis treated:  Affective Disorder, Anxiety/Panic Disorder  Treatment Type:  Medication Management  Prior Psychiatric Medications:  pristiq - not effective  Zoloft, buspirone - now   Support Groups:  None   Sequelae Of Mental Disorder:  social isolation, emotional distress          Interval History  No Change    Side Effects  Denied       Past Medical History:  Past Medical History:   Diagnosis Date   • Acute renal failure superimposed on stage 4 chronic kidney disease (HCC) 3/24/2021   • Anemia    • Broken foot     right   • Broken nose 07/2017   • CHF (congestive heart failure) ([facility])    • CHF (congestive heart failure) ([facility])    • Chronic kidney disease (CKD), stage IV (severe) ([facility]) 5/6/2020   • Chronic low back pain    • CRI (chronic renal insufficiency)    • Depression    • DJD (degenerative joint disease)    • Fracture of ankle 8/12/2016   • GERD (gastroesophageal reflux disease)    • Gout    • Hypertension    • " Hypothyroidism    • CRUZ (obstructive sleep apnea)     non-complaince with bipap   • Osteopenia    • Renal failure     stage 3   • Renal failure, chronic, stage 4 (severe) ([facility]) 1/21/2020   • Suicide attempt ([facility])    • TMJ (dislocation of temporomandibular joint)        Social History:  Social History     Socioeconomic History   • Marital status:    Tobacco Use   • Smoking status: Never Smoker   • Smokeless tobacco: Never Used   Vaping Use   • Vaping Use: Never used   • Passive vaping exposure: Yes   Substance and Sexual Activity   • Alcohol use: No   • Drug use: No   • Sexual activity: Not Currently       Family History:  Family History   Problem Relation Age of Onset   • Hypertension Father         PM implanted   • Prostate cancer Father    • Arthritis Sister    • Heart disease Sister    • Lung disease Paternal Grandmother    • COPD Other    • Lung disease Other        Past Surgical History:  Past Surgical History:   Procedure Laterality Date   • APPENDECTOMY  1986   • BACK SURGERY      2004---2005 L5-S1 spinal stenosis   • CARDIAC CATHETERIZATION  04/2018   • CHOLECYSTECTOMY  2008   • EPIDURAL BLOCK     • FOOT SURGERY Bilateral 2008   • FOOT SURGERY Bilateral 09/2019    Dr. Keenan- 2nd toe on bilateral feet    • GASTRIC BYPASS  2007   • INSERT / REPLACE / REMOVE PACEMAKER     • LAPAROSCOPIC GASTRIC BANDING  2004    removed   • OTHER SURGICAL HISTORY  01/2018    myelogram   • TOTAL KNEE ARTHROPLASTY Left        Problem List:  Patient Active Problem List   Diagnosis   • History of anemia due to chronic kidney disease   • History of iron deficiency   • Dysuria   • Congestive heart failure ([facility])   • Major depressive disorder, recurrent episode, moderate ([facility])   • Edema   • Gastroesophageal reflux disease   • Gout   • Hypertension   • Hypokalemia   • Hypothyroidism   • Loose total knee arthroplasty ([facility])   • Hyperlipidemia   • Low back pain   • Asthma   • Mixed stress and urge urinary incontinence   • Vitamin D  deficiency   • Swelling of lower extremity   • Spinal stenosis of lumbar region   • Osteoporosis   • Obstructive sleep apnea syndrome in adult   • B12 deficiency   • Abnormal cardiovascular stress test   • Pain in right shoulder   • Breast lump   • Chest discomfort   • Dysphagia, unspecified   • Frequent falls   • Greater trochanteric bursitis   • Hand pain, left   • Hyperglycemia   • Loss of balance   • Mobility poor   • MRSA carrier   • Numbness and tingling sensation of skin   • Other spondylosis with radiculopathy, lumbar region   • Pain of right hip joint   • Leg pain, bilateral   • Peripheral axonal neuropathy   • Presence of cardiac pacemaker   • Sick sinus syndrome ([facility])   • TMJ syndrome   • Degeneration of lumbar intervertebral disc   • Irritable bowel syndrome   • Peptic ulcer   • Anemia   • Chest pain   • Other insomnia   • Allergic rhinitis   • UTI (urinary tract infection)   • Stage 3a chronic kidney disease ([facility])   • Class 3 severe obesity due to excess calories with serious comorbidity and body mass index (BMI) of 40.0 to 44.9 in adult ([facility])   • Generalized anxiety disorder       Allergy:   Allergies   Allergen Reactions   • Azithromycin Unknown (See Comments)   • Codeine Nausea And Vomiting   • Dilaudid  [Hydromorphone Hcl] Unknown (See Comments)   • Isosorbide Nitrate Other (See Comments)   • Macrobid  [Nitrofurantoin] Rash   • Morphine Rash   • Oxycontin  [Oxycodone] Delirium   • Pineapple Other (See Comments)     Mouth numbness   • Povidone Iodine Rash   • Tetanus Antitoxin Rash   • Denosumab Other (See Comments)   • Demerol [Meperidine] Unknown (See Comments)     Pt does not remember. She states it was a long time ago   • Etodolac Other (See Comments)   • Lodrane D [Brompheniramine-Pseudoeph] Other (See Comments)     drowsiness   • Shrimp Flavor Swelling     Sauce only    • Sulfa Antibiotics Unknown (See Comments)   • Tizanidine Other (See Comments)     Increased pain   • Imipramine Palpitations    • Iodine Itching and Rash        Discontinued Medications:  Medications Discontinued During This Encounter   Medication Reason   • Desvenlafaxine Succinate ER 25 MG tablet sustained-release 24 hour *Therapy completed   • sertraline (ZOLOFT) 50 MG tablet        Current Medications:   Current Outpatient Medications   Medication Sig Dispense Refill   • alendronate (FOSAMAX) 70 MG tablet Take 1 tablet by mouth Every 7 (Seven) Days. Take with 8 ounces of water first thing in the morning.  remain upright for 30 minutes  Takes on Wed 4 tablet 2   • allopurinol (ZYLOPRIM) 100 MG tablet Take 200 mg by mouth Daily.     • aspirin (aspirin) 81 MG EC tablet Take 1 tablet by mouth Daily. 90 tablet 2   • busPIRone (BUSPAR) 10 MG tablet Take 1 tablet by mouth 2 (Two) Times a Day. 180 tablet 2   • Calcium 600/Vitamin D3 600-800 MG-UNIT tablet TAKE ONE TABLET BY MOUTH TWO TIMES A DAY WITH MEALS 60 tablet 2   • cyanocobalamin 1000 MCG/ML injection Inject 1 mL into the appropriate muscle as directed by prescriber Every 28 (Twenty-Eight) Days. 1 mL 6   • dicyclomine (BENTYL) 10 MG capsule Take 1 capsule by mouth 4 (Four) Times a Day. 360 capsule 1   • famotidine (PEPCID) 20 MG tablet Take 1 tablet by mouth 2 (Two) Times a Day. 180 tablet 1   • FeroSul 325 (65 Fe) MG tablet TAKE ONE TABLET BY MOUTH ONCE DAILY WITH BREAKFAST 30 tablet 2   • fluticasone (Flonase) 50 MCG/ACT nasal spray 2 sprays into the nostril(s) as directed by provider Daily. (Patient taking differently: 2 sprays into the nostril(s) as directed by provider Daily As Needed.) 16 g 12   • furosemide (LASIX) 40 MG tablet Take 1 tablet by mouth Daily. Until ankle diameter <10.5 inches and weight <255 # (Patient taking differently: Take 40 mg by mouth Daily As Needed. Until ankle diameter <10.5 inches and weight <255 #) 30 tablet o   • hydrALAZINE (APRESOLINE) 50 MG tablet TAKE ONE TABLET BY MOUTH TWO TIMES A DAY 60 tablet 2   • HYDROcodone-acetaminophen (NORCO)  MG  "per tablet Take 1 tablet by mouth Every 6 (Six) Hours As Needed.     • linaclotide (LINZESS) 72 MCG capsule capsule Take 1 capsule by mouth Daily. (Patient taking differently: Take 72 mcg by mouth Daily As Needed.) 90 capsule 3   • melatonin ([employer] Melatonin) 5 MG tablet tablet Take 2 tablets by mouth Every Night. 60 tablet 12   • metoclopramide (REGLAN) 5 MG tablet TAKE ONE TABLET BY MOUTH THREE TIMES A DAY BEFORE MEALS 90 tablet 0   • O2 (OXYGEN) Inhale 2 L/min Take As Directed.     • ondansetron (ZOFRAN) 4 MG tablet TAKE ONE TABLET BY MOUTH AS NEEDED FOR NAUSEA AND VOMITING (Patient taking differently: Take 4 mg by mouth Every 8 (Eight) Hours As Needed.) 30 tablet 1   • pantoprazole (PROTONIX) 40 MG EC tablet pantoprazole 40 mg tablet,delayed release     • potassium chloride 10 MEQ CR tablet TAKE ONE TABLET BY MOUTH TWO TIMES A DAY AS NEEDED WITH LASIX FOR POTASSIUM SUPPLEMENT 60 tablet 1   • propranolol (INDERAL) 80 MG tablet Take 1 tablet by mouth 2 (Two) Times a Day. 180 tablet 1   • QUEtiapine (SEROquel) 50 MG tablet TAKE ONE TABLET BY MOUTH ONCE DAILY AT NIGHT 90 tablet 0   • sertraline (ZOLOFT) 100 MG tablet Take 1 tablet by mouth Daily. 30 tablet 5   • Syringe 23G X 1\" 3 ML misc 1 each Every 30 (Thirty) Days. Use with B12 Injections 50 each 0   • vitamin D (ERGOCALCIFEROL) 1.25 MG (82677 UT) capsule capsule Take 1 capsule by mouth Every 7 (Seven) Days. Sun 4 capsule 6     No current facility-administered medications for this visit.         Psychological ROS: positive for - anxiety, depression and memory difficulties  negative for - disorientation, hallucinations, hostility, irritability, mood swings, obsessive thoughts, physical abuse, sexual abuse, sleep disturbances or suicidal ideation      Physical Exam:   not currently breastfeeding.    Mental Status Exam:   Hygiene:   good  Cooperation:  Cooperative  Eye Contact:  Good  Psychomotor Behavior:  Slow  Affect:  Restricted  Mood: depressed and " anxious  Hopelessness: Denies  Speech:  Monotone  Thought Process:  Goal directed and Linear  Thought Content:  Mood congruent  Suicidal:  None  Homicidal:  None  Hallucinations:  Not demonstrated today  Delusion:  None  Memory:  Intact  Orientation:  Person, Place, Time and Situation  Reliability:  good  Insight:  Good  Judgement:  Fair  Impulse Control:  Fair  Physical/Medical Issues:  Yes         PHQ-9 Depression Screening  Little interest or pleasure in doing things? 2   Feeling down, depressed, or hopeless? 2   Trouble falling or staying asleep, or sleeping too much? 2   Feeling tired or having little energy? 2   Poor appetite or overeating? 1   Feeling bad about yourself - or that you are a failure or have let yourself or your family down? 2   Trouble concentrating on things, such as reading the newspaper or watching television? 2   Moving or speaking so slowly that other people could have noticed? Or the opposite - being so fidgety or restless that you have been moving around a lot more than usual? 2   Thoughts that you would be better off dead, or of hurting yourself in some way? 0   PHQ-9 Total Score 15   If you checked off any problems, how difficult have these problems made it for you to do your work, take care of things at home, or get along with other people? Very difficult           Never smoker    I advised Elaine of the risks of tobacco use.     Lab Results:   Office Visit on 09/07/2021   Component Date Value Ref Range Status   • WBC 09/10/2021 5.9  3.4 - 10.8 x10E3/uL Final   • RBC 09/10/2021 3.84  3.77 - 5.28 x10E6/uL Final   • Hemoglobin 09/10/2021 10.6* 11.1 - 15.9 g/dL Final   • Hematocrit 09/10/2021 34.7  34.0 - 46.6 % Final   • MCV 09/10/2021 90  79 - 97 fL Final   • MCH 09/10/2021 27.6  26.6 - 33.0 pg Final   • MCHC 09/10/2021 30.5* 31.5 - 35.7 g/dL Final   • RDW 09/10/2021 17.0* 11.7 - 15.4 % Final   • Platelets 09/10/2021 230  150 - 450 x10E3/uL Final   • Neutrophil Rel % 09/10/2021 72  Not  Estab. % Final   • Lymphocyte Rel % 09/10/2021 18  Not Estab. % Final   • Monocyte Rel % 09/10/2021 6  Not Estab. % Final   • Eosinophil Rel % 09/10/2021 3  Not Estab. % Final   • Basophil Rel % 09/10/2021 1  Not Estab. % Final   • Neutrophils Absolute 09/10/2021 4.3  1.4 - 7.0 x10E3/uL Final   • Lymphocytes Absolute 09/10/2021 1.1  0.7 - 3.1 x10E3/uL Final   • Monocytes Absolute 09/10/2021 0.4  0.1 - 0.9 x10E3/uL Final   • Eosinophils Absolute 09/10/2021 0.2  0.0 - 0.4 x10E3/uL Final   • Basophils Absolute 09/10/2021 0.0  0.0 - 0.2 x10E3/uL Final   • Immature Granulocyte Rel % 09/10/2021 0  Not Estab. % Final   • Immature Grans Absolute 09/10/2021 0.0  0.0 - 0.1 x10E3/uL Final   • Glucose 09/10/2021 113* 65 - 99 mg/dL Final   • BUN 09/10/2021 20  8 - 27 mg/dL Final   • Creatinine 09/10/2021 1.25* 0.57 - 1.00 mg/dL Final   • eGFR Non  Am 09/10/2021 43* >59 mL/min/1.73 Final   • eGFR African Am 09/10/2021 50* >59 mL/min/1.73 Final    Comment: **[facility] currently reports eGFR in compliance with the current**    recommendations of the [employer]. [facility] will    update reporting as new guidelines are published from the NKF-ASN    Task force.     • BUN/Creatinine Ratio 09/10/2021 16  12 - 28 Final   • Sodium 09/10/2021 142  134 - 144 mmol/L Final   • Potassium 09/10/2021 5.2  3.5 - 5.2 mmol/L Final   • Chloride 09/10/2021 107* 96 - 106 mmol/L Final   • Total CO2 09/10/2021 20  20 - 29 mmol/L Final   • Calcium 09/10/2021 8.8  8.7 - 10.3 mg/dL Final       Assessment/Plan   Problems Addressed this Visit        Mental Health    Major depressive disorder, recurrent episode, moderate (HCC) - Primary (Chronic)    Relevant Medications    sertraline (ZOLOFT) 100 MG tablet    Generalized anxiety disorder (Chronic)    Relevant Medications    sertraline (ZOLOFT) 100 MG tablet      Diagnoses       Codes Comments    Major depressive disorder, recurrent episode, moderate (HCC)    -  Primary ICD-10-CM:  F33.1  ICD-9-CM: 296.32     Generalized anxiety disorder     ICD-10-CM: F41.1  ICD-9-CM: 300.02           Visit Diagnoses:    ICD-10-CM ICD-9-CM   1. Major depressive disorder, recurrent episode, moderate (HCC)  F33.1 296.32   2. Generalized anxiety disorder  F41.1 300.02       TREATMENT PLAN/GOALS: Continue supportive psychotherapy efforts and medications as indicated. Treatment and medication options discussed during today's visit. Patient ackowledged and verbally consented to continue with current treatment plan and was educated on the importance of compliance with treatment and follow-up appointments.    MEDICATION ISSUES:  INSPECT reviewed as expected- on hydrocodone   PHQ scored 15 - moderately severe depression  MMSE scored 30     1. MDD- cont zoloft, increase to 100 mg ,the pt experienced memory issues, most likely related to depression and mild delirium (she had UTI at that time)   2. DONIS- cont zoloft 100 mg , buspirone 10 mg BID     discussed with the pt and her son (POA)       Discussed medication options and treatment plan of prescribed medication as well as the risks, benefits, and side effects including potential falls, possible impaired driving and metabolic adversities among others. Patient is agreeable to call the office with any worsening of symptoms or onset of side effects. Patient is agreeable to call 911 or go to the nearest ER should he/she begin having SI/HI. No medication side effects or related complaints today.     MEDS ORDERED DURING VISIT:  New Medications Ordered This Visit   Medications   • sertraline (ZOLOFT) 100 MG tablet     Sig: Take 1 tablet by mouth Daily.     Dispense:  30 tablet     Refill:  5       Return in about 6 months (around 4/29/2022).  Or the pt can f/u with PCP        This document has been electronically signed by Sharon Castorena MD  October 29, 2021 10:28 EDT    EMR Dragon transcription disclaimer:  Some of this encounter note is an electronic transcription  translation of spoken language to printed text. The electronic translation of spoken language may permit erroneous, or at times, nonsensical words or phrases to be inadvertently transcribed; Although I have reviewed the note for such errors some may still exist.

## 2021-11-02 ENCOUNTER — APPOINTMENT (OUTPATIENT)
Dept: MAMMOGRAPHY | Facility: HOSPITAL | Age: 71
End: 2021-11-02

## 2021-11-05 RX ORDER — VIT B12/LEVOMEFOLATE/VIT B6/B2 1-6-50-5MG
1 TABLET ORAL EVERY MORNING
Qty: 30 EACH | Refills: 2 | Status: SHIPPED | OUTPATIENT
Start: 2021-11-05 | End: 2021-12-07

## 2021-11-05 NOTE — TELEPHONE ENCOUNTER
Patient said you gave her samples of  Cerefolin, She has 8 tablets left. She wants a script sent to her Pharmacy

## 2021-11-14 NOTE — TELEPHONE ENCOUNTER
Patient was placed on Reglan at the hospital in March for persistent nausea this is not a medication you want to stay on long-term.  She needs to go off of it or ask Dr. Cisse if she still needs this medication if so he will need to order it.  Has a lot of very nasty side effects long-term

## 2021-11-15 RX ORDER — METOCLOPRAMIDE 5 MG/1
TABLET ORAL
Qty: 90 TABLET | Refills: 0 | OUTPATIENT
Start: 2021-11-15

## 2021-12-03 ENCOUNTER — CLINICAL SUPPORT NO REQUIREMENTS (OUTPATIENT)
Dept: CARDIOLOGY | Facility: CLINIC | Age: 71
End: 2021-12-03

## 2021-12-03 DIAGNOSIS — I49.5 SICK SINUS SYNDROME (HCC): ICD-10-CM

## 2021-12-03 DIAGNOSIS — Z95.0 PRESENCE OF CARDIAC PACEMAKER: Primary | ICD-10-CM

## 2021-12-03 PROCEDURE — 93280 PM DEVICE PROGR EVAL DUAL: CPT | Performed by: INTERNAL MEDICINE

## 2021-12-07 ENCOUNTER — OFFICE VISIT (OUTPATIENT)
Dept: FAMILY MEDICINE CLINIC | Facility: CLINIC | Age: 71
End: 2021-12-07

## 2021-12-07 VITALS
WEIGHT: 210.6 LBS | HEIGHT: 62 IN | HEART RATE: 85 BPM | SYSTOLIC BLOOD PRESSURE: 148 MMHG | TEMPERATURE: 97.3 F | BODY MASS INDEX: 38.76 KG/M2 | DIASTOLIC BLOOD PRESSURE: 84 MMHG | OXYGEN SATURATION: 93 %

## 2021-12-07 DIAGNOSIS — R41.3 LOSS OF MEMORY: Primary | ICD-10-CM

## 2021-12-07 DIAGNOSIS — M79.10 MYALGIA: ICD-10-CM

## 2021-12-07 PROCEDURE — 99214 OFFICE O/P EST MOD 30 MIN: CPT | Performed by: NURSE PRACTITIONER

## 2021-12-07 RX ORDER — DONEPEZIL HYDROCHLORIDE 5 MG/1
5 TABLET, FILM COATED ORAL NIGHTLY
Qty: 30 TABLET | Refills: 2 | Status: SHIPPED | OUTPATIENT
Start: 2021-12-07 | End: 2022-02-13

## 2021-12-07 RX ORDER — CYCLOBENZAPRINE HCL 10 MG
10 TABLET ORAL NIGHTLY PRN
Qty: 30 TABLET | Refills: 2 | Status: SHIPPED | OUTPATIENT
Start: 2021-12-07 | End: 2022-04-12

## 2021-12-07 NOTE — PATIENT INSTRUCTIONS
Flexeril 10 mg 1-3 times a day monitor for drowsiness  Aricept 5 mg daily  Let pain management know you are on Flexeril  Follow-up 3 months

## 2021-12-07 NOTE — PROGRESS NOTES
Elaine Toscano is a 71 y.o. female.     71-year-old white female with history of CKD, mild dementia, depression, GERD, CHF, osteoporosis, anemia, hypertension, sleep apnea, gastric bypass, chronic pain goes to pain management hyperlipidemia who comes in today with daughter.  Patient was in the hospital visiting with her daughter today and on the way out the automatic sliding doors closed on her squishing her.  She is complaining of bilateral arm pain and neck pain.  She goes to pain management and had recent cervical x-ray.  She is already on a narcotic pain medication I am adding Flexeril for a week or so till she sees pain management again.  She has normal range of motion in shoulders    Blood pressure 148/84 heart rate 84 she denies any chest pain, dyspnea, tachycardia or dizziness    Patient went to see psychiatry who placed her on Seroquel and Zoloft.  They just increased her Zoloft and she states that has helped with her mood swings.  They are wanting something for memory issues and do not want to go back to psychiatry.  I am going to start them on 5 mg of Aricept and they will follow up in a couple months    Weight is down 9 pounds at 211 for BMI of 38.5 and I encouraged patient to keep losing weight.  Patient is up-to-date on Covid vaccines DEXA scan colonoscopy she has a mammogram scheduled and is going to schedule an eye exam          Flexeril 10 mg 1-3 times a day monitor for drowsiness  Aricept 5 mg daily  Let pain management know you are on Flexeril  Follow-up 3 months         The following portions of the patient's history were reviewed and updated as appropriate: allergies, current medications, past family history, past medical history, past social history, past surgical history and problem list.    Vitals:    12/07/21 1429   BP: 148/84   BP Location: Left arm   Patient Position: Sitting   Cuff Size: Large Adult   Pulse: 85   Temp: 97.3 °F (36.3 °C)   TempSrc: Temporal   SpO2: 93%   Weight: 95.5 kg  "(210 lb 9.6 oz)   Height: 157.5 cm (62\")     Body mass index is 38.52 kg/m².    Past Medical History:   Diagnosis Date   • Acute renal failure superimposed on stage 4 chronic kidney disease ([facility]) 3/24/2021   • Anemia    • Broken foot     right   • Broken nose 07/2017   • CHF (congestive heart failure) ([facility])    • CHF (congestive heart failure) ([facility])    • Chronic kidney disease (CKD), stage IV (severe) ([facility]) 5/6/2020   • Chronic low back pain    • CRI (chronic renal insufficiency)    • Depression    • DJD (degenerative joint disease)    • Fracture of ankle 8/12/2016   • GERD (gastroesophageal reflux disease)    • Gout    • Hypertension    • Hypothyroidism    • CRUZ (obstructive sleep apnea)     non-complaince with bipap   • Osteopenia    • Renal failure     stage 3   • Renal failure, chronic, stage 4 (severe) ([facility]) 1/21/2020   • Suicide attempt ([facility])    • TMJ (dislocation of temporomandibular joint)      Past Surgical History:   Procedure Laterality Date   • APPENDECTOMY  1986   • BACK SURGERY      2004---2005 L5-S1 spinal stenosis   • CARDIAC CATHETERIZATION  04/2018   • CHOLECYSTECTOMY  2008   • EPIDURAL BLOCK     • FOOT SURGERY Bilateral 2008   • FOOT SURGERY Bilateral 09/2019    Dr. Keenan- 2nd toe on bilateral feet    • GASTRIC BYPASS  2007   • INSERT / REPLACE / REMOVE PACEMAKER     • LAPAROSCOPIC GASTRIC BANDING  2004    removed   • OTHER SURGICAL HISTORY  01/2018    myelogram   • TOTAL KNEE ARTHROPLASTY Left      Family History   Problem Relation Age of Onset   • Hypertension Father         PM implanted   • Prostate cancer Father    • Arthritis Sister    • Heart disease Sister    • Lung disease Paternal Grandmother    • COPD Other    • Lung disease Other      Immunization History   Administered Date(s) Administered   • COVID-19 (MODERNA) 1st, 2nd, 3rd Dose Only 02/03/2021, 03/04/2021   • FLUAD TRI 65YR+ 09/30/2019   • Flu Vaccine Quad PF >18YRS 09/26/2016   • Fluad Quad 65+ 09/30/2019, 09/22/2020   • Fluzone " High Dose =>65 Years ([employer] ONLY) 09/08/2015, 09/11/2017, 11/02/2018, 09/30/2019   • H1N1 Inj 12/03/2009   • Influenza Quad Vaccine (Inpatient) 09/11/2017   • Influenza Whole 10/29/2012   • Pneumococcal Conjugate 13-Valent (PCV13) 06/16/2015   • Pneumococcal Polysaccharide (PPSV23) 11/01/2016   • Td 04/27/2018   • Tdap 07/24/2019       Office Visit on 09/07/2021   Component Date Value Ref Range Status   • WBC 09/10/2021 5.9  3.4 - 10.8 x10E3/uL Final   • RBC 09/10/2021 3.84  3.77 - 5.28 x10E6/uL Final   • Hemoglobin 09/10/2021 10.6* 11.1 - 15.9 g/dL Final   • Hematocrit 09/10/2021 34.7  34.0 - 46.6 % Final   • MCV 09/10/2021 90  79 - 97 fL Final   • MCH 09/10/2021 27.6  26.6 - 33.0 pg Final   • MCHC 09/10/2021 30.5* 31.5 - 35.7 g/dL Final   • RDW 09/10/2021 17.0* 11.7 - 15.4 % Final   • Platelets 09/10/2021 230  150 - 450 x10E3/uL Final   • Neutrophil Rel % 09/10/2021 72  Not Estab. % Final   • Lymphocyte Rel % 09/10/2021 18  Not Estab. % Final   • Monocyte Rel % 09/10/2021 6  Not Estab. % Final   • Eosinophil Rel % 09/10/2021 3  Not Estab. % Final   • Basophil Rel % 09/10/2021 1  Not Estab. % Final   • Neutrophils Absolute 09/10/2021 4.3  1.4 - 7.0 x10E3/uL Final   • Lymphocytes Absolute 09/10/2021 1.1  0.7 - 3.1 x10E3/uL Final   • Monocytes Absolute 09/10/2021 0.4  0.1 - 0.9 x10E3/uL Final   • Eosinophils Absolute 09/10/2021 0.2  0.0 - 0.4 x10E3/uL Final   • Basophils Absolute 09/10/2021 0.0  0.0 - 0.2 x10E3/uL Final   • Immature Granulocyte Rel % 09/10/2021 0  Not Estab. % Final   • Immature Grans Absolute 09/10/2021 0.0  0.0 - 0.1 x10E3/uL Final   • Glucose 09/10/2021 113* 65 - 99 mg/dL Final   • BUN 09/10/2021 20  8 - 27 mg/dL Final   • Creatinine 09/10/2021 1.25* 0.57 - 1.00 mg/dL Final   • eGFR Non  Am 09/10/2021 43* >59 mL/min/1.73 Final   • eGFR African Am 09/10/2021 50* >59 mL/min/1.73 Final    Comment: **[facility] currently reports eGFR in compliance with the current**    recommendations of the  [employer]. [facility] will    update reporting as new guidelines are published from the NKF-ASN    Task force.     • BUN/Creatinine Ratio 09/10/2021 16  12 - 28 Final   • Sodium 09/10/2021 142  134 - 144 mmol/L Final   • Potassium 09/10/2021 5.2  3.5 - 5.2 mmol/L Final   • Chloride 09/10/2021 107* 96 - 106 mmol/L Final   • Total CO2 09/10/2021 20  20 - 29 mmol/L Final   • Calcium 09/10/2021 8.8  8.7 - 10.3 mg/dL Final         Review of Systems   Constitutional: Negative.    HENT: Negative.    Respiratory: Negative.    Cardiovascular: Negative.    Gastrointestinal: Negative.    Genitourinary: Negative.    Musculoskeletal: Positive for myalgias and neck pain.   Skin: Negative.    Neurological: Positive for memory problem.   Psychiatric/Behavioral: Negative.        Objective   Physical Exam  Constitutional:       Appearance: Normal appearance.   HENT:      Head: Normocephalic.   Cardiovascular:      Rate and Rhythm: Normal rate and regular rhythm.      Pulses: Normal pulses.      Heart sounds: Normal heart sounds.   Pulmonary:      Effort: Pulmonary effort is normal.      Breath sounds: Normal breath sounds.   Abdominal:      General: Bowel sounds are normal.   Musculoskeletal:      Comments: Has normal range of motion but does have some pain with movement in shoulders   Skin:     General: Skin is warm and dry.   Neurological:      General: No focal deficit present.      Mental Status: She is alert and oriented to person, place, and time.   Psychiatric:         Mood and Affect: Mood normal.         Behavior: Behavior normal.         Procedures    Assessment/Plan   Diagnoses and all orders for this visit:    1. Loss of memory (Primary)    2. Myalgia    Other orders  -     cyclobenzaprine (FLEXERIL) 10 MG tablet; Take 1 tablet by mouth At Night As Needed for Muscle Spasms.  Dispense: 30 tablet; Refill: 2  -     donepezil (Aricept) 5 MG tablet; Take 1 tablet by mouth Every Night.  Dispense: 30 tablet;  Refill: 2          Current Outpatient Medications:   •  alendronate (FOSAMAX) 70 MG tablet, Take 1 tablet by mouth Every 7 (Seven) Days. Take with 8 ounces of water first thing in the morning.  remain upright for 30 minutes Takes on Wed, Disp: 4 tablet, Rfl: 2  •  allopurinol (ZYLOPRIM) 100 MG tablet, Take 200 mg by mouth Daily., Disp: , Rfl:   •  aspirin (aspirin) 81 MG EC tablet, Take 1 tablet by mouth Daily., Disp: 90 tablet, Rfl: 2  •  busPIRone (BUSPAR) 10 MG tablet, Take 1 tablet by mouth 2 (Two) Times a Day., Disp: 180 tablet, Rfl: 2  •  Calcium 600/Vitamin D3 600-800 MG-UNIT tablet, TAKE ONE TABLET BY MOUTH TWO TIMES A DAY WITH MEALS, Disp: 60 tablet, Rfl: 2  •  cyanocobalamin 1000 MCG/ML injection, Inject 1 mL into the appropriate muscle as directed by prescriber Every 28 (Twenty-Eight) Days., Disp: 1 mL, Rfl: 6  •  dicyclomine (BENTYL) 10 MG capsule, Take 1 capsule by mouth 4 (Four) Times a Day., Disp: 360 capsule, Rfl: 1  •  famotidine (PEPCID) 20 MG tablet, Take 1 tablet by mouth 2 (Two) Times a Day., Disp: 180 tablet, Rfl: 1  •  FeroSul 325 (65 Fe) MG tablet, TAKE ONE TABLET BY MOUTH ONCE DAILY WITH BREAKFAST, Disp: 30 tablet, Rfl: 2  •  fluticasone (Flonase) 50 MCG/ACT nasal spray, 2 sprays into the nostril(s) as directed by provider Daily. (Patient taking differently: 2 sprays into the nostril(s) as directed by provider Daily As Needed.), Disp: 16 g, Rfl: 12  •  furosemide (LASIX) 40 MG tablet, Take 1 tablet by mouth Daily. Until ankle diameter <10.5 inches and weight <255 # (Patient taking differently: Take 40 mg by mouth Daily As Needed. Until ankle diameter <10.5 inches and weight <255 #), Disp: 30 tablet, Rfl: o  •  hydrALAZINE (APRESOLINE) 50 MG tablet, TAKE ONE TABLET BY MOUTH TWO TIMES A DAY, Disp: 60 tablet, Rfl: 2  •  HYDROcodone-acetaminophen (NORCO)  MG per tablet, Take 1 tablet by mouth Every 6 (Six) Hours As Needed., Disp: , Rfl:   •  linaclotide (LINZESS) 72 MCG capsule capsule,  "Take 1 capsule by mouth Daily. (Patient taking differently: Take 72 mcg by mouth Daily As Needed.), Disp: 90 capsule, Rfl: 3  •  melatonin ([employer] Melatonin) 5 MG tablet tablet, Take 2 tablets by mouth Every Night., Disp: 60 tablet, Rfl: 12  •  metoclopramide (REGLAN) 5 MG tablet, TAKE ONE TABLET BY MOUTH THREE TIMES A DAY BEFORE MEALS, Disp: 90 tablet, Rfl: 0  •  O2 (OXYGEN), Inhale 2 L/min Take As Directed., Disp: , Rfl:   •  ondansetron (ZOFRAN) 4 MG tablet, TAKE ONE TABLET BY MOUTH AS NEEDED FOR NAUSEA AND VOMITING (Patient taking differently: Take 4 mg by mouth Every 8 (Eight) Hours As Needed.), Disp: 30 tablet, Rfl: 1  •  pantoprazole (PROTONIX) 40 MG EC tablet, pantoprazole 40 mg tablet,delayed release, Disp: , Rfl:   •  potassium chloride 10 MEQ CR tablet, TAKE ONE TABLET BY MOUTH TWO TIMES A DAY AS NEEDED WITH LASIX FOR POTASSIUM SUPPLEMENT, Disp: 60 tablet, Rfl: 1  •  propranolol (INDERAL) 80 MG tablet, Take 1 tablet by mouth 2 (Two) Times a Day., Disp: 180 tablet, Rfl: 1  •  QUEtiapine (SEROquel) 50 MG tablet, TAKE ONE TABLET BY MOUTH ONCE DAILY AT NIGHT, Disp: 90 tablet, Rfl: 0  •  sertraline (ZOLOFT) 100 MG tablet, Take 1 tablet by mouth Daily., Disp: 30 tablet, Rfl: 5  •  Syringe 23G X 1\" 3 ML misc, 1 each Every 30 (Thirty) Days. Use with B12 Injections, Disp: 50 each, Rfl: 0  •  vitamin D (ERGOCALCIFEROL) 1.25 MG (48375 UT) capsule capsule, Take 1 capsule by mouth Every 7 (Seven) Days. Sun, Disp: 4 capsule, Rfl: 6  •  cyclobenzaprine (FLEXERIL) 10 MG tablet, Take 1 tablet by mouth At Night As Needed for Muscle Spasms., Disp: 30 tablet, Rfl: 2  •  donepezil (Aricept) 5 MG tablet, Take 1 tablet by mouth Every Night., Disp: 30 tablet, Rfl: 2         "

## 2021-12-09 RX ORDER — FERROUS SULFATE 325(65) MG
TABLET ORAL
Qty: 30 TABLET | Refills: 2 | Status: SHIPPED | OUTPATIENT
Start: 2021-12-09 | End: 2021-12-19

## 2021-12-09 RX ORDER — DICYCLOMINE HYDROCHLORIDE 10 MG/1
CAPSULE ORAL
Qty: 360 CAPSULE | Refills: 1 | Status: SHIPPED | OUTPATIENT
Start: 2021-12-09 | End: 2022-06-20

## 2021-12-19 RX ORDER — FERROUS SULFATE 325(65) MG
TABLET ORAL
Qty: 30 TABLET | Refills: 2 | Status: SHIPPED | OUTPATIENT
Start: 2021-12-19 | End: 2022-06-09

## 2021-12-23 RX ORDER — PROPRANOLOL HYDROCHLORIDE 80 MG/1
TABLET ORAL
Qty: 180 TABLET | Refills: 1 | Status: SHIPPED | OUTPATIENT
Start: 2021-12-23 | End: 2022-06-09

## 2022-01-07 ENCOUNTER — HOSPITAL ENCOUNTER (OUTPATIENT)
Dept: GENERAL RADIOLOGY | Facility: HOSPITAL | Age: 72
Discharge: HOME | End: 2022-01-07
Admitting: PHYSICAL MEDICINE & REHABILITATION

## 2022-01-07 DIAGNOSIS — M54.2 CERVICALGIA: ICD-10-CM

## 2022-01-07 PROCEDURE — 72052 X-RAY EXAM NECK SPINE 6/>VWS: CPT

## 2022-01-31 RX ORDER — CALCITRIOL 0.25 UG/1
0.25 CAPSULE, LIQUID FILLED ORAL DAILY
COMMUNITY
Start: 2021-12-10 | End: 2023-03-22 | Stop reason: DRUGHIGH

## 2022-02-02 ENCOUNTER — TELEMEDICINE (OUTPATIENT)
Dept: ENDOCRINOLOGY | Facility: CLINIC | Age: 72
End: 2022-02-02

## 2022-02-02 VITALS — WEIGHT: 203 LBS | HEIGHT: 62 IN | BODY MASS INDEX: 37.36 KG/M2

## 2022-02-02 DIAGNOSIS — M81.0 AGE-RELATED OSTEOPOROSIS WITHOUT CURRENT PATHOLOGICAL FRACTURE: Primary | ICD-10-CM

## 2022-02-02 PROCEDURE — 99203 OFFICE O/P NEW LOW 30 MIN: CPT | Performed by: INTERNAL MEDICINE

## 2022-02-14 NOTE — PROGRESS NOTES
[facility]    Referring Provider: MAGAN Sapp  Reason for Consultation: Osteoporosis evaluation & management.    Patient Care Team:  Annalisa Chase APRN as PCP - General (Nurse Practitioner)  Sánchez Larson MD as Consulting Physician (Cardiology)    Chief complaint Osteoporosis (New Patient)      Subjective .     History of present illness:  This pt consented to this telemedicine visit in view of the transportation family member has Shingles.    This is a  71 y.o. female with osteoporosis for several years.  Initially Rx Fosamax. It caused nausea. Then took Prolia in October 2017. It caused diarrhea.  Last DEXA scan in Nov 2020 in [place]. Radius T score -3.9.  Originally referred in Nov 2020. Had appt in April 2021, but cancelled.  Started on alendronate on June 2021 & tolerating it this time around. Taking it weekly.  Teeth removed in 2010.  No fragility fractures. Had ankle fracture in 2016  Has L5-S1 back surgery for spinal stenosis. Rods placed in 2004.  Gastric bypass in 2007.  Memory issues.  Lost 70 lb in the last couple years intentionally by improving diet.  Off vit D 50,000 units since December 2021 per nephrologist. On calcitriol 3x/week.  Had PT & given exercises, but not doing them.    Review of Systems  Review of Systems   Eyes: Negative for blurred vision.   Gastrointestinal: Negative for nausea.   Endocrine: Negative for polyuria.   Musculoskeletal: Positive for back pain and gait problem.   Neurological: Positive for memory problem. Negative for headache.       History  Past Medical History:   Diagnosis Date   • Acute renal failure superimposed on stage 4 chronic kidney disease (HCC) 3/24/2021   • Anemia    • Broken foot     right   • Broken nose 07/2017   • CHF (congestive heart failure) ([facility])    • CHF (congestive heart failure) ([facility])    • Chronic kidney disease (CKD), stage IV (severe) ([facility]) 5/6/2020   • Chronic low back pain    • CRI (chronic renal insufficiency)     • Depression    • DJD (degenerative joint disease)    • Fracture of ankle 8/12/2016   • GERD (gastroesophageal reflux disease)    • Gout    • Hypertension    • Hypothyroidism    • CRUZ (obstructive sleep apnea)     non-complaince with bipap   • Osteopenia    • Renal failure     stage 3   • Renal failure, chronic, stage 4 (severe) ([facility]) 1/21/2020   • Suicide attempt ([facility])    • TMJ (dislocation of temporomandibular joint)      Past Surgical History:   Procedure Laterality Date   • APPENDECTOMY  1986   • BACK SURGERY      2004---2005 L5-S1 spinal stenosis   • CARDIAC CATHETERIZATION  04/2018   • CHOLECYSTECTOMY  2008   • EPIDURAL BLOCK     • FOOT SURGERY Bilateral 2008   • FOOT SURGERY Bilateral 09/2019    Dr. Keenan- 2nd toe on bilateral feet    • GASTRIC BYPASS  2007   • INSERT / REPLACE / REMOVE PACEMAKER     • LAPAROSCOPIC GASTRIC BANDING  2004    removed   • OTHER SURGICAL HISTORY  01/2018    myelogram   • TOTAL KNEE ARTHROPLASTY Left      Family History   Problem Relation Age of Onset   • Hypertension Father         PM implanted   • Prostate cancer Father    • Arthritis Sister    • Heart disease Sister    • Lung disease Paternal Grandmother    • COPD Other    • Lung disease Other      Social History     Tobacco Use   • Smoking status: Never Smoker   • Smokeless tobacco: Never Used   Vaping Use   • Vaping Use: Never used   • Passive vaping exposure: Yes   Substance Use Topics   • Alcohol use: No   • Drug use: No     Allergies:  Azithromycin, Codeine, Dilaudid  [hydromorphone hcl], Isosorbide nitrate, Macrobid  [nitrofurantoin], Morphine, Oxycontin  [oxycodone], Pineapple, Povidone iodine, Tetanus antitoxin, Denosumab, Demerol [meperidine], Etodolac, Lodrane d [brompheniramine-pseudoeph], Shrimp flavor, Sulfa antibiotics, Tizanidine, Imipramine, and Iodine    Objective     Vital Signs     There were no vitals filed for this visit.      Physical Exam: limited due to video visit.     General Appearance:    Alert,  cooperative, in no acute distress. Obese   Head:    Normocephalic, without obvious abnormality, atraumatic   Eyes:            Lids and lashes normal, conjunctivae and sclerae normal, no   icterus, no pallor, corneas clear, PERRLA     Results Review  I have reviewed the patient's new clinical results, labs & imaging.    DEXA scan on 11/9/2020: T score -3.9 radius, - 2.3 hip, - 3.1 femoral head.    Lab Results (last 24 hours)     ** No results found for the last 24 hours. **        Lab Results   Component Value Date    HGBA1C 6.0 (H) 04/10/2021     Lab Results   Component Value Date    TSH 1.160 04/25/2020     Na 142, K 5.2, cr 1.25, BUN 20, Ca 8.8 on 9/10/2021.  ; vit D level 103, Hb 10.7 on 12/7/2021.    Assessment/Plan    1. Osteoporosis    Do PT exercises as directed.  Have DEXA scan after  Nov 9, 2022.  Continue alendronate.    I discussed the patients findings and my recommendations with patient.    Cesar Alicia MD  02/13/22  21:07 EST

## 2022-03-01 RX ORDER — BUSPIRONE HYDROCHLORIDE 10 MG/1
TABLET ORAL
Qty: 180 TABLET | Refills: 1 | Status: SHIPPED | OUTPATIENT
Start: 2022-03-01 | End: 2022-03-17

## 2022-03-17 ENCOUNTER — OFFICE VISIT (OUTPATIENT)
Dept: FAMILY MEDICINE CLINIC | Facility: CLINIC | Age: 72
End: 2022-03-17

## 2022-03-17 VITALS
DIASTOLIC BLOOD PRESSURE: 100 MMHG | OXYGEN SATURATION: 95 % | HEART RATE: 80 BPM | TEMPERATURE: 98.6 F | HEIGHT: 62 IN | BODY MASS INDEX: 36.73 KG/M2 | SYSTOLIC BLOOD PRESSURE: 170 MMHG | WEIGHT: 199.6 LBS

## 2022-03-17 DIAGNOSIS — S81.802A LEG WOUND, LEFT, INITIAL ENCOUNTER: ICD-10-CM

## 2022-03-17 DIAGNOSIS — S81.812A LACERATION OF LEFT LOWER EXTREMITY, INITIAL ENCOUNTER: Primary | ICD-10-CM

## 2022-03-17 DIAGNOSIS — F33.2 SEVERE EPISODE OF RECURRENT MAJOR DEPRESSIVE DISORDER, WITHOUT PSYCHOTIC FEATURES: ICD-10-CM

## 2022-03-17 DIAGNOSIS — M81.0 OSTEOPOROSIS, UNSPECIFIED OSTEOPOROSIS TYPE, UNSPECIFIED PATHOLOGICAL FRACTURE PRESENCE: ICD-10-CM

## 2022-03-17 DIAGNOSIS — N18.31 STAGE 3A CHRONIC KIDNEY DISEASE: ICD-10-CM

## 2022-03-17 PROBLEM — I25.10 CORONARY ATHEROSCLEROSIS: Status: ACTIVE | Noted: 2021-12-10

## 2022-03-17 PROBLEM — I10 BENIGN ESSENTIAL HYPERTENSION: Status: ACTIVE | Noted: 2021-12-10

## 2022-03-17 PROBLEM — N25.81 SECONDARY HYPERPARATHYROIDISM: Status: ACTIVE | Noted: 2021-12-10

## 2022-03-17 PROCEDURE — 99213 OFFICE O/P EST LOW 20 MIN: CPT | Performed by: NURSE PRACTITIONER

## 2022-03-17 RX ORDER — BUSPIRONE HYDROCHLORIDE 10 MG/1
TABLET ORAL
Qty: 180 TABLET | Refills: 2 | Status: SHIPPED | OUTPATIENT
Start: 2022-03-17

## 2022-03-17 RX ORDER — QUETIAPINE FUMARATE 50 MG/1
TABLET, FILM COATED ORAL
Qty: 90 TABLET | Refills: 0 | Status: SHIPPED | OUTPATIENT
Start: 2022-03-17 | End: 2022-06-09

## 2022-03-17 RX ORDER — CEPHALEXIN 500 MG/1
1 CAPSULE ORAL 3 TIMES DAILY
COMMUNITY
Start: 2022-03-15 | End: 2022-03-30

## 2022-03-17 RX ORDER — ASPIRIN 81 MG/1
TABLET, COATED ORAL
Qty: 90 TABLET | Refills: 2 | Status: SHIPPED | OUTPATIENT
Start: 2022-03-17 | End: 2022-11-07

## 2022-03-17 RX ORDER — CLINDAMYCIN HYDROCHLORIDE 300 MG/1
300 CAPSULE ORAL 2 TIMES DAILY
Qty: 20 CAPSULE | Refills: 0 | Status: SHIPPED | OUTPATIENT
Start: 2022-03-17 | End: 2022-03-27

## 2022-03-17 NOTE — PROGRESS NOTES
"    Elaine Toscano is a 71 y.o. female.     71-year-old white female with history of CKD, mild dementia, depression, GERD, CHF, osteoporosis, anemia, hypertension, sleep apnea, gastric bypass, chronic back pain he goes to pain management, hyperlipidemia who comes in today with daughter after cutting left lower extremity on a door at home.  Patient went to [facility] emergency room with a 12 cm laceration where 11 stitches were placed and she was placed on Keflex.  Patient apparently developed a hematoma at home had to go back to the emergency room where stitches were removed and wound was debrided.  She was placed on second round of antibiotics.  Leg is now hot and red to touch.  I am switching her over to clindamycin as long as her creatinine today is normal.  I am referring her to wound care.     Blood pressure is elevated today 170/100 heart rate 88 she denies any chest pain, dyspnea, tachycardia or dizziness.  Daughter states she will monitor blood pressure at home to make sure that it is staying within normal limits          Stop Keflex  Clindamycin 300 mg twice daily x10 days  Wound care referral  Keep elevated at all times  Change dressing once soiled and keep covered                     The following portions of the patient's history were reviewed and updated as appropriate: allergies, current medications, past family history, past medical history, past social history, past surgical history and problem list.    Vitals:    03/17/22 1117   BP: 170/100   BP Location: Left arm   Patient Position: Sitting   Cuff Size: Large Adult   Pulse: 80   Temp: 98.6 °F (37 °C)   TempSrc: Temporal   SpO2: 95%   Weight: 90.5 kg (199 lb 9.6 oz)   Height: 157.5 cm (62\")     Body mass index is 36.51 kg/m².    Past Medical History:   Diagnosis Date   • Acute renal failure superimposed on stage 4 chronic kidney disease (HCC) 3/24/2021   • Anemia    • Broken foot     right   • Broken nose 07/2017   • CHF (congestive heart " failure) ([facility])    • CHF (congestive heart failure) ([facility])    • Chronic kidney disease (CKD), stage IV (severe) ([facility]) 5/6/2020   • Chronic low back pain    • CRI (chronic renal insufficiency)    • Depression    • DJD (degenerative joint disease)    • Fracture of ankle 8/12/2016   • GERD (gastroesophageal reflux disease)    • Gout    • Hypertension    • Hypothyroidism    • CRUZ (obstructive sleep apnea)     non-complaince with bipap   • Osteopenia    • Renal failure     stage 3   • Renal failure, chronic, stage 4 (severe) ([facility]) 1/21/2020   • Suicide attempt ([facility])    • TMJ (dislocation of temporomandibular joint)      Past Surgical History:   Procedure Laterality Date   • APPENDECTOMY  1986   • BACK SURGERY      2004---2005 L5-S1 spinal stenosis   • CARDIAC CATHETERIZATION  04/2018   • CHOLECYSTECTOMY  2008   • EPIDURAL BLOCK     • FOOT SURGERY Bilateral 2008   • FOOT SURGERY Bilateral 09/2019    Dr. Keenan- 2nd toe on bilateral feet    • GASTRIC BYPASS  2007   • INSERT / REPLACE / REMOVE PACEMAKER     • LAPAROSCOPIC GASTRIC BANDING  2004    removed   • OTHER SURGICAL HISTORY  01/2018    myelogram   • TOTAL KNEE ARTHROPLASTY Left      Family History   Problem Relation Age of Onset   • Hypertension Father         PM implanted   • Prostate cancer Father    • Arthritis Sister    • Heart disease Sister    • Lung disease Paternal Grandmother    • COPD Other    • Lung disease Other      Immunization History   Administered Date(s) Administered   • COVID-19 (MODERNA) 1st, 2nd, 3rd Dose Only 02/03/2021, 03/04/2021   • COVID-19 (UNSPECIFIED) 02/03/2021, 03/04/2021   • FLUAD TRI 65YR+ 09/30/2019   • Flu Vaccine Quad PF >18YRS 09/26/2016   • Fluad Quad 65+ 09/30/2019, 09/22/2020   • Fluzone High Dose =>65 Years ([employer] ONLY) 09/08/2015, 09/11/2017, 11/02/2018, 09/30/2019   • H1N1 Inj 12/03/2009   • Influenza Quad Vaccine (Inpatient) 09/11/2017   • Influenza Whole 10/29/2012   • Pneumococcal Conjugate 13-Valent (PCV13) 06/16/2015   •  Pneumococcal Polysaccharide (PPSV23) 11/01/2016   • Shingrix 01/03/2022   • Td 04/27/2018   • Tdap 07/24/2019       Office Visit on 09/07/2021   Component Date Value Ref Range Status   • WBC 09/10/2021 5.9  3.4 - 10.8 x10E3/uL Final   • RBC 09/10/2021 3.84  3.77 - 5.28 x10E6/uL Final   • Hemoglobin 09/10/2021 10.6 (A) 11.1 - 15.9 g/dL Final   • Hematocrit 09/10/2021 34.7  34.0 - 46.6 % Final   • MCV 09/10/2021 90  79 - 97 fL Final   • MCH 09/10/2021 27.6  26.6 - 33.0 pg Final   • MCHC 09/10/2021 30.5 (A) 31.5 - 35.7 g/dL Final   • RDW 09/10/2021 17.0 (A) 11.7 - 15.4 % Final   • Platelets 09/10/2021 230  150 - 450 x10E3/uL Final   • Neutrophil Rel % 09/10/2021 72  Not Estab. % Final   • Lymphocyte Rel % 09/10/2021 18  Not Estab. % Final   • Monocyte Rel % 09/10/2021 6  Not Estab. % Final   • Eosinophil Rel % 09/10/2021 3  Not Estab. % Final   • Basophil Rel % 09/10/2021 1  Not Estab. % Final   • Neutrophils Absolute 09/10/2021 4.3  1.4 - 7.0 x10E3/uL Final   • Lymphocytes Absolute 09/10/2021 1.1  0.7 - 3.1 x10E3/uL Final   • Monocytes Absolute 09/10/2021 0.4  0.1 - 0.9 x10E3/uL Final   • Eosinophils Absolute 09/10/2021 0.2  0.0 - 0.4 x10E3/uL Final   • Basophils Absolute 09/10/2021 0.0  0.0 - 0.2 x10E3/uL Final   • Immature Granulocyte Rel % 09/10/2021 0  Not Estab. % Final   • Immature Grans Absolute 09/10/2021 0.0  0.0 - 0.1 x10E3/uL Final   • Glucose 09/10/2021 113 (A) 65 - 99 mg/dL Final   • BUN 09/10/2021 20  8 - 27 mg/dL Final   • Creatinine 09/10/2021 1.25 (A) 0.57 - 1.00 mg/dL Final   • eGFR Non  Am 09/10/2021 43 (A) >59 mL/min/1.73 Final   • eGFR African Am 09/10/2021 50 (A) >59 mL/min/1.73 Final    Comment: **[facility] currently reports eGFR in compliance with the current**    recommendations of the [employer]. [facility] will    update reporting as new guidelines are published from the NKF-ASN    Task force.     • BUN/Creatinine Ratio 09/10/2021 16  12 - 28 Final   • Sodium 09/10/2021 142   134 - 144 mmol/L Final   • Potassium 09/10/2021 5.2  3.5 - 5.2 mmol/L Final   • Chloride 09/10/2021 107 (A) 96 - 106 mmol/L Final   • Total CO2 09/10/2021 20  20 - 29 mmol/L Final   • Calcium 09/10/2021 8.8  8.7 - 10.3 mg/dL Final         Review of Systems   Constitutional: Negative.    HENT: Negative.    Respiratory: Negative.    Cardiovascular: Negative.    Gastrointestinal: Negative.    Genitourinary: Negative.    Musculoskeletal: Negative.    Skin: Positive for wound.   Neurological: Negative.    Psychiatric/Behavioral: Negative.        Objective   Physical Exam  Constitutional:       Appearance: Normal appearance.   HENT:      Head: Normocephalic.   Cardiovascular:      Rate and Rhythm: Normal rate and regular rhythm.      Pulses: Normal pulses.      Heart sounds: Normal heart sounds.   Pulmonary:      Effort: Pulmonary effort is normal.      Breath sounds: Normal breath sounds.   Abdominal:      General: Bowel sounds are normal.   Musculoskeletal:         General: Normal range of motion.   Skin:     General: Skin is warm.      Comments: Laceration open with iodoform packing with redness and heat around entire area of shin   Neurological:      General: No focal deficit present.      Mental Status: She is alert and oriented to person, place, and time.   Psychiatric:         Mood and Affect: Mood normal.         Behavior: Behavior normal.         Procedures    Assessment/Plan   Diagnoses and all orders for this visit:    1. Laceration of left lower extremity, initial encounter (Primary)  -     Ambulatory Referral to Wound Clinic    2. Stage 3a chronic kidney disease (HCC)  -     Basic Metabolic Panel    3. Leg wound, left, initial encounter    Other orders  -     clindamycin (CLEOCIN) 300 MG capsule; Take 1 capsule by mouth 2 (Two) Times a Day for 10 days.  Dispense: 20 capsule; Refill: 0          Current Outpatient Medications:   •  alendronate (FOSAMAX) 70 MG tablet, Take 1 tablet by mouth Every 7 (Seven)  Days. Take with 8 ounces of water first thing in the morning.  remain upright for 30 minutes Takes on Wed, Disp: 4 tablet, Rfl: 2  •  allopurinol (ZYLOPRIM) 100 MG tablet, Take 200 mg by mouth Daily., Disp: , Rfl:   •  aspirin (aspirin) 81 MG EC tablet, Take 1 tablet by mouth Daily., Disp: 90 tablet, Rfl: 2  •  busPIRone (BUSPAR) 10 MG tablet, TAKE ONE TABLET BY MOUTH TWO TIMES A DAY, Disp: 180 tablet, Rfl: 1  •  calcitriol (ROCALTROL) 0.25 MCG capsule, , Disp: , Rfl:   •  cephalexin (KEFLEX) 500 MG capsule, Take 1 capsule by mouth 3 (Three) Times a Day., Disp: , Rfl:   •  cyanocobalamin 1000 MCG/ML injection, Inject 1 mL into the appropriate muscle as directed by prescriber Every 28 (Twenty-Eight) Days., Disp: 1 mL, Rfl: 6  •  cyclobenzaprine (FLEXERIL) 10 MG tablet, Take 1 tablet by mouth At Night As Needed for Muscle Spasms., Disp: 30 tablet, Rfl: 2  •  dicyclomine (BENTYL) 10 MG capsule, TAKE ONE CAPSULE BY MOUTH FOUR TIMES A DAY, Disp: 360 capsule, Rfl: 1  •  famotidine (PEPCID) 20 MG tablet, Take 1 tablet by mouth 2 (Two) Times a Day., Disp: 180 tablet, Rfl: 1  •  FeroSul 325 (65 Fe) MG tablet, TAKE ONE TABLET BY MOUTH ONCE DAILY WITH BREAKFAST, Disp: 30 tablet, Rfl: 2  •  Flector 1.3 % patch patch, , Disp: , Rfl:   •  fluticasone (Flonase) 50 MCG/ACT nasal spray, 2 sprays into the nostril(s) as directed by provider Daily. (Patient taking differently: 2 sprays into the nostril(s) as directed by provider Daily As Needed.), Disp: 16 g, Rfl: 12  •  furosemide (LASIX) 40 MG tablet, Take 1 tablet by mouth Daily. Until ankle diameter <10.5 inches and weight <255 # (Patient taking differently: Take 40 mg by mouth Daily As Needed. Until ankle diameter <10.5 inches and weight <255 #), Disp: 30 tablet, Rfl: o  •  HYDROcodone-acetaminophen (NORCO)  MG per tablet, Take 1 tablet by mouth Every 6 (Six) Hours As Needed., Disp: , Rfl:   •  linaclotide (LINZESS) 72 MCG capsule capsule, Take 1 capsule by mouth Daily.  "(Patient taking differently: Take 72 mcg by mouth Daily As Needed.), Disp: 90 capsule, Rfl: 3  •  melatonin ([employer] Melatonin) 5 MG tablet tablet, Take 2 tablets by mouth Every Night., Disp: 60 tablet, Rfl: 12  •  metoclopramide (REGLAN) 5 MG tablet, TAKE ONE TABLET BY MOUTH THREE TIMES A DAY BEFORE MEALS, Disp: 90 tablet, Rfl: 0  •  O2 (OXYGEN), Inhale 2 L/min Every Night., Disp: , Rfl:   •  ondansetron (ZOFRAN) 4 MG tablet, TAKE ONE TABLET BY MOUTH AS NEEDED FOR NAUSEA AND VOMITING (Patient taking differently: Take 4 mg by mouth Every 8 (Eight) Hours As Needed.), Disp: 30 tablet, Rfl: 1  •  pantoprazole (PROTONIX) 40 MG EC tablet, pantoprazole 40 mg tablet,delayed release, Disp: , Rfl:   •  potassium chloride 10 MEQ CR tablet, TAKE ONE TABLET BY MOUTH TWO TIMES A DAY AS NEEDED WITH LASIX FOR POTASSIUM SUPPLEMENT, Disp: 60 tablet, Rfl: 1  •  propranolol (INDERAL) 80 MG tablet, TAKE ONE TABLET BY MOUTH TWO TIMES A DAY, Disp: 180 tablet, Rfl: 1  •  QUEtiapine (SEROquel) 50 MG tablet, TAKE ONE TABLET BY MOUTH ONCE DAILY AT NIGHT, Disp: 90 tablet, Rfl: 0  •  sertraline (ZOLOFT) 100 MG tablet, Take 1 tablet by mouth Daily., Disp: 30 tablet, Rfl: 5  •  Syringe 23G X 1\" 3 ML misc, 1 each Every 30 (Thirty) Days. Use with B12 Injections, Disp: 50 each, Rfl: 0  •  clindamycin (CLEOCIN) 300 MG capsule, Take 1 capsule by mouth 2 (Two) Times a Day for 10 days., Disp: 20 capsule, Rfl: 0           MAGAN Sapp 3/17/2022 11:56 EDT  This note has been electronically signed  "

## 2022-03-17 NOTE — PATIENT INSTRUCTIONS
Stop Keflex  Clindamycin 300 mg twice daily x10 days  Wound care referral  Keep elevated at all times  Change dressing once soiled and keep covered

## 2022-03-18 LAB
BUN SERPL-MCNC: 24 MG/DL (ref 8–27)
BUN/CREAT SERPL: 15 (ref 12–28)
CALCIUM SERPL-MCNC: 9.6 MG/DL (ref 8.7–10.3)
CHLORIDE SERPL-SCNC: 99 MMOL/L (ref 96–106)
CO2 SERPL-SCNC: 22 MMOL/L (ref 20–29)
CREAT SERPL-MCNC: 1.57 MG/DL (ref 0.57–1)
EGFRCR SERPLBLD CKD-EPI 2021: 35 ML/MIN/1.73
GLUCOSE SERPL-MCNC: 93 MG/DL (ref 65–99)
POTASSIUM SERPL-SCNC: 6 MMOL/L (ref 3.5–5.2)
SODIUM SERPL-SCNC: 139 MMOL/L (ref 134–144)

## 2022-03-20 RX ORDER — ALENDRONATE SODIUM 70 MG/1
TABLET ORAL
Qty: 13 TABLET | Refills: 3 | Status: SHIPPED | OUTPATIENT
Start: 2022-03-20 | End: 2023-02-21 | Stop reason: SDUPTHER

## 2022-03-22 ENCOUNTER — TELEPHONE (OUTPATIENT)
Dept: FAMILY MEDICINE CLINIC | Facility: CLINIC | Age: 72
End: 2022-03-22

## 2022-03-22 DIAGNOSIS — N18.31 STAGE 3A CHRONIC KIDNEY DISEASE: ICD-10-CM

## 2022-03-22 DIAGNOSIS — E87.5 HYPERKALEMIA: Primary | ICD-10-CM

## 2022-03-22 RX ORDER — SODIUM POLYSTYRENE SULFONATE 15 G/60ML
15 SUSPENSION ORAL; RECTAL ONCE
Qty: 60 ML | Refills: 0 | Status: SHIPPED | OUTPATIENT
Start: 2022-03-22 | End: 2022-03-22

## 2022-03-22 NOTE — TELEPHONE ENCOUNTER
Just have them hold the potassium for now.  Her renal function was also elevated more than normal.  I put in a BMP for next week.  The higher potassium level was probably due to the increase in creatinine

## 2022-03-22 NOTE — TELEPHONE ENCOUNTER
Spoke with Earnestine.  She has a follow up appt on 3/30/22 and will recheck labs then.  MARCELINA

## 2022-03-22 NOTE — TELEPHONE ENCOUNTER
[facility] called stating they do not have, and are unable to order, the sodium polystyrene (KAYEXALATE) 15 GM/60ML suspension they received a script for today.

## 2022-03-24 ENCOUNTER — OFFICE VISIT (OUTPATIENT)
Dept: WOUND CARE | Facility: HOSPITAL | Age: 72
End: 2022-03-24

## 2022-03-24 ENCOUNTER — LAB (OUTPATIENT)
Dept: LAB | Facility: HOSPITAL | Age: 72
End: 2022-03-24

## 2022-03-24 ENCOUNTER — HOSPITAL ENCOUNTER (OUTPATIENT)
Dept: CARDIOLOGY | Facility: HOSPITAL | Age: 72
Discharge: HOME | End: 2022-03-24

## 2022-03-24 ENCOUNTER — LAB REQUISITION (OUTPATIENT)
Dept: LAB | Facility: HOSPITAL | Age: 72
End: 2022-03-24

## 2022-03-24 ENCOUNTER — TRANSCRIBE ORDERS (OUTPATIENT)
Dept: ADMINISTRATIVE | Facility: HOSPITAL | Age: 72
End: 2022-03-24

## 2022-03-24 DIAGNOSIS — R60.9 EDEMA, UNSPECIFIED TYPE: ICD-10-CM

## 2022-03-24 DIAGNOSIS — R60.0 LOCALIZED EDEMA: Primary | ICD-10-CM

## 2022-03-24 DIAGNOSIS — R60.0 LOCALIZED EDEMA: ICD-10-CM

## 2022-03-24 DIAGNOSIS — L97.822 NON-PRESSURE CHRONIC ULCER OF OTHER PART OF LEFT LOWER LEG WITH FAT LAYER EXPOSED: ICD-10-CM

## 2022-03-24 DIAGNOSIS — R60.9 EDEMA, UNSPECIFIED TYPE: Primary | ICD-10-CM

## 2022-03-24 LAB
BASOPHILS # BLD AUTO: 0.04 10*3/MM3 (ref 0–0.2)
BASOPHILS NFR BLD AUTO: 0.8 % (ref 0–1.5)
DEPRECATED RDW RBC AUTO: 43.4 FL (ref 37–54)
EOSINOPHIL # BLD AUTO: 0.28 10*3/MM3 (ref 0–0.4)
EOSINOPHIL NFR BLD AUTO: 5.9 % (ref 0.3–6.2)
ERYTHROCYTE [DISTWIDTH] IN BLOOD BY AUTOMATED COUNT: 13.7 % (ref 12.3–15.4)
HCT VFR BLD AUTO: 36 % (ref 34–46.6)
HGB BLD-MCNC: 11.3 G/DL (ref 12–15.9)
IMM GRANULOCYTES # BLD AUTO: 0.01 10*3/MM3 (ref 0–0.05)
IMM GRANULOCYTES NFR BLD AUTO: 0.2 % (ref 0–0.5)
LYMPHOCYTES # BLD AUTO: 1.44 10*3/MM3 (ref 0.7–3.1)
LYMPHOCYTES NFR BLD AUTO: 30.1 % (ref 19.6–45.3)
MCH RBC QN AUTO: 28 PG (ref 26.6–33)
MCHC RBC AUTO-ENTMCNC: 31.4 G/DL (ref 31.5–35.7)
MCV RBC AUTO: 89.3 FL (ref 79–97)
MONOCYTES # BLD AUTO: 0.33 10*3/MM3 (ref 0.1–0.9)
MONOCYTES NFR BLD AUTO: 6.9 % (ref 5–12)
NEUTROPHILS NFR BLD AUTO: 2.68 10*3/MM3 (ref 1.7–7)
NEUTROPHILS NFR BLD AUTO: 56.1 % (ref 42.7–76)
NRBC BLD AUTO-RTO: 0 /100 WBC (ref 0–0.2)
PLATELET # BLD AUTO: 256 10*3/MM3 (ref 140–450)
PMV BLD AUTO: 10.5 FL (ref 6–12)
RBC # BLD AUTO: 4.03 10*6/MM3 (ref 3.77–5.28)
WBC NRBC COR # BLD: 4.78 10*3/MM3 (ref 3.4–10.8)

## 2022-03-24 PROCEDURE — 87205 SMEAR GRAM STAIN: CPT | Performed by: SURGERY

## 2022-03-24 PROCEDURE — 36415 COLL VENOUS BLD VENIPUNCTURE: CPT

## 2022-03-24 PROCEDURE — 93971 EXTREMITY STUDY: CPT

## 2022-03-24 PROCEDURE — 85025 COMPLETE CBC W/AUTO DIFF WBC: CPT

## 2022-03-24 PROCEDURE — 87070 CULTURE OTHR SPECIMN AEROBIC: CPT | Performed by: SURGERY

## 2022-03-24 PROCEDURE — G0463 HOSPITAL OUTPT CLINIC VISIT: HCPCS

## 2022-03-24 PROCEDURE — 84134 ASSAY OF PREALBUMIN: CPT

## 2022-03-24 PROCEDURE — 80053 COMPREHEN METABOLIC PANEL: CPT

## 2022-03-25 LAB
ALBUMIN SERPL-MCNC: 4.1 G/DL (ref 3.5–5.2)
ALBUMIN/GLOB SERPL: 1.5 G/DL
ALP SERPL-CCNC: 91 U/L (ref 39–117)
ALT SERPL W P-5'-P-CCNC: 15 U/L (ref 1–33)
ANION GAP SERPL CALCULATED.3IONS-SCNC: 8.6 MMOL/L (ref 5–15)
AST SERPL-CCNC: 15 U/L (ref 1–32)
BH CV LOWER VASCULAR LEFT COMMON FEMORAL AUGMENT: NORMAL
BH CV LOWER VASCULAR LEFT COMMON FEMORAL COMPETENT: NORMAL
BH CV LOWER VASCULAR LEFT COMMON FEMORAL COMPRESS: NORMAL
BH CV LOWER VASCULAR LEFT COMMON FEMORAL PHASIC: NORMAL
BH CV LOWER VASCULAR LEFT COMMON FEMORAL SPONT: NORMAL
BH CV LOWER VASCULAR LEFT DISTAL FEMORAL COMPRESS: NORMAL
BH CV LOWER VASCULAR LEFT GASTRONEMIUS COMPRESS: NORMAL
BH CV LOWER VASCULAR LEFT GREATER SAPH AK COMPRESS: NORMAL
BH CV LOWER VASCULAR LEFT GREATER SAPH BK COMPRESS: NORMAL
BH CV LOWER VASCULAR LEFT LESSER SAPH COMPRESS: NORMAL
BH CV LOWER VASCULAR LEFT MID FEMORAL AUGMENT: NORMAL
BH CV LOWER VASCULAR LEFT MID FEMORAL COMPETENT: NORMAL
BH CV LOWER VASCULAR LEFT MID FEMORAL COMPRESS: NORMAL
BH CV LOWER VASCULAR LEFT MID FEMORAL PHASIC: NORMAL
BH CV LOWER VASCULAR LEFT MID FEMORAL SPONT: NORMAL
BH CV LOWER VASCULAR LEFT POPLITEAL AUGMENT: NORMAL
BH CV LOWER VASCULAR LEFT POPLITEAL COMPETENT: NORMAL
BH CV LOWER VASCULAR LEFT POPLITEAL COMPRESS: NORMAL
BH CV LOWER VASCULAR LEFT POPLITEAL PHASIC: NORMAL
BH CV LOWER VASCULAR LEFT POPLITEAL SPONT: NORMAL
BH CV LOWER VASCULAR LEFT POSTERIOR TIBIAL COMPRESS: NORMAL
BH CV LOWER VASCULAR LEFT PROXIMAL FEMORAL COMPRESS: NORMAL
BH CV LOWER VASCULAR LEFT SAPHENOFEMORAL JUNCTION COMPRESS: NORMAL
BH CV LOWER VASCULAR RIGHT COMMON FEMORAL AUGMENT: NORMAL
BH CV LOWER VASCULAR RIGHT COMMON FEMORAL COMPETENT: NORMAL
BH CV LOWER VASCULAR RIGHT COMMON FEMORAL COMPRESS: NORMAL
BH CV LOWER VASCULAR RIGHT COMMON FEMORAL PHASIC: NORMAL
BH CV LOWER VASCULAR RIGHT COMMON FEMORAL SPONT: NORMAL
BILIRUB SERPL-MCNC: 0.3 MG/DL (ref 0–1.2)
BUN SERPL-MCNC: 28 MG/DL (ref 8–23)
BUN/CREAT SERPL: 19.7 (ref 7–25)
CALCIUM SPEC-SCNC: 9.3 MG/DL (ref 8.6–10.5)
CHLORIDE SERPL-SCNC: 103 MMOL/L (ref 98–107)
CO2 SERPL-SCNC: 25.4 MMOL/L (ref 22–29)
CREAT SERPL-MCNC: 1.42 MG/DL (ref 0.57–1)
EGFRCR SERPLBLD CKD-EPI 2021: 39.1 ML/MIN/1.73
GLOBULIN UR ELPH-MCNC: 2.7 GM/DL
GLUCOSE SERPL-MCNC: 85 MG/DL (ref 65–99)
MAXIMAL PREDICTED HEART RATE: 147 BPM
POTASSIUM SERPL-SCNC: 4.7 MMOL/L (ref 3.5–5.2)
PREALB SERPL-MCNC: 21.8 MG/DL (ref 20–40)
PROT SERPL-MCNC: 6.8 G/DL (ref 6–8.5)
SODIUM SERPL-SCNC: 137 MMOL/L (ref 136–145)
STRESS TARGET HR: 125 BPM

## 2022-03-27 LAB
BACTERIA SPEC AEROBE CULT: NORMAL
GRAM STN SPEC: NORMAL
GRAM STN SPEC: NORMAL

## 2022-03-30 ENCOUNTER — OFFICE VISIT (OUTPATIENT)
Dept: FAMILY MEDICINE CLINIC | Facility: CLINIC | Age: 72
End: 2022-03-30

## 2022-03-30 VITALS
BODY MASS INDEX: 36.99 KG/M2 | DIASTOLIC BLOOD PRESSURE: 90 MMHG | WEIGHT: 201 LBS | SYSTOLIC BLOOD PRESSURE: 150 MMHG | HEART RATE: 62 BPM | OXYGEN SATURATION: 97 % | HEIGHT: 62 IN | TEMPERATURE: 97.3 F | RESPIRATION RATE: 16 BRPM

## 2022-03-30 DIAGNOSIS — E78.00 PURE HYPERCHOLESTEROLEMIA: Primary | ICD-10-CM

## 2022-03-30 DIAGNOSIS — D50.9 IRON DEFICIENCY ANEMIA, UNSPECIFIED IRON DEFICIENCY ANEMIA TYPE: ICD-10-CM

## 2022-03-30 DIAGNOSIS — R41.3 LOSS OF MEMORY: ICD-10-CM

## 2022-03-30 DIAGNOSIS — R73.9 HYPERGLYCEMIA: ICD-10-CM

## 2022-03-30 DIAGNOSIS — R73.09 ELEVATED HEMOGLOBIN A1C: ICD-10-CM

## 2022-03-30 DIAGNOSIS — S81.802D WOUND OF LEFT LOWER EXTREMITY, SUBSEQUENT ENCOUNTER: ICD-10-CM

## 2022-03-30 PROBLEM — S81.802A LEG WOUND, LEFT: Status: ACTIVE | Noted: 2022-03-30

## 2022-03-30 PROCEDURE — 99214 OFFICE O/P EST MOD 30 MIN: CPT | Performed by: NURSE PRACTITIONER

## 2022-03-30 RX ORDER — MEMANTINE HYDROCHLORIDE 5 MG/1
5 TABLET ORAL 2 TIMES DAILY
Qty: 60 TABLET | Refills: 2 | Status: SHIPPED | OUTPATIENT
Start: 2022-03-30 | End: 2022-06-30

## 2022-03-30 NOTE — PROGRESS NOTES
"Wound any    Elaine Toscano is a 71 y.o. female.     71-year-old white female with history of CKD, mild dementia, depression, GERD, CHF, osteoporosis, anemia, hypertension, sleep apnea, gastric bypass, chronic back pain goes to pain management, hyperlipidemia who comes in today with daughter.  Patient was here on 317 with wound to left lower leg that definitely was not healing well and looked pretty bad.  I placed her on clindamycin and referred her to wound care.  Wound care placed her on wound honey and special Vaseline gauze dressings and pressure  wraps.  They also debrided the wound.  Wound cultures came back negative.  Daughter states wound is much better now, more loose skin has dropped off and new skin is starting to develop.  They have a follow-up visit coming up next week with wound care    Patient tried Aricept for memory however it gave her headaches.  She is requesting something else to try I am putting her on Namenda 5 mg and she is to let me know if she develops any side effects.    Vital signs are stable.  Recent lab work revealed creatinine 1.42 hemoglobin, triglycerides 231, 11.3 and patient does take iron.  Patient has mildly elevated A1c and fasting blood sugar will be rechecking labs        Namenda 5 mg daily  Keep appointment with wound care  Fasting blood work         The following portions of the patient's history were reviewed and updated as appropriate: allergies, current medications, past family history, past medical history, past social history, past surgical history and problem list.    Vitals:    03/30/22 1034 03/30/22 1036   BP: 159/97 150/90   BP Location: Right arm Right arm   Patient Position: Sitting Sitting   Cuff Size: Large Adult Large Adult   Pulse: 62    Resp: 16    Temp: 97.3 °F (36.3 °C)    TempSrc: Infrared    SpO2: 97%    Weight: 91.2 kg (201 lb)    Height: 157.5 cm (62\")      Body mass index is 36.76 kg/m².    Past Medical History:   Diagnosis Date   • Acute renal failure " superimposed on stage 4 chronic kidney disease ([facility]) 3/24/2021   • Anemia    • Broken foot     right   • Broken nose 07/2017   • CHF (congestive heart failure) ([facility])    • CHF (congestive heart failure) ([facility])    • Chronic kidney disease (CKD), stage IV (severe) ([facility]) 5/6/2020   • Chronic low back pain    • CRI (chronic renal insufficiency)    • Depression    • DJD (degenerative joint disease)    • Fracture of ankle 8/12/2016   • GERD (gastroesophageal reflux disease)    • Gout    • Hypertension    • Hypothyroidism    • CRUZ (obstructive sleep apnea)     non-complaince with bipap   • Osteopenia    • Renal failure     stage 3   • Renal failure, chronic, stage 4 (severe) ([facility]) 1/21/2020   • Suicide attempt ([facility])    • TMJ (dislocation of temporomandibular joint)      Past Surgical History:   Procedure Laterality Date   • APPENDECTOMY  1986   • BACK SURGERY      2004---2005 L5-S1 spinal stenosis   • CARDIAC CATHETERIZATION  04/2018   • CHOLECYSTECTOMY  2008   • EPIDURAL BLOCK     • FOOT SURGERY Bilateral 2008   • FOOT SURGERY Bilateral 09/2019    Dr. Keenan- 2nd toe on bilateral feet    • GASTRIC BYPASS  2007   • INSERT / REPLACE / REMOVE PACEMAKER     • LAPAROSCOPIC GASTRIC BANDING  2004    removed   • OTHER SURGICAL HISTORY  01/2018    myelogram   • TOTAL KNEE ARTHROPLASTY Left      Family History   Problem Relation Age of Onset   • Hypertension Father         PM implanted   • Prostate cancer Father    • Arthritis Sister    • Heart disease Sister    • Lung disease Paternal Grandmother    • COPD Other    • Lung disease Other      Immunization History   Administered Date(s) Administered   • COVID-19 (MODERNA) 1st, 2nd, 3rd Dose Only 02/03/2021, 03/04/2021   • COVID-19 (UNSPECIFIED) 02/03/2021, 03/04/2021   • FLUAD TRI 65YR+ 09/30/2019   • Flu Vaccine Quad PF >18YRS 09/26/2016   • Fluad Quad 65+ 09/30/2019, 09/22/2020   • Fluzone High Dose =>65 Years ([employer] ONLY) 09/08/2015, 09/11/2017, 11/02/2018, 09/30/2019   • H1N1 Inj  12/03/2009   • Influenza Quad Vaccine (Inpatient) 09/11/2017   • Influenza Whole 10/29/2012   • Pneumococcal Conjugate 13-Valent (PCV13) 06/16/2015   • Pneumococcal Polysaccharide (PPSV23) 11/01/2016   • Shingrix 01/03/2022   • Td 04/27/2018   • Tdap 07/24/2019       Hospital Outpatient Visit on 03/24/2022   Component Date Value Ref Range Status   • Target HR (85%) 03/24/2022 125  bpm Final   • Max. Pred. HR (100%) 03/24/2022 147  bpm Final   • Right Common Femoral Spont 03/24/2022 Y   Final   • Right Common Femoral Phasic 03/24/2022 Y   Final   • Right Common Femoral Augment 03/24/2022 Y   Final   • Right Common Femoral Competent 03/24/2022 Y   Final   • Right Common Femoral Compress 03/24/2022 C   Final   • Left Common Femoral Spont 03/24/2022 Y   Final   • Left Common Femoral Phasic 03/24/2022 Y   Final   • Left Common Femoral Augment 03/24/2022 Y   Final   • Left Common Femoral Competent 03/24/2022 Y   Final   • Left Common Femoral Compress 03/24/2022 C   Final   • Left Saphenofemoral Junction Compr* 03/24/2022 C   Final   • Left Proximal Femoral Compress 03/24/2022 C   Final   • Left Mid Femoral Spont 03/24/2022 Y   Final   • Left Mid Femoral Phasic 03/24/2022 Y   Final   • Left Mid Femoral Augment 03/24/2022 Y   Final   • Left Mid Femoral Competent 03/24/2022 Y   Final   • Left Mid Femoral Compress 03/24/2022 C   Final   • Left Distal Femoral Compress 03/24/2022 C   Final   • Left Popliteal Spont 03/24/2022 Y   Final   • Left Popliteal Phasic 03/24/2022 Y   Final   • Left Popliteal Augment 03/24/2022 Y   Final   • Left Popliteal Competent 03/24/2022 Y   Final   • Left Popliteal Compress 03/24/2022 C   Final   • Left Posterior Tibial Compress 03/24/2022 C   Final   • Left Gastronemius Compress 03/24/2022 C   Final   • Left Greater Saph AK Compress 03/24/2022 C   Final   • Left Greater Saph BK Compress 03/24/2022 C   Final   • Left Lesser Saph Compress 03/24/2022 C   Final         Review of Systems    Constitutional: Negative.    HENT: Negative.    Respiratory: Negative.    Cardiovascular: Negative.    Gastrointestinal: Negative.    Genitourinary: Negative.    Musculoskeletal: Positive for back pain.   Skin: Positive for wound.   Neurological: Positive for memory problem.   Psychiatric/Behavioral: Negative.        Objective   Physical Exam  Constitutional:       Appearance: Normal appearance.   HENT:      Head: Normocephalic.   Cardiovascular:      Rate and Rhythm: Normal rate and regular rhythm.      Pulses: Normal pulses.      Heart sounds: Normal heart sounds.   Pulmonary:      Effort: Pulmonary effort is normal.      Breath sounds: Normal breath sounds.   Abdominal:      General: Bowel sounds are normal.   Musculoskeletal:         General: Normal range of motion.   Skin:     Comments: Did not unwrap wound due to pressure dressing however daughter states is doing very well   Neurological:      General: No focal deficit present.      Mental Status: She is alert.      Comments: Some memory issues   Psychiatric:         Mood and Affect: Mood normal.         Behavior: Behavior normal.         Procedures    Assessment/Plan   Diagnoses and all orders for this visit:    1. Pure hypercholesterolemia (Primary)  -     Cancel: Lipid Panel With LDL / HDL Ratio  -     Lipid Panel With LDL / HDL Ratio; Future    2. Hyperglycemia  -     Cancel: Basic Metabolic Panel  -     Basic Metabolic Panel; Future    3. Elevated hemoglobin A1c  -     Cancel: Hemoglobin A1c  -     Hemoglobin A1c; Future    4. Iron deficiency anemia, unspecified iron deficiency anemia type    5. Loss of memory    6. Wound of left lower extremity, subsequent encounter    Other orders  -     memantine (Namenda) 5 MG tablet; Take 1 tablet by mouth 2 (Two) Times a Day.  Dispense: 60 tablet; Refill: 2          Current Outpatient Medications:   •  alendronate (FOSAMAX) 70 MG tablet, TAKE ONE TABLET BY MOUTH EVERY 7 DAYS AS DIRECTED ------TAKE WITH 8 OUNCES OF  WATER FIRST THING IN THE MORNING AND REMAIN UPRIGHT FOR 30 MINUTES----, Disp: 13 tablet, Rfl: 3  •  allopurinol (ZYLOPRIM) 100 MG tablet, Take 200 mg by mouth Daily., Disp: , Rfl:   •  Aspirin Low Dose 81 MG EC tablet, TAKE ONE TABLET BY MOUTH ONCE DAILY, Disp: 90 tablet, Rfl: 2  •  busPIRone (BUSPAR) 10 MG tablet, TAKE ONE TABLET BY MOUTH TWO TIMES A DAY, Disp: 180 tablet, Rfl: 2  •  calcitriol (ROCALTROL) 0.25 MCG capsule, , Disp: , Rfl:   •  cyanocobalamin 1000 MCG/ML injection, Inject 1 mL into the appropriate muscle as directed by prescriber Every 28 (Twenty-Eight) Days., Disp: 1 mL, Rfl: 6  •  cyclobenzaprine (FLEXERIL) 10 MG tablet, Take 1 tablet by mouth At Night As Needed for Muscle Spasms., Disp: 30 tablet, Rfl: 2  •  dicyclomine (BENTYL) 10 MG capsule, TAKE ONE CAPSULE BY MOUTH FOUR TIMES A DAY, Disp: 360 capsule, Rfl: 1  •  famotidine (PEPCID) 20 MG tablet, Take 1 tablet by mouth 2 (Two) Times a Day., Disp: 180 tablet, Rfl: 1  •  FeroSul 325 (65 Fe) MG tablet, TAKE ONE TABLET BY MOUTH ONCE DAILY WITH BREAKFAST, Disp: 30 tablet, Rfl: 2  •  Flector 1.3 % patch patch, , Disp: , Rfl:   •  fluticasone (Flonase) 50 MCG/ACT nasal spray, 2 sprays into the nostril(s) as directed by provider Daily. (Patient taking differently: 2 sprays into the nostril(s) as directed by provider Daily As Needed.), Disp: 16 g, Rfl: 12  •  furosemide (LASIX) 40 MG tablet, Take 1 tablet by mouth Daily. Until ankle diameter <10.5 inches and weight <255 # (Patient taking differently: Take 40 mg by mouth Daily As Needed. Until ankle diameter <10.5 inches and weight <255 #), Disp: 30 tablet, Rfl: o  •  HYDROcodone-acetaminophen (NORCO)  MG per tablet, Take 1 tablet by mouth Every 6 (Six) Hours As Needed., Disp: , Rfl:   •  linaclotide (LINZESS) 72 MCG capsule capsule, Take 1 capsule by mouth Daily. (Patient taking differently: Take 72 mcg by mouth Daily As Needed.), Disp: 90 capsule, Rfl: 3  •  melatonin ([employer] Melatonin) 5 MG tablet  "tablet, Take 2 tablets by mouth Every Night., Disp: 60 tablet, Rfl: 12  •  metoclopramide (REGLAN) 5 MG tablet, TAKE ONE TABLET BY MOUTH THREE TIMES A DAY BEFORE MEALS, Disp: 90 tablet, Rfl: 0  •  O2 (OXYGEN), Inhale 2 L/min Every Night., Disp: , Rfl:   •  ondansetron (ZOFRAN) 4 MG tablet, TAKE ONE TABLET BY MOUTH AS NEEDED FOR NAUSEA AND VOMITING (Patient taking differently: Take 4 mg by mouth Every 8 (Eight) Hours As Needed.), Disp: 30 tablet, Rfl: 1  •  pantoprazole (PROTONIX) 40 MG EC tablet, pantoprazole 40 mg tablet,delayed release, Disp: , Rfl:   •  potassium chloride 10 MEQ CR tablet, TAKE ONE TABLET BY MOUTH TWO TIMES A DAY AS NEEDED WITH LASIX FOR POTASSIUM SUPPLEMENT, Disp: 60 tablet, Rfl: 1  •  propranolol (INDERAL) 80 MG tablet, TAKE ONE TABLET BY MOUTH TWO TIMES A DAY, Disp: 180 tablet, Rfl: 1  •  QUEtiapine (SEROquel) 50 MG tablet, TAKE ONE TABLET BY MOUTH AT BEDTIME, Disp: 90 tablet, Rfl: 0  •  sertraline (ZOLOFT) 100 MG tablet, Take 1 tablet by mouth Daily., Disp: 30 tablet, Rfl: 5  •  Syringe 23G X 1\" 3 ML misc, 1 each Every 30 (Thirty) Days. Use with B12 Injections, Disp: 50 each, Rfl: 0  •  memantine (Namenda) 5 MG tablet, Take 1 tablet by mouth 2 (Two) Times a Day., Disp: 60 tablet, Rfl: 2           MAGAN Sapp 3/30/2022 11:48 EDT  This note has been electronically signed  "

## 2022-04-02 LAB
BUN SERPL-MCNC: 23 MG/DL (ref 8–27)
BUN/CREAT SERPL: 16 (ref 12–28)
CALCIUM SERPL-MCNC: 9.1 MG/DL (ref 8.7–10.3)
CHLORIDE SERPL-SCNC: 102 MMOL/L (ref 96–106)
CHOLEST SERPL-MCNC: 175 MG/DL (ref 100–199)
CO2 SERPL-SCNC: 23 MMOL/L (ref 20–29)
CREAT SERPL-MCNC: 1.45 MG/DL (ref 0.57–1)
EGFRCR SERPLBLD CKD-EPI 2021: 38 ML/MIN/1.73
GLUCOSE SERPL-MCNC: 91 MG/DL (ref 65–99)
HBA1C MFR BLD: 5.3 % (ref 4.8–5.6)
HDLC SERPL-MCNC: 49 MG/DL
LDLC SERPL CALC-MCNC: 110 MG/DL (ref 0–99)
LDLC/HDLC SERPL: 2.2 RATIO (ref 0–3.2)
POTASSIUM SERPL-SCNC: 5.3 MMOL/L (ref 3.5–5.2)
SODIUM SERPL-SCNC: 138 MMOL/L (ref 134–144)
SPECIMEN STATUS: NORMAL
TRIGL SERPL-MCNC: 89 MG/DL (ref 0–149)
VLDLC SERPL CALC-MCNC: 16 MG/DL (ref 5–40)

## 2022-04-07 ENCOUNTER — OFFICE VISIT (OUTPATIENT)
Dept: WOUND CARE | Facility: HOSPITAL | Age: 72
End: 2022-04-07

## 2022-04-08 ENCOUNTER — HOME HEALTH ADMISSION (OUTPATIENT)
Dept: HOME HEALTH SERVICES | Facility: HOME HEALTHCARE | Age: 72
End: 2022-04-08

## 2022-04-08 ENCOUNTER — TRANSCRIBE ORDERS (OUTPATIENT)
Dept: HOME HEALTH SERVICES | Facility: HOME HEALTHCARE | Age: 72
End: 2022-04-08

## 2022-04-08 DIAGNOSIS — I87.312 CHRONIC VENOUS HYPERTENSION (IDIOPATHIC) WITH ULCER OF LEFT LOWER EXTREMITY (CODE): Primary | ICD-10-CM

## 2022-04-11 ENCOUNTER — HOME CARE VISIT (OUTPATIENT)
Dept: HOME HEALTH SERVICES | Facility: HOME HEALTHCARE | Age: 72
End: 2022-04-11

## 2022-04-11 VITALS
BODY MASS INDEX: 36.95 KG/M2 | DIASTOLIC BLOOD PRESSURE: 100 MMHG | HEART RATE: 78 BPM | RESPIRATION RATE: 18 BRPM | OXYGEN SATURATION: 99 % | TEMPERATURE: 97.5 F | WEIGHT: 202 LBS | SYSTOLIC BLOOD PRESSURE: 140 MMHG

## 2022-04-11 PROCEDURE — G0299 HHS/HOSPICE OF RN EA 15 MIN: HCPCS

## 2022-04-12 RX ORDER — CYCLOBENZAPRINE HCL 10 MG
TABLET ORAL
Qty: 30 TABLET | Refills: 2 | Status: SHIPPED | OUTPATIENT
Start: 2022-04-12

## 2022-04-12 RX ORDER — CYANOCOBALAMIN 1000 UG/ML
INJECTION, SOLUTION INTRAMUSCULAR; SUBCUTANEOUS
Qty: 1 ML | Refills: 6 | Status: SHIPPED | OUTPATIENT
Start: 2022-04-12 | End: 2023-01-19

## 2022-04-12 NOTE — HOME HEALTH
Patient is a 71 y.o. female with a h/o renal failure, iron deficiency anemia, heart failure with pacemaker, major depressive disorder, acid reflux, gout, HTN, hypothyroidism who was referred to [facility] for wound care. Wound is a traumatic injury secondary to fall located on the LLE that is now considered a venous ulcer per [facility]. Wound care orders from [facility]: May shower, don't get wrap wet, BLE moisturizing lotion, wound - apply hydrofera blue transfer to wound bed, ABD pad, 2 layer compression wrap - change 2x a week. Patient lives with son and daughter who are willing and capable caregivers. Supplies ordered this date, ask patient if supplies have arrived for next visit. PT refused at this time. SN focus of care: wound care

## 2022-04-14 ENCOUNTER — HOME CARE VISIT (OUTPATIENT)
Dept: HOME HEALTH SERVICES | Facility: HOME HEALTHCARE | Age: 72
End: 2022-04-14

## 2022-04-14 PROCEDURE — G0299 HHS/HOSPICE OF RN EA 15 MIN: HCPCS

## 2022-04-15 VITALS
SYSTOLIC BLOOD PRESSURE: 130 MMHG | RESPIRATION RATE: 17 BRPM | OXYGEN SATURATION: 97 % | HEART RATE: 66 BPM | TEMPERATURE: 98.2 F | DIASTOLIC BLOOD PRESSURE: 75 MMHG

## 2022-04-18 ENCOUNTER — HOME CARE VISIT (OUTPATIENT)
Dept: HOME HEALTH SERVICES | Facility: HOME HEALTHCARE | Age: 72
End: 2022-04-18

## 2022-04-18 VITALS
DIASTOLIC BLOOD PRESSURE: 78 MMHG | HEART RATE: 88 BPM | RESPIRATION RATE: 16 BRPM | SYSTOLIC BLOOD PRESSURE: 130 MMHG | TEMPERATURE: 98.3 F | OXYGEN SATURATION: 97 %

## 2022-04-18 PROCEDURE — G0299 HHS/HOSPICE OF RN EA 15 MIN: HCPCS

## 2022-04-18 NOTE — HOME HEALTH
Wound care completed; patient tolerated well and healing noted. Vital signs stable. Education was provided about elevating legs and increasing protein in diet to promote wound healing; patient and caregiver verbilized understanding. Patient denies pain and falls.     CP assess  Assess vital signs  Assess wound

## 2022-04-21 ENCOUNTER — HOME CARE VISIT (OUTPATIENT)
Dept: HOME HEALTH SERVICES | Facility: HOME HEALTHCARE | Age: 72
End: 2022-04-21

## 2022-04-21 ENCOUNTER — APPOINTMENT (OUTPATIENT)
Dept: WOUND CARE | Facility: HOSPITAL | Age: 72
End: 2022-04-21

## 2022-04-25 ENCOUNTER — HOME CARE VISIT (OUTPATIENT)
Dept: HOME HEALTH SERVICES | Facility: HOME HEALTHCARE | Age: 72
End: 2022-04-25

## 2022-04-25 PROCEDURE — G0299 HHS/HOSPICE OF RN EA 15 MIN: HCPCS

## 2022-04-28 ENCOUNTER — OFFICE VISIT (OUTPATIENT)
Dept: WOUND CARE | Facility: HOSPITAL | Age: 72
End: 2022-04-28

## 2022-04-29 ENCOUNTER — HOME CARE VISIT (OUTPATIENT)
Dept: HOME HEALTH SERVICES | Facility: HOME HEALTHCARE | Age: 72
End: 2022-04-29

## 2022-05-02 ENCOUNTER — HOME CARE VISIT (OUTPATIENT)
Dept: HOME HEALTH SERVICES | Facility: HOME HEALTHCARE | Age: 72
End: 2022-05-02

## 2022-05-02 VITALS
OXYGEN SATURATION: 98 % | SYSTOLIC BLOOD PRESSURE: 144 MMHG | HEART RATE: 61 BPM | TEMPERATURE: 97.8 F | RESPIRATION RATE: 15 BRPM | DIASTOLIC BLOOD PRESSURE: 76 MMHG

## 2022-05-02 PROCEDURE — G0299 HHS/HOSPICE OF RN EA 15 MIN: HCPCS

## 2022-05-05 ENCOUNTER — HOME CARE VISIT (OUTPATIENT)
Dept: HOME HEALTH SERVICES | Facility: HOME HEALTHCARE | Age: 72
End: 2022-05-05

## 2022-05-05 VITALS
OXYGEN SATURATION: 96 % | TEMPERATURE: 96.5 F | SYSTOLIC BLOOD PRESSURE: 139 MMHG | HEART RATE: 64 BPM | RESPIRATION RATE: 18 BRPM | DIASTOLIC BLOOD PRESSURE: 98 MMHG

## 2022-05-05 PROCEDURE — G0299 HHS/HOSPICE OF RN EA 15 MIN: HCPCS

## 2022-05-09 ENCOUNTER — HOME CARE VISIT (OUTPATIENT)
Dept: HOME HEALTH SERVICES | Facility: HOME HEALTHCARE | Age: 72
End: 2022-05-09

## 2022-05-09 VITALS
RESPIRATION RATE: 18 BRPM | HEART RATE: 62 BPM | DIASTOLIC BLOOD PRESSURE: 82 MMHG | SYSTOLIC BLOOD PRESSURE: 140 MMHG | TEMPERATURE: 97.5 F | OXYGEN SATURATION: 96 %

## 2022-05-09 PROCEDURE — G0299 HHS/HOSPICE OF RN EA 15 MIN: HCPCS

## 2022-05-09 NOTE — HOME HEALTH
Vital signs stable. Patient denies falls. Complaints of chronic back pain. Education provided on wound care completed; patient and caregiver verbilized understanding.     CP assess  Assess wound

## 2022-05-11 DIAGNOSIS — F33.1 MAJOR DEPRESSIVE DISORDER, RECURRENT EPISODE, MODERATE: Chronic | ICD-10-CM

## 2022-05-11 DIAGNOSIS — F41.1 GENERALIZED ANXIETY DISORDER: Chronic | ICD-10-CM

## 2022-05-11 RX ORDER — SERTRALINE HYDROCHLORIDE 100 MG/1
TABLET, FILM COATED ORAL
Qty: 30 TABLET | Refills: 1 | Status: SHIPPED | OUTPATIENT
Start: 2022-05-11 | End: 2022-07-12

## 2022-05-12 ENCOUNTER — HOME CARE VISIT (OUTPATIENT)
Dept: HOME HEALTH SERVICES | Facility: HOME HEALTHCARE | Age: 72
End: 2022-05-12

## 2022-05-12 ENCOUNTER — APPOINTMENT (OUTPATIENT)
Dept: WOUND CARE | Facility: HOSPITAL | Age: 72
End: 2022-05-12

## 2022-05-12 VITALS
DIASTOLIC BLOOD PRESSURE: 92 MMHG | SYSTOLIC BLOOD PRESSURE: 128 MMHG | OXYGEN SATURATION: 96 % | TEMPERATURE: 97.6 F | HEART RATE: 78 BPM

## 2022-05-12 PROCEDURE — G0493 RN CARE EA 15 MIN HH/HOSPICE: HCPCS

## 2022-05-13 NOTE — HOME HEALTH
PLAN FOR NEXT VISIT    CP assessment  assess safety and pain  wound care  Poss DC if wound is now healed      wound care completed today. see wound note. would is nearly healed.SN visit added for possible DC if wound is completely healed.

## 2022-05-16 ENCOUNTER — HOME CARE VISIT (OUTPATIENT)
Dept: HOME HEALTH SERVICES | Facility: HOME HEALTHCARE | Age: 72
End: 2022-05-16

## 2022-05-16 PROCEDURE — G0299 HHS/HOSPICE OF RN EA 15 MIN: HCPCS

## 2022-05-17 NOTE — HOME HEALTH
plan for next visit:  CP assess  falls/safety assess  medication education  assess wound  DC if patient has been DC'd from C

## 2022-05-23 ENCOUNTER — HOME CARE VISIT (OUTPATIENT)
Dept: HOME HEALTH SERVICES | Facility: HOME HEALTHCARE | Age: 72
End: 2022-05-23

## 2022-05-23 VITALS
HEART RATE: 55 BPM | RESPIRATION RATE: 18 BRPM | SYSTOLIC BLOOD PRESSURE: 140 MMHG | DIASTOLIC BLOOD PRESSURE: 100 MMHG | OXYGEN SATURATION: 99 % | TEMPERATURE: 97.8 F

## 2022-05-23 PROCEDURE — G0299 HHS/HOSPICE OF RN EA 15 MIN: HCPCS

## 2022-05-23 NOTE — HOME HEALTH
Patient denies SOA, CP, recent falls or other symptoms. Wound has healed and patient has been released from [facility]. Patient reports pain is manageable and she verbalizes understanding of all medications and falls prevention. Verbalizes agreement with discharge and all questions addressed. Discharged to home with family assist if needed, though patient is independent with ADLs.

## 2022-05-26 ENCOUNTER — APPOINTMENT (OUTPATIENT)
Dept: MAMMOGRAPHY | Facility: HOSPITAL | Age: 72
End: 2022-05-26

## 2022-05-27 ENCOUNTER — PATIENT OUTREACH (OUTPATIENT)
Dept: CASE MANAGEMENT | Facility: OTHER | Age: 72
End: 2022-05-27

## 2022-05-27 NOTE — OUTREACH NOTE
"AMBULATORY CASE MANAGEMENT NOTE    Patient Outreach  Patient daughter-in-law Earnestine calls RN-ACM back, says has family emergency happening (unrelated to patient), states patient is \"doing really well\", scalp wound glue intact without wound drainage. States eyes are \"still black and blue\" but healing.  She states patient lives with her, is now using her regular walker to ambulate instead of the less stable rolling walker.  DC'd 5/23/22.  States she is working with patient on her exercises twice a day & that patient is agreeable to this.  She states patient wears 2L O2 at night only.  States is relieved this fall did not re-injure the patient's healed foot wound. She denies SOA, cough, new fatigue. Denies pedal or ankle edema, states patient is not wearing TEDs.   Again unable to further outreach d/t a family emergency call, Earnestine thanks RN-MARYM & ended call.  Time did not permit further health review or education. RN-MARYM contact information,  24-hour nurse triage phone number and [email or web address] contact information was texted to Earnestine and she acknowledged it w/ appreciation.  Patient has [facility] appt 6/3/22 and PCP appt 6/30/22.  No additional outreach scheduled, RN-ACM to outreach as needed.     SDOH updated and reviewed with the patient during this program:  Continuing Care   Community & DME   [employer]    [address] [place] [address] [place] [address]    Phone: [phone]    Resource for: Financial Resource Strain     Send Education  Questions/Answers    Flowsheet Row Most Recent Value   AWV Materials Send Materials   Other Patient Education/Resources  24/7 [facility] Healthcare Nurse Call Line, Advanced Care Planning, Housing, MyChart   24/7 Nurse Call Line Education Method Send Materials   ACP Education Method Verbal  [on file]   Housing Education Method Verbal  [lives with son & Dtr in law]   MyChart Education Method Verbal  [active]   Advanced Directives: Patient Has        Patient Outreach  Name " "and Relationship of Patient/Support Person: COLTON TRINIDAD - Emergency Contact  Patient daughter-in-law Colton returns RN-ACM HRCM outreach VM.  She states patient \"doing pretty good\" and \"the skin tear on her arm has healed\".  Suddenly unable to further outreach, Colton states \"Can I call you back?\" and ended call.  RN-ACM to outreach as needed.    AGUEDA HUGO  Ambulatory Case Management  5/27/2022, 12:29 EDT  "

## 2022-06-03 ENCOUNTER — CLINICAL SUPPORT NO REQUIREMENTS (OUTPATIENT)
Dept: CARDIOLOGY | Facility: CLINIC | Age: 72
End: 2022-06-03

## 2022-06-03 DIAGNOSIS — I49.5 SICK SINUS SYNDROME: ICD-10-CM

## 2022-06-03 DIAGNOSIS — Z95.0 PRESENCE OF CARDIAC PACEMAKER: Primary | ICD-10-CM

## 2022-06-03 PROCEDURE — 93280 PM DEVICE PROGR EVAL DUAL: CPT | Performed by: INTERNAL MEDICINE

## 2022-06-09 DIAGNOSIS — F33.2 SEVERE EPISODE OF RECURRENT MAJOR DEPRESSIVE DISORDER, WITHOUT PSYCHOTIC FEATURES: ICD-10-CM

## 2022-06-09 RX ORDER — PROPRANOLOL HYDROCHLORIDE 80 MG/1
TABLET ORAL
Qty: 180 TABLET | Refills: 1 | Status: SHIPPED | OUTPATIENT
Start: 2022-06-09 | End: 2022-11-07

## 2022-06-09 RX ORDER — QUETIAPINE FUMARATE 50 MG/1
TABLET, FILM COATED ORAL
Qty: 90 TABLET | Refills: 0 | Status: SHIPPED | OUTPATIENT
Start: 2022-06-09 | End: 2022-08-29

## 2022-06-09 RX ORDER — FERROUS SULFATE 325(65) MG
TABLET ORAL
Qty: 30 TABLET | Refills: 2 | Status: SHIPPED | OUTPATIENT
Start: 2022-06-09 | End: 2022-10-10

## 2022-06-20 RX ORDER — DICYCLOMINE HYDROCHLORIDE 10 MG/1
CAPSULE ORAL
Qty: 360 CAPSULE | Refills: 1 | Status: SHIPPED | OUTPATIENT
Start: 2022-06-20 | End: 2022-11-07

## 2022-06-30 ENCOUNTER — OFFICE VISIT (OUTPATIENT)
Dept: FAMILY MEDICINE CLINIC | Facility: CLINIC | Age: 72
End: 2022-06-30

## 2022-06-30 ENCOUNTER — HOME HEALTH ADMISSION (OUTPATIENT)
Dept: HOME HEALTH SERVICES | Facility: HOME HEALTHCARE | Age: 72
End: 2022-06-30

## 2022-06-30 VITALS
TEMPERATURE: 97.3 F | OXYGEN SATURATION: 95 % | BODY MASS INDEX: 37.02 KG/M2 | WEIGHT: 201.2 LBS | HEART RATE: 69 BPM | SYSTOLIC BLOOD PRESSURE: 172 MMHG | HEIGHT: 62 IN | DIASTOLIC BLOOD PRESSURE: 105 MMHG

## 2022-06-30 DIAGNOSIS — E66.01 CLASS 2 SEVERE OBESITY DUE TO EXCESS CALORIES WITH SERIOUS COMORBIDITY AND BODY MASS INDEX (BMI) OF 36.0 TO 36.9 IN ADULT: ICD-10-CM

## 2022-06-30 DIAGNOSIS — M54.41 CHRONIC LOW BACK PAIN WITH BILATERAL SCIATICA, UNSPECIFIED BACK PAIN LATERALITY: ICD-10-CM

## 2022-06-30 DIAGNOSIS — S81.802D WOUND OF LEFT LOWER EXTREMITY, SUBSEQUENT ENCOUNTER: ICD-10-CM

## 2022-06-30 DIAGNOSIS — N18.31 STAGE 3A CHRONIC KIDNEY DISEASE: ICD-10-CM

## 2022-06-30 DIAGNOSIS — Z86.2 HISTORY OF ANEMIA DUE TO CHRONIC KIDNEY DISEASE: ICD-10-CM

## 2022-06-30 DIAGNOSIS — R29.6 FREQUENT FALLS: ICD-10-CM

## 2022-06-30 DIAGNOSIS — G89.29 CHRONIC LOW BACK PAIN WITH BILATERAL SCIATICA, UNSPECIFIED BACK PAIN LATERALITY: ICD-10-CM

## 2022-06-30 DIAGNOSIS — I50.9 CONGESTIVE HEART FAILURE, UNSPECIFIED HF CHRONICITY, UNSPECIFIED HEART FAILURE TYPE: ICD-10-CM

## 2022-06-30 DIAGNOSIS — M54.42 CHRONIC LOW BACK PAIN WITH BILATERAL SCIATICA, UNSPECIFIED BACK PAIN LATERALITY: ICD-10-CM

## 2022-06-30 DIAGNOSIS — N18.9 HISTORY OF ANEMIA DUE TO CHRONIC KIDNEY DISEASE: ICD-10-CM

## 2022-06-30 DIAGNOSIS — M48.062 SPINAL STENOSIS OF LUMBAR REGION WITH NEUROGENIC CLAUDICATION: ICD-10-CM

## 2022-06-30 DIAGNOSIS — R26.89 LOSS OF BALANCE: ICD-10-CM

## 2022-06-30 DIAGNOSIS — R73.9 HYPERGLYCEMIA: Primary | ICD-10-CM

## 2022-06-30 DIAGNOSIS — Z74.09 MOBILITY POOR: ICD-10-CM

## 2022-06-30 DIAGNOSIS — F01.50 VASCULAR DEMENTIA WITHOUT BEHAVIORAL DISTURBANCE: ICD-10-CM

## 2022-06-30 PROBLEM — E66.812 CLASS 2 SEVERE OBESITY DUE TO EXCESS CALORIES WITH SERIOUS COMORBIDITY AND BODY MASS INDEX (BMI) OF 36.0 TO 36.9 IN ADULT: Status: ACTIVE | Noted: 2022-06-30

## 2022-06-30 PROCEDURE — 99214 OFFICE O/P EST MOD 30 MIN: CPT | Performed by: NURSE PRACTITIONER

## 2022-06-30 RX ORDER — MEMANTINE HYDROCHLORIDE 10 MG/1
10 TABLET ORAL 2 TIMES DAILY
Qty: 180 TABLET | Refills: 1 | Status: SHIPPED | OUTPATIENT
Start: 2022-06-30 | End: 2022-12-30

## 2022-06-30 NOTE — PROGRESS NOTES
Elaine Toscano is a 71 y.o. female.     71-year-old white female with history of CKD, mild dementia, depression, GERD, CHF, osteoporosis, anemia, hypertension, sleep apnea, gastric bypass, chronic pain to go to pain management, hyperlipidemia and recent left lower leg wound from a fall who comes in today for follow-up visit with daughter    Blood pressure elevated today 132/104 however patient has a lot of excessive skin and fat under her upper arms and I told him to check her pressure at home with a wrist cuff.  She denies any chest pain, dyspnea, tachycardia or dizziness    Patient's left lower wound is finally has healed there is a scar but there is no longer any open wounds from her fall.  She still has a very unsteady gait and does not use her walker correctly which will probably end up in another fall.  I am ordering home health to come in and work with her on gait training and using a walker.    Will be checking patient's creatinine today her last creatinine was 1.45.  She has lost weight since her fall with her weight being 201 and a BMI of 36.8 and since then her A1c and lipid panel have returned to normal.  She is still mildly anemic probably from the renal disease we will be checking labs today    On last visit I placed patient on Namenda 5 mg because she had a reaction to Aricept.  She states that has helped and family has noticed a difference.  I am going to increase her to 10 mg twice a day                Blood work today  Increase Namenda to 10 mg twice a day  Physical therapy through home health  Monitor blood pressure at home with wrist cuff and call office if still elevated  Prescription for new walker             The following portions of the patient's history were reviewed and updated as appropriate: allergies, current medications, past family history, past medical history, past social history, past surgical history and problem list.    Vitals:    06/30/22 1306   BP: (!) 172/105   BP Location:  "Left arm   Patient Position: Sitting   Cuff Size: Large Adult   Pulse: 69   Temp: 97.3 °F (36.3 °C)   TempSrc: Temporal   SpO2: 95%   Weight: 91.3 kg (201 lb 3.2 oz)   Height: 157.5 cm (62\")     Body mass index is 36.8 kg/m².    Past Medical History:   Diagnosis Date   • Acute renal failure superimposed on stage 4 chronic kidney disease ([facility]) 3/24/2021   • Anemia    • Broken foot     right   • Broken nose 07/2017   • CHF (congestive heart failure) ([facility])    • CHF (congestive heart failure) ([facility])    • Chronic kidney disease (CKD), stage IV (severe) ([facility]) 5/6/2020   • Chronic low back pain    • CRI (chronic renal insufficiency)    • Depression    • DJD (degenerative joint disease)    • Fracture of ankle 8/12/2016   • GERD (gastroesophageal reflux disease)    • Gout    • Hypertension    • Hypothyroidism    • CRUZ (obstructive sleep apnea)     non-complaince with bipap   • Osteopenia    • Renal failure     stage 3   • Renal failure, chronic, stage 4 (severe) ([facility]) 1/21/2020   • Suicide attempt ([facility])    • TMJ (dislocation of temporomandibular joint)      Past Surgical History:   Procedure Laterality Date   • APPENDECTOMY  1986   • BACK SURGERY      2004---2005 L5-S1 spinal stenosis   • CARDIAC CATHETERIZATION  04/2018   • CHOLECYSTECTOMY  2008   • EPIDURAL BLOCK     • FOOT SURGERY Bilateral 2008   • FOOT SURGERY Bilateral 09/2019    Dr. Keenan- 2nd toe on bilateral feet    • GASTRIC BYPASS  2007   • INSERT / REPLACE / REMOVE PACEMAKER     • LAPAROSCOPIC GASTRIC BANDING  2004    removed   • OTHER SURGICAL HISTORY  01/2018    myelogram   • TOTAL KNEE ARTHROPLASTY Left      Family History   Problem Relation Age of Onset   • Hypertension Father         PM implanted   • Prostate cancer Father    • Arthritis Sister    • Heart disease Sister    • Lung disease Paternal Grandmother    • COPD Other    • Lung disease Other      Immunization History   Administered Date(s) Administered   • COVID-19 (MODERNA) 1st, 2nd, 3rd Dose Only " 02/03/2021, 03/04/2021   • COVID-19 (UNSPECIFIED) 02/03/2021, 03/04/2021   • FLUAD TRI 65YR+ 09/30/2019   • Fluad Quad 65+ 09/30/2019, 09/22/2020   • Fluzone High Dose =>65 Years ([employer] ONLY) 09/08/2015, 09/11/2017, 11/02/2018, 09/30/2019   • Fluzone Split Quad (Multi-dose) 09/26/2016   • H1N1 Inj 12/03/2009   • Influenza Quad Vaccine (Inpatient) 09/11/2017   • Influenza Whole 10/29/2012   • Pneumococcal Conjugate 13-Valent (PCV13) 06/16/2015   • Pneumococcal Polysaccharide (PPSV23) 11/01/2016   • Shingrix 01/03/2022   • Td 04/27/2018   • Tdap 07/24/2019       Orders Only on 04/01/2022   Component Date Value Ref Range Status   • Glucose 04/01/2022 91  65 - 99 mg/dL Final   • BUN 04/01/2022 23  8 - 27 mg/dL Final   • Creatinine 04/01/2022 1.45 (A) 0.57 - 1.00 mg/dL Final   • EGFR Result 04/01/2022 38 (A) >59 mL/min/1.73 Final   • BUN/Creatinine Ratio 04/01/2022 16  12 - 28 Final   • Sodium 04/01/2022 138  134 - 144 mmol/L Final   • Potassium 04/01/2022 5.3 (A) 3.5 - 5.2 mmol/L Final   • Chloride 04/01/2022 102  96 - 106 mmol/L Final   • Total CO2 04/01/2022 23  20 - 29 mmol/L Final   • Calcium 04/01/2022 9.1  8.7 - 10.3 mg/dL Final   • Total Cholesterol 04/01/2022 175  100 - 199 mg/dL Final   • Triglycerides 04/01/2022 89  0 - 149 mg/dL Final   • HDL Cholesterol 04/01/2022 49  >39 mg/dL Final   • VLDL Cholesterol Teo 04/01/2022 16  5 - 40 mg/dL Final   • LDL Chol Calc ([facility]) 04/01/2022 110 (A) 0 - 99 mg/dL Final   • LDL/HDL RATIO 04/01/2022 2.2  0.0 - 3.2 ratio Final    Comment:                                     LDL/HDL Ratio                                              Men  Women                                1/2 Avg.Risk  1.0    1.5                                    Avg.Risk  3.6    3.2                                 2X Avg.Risk  6.2    5.0                                 3X Avg.Risk  8.0    6.1     • Hemoglobin A1C 04/01/2022 5.3  4.8 - 5.6 % Final    Comment:          Prediabetes: 5.7 - 6.4            Diabetes: >6.4           Glycemic control for adults with diabetes: <7.0     • Specimen Status 04/01/2022 Comment   Final    Comment: Jeronimo Ochoa BMP8 Default  Jeronimo Rich BMP8 Default  A hand-written panel/profile was received from your office. In  accordance with the [facility] Ambiguous Test Code Policy dated July 2003, we have completed your order by using the closest currently  or formerly recognized AMA panel.  We have assigned Basic Metabolic  Panel (8), Test Code #612209 to this request. If this is not the  testing you wished to receive on this specimen, please contact the  [facility] Client Inquiry/Technical Services Department to clarify the  test order.  We appreciate your business.           Review of Systems   Constitutional: Negative.    HENT: Negative.    Respiratory: Negative.    Cardiovascular: Negative.    Gastrointestinal: Negative.    Genitourinary: Negative.    Musculoskeletal: Positive for gait problem.   Skin: Negative.    Psychiatric/Behavioral: Negative.        Objective   Physical Exam  Constitutional:       Appearance: Normal appearance.   HENT:      Head: Normocephalic.   Cardiovascular:      Rate and Rhythm: Normal rate and regular rhythm.      Pulses: Normal pulses.      Heart sounds: Normal heart sounds.   Pulmonary:      Effort: Pulmonary effort is normal.      Breath sounds: Normal breath sounds.   Abdominal:      General: Bowel sounds are normal.   Musculoskeletal:      Comments: Unsteady gait   Skin:     General: Skin is warm and dry.   Neurological:      General: No focal deficit present.      Mental Status: She is alert and oriented to person, place, and time.      Comments: Mild dementia   Psychiatric:         Mood and Affect: Mood normal.         Behavior: Behavior normal.         Procedures    Assessment & Plan   Diagnoses and all orders for this visit:    1. Hyperglycemia (Primary)  -     Comprehensive Metabolic Panel    2. History of anemia due to chronic kidney disease  -      CBC & Differential    3. Stage 3a chronic kidney disease (HCC)    4. Wound of left lower extremity, subsequent encounter    5. Frequent falls  -     Ambulatory Referral to Home Health    6. Mobility poor  -     Ambulatory Referral to Home Health    7. Class 2 severe obesity due to excess calories with serious comorbidity and body mass index (BMI) of 36.0 to 36.9 in adult ([facility])    8. Vascular dementia without behavioral disturbance ([facility])    Other orders  -     memantine (Namenda) 10 MG tablet; Take 1 tablet by mouth 2 (Two) Times a Day.  Dispense: 180 tablet; Refill: 1          Current Outpatient Medications:   •  alendronate (FOSAMAX) 70 MG tablet, TAKE ONE TABLET BY MOUTH EVERY 7 DAYS AS DIRECTED ------TAKE WITH 8 OUNCES OF WATER FIRST THING IN THE MORNING AND REMAIN UPRIGHT FOR 30 MINUTES----, Disp: 13 tablet, Rfl: 3  •  allopurinol (ZYLOPRIM) 100 MG tablet, Take 200 mg by mouth Daily., Disp: , Rfl:   •  Aspirin Low Dose 81 MG EC tablet, TAKE ONE TABLET BY MOUTH ONCE DAILY, Disp: 90 tablet, Rfl: 2  •  busPIRone (BUSPAR) 10 MG tablet, TAKE ONE TABLET BY MOUTH TWO TIMES A DAY, Disp: 180 tablet, Rfl: 2  •  calcitriol (ROCALTROL) 0.25 MCG capsule, , Disp: , Rfl:   •  cyanocobalamin 1000 MCG/ML injection, INJECT 1 ML INTRAMUSCULARLY EVERY 28 DAYS AS DIRECTED, Disp: 1 mL, Rfl: 6  •  cyclobenzaprine (FLEXERIL) 10 MG tablet, TAKE ONE TABLET BY MOUTH AT NIGHT AS NEEDED FOR MUSCLE SPASMS, Disp: 30 tablet, Rfl: 2  •  dicyclomine (BENTYL) 10 MG capsule, TAKE ONE CAPSULE BY MOUTH FOUR TIMES A DAY, Disp: 360 capsule, Rfl: 1  •  famotidine (PEPCID) 20 MG tablet, Take 1 tablet by mouth 2 (Two) Times a Day., Disp: 180 tablet, Rfl: 1  •  FeroSul 325 (65 Fe) MG tablet, TAKE ONE TABLET BY MOUTH ONCE DAILY WITH BREAKFAST, Disp: 30 tablet, Rfl: 2  •  Flector 1.3 % patch patch, Apply 1 patch topically to the appropriate area as directed Continuous As Needed (back pain)., Disp: , Rfl:   •  fluticasone (Flonase) 50 MCG/ACT nasal spray, 2  "sprays into the nostril(s) as directed by provider Daily., Disp: 16 g, Rfl: 12  •  furosemide (LASIX) 40 MG tablet, Take 1 tablet by mouth Daily. Until ankle diameter <10.5 inches and weight <255 # (Patient taking differently: Take 40 mg by mouth Daily As Needed. Until ankle diameter <10.5 inches and weight <255 #), Disp: 30 tablet, Rfl: o  •  HYDROcodone-acetaminophen (NORCO)  MG per tablet, Take 1 tablet by mouth Every 6 (Six) Hours As Needed for Severe Pain ., Disp: , Rfl:   •  linaclotide (LINZESS) 72 MCG capsule capsule, Take 1 capsule by mouth Daily. (Patient taking differently: Take 72 mcg by mouth Daily As Needed.), Disp: 90 capsule, Rfl: 3  •  melatonin ([employer] Melatonin) 5 MG tablet tablet, Take 2 tablets by mouth Every Night., Disp: 60 tablet, Rfl: 12  •  metoclopramide (REGLAN) 5 MG tablet, TAKE ONE TABLET BY MOUTH THREE TIMES A DAY BEFORE MEALS, Disp: 90 tablet, Rfl: 0  •  O2 (OXYGEN), Inhale 2 L/min Every Night., Disp: , Rfl:   •  ondansetron (ZOFRAN) 4 MG tablet, TAKE ONE TABLET BY MOUTH AS NEEDED FOR NAUSEA AND VOMITING (Patient taking differently: Take 4 mg by mouth Every 8 (Eight) Hours As Needed.), Disp: 30 tablet, Rfl: 1  •  pantoprazole (PROTONIX) 40 MG EC tablet, pantoprazole 40 mg tablet,delayed release, Disp: , Rfl:   •  potassium chloride 10 MEQ CR tablet, TAKE ONE TABLET BY MOUTH TWO TIMES A DAY AS NEEDED WITH LASIX FOR POTASSIUM SUPPLEMENT, Disp: 60 tablet, Rfl: 1  •  propranolol (INDERAL) 80 MG tablet, TAKE ONE TABLET BY MOUTH TWO TIMES A DAY, Disp: 180 tablet, Rfl: 1  •  QUEtiapine (SEROquel) 50 MG tablet, TAKE ONE TABLET BY MOUTH ONCE DAILY AT BEDTIME, Disp: 90 tablet, Rfl: 0  •  sertraline (ZOLOFT) 100 MG tablet, TAKE ONE TABLET BY MOUTH ONCE DAILY, Disp: 30 tablet, Rfl: 1  •  Syringe 23G X 1\" 3 ML misc, 1 each Every 30 (Thirty) Days. Use with B12 Injections, Disp: 50 each, Rfl: 0  •  memantine (Namenda) 10 MG tablet, Take 1 tablet by mouth 2 (Two) Times a Day., Disp: 180 tablet, " Rfl: 1           Annalisa Chase, MAGAN 6/30/2022 13:43 EDT  This note has been electronically signed

## 2022-06-30 NOTE — PATIENT INSTRUCTIONS
Blood work today  Increase Namenda to 10 mg twice a day  Physical therapy through home health  Monitor blood pressure at home with wrist cuff and call office if still elevated  Prescription for new walker

## 2022-07-01 LAB
ALBUMIN SERPL-MCNC: 3.8 G/DL (ref 3.7–4.7)
ALBUMIN/GLOB SERPL: 1.7 {RATIO} (ref 1.2–2.2)
ALP SERPL-CCNC: 99 IU/L (ref 44–121)
ALT SERPL-CCNC: 9 IU/L (ref 0–32)
AST SERPL-CCNC: 13 IU/L (ref 0–40)
BASOPHILS # BLD AUTO: 0 X10E3/UL (ref 0–0.2)
BASOPHILS NFR BLD AUTO: 1 %
BILIRUB SERPL-MCNC: 0.4 MG/DL (ref 0–1.2)
BUN SERPL-MCNC: 23 MG/DL (ref 8–27)
BUN/CREAT SERPL: 17 (ref 12–28)
CALCIUM SERPL-MCNC: 9 MG/DL (ref 8.7–10.3)
CHLORIDE SERPL-SCNC: 104 MMOL/L (ref 96–106)
CO2 SERPL-SCNC: 20 MMOL/L (ref 20–29)
CREAT SERPL-MCNC: 1.33 MG/DL (ref 0.57–1)
EGFRCR SERPLBLD CKD-EPI 2021: 42 ML/MIN/1.73
EOSINOPHIL # BLD AUTO: 0.2 X10E3/UL (ref 0–0.4)
EOSINOPHIL NFR BLD AUTO: 3 %
ERYTHROCYTE [DISTWIDTH] IN BLOOD BY AUTOMATED COUNT: 14.4 % (ref 11.7–15.4)
GLOBULIN SER CALC-MCNC: 2.3 G/DL (ref 1.5–4.5)
GLUCOSE SERPL-MCNC: 89 MG/DL (ref 65–99)
HCT VFR BLD AUTO: 36.2 % (ref 34–46.6)
HGB BLD-MCNC: 11.7 G/DL (ref 11.1–15.9)
IMM GRANULOCYTES # BLD AUTO: 0 X10E3/UL (ref 0–0.1)
IMM GRANULOCYTES NFR BLD AUTO: 0 %
LYMPHOCYTES # BLD AUTO: 1.5 X10E3/UL (ref 0.7–3.1)
LYMPHOCYTES NFR BLD AUTO: 29 %
MCH RBC QN AUTO: 29.2 PG (ref 26.6–33)
MCHC RBC AUTO-ENTMCNC: 32.3 G/DL (ref 31.5–35.7)
MCV RBC AUTO: 90 FL (ref 79–97)
MONOCYTES # BLD AUTO: 0.2 X10E3/UL (ref 0.1–0.9)
MONOCYTES NFR BLD AUTO: 5 %
NEUTROPHILS # BLD AUTO: 3.1 X10E3/UL (ref 1.4–7)
NEUTROPHILS NFR BLD AUTO: 62 %
PLATELET # BLD AUTO: 229 X10E3/UL (ref 150–450)
POTASSIUM SERPL-SCNC: 4.7 MMOL/L (ref 3.5–5.2)
PROT SERPL-MCNC: 6.1 G/DL (ref 6–8.5)
RBC # BLD AUTO: 4.01 X10E6/UL (ref 3.77–5.28)
SODIUM SERPL-SCNC: 140 MMOL/L (ref 134–144)
WBC # BLD AUTO: 5.1 X10E3/UL (ref 3.4–10.8)

## 2022-07-05 ENCOUNTER — TELEPHONE (OUTPATIENT)
Dept: FAMILY MEDICINE CLINIC | Facility: CLINIC | Age: 72
End: 2022-07-05

## 2022-07-05 NOTE — TELEPHONE ENCOUNTER
"Lamar with [facility] called to request a change of DX for pt's home health referral in order to meet Medicare guidelines. Lamar states \"falls\" cannot be a dx, it needs to be something specific that is causing the falls, and \"vascular dementia\" does not meet Medicare guidelines but if it is changed to \"unspecified dementia\" it is more likely to meet guidelines.   "

## 2022-07-08 ENCOUNTER — HOME CARE VISIT (OUTPATIENT)
Dept: HOME HEALTH SERVICES | Facility: HOME HEALTHCARE | Age: 72
End: 2022-07-08

## 2022-07-08 VITALS
DIASTOLIC BLOOD PRESSURE: 85 MMHG | OXYGEN SATURATION: 97 % | TEMPERATURE: 98 F | HEART RATE: 71 BPM | SYSTOLIC BLOOD PRESSURE: 132 MMHG

## 2022-07-08 PROCEDURE — G0151 HHCP-SERV OF PT,EA 15 MIN: HCPCS

## 2022-07-08 NOTE — HOME HEALTH
PT EVAL  Patient is a 71  year old  female,referred for skilled PT interventions after recent hospital stay due to weakness, dec act tolerance bec of hypertensive heart and chronic kidney dse. Patient req min a for transfers, MIn A for gait with a RW. Patient also has a history of 2-3 fall within the last 12 months  SOCIAL SUPPORT/HOME LIVING SITUATION. Lives with son and daughter in law, steps to get in and out of the home.  PLOF. Px reports being independent without an AD prior to hospitalization  Vital Signs. Please see oasis/eval on this visit  Homebound status. Due to dec act tolerance, weakness, decline in transfers and ambulation. Patient requires a taxing effort to leave home, putting patient at risk for falls or injury  Skilled PT needed to improve patient's functional mobility, improve safety for transfers and ambulation and return patient to PLOF.  Focus of care: hypertensive heart and chronic kidney dse    Plan on next visit: HEP teaching, gait training

## 2022-07-10 DIAGNOSIS — F33.1 MAJOR DEPRESSIVE DISORDER, RECURRENT EPISODE, MODERATE: Chronic | ICD-10-CM

## 2022-07-10 DIAGNOSIS — F41.1 GENERALIZED ANXIETY DISORDER: Chronic | ICD-10-CM

## 2022-07-11 NOTE — CASE COMMUNICATION
Per Home Care guidelines we are required to report drug to drug interactions on review of this patient medications a major interaction was noted as listed below.    Drug-Drug: Flector and sertraline   Toxic effects may be increased with concurrent administration of Flector and sertraline. The risk of upper gastrointestinal bleeding may be increased. Patients taking both drugs concurrently should be educated about the signs and symptoms of GI bleeding    Drug-Drug: metoclopramide and QUEtiapine   Coadministration of metoclopramide with QUEtiapine may increase the risk of extrapyramidal reactions and neuroleptic malignant syndrome. Coadministration of metoclopramide with other agents associated with the development of extrapyramidal reactions (eg, QUEtiapine) should be avoided according to metoclopramide prescribing information.    Drug-Drug: busPIRone and sertraline   Additive serotonergic effects may occur during coadministration of buspirone and sertraline, and the risk of developing serotonin syndrome may be increased.    Patient denies any issue with the medications    Thank you  El AMARALN  Clinical Manager [facility]  Office [phone]  Cell [phone]  Fax [phone]  [email or web address]

## 2022-07-12 RX ORDER — SERTRALINE HYDROCHLORIDE 100 MG/1
TABLET, FILM COATED ORAL
Qty: 30 TABLET | Refills: 0 | Status: SHIPPED | OUTPATIENT
Start: 2022-07-12 | End: 2022-08-09

## 2022-07-13 PROCEDURE — G0180 MD CERTIFICATION HHA PATIENT: HCPCS | Performed by: NURSE PRACTITIONER

## 2022-07-13 RX ORDER — METOCLOPRAMIDE 5 MG/1
TABLET ORAL
Qty: 90 TABLET | Refills: 1 | Status: SHIPPED | OUTPATIENT
Start: 2022-07-13 | End: 2022-08-29

## 2022-07-15 ENCOUNTER — HOME CARE VISIT (OUTPATIENT)
Dept: HOME HEALTH SERVICES | Facility: HOME HEALTHCARE | Age: 72
End: 2022-07-15

## 2022-07-15 PROCEDURE — G0157 HHC PT ASSISTANT EA 15: HCPCS

## 2022-07-17 VITALS
RESPIRATION RATE: 14 BRPM | SYSTOLIC BLOOD PRESSURE: 132 MMHG | HEART RATE: 73 BPM | OXYGEN SATURATION: 96 % | DIASTOLIC BLOOD PRESSURE: 66 MMHG | TEMPERATURE: 97.5 F

## 2022-07-17 NOTE — HOME HEALTH
pt up and is prepared for PT session,  feels fair today with no mentioned c/o's.      pt reporting no changes from last visit.   pt reporting she has significant decrease in strength and functional capacity.     no med changes reported.     unable to visually assess per cg request due to having meds locked up in a safe.            pt was challenged today to ambulate long distance,   with decreased step length and step through and with cues to improve b hip flexion.       pt was compliant throughout PT session but struggled at times to stand for exended periods.    pt was challenged today to ambulated long distance with c/o fatigue and LE weakness.

## 2022-07-22 ENCOUNTER — HOME CARE VISIT (OUTPATIENT)
Dept: HOME HEALTH SERVICES | Facility: HOME HEALTHCARE | Age: 72
End: 2022-07-22

## 2022-07-22 PROCEDURE — G0157 HHC PT ASSISTANT EA 15: HCPCS

## 2022-07-24 VITALS
OXYGEN SATURATION: 96 % | HEART RATE: 72 BPM | DIASTOLIC BLOOD PRESSURE: 60 MMHG | SYSTOLIC BLOOD PRESSURE: 132 MMHG | TEMPERATURE: 98.2 F | RESPIRATION RATE: 16 BRPM

## 2022-07-25 NOTE — HOME HEALTH
pt up and is prepared for PT session.  pt staes she feels well today, no new falls reported.   pt is using her walker at all times.        no med changes reported,  family requested no visual assessment as meds are locked in a safe.       pt is transferring better but still challenged at times,  with repeated attempts needed and cues given for proper LE placement/trunk flexion.   pt displayed minimal unsteadiness upon standing but was able to self correct.       work/rest ratio today 50/50,  standing tolerance was mostly at 2-3 mins.  pt was fatigued to end PT session but was pleased to have participated.

## 2022-07-29 ENCOUNTER — HOME CARE VISIT (OUTPATIENT)
Dept: HOME HEALTH SERVICES | Facility: HOME HEALTHCARE | Age: 72
End: 2022-07-29

## 2022-07-29 PROCEDURE — G0157 HHC PT ASSISTANT EA 15: HCPCS

## 2022-07-31 VITALS
SYSTOLIC BLOOD PRESSURE: 130 MMHG | HEART RATE: 73 BPM | TEMPERATURE: 98.2 F | RESPIRATION RATE: 16 BRPM | OXYGEN SATURATION: 94 % | DIASTOLIC BLOOD PRESSURE: 60 MMHG

## 2022-07-31 NOTE — HOME HEALTH
pt up and is prepared for PT session.   pt reporting no med changes,  unable to assess as family keeps them locked up.     pt reporting no new falls and feels her mobility is slowly improving with PT intervention.        pt was compliant throughout PT session but struggled at times with weakness and related limitations     pt was challenged to stand, reported weakness in the quads and needed cues to properly position the LEs.      pt was cued to properly deep plb for energy conservation.   work/rest ratio today was 50/50.    standing and gait tolerance have improved with PT intervention

## 2022-08-05 ENCOUNTER — HOME CARE VISIT (OUTPATIENT)
Dept: HOME HEALTH SERVICES | Facility: HOME HEALTHCARE | Age: 72
End: 2022-08-05

## 2022-08-05 VITALS
SYSTOLIC BLOOD PRESSURE: 130 MMHG | DIASTOLIC BLOOD PRESSURE: 80 MMHG | TEMPERATURE: 98.1 F | HEART RATE: 78 BPM | OXYGEN SATURATION: 97 %

## 2022-08-05 PROCEDURE — G0151 HHCP-SERV OF PT,EA 15 MIN: HCPCS

## 2022-08-09 DIAGNOSIS — F41.1 GENERALIZED ANXIETY DISORDER: Chronic | ICD-10-CM

## 2022-08-09 DIAGNOSIS — F33.1 MAJOR DEPRESSIVE DISORDER, RECURRENT EPISODE, MODERATE: Chronic | ICD-10-CM

## 2022-08-09 RX ORDER — SERTRALINE HYDROCHLORIDE 100 MG/1
TABLET, FILM COATED ORAL
Qty: 30 TABLET | Refills: 0 | Status: SHIPPED | OUTPATIENT
Start: 2022-08-09 | End: 2022-08-29

## 2022-08-29 DIAGNOSIS — F33.1 MAJOR DEPRESSIVE DISORDER, RECURRENT EPISODE, MODERATE: Chronic | ICD-10-CM

## 2022-08-29 DIAGNOSIS — F33.2 SEVERE EPISODE OF RECURRENT MAJOR DEPRESSIVE DISORDER, WITHOUT PSYCHOTIC FEATURES: ICD-10-CM

## 2022-08-29 DIAGNOSIS — F41.1 GENERALIZED ANXIETY DISORDER: Chronic | ICD-10-CM

## 2022-08-29 RX ORDER — METOCLOPRAMIDE 5 MG/1
TABLET ORAL
Qty: 90 TABLET | Refills: 1 | Status: SHIPPED | OUTPATIENT
Start: 2022-08-29 | End: 2022-11-07

## 2022-08-29 RX ORDER — SERTRALINE HYDROCHLORIDE 100 MG/1
TABLET, FILM COATED ORAL
Qty: 30 TABLET | Refills: 0 | Status: SHIPPED | OUTPATIENT
Start: 2022-08-29 | End: 2022-08-29 | Stop reason: SDUPTHER

## 2022-08-29 RX ORDER — ALLOPURINOL 100 MG/1
TABLET ORAL
Qty: 180 TABLET | Refills: 3 | Status: SHIPPED | OUTPATIENT
Start: 2022-08-29

## 2022-08-29 RX ORDER — SERTRALINE HYDROCHLORIDE 100 MG/1
100 TABLET, FILM COATED ORAL DAILY
Qty: 90 TABLET | Refills: 1 | Status: SHIPPED | OUTPATIENT
Start: 2022-08-29 | End: 2022-10-11

## 2022-08-29 RX ORDER — QUETIAPINE FUMARATE 50 MG/1
TABLET, FILM COATED ORAL
Qty: 90 TABLET | Refills: 0 | Status: SHIPPED | OUTPATIENT
Start: 2022-08-29 | End: 2022-11-07

## 2022-10-08 DIAGNOSIS — F41.1 GENERALIZED ANXIETY DISORDER: Chronic | ICD-10-CM

## 2022-10-08 DIAGNOSIS — F33.1 MAJOR DEPRESSIVE DISORDER, RECURRENT EPISODE, MODERATE: Chronic | ICD-10-CM

## 2022-10-10 RX ORDER — FERROUS SULFATE 325(65) MG
TABLET ORAL
Qty: 30 TABLET | Refills: 2 | Status: SHIPPED | OUTPATIENT
Start: 2022-10-10 | End: 2023-01-19

## 2022-10-11 RX ORDER — SERTRALINE HYDROCHLORIDE 100 MG/1
TABLET, FILM COATED ORAL
Qty: 30 TABLET | Refills: 1 | Status: SHIPPED | OUTPATIENT
Start: 2022-10-11

## 2022-11-03 ENCOUNTER — CLINICAL SUPPORT NO REQUIREMENTS (OUTPATIENT)
Dept: CARDIOLOGY | Facility: CLINIC | Age: 72
End: 2022-11-03

## 2022-11-03 ENCOUNTER — OFFICE VISIT (OUTPATIENT)
Dept: CARDIOLOGY | Facility: CLINIC | Age: 72
End: 2022-11-03

## 2022-11-03 VITALS
OXYGEN SATURATION: 97 % | DIASTOLIC BLOOD PRESSURE: 74 MMHG | BODY MASS INDEX: 33.68 KG/M2 | HEART RATE: 62 BPM | SYSTOLIC BLOOD PRESSURE: 142 MMHG | WEIGHT: 183 LBS | HEIGHT: 62 IN

## 2022-11-03 DIAGNOSIS — I25.10 CORONARY ARTERY DISEASE INVOLVING NATIVE CORONARY ARTERY OF NATIVE HEART WITHOUT ANGINA PECTORIS: ICD-10-CM

## 2022-11-03 DIAGNOSIS — G47.33 OBSTRUCTIVE SLEEP APNEA: ICD-10-CM

## 2022-11-03 DIAGNOSIS — I10 ESSENTIAL HYPERTENSION: ICD-10-CM

## 2022-11-03 DIAGNOSIS — Z95.0 PRESENCE OF CARDIAC PACEMAKER: Primary | ICD-10-CM

## 2022-11-03 DIAGNOSIS — E78.00 PURE HYPERCHOLESTEROLEMIA: ICD-10-CM

## 2022-11-03 DIAGNOSIS — I49.5 SICK SINUS SYNDROME: ICD-10-CM

## 2022-11-03 PROCEDURE — 99214 OFFICE O/P EST MOD 30 MIN: CPT | Performed by: INTERNAL MEDICINE

## 2022-11-03 PROCEDURE — 93280 PM DEVICE PROGR EVAL DUAL: CPT | Performed by: INTERNAL MEDICINE

## 2022-11-03 NOTE — PROGRESS NOTES
Subjective:     Encounter Date:11/03/2022      Patient ID: Elaine Toscano is a 72 y.o. female.    Chief Complaint:  History of Present Illness 72-year-old white female with history of coronary disease hypertension hyperlipidemia sleep apnea presents to office for follow-up.  Patient also has sick sinus syndrome status post pacemaker placement.  Patient is currently stable without any symptoms of chest pain or shortness of breath at rest on exertion.  No complains any PND orthopnea.  No palpitation dizziness syncope or swelling of the feet.  Patient has been taking all medicines regularly.  She does not smoke.    The following portions of the patient's history were reviewed and updated as appropriate: allergies, current medications, past family history, past medical history, past social history, past surgical history and problem list.  Past Medical History:   Diagnosis Date   • Abnormal ECG    • Acute renal failure superimposed on stage 4 chronic kidney disease ([facility]) 03/24/2021   • Anemia    • Broken foot     right   • Broken nose 07/2017   • CHF (congestive heart failure) ([facility])    • CHF (congestive heart failure) ([facility])    • Chronic kidney disease (CKD), stage IV (severe) ([facility]) 05/06/2020   • Chronic low back pain    • CRI (chronic renal insufficiency)    • Depression    • DJD (degenerative joint disease)    • Fracture of ankle 08/12/2016   • GERD (gastroesophageal reflux disease)    • Gout    • Hypertension    • Hypothyroidism    • CRUZ (obstructive sleep apnea)     non-complaince with bipap   • Osteopenia    • Renal failure     stage 3   • Renal failure, chronic, stage 4 (severe) ([facility]) 01/21/2020   • Suicide attempt ([facility])    • TMJ (dislocation of temporomandibular joint)      Past Surgical History:   Procedure Laterality Date   • APPENDECTOMY  1986   • BACK SURGERY      2004---2005 L5-S1 spinal stenosis   • CARDIAC CATHETERIZATION  04/2018   • CHOLECYSTECTOMY  2008   • EPIDURAL BLOCK     • FOOT SURGERY Bilateral  "   • FOOT SURGERY Bilateral 2019    Dr. Keenan- 2nd toe on bilateral feet    • GASTRIC BYPASS     • INSERT / REPLACE / REMOVE PACEMAKER     • LAPAROSCOPIC GASTRIC BANDING      removed   • OTHER SURGICAL HISTORY  2018    myelogram   • TOTAL KNEE ARTHROPLASTY Left      /74   Pulse 62   Ht 157.5 cm (62.01\")   Wt 83 kg (183 lb)   LMP  (LMP Unknown)   SpO2 97%   BMI 33.46 kg/m²   Family History   Problem Relation Age of Onset   • Hypertension Father         PM implanted   • Prostate cancer Father    • Heart disease Father         He had a pacemaker/     • Arthritis Sister    • Heart disease Sister    • Lung disease Paternal Grandmother    • COPD Other    • Lung disease Other        Current Outpatient Medications:   •  alendronate (FOSAMAX) 70 MG tablet, TAKE ONE TABLET BY MOUTH EVERY 7 DAYS AS DIRECTED ------TAKE WITH 8 OUNCES OF WATER FIRST THING IN THE MORNING AND REMAIN UPRIGHT FOR 30 MINUTES----, Disp: 13 tablet, Rfl: 3  •  allopurinol (ZYLOPRIM) 100 MG tablet, TAKE TWO TABLETS BY MOUTH EVERY NIGHT AT BEDTIME, Disp: 180 tablet, Rfl: 3  •  busPIRone (BUSPAR) 10 MG tablet, TAKE ONE TABLET BY MOUTH TWO TIMES A DAY, Disp: 180 tablet, Rfl: 2  •  calcitriol (ROCALTROL) 0.25 MCG capsule, Take 0.25 mcg by mouth Daily., Disp: , Rfl:   •  cyanocobalamin 1000 MCG/ML injection, INJECT 1 ML INTRAMUSCULARLY EVERY 28 DAYS AS DIRECTED, Disp: 1 mL, Rfl: 6  •  cyclobenzaprine (FLEXERIL) 10 MG tablet, TAKE ONE TABLET BY MOUTH AT NIGHT AS NEEDED FOR MUSCLE SPASMS, Disp: 30 tablet, Rfl: 2  •  FeroSul 325 (65 Fe) MG tablet, TAKE ONE TABLET BY MOUTH ONCE DAILY WITH BREAKFAST, Disp: 30 tablet, Rfl: 2  •  Flector 1.3 % patch patch, Apply 1 patch topically to the appropriate area as directed Continuous As Needed (back pain)., Disp: , Rfl:   •  fluticasone (Flonase) 50 MCG/ACT nasal spray, 2 sprays into the nostril(s) as directed by provider Daily., Disp: 16 g, Rfl: 12  •  furosemide (LASIX) 40 MG " "tablet, Take 1 tablet by mouth Daily. Until ankle diameter <10.5 inches and weight <255 # (Patient taking differently: Take 1 tablet by mouth Daily As Needed. Until ankle diameter <10.5 inches and weight <255 #), Disp: 30 tablet, Rfl: o  •  HYDROcodone-acetaminophen (NORCO)  MG per tablet, Take 1 tablet by mouth Every 6 (Six) Hours As Needed for Severe Pain ., Disp: , Rfl:   •  linaclotide (LINZESS) 72 MCG capsule capsule, Take 1 capsule by mouth Daily. (Patient taking differently: Take 1 capsule by mouth Daily As Needed.), Disp: 90 capsule, Rfl: 3  •  melatonin ([employer] Melatonin) 5 MG tablet tablet, Take 2 tablets by mouth Every Night., Disp: 60 tablet, Rfl: 12  •  memantine (Namenda) 10 MG tablet, Take 1 tablet by mouth 2 (Two) Times a Day., Disp: 180 tablet, Rfl: 1  •  O2 (OXYGEN), Inhale 2 L/min Every Night., Disp: , Rfl:   •  ondansetron (ZOFRAN) 4 MG tablet, TAKE ONE TABLET BY MOUTH AS NEEDED FOR NAUSEA AND VOMITING (Patient taking differently: Take 1 tablet by mouth Every 8 (Eight) Hours As Needed.), Disp: 30 tablet, Rfl: 1  •  potassium chloride 10 MEQ CR tablet, TAKE ONE TABLET BY MOUTH TWO TIMES A DAY AS NEEDED WITH LASIX FOR POTASSIUM SUPPLEMENT, Disp: 60 tablet, Rfl: 1  •  sertraline (ZOLOFT) 100 MG tablet, TAKE ONE TABLET BY MOUTH ONCE DAILY ----MUST BE SEEN FOR ADDITIONAL REFILLS----, Disp: 30 tablet, Rfl: 1  •  Syringe 23G X 1\" 3 ML misc, 1 each Every 30 (Thirty) Days. Use with B12 Injections, Disp: 50 each, Rfl: 0  •  Aspirin Low Dose 81 MG EC tablet, TAKE ONE TABLET BY MOUTH ONCE DAILY, Disp: 90 tablet, Rfl: 2  •  dicyclomine (BENTYL) 10 MG capsule, TAKE ONE CAPSULE BY MOUTH FOUR TIMES A DAY, Disp: 360 capsule, Rfl: 1  •  famotidine (PEPCID) 20 MG tablet, TAKE ONE TABLET BY MOUTH TWO TIMES A DAY, Disp: 60 tablet, Rfl: 6  •  metoclopramide (REGLAN) 5 MG tablet, TAKE ONE TABLET BY MOUTH FOUR TIMES A DAY, Disp: 90 tablet, Rfl: 1  •  pantoprazole (PROTONIX) 40 MG EC tablet, TAKE ONE TABLET BY MOUTH " ONCE DAILY, Disp: 30 tablet, Rfl: 6  •  propranolol (INDERAL) 80 MG tablet, TAKE ONE TABLET BY MOUTH TWO TIMES A DAY, Disp: 180 tablet, Rfl: 1  •  QUEtiapine (SEROquel) 50 MG tablet, TAKE ONE TABLET BY MOUTH ONCE DAILY AT BEDTIME, Disp: 90 tablet, Rfl: 0  Allergies   Allergen Reactions   • Azithromycin Unknown (See Comments)   • Codeine Nausea And Vomiting   • Dilaudid  [Hydromorphone Hcl] Unknown (See Comments)   • Isosorbide Nitrate Unknown - High Severity   • Macrobid  [Nitrofurantoin] Rash   • Morphine Rash   • Oxycontin  [Oxycodone] Delirium   • Pineapple Other (See Comments)     Mouth numbness   • Povidone Iodine Rash   • Tetanus Antitoxin Rash   • Tizanidine Other (See Comments) and Delirium     Increased pain   • Denosumab Other (See Comments)   • Demerol [Meperidine] Unknown (See Comments)     Pt does not remember. She states it was a long time ago   • Etodolac Other (See Comments)   • Lodrane D [Brompheniramine-Pseudoeph] Other (See Comments)     drowsiness   • Shrimp Flavor Swelling     Sauce only    • Sulfa Antibiotics Unknown (See Comments)   • Imipramine Palpitations   • Iodine Itching and Rash     Social History     Socioeconomic History   • Marital status:    Tobacco Use   • Smoking status: Never   • Smokeless tobacco: Never   Vaping Use   • Vaping Use: Never used   • Passive vaping exposure: Yes   Substance and Sexual Activity   • Alcohol use: Yes     Alcohol/week: 1.0 standard drink     Types: 1 Glasses of wine per week     Comment: Occasionally   • Drug use: No   • Sexual activity: Not Currently     Partners: Male     Review of Systems   Constitutional: Negative for malaise/fatigue.   Cardiovascular: Negative for chest pain, dyspnea on exertion, leg swelling and palpitations.   Respiratory: Negative for cough and shortness of breath.    Gastrointestinal: Negative for abdominal pain, nausea and vomiting.   Neurological: Negative for dizziness, focal weakness, headaches, light-headedness and  numbness.   All other systems reviewed and are negative.             Objective:     Constitutional:       Appearance: Well-developed.   Eyes:      General: No scleral icterus.     Conjunctiva/sclera: Conjunctivae normal.   HENT:      Head: Normocephalic and atraumatic.   Neck:      Vascular: No carotid bruit or JVD.   Pulmonary:      Effort: Pulmonary effort is normal.      Breath sounds: Normal breath sounds. No wheezing. No rales.   Cardiovascular:      Normal rate. Regular rhythm.   Pulses:     Intact distal pulses.   Abdominal:      General: Bowel sounds are normal.      Palpations: Abdomen is soft.   Musculoskeletal:      Cervical back: Normal range of motion and neck supple. Skin:     General: Skin is warm and dry.      Findings: No rash.   Neurological:      Mental Status: Alert.       Procedures    Lab Review:         MDM  1.  Coronary disease  Patient has nonobstructive disease with normal function is currently stable on medications  2.  Sick sinus syndrome  Patient had a pacemaker placement and the pacemaker is working very well  3.  Hypertension patient blood pressure is currently stable on medical therapy  4.  Hyperlipidemia  Patient on statins and the lipid levels are well within normal limits  5.  Sleep apnea  Patient has sleep apnea and uses a CPAP machine      Patient's previous medical records, labs, and EKG were reviewed and discussed with the patient at today's visit.

## 2022-11-07 DIAGNOSIS — F33.2 SEVERE EPISODE OF RECURRENT MAJOR DEPRESSIVE DISORDER, WITHOUT PSYCHOTIC FEATURES: ICD-10-CM

## 2022-11-07 RX ORDER — DICYCLOMINE HYDROCHLORIDE 10 MG/1
CAPSULE ORAL
Qty: 360 CAPSULE | Refills: 1 | Status: SHIPPED | OUTPATIENT
Start: 2022-11-07 | End: 2023-03-22 | Stop reason: SDUPTHER

## 2022-11-07 RX ORDER — PROPRANOLOL HYDROCHLORIDE 80 MG/1
TABLET ORAL
Qty: 180 TABLET | Refills: 1 | Status: SHIPPED | OUTPATIENT
Start: 2022-11-07 | End: 2023-03-22 | Stop reason: ALTCHOICE

## 2022-11-07 RX ORDER — QUETIAPINE FUMARATE 50 MG/1
TABLET, FILM COATED ORAL
Qty: 90 TABLET | Refills: 0 | Status: SHIPPED | OUTPATIENT
Start: 2022-11-07 | End: 2023-01-05

## 2022-11-07 RX ORDER — ASPIRIN 81 MG/1
TABLET, COATED ORAL
Qty: 90 TABLET | Refills: 2 | Status: SHIPPED | OUTPATIENT
Start: 2022-11-07 | End: 2023-02-06

## 2022-11-07 RX ORDER — METOCLOPRAMIDE 5 MG/1
TABLET ORAL
Qty: 90 TABLET | Refills: 1 | Status: SHIPPED | OUTPATIENT
Start: 2022-11-07 | End: 2023-01-05

## 2022-11-08 RX ORDER — FAMOTIDINE 20 MG/1
TABLET, FILM COATED ORAL
Qty: 60 TABLET | Refills: 6 | Status: SHIPPED | OUTPATIENT
Start: 2022-11-08

## 2022-11-08 RX ORDER — PANTOPRAZOLE SODIUM 40 MG/1
TABLET, DELAYED RELEASE ORAL
Qty: 30 TABLET | Refills: 6 | Status: SHIPPED | OUTPATIENT
Start: 2022-11-08

## 2022-12-30 RX ORDER — MEMANTINE HYDROCHLORIDE 10 MG/1
TABLET ORAL
Qty: 180 TABLET | Refills: 1 | Status: SHIPPED | OUTPATIENT
Start: 2022-12-30

## 2022-12-30 NOTE — TELEPHONE ENCOUNTER
KUB and chest xray reviewed - KUB stable, chest xray with new basilar / lateral ptx. Plan for follow up chest xray tomorrow to review.      Spoke with Earnestine, informed her that Elaine would need to be evaluated at the ER. She stated that the pain  In her breast was radiating from left to right. EMS was arriving at the house when I was speaking to Earnestine.

## 2023-01-05 ENCOUNTER — OFFICE VISIT (OUTPATIENT)
Dept: FAMILY MEDICINE CLINIC | Facility: CLINIC | Age: 73
End: 2023-01-05
Payer: MEDICARE

## 2023-01-05 VITALS
TEMPERATURE: 97.3 F | SYSTOLIC BLOOD PRESSURE: 166 MMHG | HEART RATE: 73 BPM | RESPIRATION RATE: 16 BRPM | OXYGEN SATURATION: 97 % | BODY MASS INDEX: 33.82 KG/M2 | HEIGHT: 62 IN | WEIGHT: 183.8 LBS | DIASTOLIC BLOOD PRESSURE: 115 MMHG

## 2023-01-05 DIAGNOSIS — R41.3 LOSS OF MEMORY: Primary | ICD-10-CM

## 2023-01-05 DIAGNOSIS — R26.89 LOSS OF BALANCE: ICD-10-CM

## 2023-01-05 DIAGNOSIS — R29.6 FREQUENT FALLS: ICD-10-CM

## 2023-01-05 DIAGNOSIS — R07.9 CHEST PAIN, UNSPECIFIED TYPE: ICD-10-CM

## 2023-01-05 DIAGNOSIS — Z74.09 MOBILITY POOR: ICD-10-CM

## 2023-01-05 DIAGNOSIS — F33.1 MAJOR DEPRESSIVE DISORDER, RECURRENT EPISODE, MODERATE: Chronic | ICD-10-CM

## 2023-01-05 DIAGNOSIS — F41.1 GENERALIZED ANXIETY DISORDER: Chronic | ICD-10-CM

## 2023-01-05 PROCEDURE — 99214 OFFICE O/P EST MOD 30 MIN: CPT | Performed by: NURSE PRACTITIONER

## 2023-01-05 PROCEDURE — 93000 ELECTROCARDIOGRAM COMPLETE: CPT | Performed by: NURSE PRACTITIONER

## 2023-01-05 NOTE — PROGRESS NOTES
Elaine Toscano is a 72 y.o. female.     History of Present Illness  72-year-old white female with history of CKD, mild dementia, depression, GERD, CHF, osteoporosis, anemia, hypertension, sleep apnea, gastric bypass, chronic pain who goes to pain management, hyperlipidemia who comes in today with daughter.    Blood pressure elevated 166/110 heart rate 72 but they state her pressures always run lower at home.  She has had some intermittent chest pain over the left side and EKG today showed marked left axis deviation and left bundle branch block will fax to Dr Larson.  She does see Dr. Harper has no other appointment and may.  Unfortunately I cannot give her nitroglycerin because she is allergic to nitratrates.  I told them if intermittent chest pain continues they might want to call Dr Larson's office to make appointment sooner than May    Patient saw psychiatry however they told her that she did not really need to come back anymore and primary care should take over her care.  She did cancel an appointment which is probably the reason for patient being dismissed.  Patient also living and home situation that is not helping her issues.  Her and her family need to decide if she should go to assisted living to get away from the home situation.  And I instructed patient she can cancel appointment with physicians and expect not to be canceled.  I did increase her Namenda to 10 mg twice a day last visit and they said that has helped    Patient has upcoming blood work for oncology and they are going to go to the court in office to have that drawn            EKG/faxed to cardiology  Consider assisted living or work-up something to get out of bad home situation  Physical therapy referral  Consider moving up cardiology appointment if chest pain persist         The following portions of the patient's history were reviewed and updated as appropriate: allergies, current medications, past family history, past medical history, past  social history, past surgical history and problem list.    Vitals:    23 1057   BP: (!) 166/115   BP Location: Left arm   Patient Position: Sitting   Cuff Size: Large Adult   Pulse: 73   Resp: 16   Temp: 97.3 °F (36.3 °C)   TempSrc: Infrared   SpO2: 97%   Weight: 83.4 kg (183 lb 12.8 oz)   Height: 157.5 cm (62\")     Body mass index is 33.62 kg/m².    Past Medical History:   Diagnosis Date   • Abnormal ECG    • Acute renal failure superimposed on stage 4 chronic kidney disease ([facility]) 2021   • Anemia    • Broken foot     right   • Broken nose 2017   • CHF (congestive heart failure) ([facility])    • CHF (congestive heart failure) ([facility])    • Chronic kidney disease (CKD), stage IV (severe) ([facility]) 2020   • Chronic low back pain    • CRI (chronic renal insufficiency)    • Depression    • DJD (degenerative joint disease)    • Fracture of ankle 2016   • GERD (gastroesophageal reflux disease)    • Gout    • Hypertension    • Hypothyroidism    • CRUZ (obstructive sleep apnea)     non-complaince with bipap   • Osteopenia    • Renal failure     stage 3   • Renal failure, chronic, stage 4 (severe) ([facility]) 2020   • Suicide attempt ([facility])    • TMJ (dislocation of temporomandibular joint)      Past Surgical History:   Procedure Laterality Date   • APPENDECTOMY     • BACK SURGERY      --- L5-S1 spinal stenosis   • CARDIAC CATHETERIZATION  2018   • CHOLECYSTECTOMY     • EPIDURAL BLOCK     • FOOT SURGERY Bilateral    • FOOT SURGERY Bilateral 2019    Dr. Keenan- 2nd toe on bilateral feet    • GASTRIC BYPASS     • INSERT / REPLACE / REMOVE PACEMAKER     • LAPAROSCOPIC GASTRIC BANDING      removed   • OTHER SURGICAL HISTORY  2018    myelogram   • TOTAL KNEE ARTHROPLASTY Left      Family History   Problem Relation Age of Onset   • Hypertension Father         PM implanted   • Prostate cancer Father    • Heart disease Father         He had a pacemaker/     • Arthritis  Sister    • Heart disease Sister    • Lung disease Paternal Grandmother    • COPD Other    • Lung disease Other      Immunization History   Administered Date(s) Administered   • COVID-19 (MODERNA) 1st, 2nd, 3rd Dose Only 02/03/2021, 03/04/2021   • COVID-19 (UNSPECIFIED) 02/03/2021, 03/04/2021   • FLUAD TRI 65YR+ 09/30/2019   • Fluad Quad 65+ 09/30/2019, 09/22/2020   • Fluzone High Dose =>65 Years ([employer] ONLY) 09/08/2015, 09/11/2017, 11/02/2018, 09/30/2019   • Fluzone Quad >6mos (Multi-dose) 09/26/2016   • H1N1 Inj 12/03/2009   • Influenza Quad Vaccine (Inpatient) 09/11/2017   • Influenza Whole 10/29/2012   • Pneumococcal Conjugate 13-Valent (PCV13) 06/16/2015   • Pneumococcal Polysaccharide (PPSV23) 11/01/2016   • Shingrix 01/03/2022   • Td 04/27/2018   • Tdap 07/24/2019       Office Visit on 06/30/2022   Component Date Value Ref Range Status   • WBC 06/30/2022 5.1  3.4 - 10.8 x10E3/uL Final   • RBC 06/30/2022 4.01  3.77 - 5.28 x10E6/uL Final   • Hemoglobin 06/30/2022 11.7  11.1 - 15.9 g/dL Final   • Hematocrit 06/30/2022 36.2  34.0 - 46.6 % Final   • MCV 06/30/2022 90  79 - 97 fL Final   • MCH 06/30/2022 29.2  26.6 - 33.0 pg Final   • MCHC 06/30/2022 32.3  31.5 - 35.7 g/dL Final   • RDW 06/30/2022 14.4  11.7 - 15.4 % Final   • Platelets 06/30/2022 229  150 - 450 x10E3/uL Final   • Neutrophil Rel % 06/30/2022 62  Not Estab. % Final   • Lymphocyte Rel % 06/30/2022 29  Not Estab. % Final   • Monocyte Rel % 06/30/2022 5  Not Estab. % Final   • Eosinophil Rel % 06/30/2022 3  Not Estab. % Final   • Basophil Rel % 06/30/2022 1  Not Estab. % Final   • Neutrophils Absolute 06/30/2022 3.1  1.4 - 7.0 x10E3/uL Final   • Lymphocytes Absolute 06/30/2022 1.5  0.7 - 3.1 x10E3/uL Final   • Monocytes Absolute 06/30/2022 0.2  0.1 - 0.9 x10E3/uL Final   • Eosinophils Absolute 06/30/2022 0.2  0.0 - 0.4 x10E3/uL Final   • Basophils Absolute 06/30/2022 0.0  0.0 - 0.2 x10E3/uL Final   • Immature Granulocyte Rel % 06/30/2022 0  Not  Estab. % Final   • Immature Grans Absolute 06/30/2022 0.0  0.0 - 0.1 x10E3/uL Final   • Glucose 06/30/2022 89  65 - 99 mg/dL Final   • BUN 06/30/2022 23  8 - 27 mg/dL Final   • Creatinine 06/30/2022 1.33 (H)  0.57 - 1.00 mg/dL Final   • EGFR Result 06/30/2022 42 (L)  >59 mL/min/1.73 Final   • BUN/Creatinine Ratio 06/30/2022 17  12 - 28 Final   • Sodium 06/30/2022 140  134 - 144 mmol/L Final   • Potassium 06/30/2022 4.7  3.5 - 5.2 mmol/L Final   • Chloride 06/30/2022 104  96 - 106 mmol/L Final   • Total CO2 06/30/2022 20  20 - 29 mmol/L Final   • Calcium 06/30/2022 9.0  8.7 - 10.3 mg/dL Final   • Total Protein 06/30/2022 6.1  6.0 - 8.5 g/dL Final   • Albumin 06/30/2022 3.8  3.7 - 4.7 g/dL Final   • Globulin 06/30/2022 2.3  1.5 - 4.5 g/dL Final   • A/G Ratio 06/30/2022 1.7  1.2 - 2.2 Final   • Total Bilirubin 06/30/2022 0.4  0.0 - 1.2 mg/dL Final   • Alkaline Phosphatase 06/30/2022 99  44 - 121 IU/L Final   • AST (SGOT) 06/30/2022 13  0 - 40 IU/L Final   • ALT (SGPT) 06/30/2022 9  0 - 32 IU/L Final         Review of Systems   Constitutional: Negative.    HENT: Negative.    Respiratory: Negative.    Cardiovascular: Positive for chest pain.   Gastrointestinal: Negative.    Genitourinary: Negative.    Musculoskeletal:        Unsteady gait   Skin: Negative.    Neurological: Negative.    Psychiatric/Behavioral: Positive for agitation and depressed mood.       Objective   Physical Exam  Constitutional:       Appearance: Normal appearance.   HENT:      Head: Normocephalic.   Cardiovascular:      Rate and Rhythm: Normal rate and regular rhythm.      Pulses: Normal pulses.      Heart sounds: Normal heart sounds.   Pulmonary:      Effort: Pulmonary effort is normal.      Breath sounds: Normal breath sounds.   Abdominal:      General: Bowel sounds are normal.   Musculoskeletal:      Comments: Unsteady gait uses walker.  However patient does very little ambulation at home   Skin:     General: Skin is warm.   Neurological:       General: No focal deficit present.      Mental Status: She is alert and oriented to person, place, and time.   Psychiatric:         Mood and Affect: Mood normal.         Behavior: Behavior normal.           ECG 12 Lead    Date/Time: 1/5/2023 11:39 AM  Performed by: Annalisa Chase APRN  Authorized by: Annalisa Chase APRN   Comparison: compared with previous ECG   Rate: normal  Conduction: left bundle branch block  Pacing: atrial sensed rhythm  Clinical impression: abnormal EKG            Assessment & Plan   Diagnoses and all orders for this visit:    1. Loss of memory (Primary)    2. Frequent falls  -     Ambulatory Referral to Physical Therapy Evaluate and treat    3. Loss of balance  -     Ambulatory Referral to Physical Therapy Evaluate and treat    4. Mobility poor  -     Ambulatory Referral to Physical Therapy Evaluate and treat    5. Major depressive disorder, recurrent episode, moderate (HCC)    6. Generalized anxiety disorder    7. Chest pain, unspecified type  -     ECG 12 Lead          Current Outpatient Medications:   •  alendronate (FOSAMAX) 70 MG tablet, TAKE ONE TABLET BY MOUTH EVERY 7 DAYS AS DIRECTED ------TAKE WITH 8 OUNCES OF WATER FIRST THING IN THE MORNING AND REMAIN UPRIGHT FOR 30 MINUTES----, Disp: 13 tablet, Rfl: 3  •  allopurinol (ZYLOPRIM) 100 MG tablet, TAKE TWO TABLETS BY MOUTH EVERY NIGHT AT BEDTIME, Disp: 180 tablet, Rfl: 3  •  Aspirin Low Dose 81 MG EC tablet, TAKE ONE TABLET BY MOUTH ONCE DAILY, Disp: 90 tablet, Rfl: 2  •  busPIRone (BUSPAR) 10 MG tablet, TAKE ONE TABLET BY MOUTH TWO TIMES A DAY, Disp: 180 tablet, Rfl: 2  •  calcitriol (ROCALTROL) 0.25 MCG capsule, Take 0.25 mcg by mouth Daily., Disp: , Rfl:   •  cyanocobalamin 1000 MCG/ML injection, INJECT 1 ML INTRAMUSCULARLY EVERY 28 DAYS AS DIRECTED, Disp: 1 mL, Rfl: 6  •  cyclobenzaprine (FLEXERIL) 10 MG tablet, TAKE ONE TABLET BY MOUTH AT NIGHT AS NEEDED FOR MUSCLE SPASMS, Disp: 30 tablet, Rfl: 2  •  dicyclomine (BENTYL) 10 MG  capsule, TAKE ONE CAPSULE BY MOUTH FOUR TIMES A DAY, Disp: 360 capsule, Rfl: 1  •  famotidine (PEPCID) 20 MG tablet, TAKE ONE TABLET BY MOUTH TWO TIMES A DAY, Disp: 60 tablet, Rfl: 6  •  FeroSul 325 (65 Fe) MG tablet, TAKE ONE TABLET BY MOUTH ONCE DAILY WITH BREAKFAST, Disp: 30 tablet, Rfl: 2  •  Flector 1.3 % patch patch, Apply 1 patch topically to the appropriate area as directed Continuous As Needed (back pain)., Disp: , Rfl:   •  fluticasone (Flonase) 50 MCG/ACT nasal spray, 2 sprays into the nostril(s) as directed by provider Daily., Disp: 16 g, Rfl: 12  •  furosemide (LASIX) 40 MG tablet, Take 1 tablet by mouth Daily. Until ankle diameter <10.5 inches and weight <255 # (Patient taking differently: Take 40 mg by mouth Daily As Needed. Until ankle diameter <10.5 inches and weight <255 #), Disp: 30 tablet, Rfl: o  •  HYDROcodone-acetaminophen (NORCO)  MG per tablet, Take 1 tablet by mouth Every 6 (Six) Hours As Needed for Severe Pain ., Disp: , Rfl:   •  linaclotide (LINZESS) 72 MCG capsule capsule, Take 1 capsule by mouth Daily. (Patient taking differently: Take 72 mcg by mouth Daily As Needed.), Disp: 90 capsule, Rfl: 3  •  melatonin ([employer] Melatonin) 5 MG tablet tablet, Take 2 tablets by mouth Every Night., Disp: 60 tablet, Rfl: 12  •  memantine (NAMENDA) 10 MG tablet, TAKE ONE TABLET TWO TIMES A DAY, Disp: 180 tablet, Rfl: 1  •  O2 (OXYGEN), Inhale 2 L/min Every Night., Disp: , Rfl:   •  ondansetron (ZOFRAN) 4 MG tablet, TAKE ONE TABLET BY MOUTH AS NEEDED FOR NAUSEA AND VOMITING (Patient taking differently: Take 4 mg by mouth Every 8 (Eight) Hours As Needed.), Disp: 30 tablet, Rfl: 1  •  pantoprazole (PROTONIX) 40 MG EC tablet, TAKE ONE TABLET BY MOUTH ONCE DAILY, Disp: 30 tablet, Rfl: 6  •  potassium chloride 10 MEQ CR tablet, TAKE ONE TABLET BY MOUTH TWO TIMES A DAY AS NEEDED WITH LASIX FOR POTASSIUM SUPPLEMENT, Disp: 60 tablet, Rfl: 1  •  propranolol (INDERAL) 80 MG tablet, TAKE ONE TABLET BY MOUTH TWO  TIMES A DAY, Disp: 180 tablet, Rfl: 1  •  sertraline (ZOLOFT) 100 MG tablet, TAKE ONE TABLET BY MOUTH ONCE DAILY ----MUST BE SEEN FOR ADDITIONAL REFILLS----, Disp: 30 tablet, Rfl: 1  •  Syringe 23G X 1\" 3 ML misc, 1 each Every 30 (Thirty) Days. Use with B12 Injections, Disp: 50 each, Rfl: 0           Annalisa Chase, APRN 1/5/2023 11:22 EST  This note has been electronically signed

## 2023-01-05 NOTE — PATIENT INSTRUCTIONS
EKG  Consider assisted living or work-up something to get out of bad home situation  Physical therapy referral  Consider moving up cardiology appointment if chest pain persist

## 2023-01-09 ENCOUNTER — TELEPHONE (OUTPATIENT)
Dept: CARDIOLOGY | Facility: CLINIC | Age: 73
End: 2023-01-09
Payer: MEDICARE

## 2023-01-09 NOTE — TELEPHONE ENCOUNTER
ECG 12 Lead (01/05/2023 11:39)    PCP did an ekg on patient and patient would like you to look at it.

## 2023-01-09 NOTE — TELEPHONE ENCOUNTER
Reviewed EKG and compared to previous study, looks similar to study in 2021. Please let patient know and to call if she needs anything else. Thank you.

## 2023-01-19 RX ORDER — FERROUS SULFATE 325(65) MG
TABLET ORAL
Qty: 30 TABLET | Refills: 2 | Status: SHIPPED | OUTPATIENT
Start: 2023-01-19 | End: 2023-04-06

## 2023-01-19 RX ORDER — CYANOCOBALAMIN 1000 UG/ML
INJECTION, SOLUTION INTRAMUSCULAR; SUBCUTANEOUS
Qty: 1 ML | Refills: 6 | Status: SHIPPED | OUTPATIENT
Start: 2023-01-19

## 2023-01-19 RX ORDER — METOCLOPRAMIDE 5 MG/1
TABLET ORAL
Qty: 90 TABLET | Refills: 1 | Status: SHIPPED | OUTPATIENT
Start: 2023-01-19

## 2023-02-06 RX ORDER — ASPIRIN 81 MG/1
TABLET, COATED ORAL
Qty: 90 TABLET | Refills: 2 | Status: SHIPPED | OUTPATIENT
Start: 2023-02-06

## 2023-02-17 DIAGNOSIS — M81.0 OSTEOPOROSIS, UNSPECIFIED OSTEOPOROSIS TYPE, UNSPECIFIED PATHOLOGICAL FRACTURE PRESENCE: ICD-10-CM

## 2023-02-17 RX ORDER — ALENDRONATE SODIUM 70 MG/1
TABLET ORAL
Qty: 12 TABLET | Refills: 3 | OUTPATIENT
Start: 2023-02-17

## 2023-02-21 DIAGNOSIS — M81.0 OSTEOPOROSIS, UNSPECIFIED OSTEOPOROSIS TYPE, UNSPECIFIED PATHOLOGICAL FRACTURE PRESENCE: ICD-10-CM

## 2023-02-21 RX ORDER — ALENDRONATE SODIUM 70 MG/1
TABLET ORAL
Qty: 12 TABLET | Refills: 3 | OUTPATIENT
Start: 2023-02-21

## 2023-02-22 RX ORDER — ALENDRONATE SODIUM 70 MG/1
70 TABLET ORAL
Qty: 13 TABLET | Refills: 3 | Status: SHIPPED | OUTPATIENT
Start: 2023-02-22

## 2023-03-22 ENCOUNTER — OFFICE VISIT (OUTPATIENT)
Dept: FAMILY MEDICINE CLINIC | Facility: CLINIC | Age: 73
End: 2023-03-22
Payer: MEDICARE

## 2023-03-22 VITALS
DIASTOLIC BLOOD PRESSURE: 107 MMHG | HEIGHT: 62 IN | TEMPERATURE: 97.3 F | WEIGHT: 183.8 LBS | HEART RATE: 78 BPM | BODY MASS INDEX: 33.82 KG/M2 | OXYGEN SATURATION: 93 % | SYSTOLIC BLOOD PRESSURE: 168 MMHG

## 2023-03-22 DIAGNOSIS — E66.09 CLASS 1 OBESITY DUE TO EXCESS CALORIES WITH SERIOUS COMORBIDITY AND BODY MASS INDEX (BMI) OF 33.0 TO 33.9 IN ADULT: ICD-10-CM

## 2023-03-22 DIAGNOSIS — J30.9 ALLERGIC RHINITIS, UNSPECIFIED SEASONALITY, UNSPECIFIED TRIGGER: ICD-10-CM

## 2023-03-22 DIAGNOSIS — Z13.220 LIPID SCREENING: Primary | ICD-10-CM

## 2023-03-22 DIAGNOSIS — J01.00 ACUTE NON-RECURRENT MAXILLARY SINUSITIS: ICD-10-CM

## 2023-03-22 PROBLEM — E66.811 CLASS 1 OBESITY DUE TO EXCESS CALORIES WITH SERIOUS COMORBIDITY AND BODY MASS INDEX (BMI) OF 33.0 TO 33.9 IN ADULT: Status: ACTIVE | Noted: 2023-03-22

## 2023-03-22 PROBLEM — H40.009 GLAUCOMA SUSPECT: Status: ACTIVE | Noted: 2023-01-09

## 2023-03-22 PROBLEM — H04.123 DRY EYES: Status: ACTIVE | Noted: 2023-01-09

## 2023-03-22 PROBLEM — H52.223 REGULAR ASTIGMATISM OF BOTH EYES: Status: ACTIVE | Noted: 2019-10-24

## 2023-03-22 PROBLEM — H52.4 PRESBYOPIA: Status: ACTIVE | Noted: 2019-05-17

## 2023-03-22 PROBLEM — H35.372 EPIRETINAL MEMBRANE (ERM) OF LEFT EYE: Status: ACTIVE | Noted: 2023-01-09

## 2023-03-22 PROBLEM — Z96.1 PSEUDOPHAKIA OF BOTH EYES: Status: ACTIVE | Noted: 2019-10-24

## 2023-03-22 RX ORDER — PROPRANOLOL HYDROCHLORIDE 80 MG/1
80 TABLET ORAL 3 TIMES DAILY
Qty: 90 TABLET | Refills: 5 | COMMUNITY
Start: 2023-01-27 | End: 2023-07-26

## 2023-03-22 RX ORDER — DICYCLOMINE HYDROCHLORIDE 10 MG/1
10 CAPSULE ORAL 4 TIMES DAILY
Qty: 360 CAPSULE | Refills: 1 | Status: SHIPPED | OUTPATIENT
Start: 2023-03-22

## 2023-03-22 RX ORDER — PSEUDOEPHEDRINE HCL 30 MG
30 TABLET ORAL EVERY 4 HOURS PRN
Qty: 60 TABLET | Refills: 2 | Status: SHIPPED | OUTPATIENT
Start: 2023-03-22

## 2023-03-22 RX ORDER — MOMETASONE FUROATE 50 UG/1
2 SPRAY, METERED NASAL DAILY
Qty: 17 G | Refills: 12 | Status: SHIPPED | OUTPATIENT
Start: 2023-03-22

## 2023-03-22 RX ORDER — CALCITRIOL 0.25 UG/1
0.25 CAPSULE, LIQUID FILLED ORAL 2 TIMES WEEKLY
Qty: 8 CAPSULE | Refills: 5 | COMMUNITY
Start: 2023-01-30 | End: 2023-07-29

## 2023-03-22 RX ORDER — HYDRALAZINE HYDROCHLORIDE 50 MG/1
50 TABLET, FILM COATED ORAL 3 TIMES DAILY
Qty: 90 TABLET | Refills: 2 | Status: SHIPPED | OUTPATIENT
Start: 2023-03-22

## 2023-03-22 RX ORDER — CEPHALEXIN 500 MG/1
500 CAPSULE ORAL 2 TIMES DAILY
Qty: 20 CAPSULE | Refills: 0 | Status: SHIPPED | OUTPATIENT
Start: 2023-03-22

## 2023-03-22 NOTE — PATIENT INSTRUCTIONS
Claritin/Nasonex/Sudafed 30 3 times daily/Benadryl 20 5 PM while on antibiotics  Keflex 500 mg twice daily x10 days  Wear mask when going outside  Mammogram DEXA scan at [place]  Follow-up 6 months

## 2023-03-22 NOTE — PROGRESS NOTES
"    Elaine Toscano is a 72 y.o. female.     History of Present Illness  72-year-old white female with history of CKD, mild dementia, depression, GERD, CHF, osteoporosis, anemia, hypertension, sleep apnea, gastric bypass, chronic pain goes to pain management, hyperlipidemia who comes in today with daughter for Medicare wellness visit    Blood pressure 168/106 heart rate 78 she denies any chest pain, dyspnea, tachycardia or dizziness.  Patient states that her daughter checks her pressure at home with a wrist cuff at age usually in the 130s and 40s systolic but it is in the 90s diastolic.  She is currently only taking 80 of propranolol 3 times a day we will add hydralazine 50 mg 3 times daily as well and gave him blood pressures for which to monitor    Patient also been having sinus pressure sinus headaches and congestion and examination reveals acute maxillary sinusitis we will place her on antibiotics and added low-dose Sudafed and Nasonex to her Claritin    Patient's weight is 184 with a basically unchanged with a BMI of 33.6.  She is up-to-date on immunizations, I am ordering her a mammogram and DEXA scan at [place], eye exam and patient had colonoscopy 2018 that was normal that is due in 2028              Claritin/Nasonex/Sudafed 30 3 times daily/Benadryl 20 5 PM while on antibiotics  Keflex 500 mg twice daily x10 days  Wear mask when going outside  Mammogram DEXA scan at [place]  Follow-up 6 months       The following portions of the patient's history were reviewed and updated as appropriate: allergies, current medications, past family history, past medical history, past social history, past surgical history and problem list.    Vitals:    03/22/23 1502   BP: (!) 168/107   BP Location: Left arm   Patient Position: Sitting   Cuff Size: Adult   Pulse: 78   Temp: 97.3 °F (36.3 °C)   TempSrc: Temporal   SpO2: 93%   Weight: 83.4 kg (183 lb 12.8 oz)   Height: 157.5 cm (62\")     Body mass index is 33.62 kg/m².    Past Medical " History:   Diagnosis Date   • Abnormal ECG    • Acute renal failure superimposed on stage 4 chronic kidney disease ([facility]) 2021   • Anemia    • Broken foot     right   • Broken nose 2017   • CHF (congestive heart failure) ([facility])    • CHF (congestive heart failure) ([facility])    • Chronic kidney disease (CKD), stage IV (severe) ([facility]) 2020   • Chronic low back pain    • CRI (chronic renal insufficiency)    • Depression    • DJD (degenerative joint disease)    • Fracture of ankle 2016   • GERD (gastroesophageal reflux disease)    • Gout    • Hypertension    • Hypothyroidism    • CRUZ (obstructive sleep apnea)     non-complaince with bipap   • Osteopenia    • Renal failure     stage 3   • Renal failure, chronic, stage 4 (severe) ([facility]) 2020   • Suicide attempt ([facility])    • TMJ (dislocation of temporomandibular joint)      Past Surgical History:   Procedure Laterality Date   • APPENDECTOMY     • BACK SURGERY      --- L5-S1 spinal stenosis   • CARDIAC CATHETERIZATION  2018   • CHOLECYSTECTOMY     • EPIDURAL BLOCK     • FOOT SURGERY Bilateral    • FOOT SURGERY Bilateral 2019    Dr. Keenan- 2nd toe on bilateral feet    • GASTRIC BYPASS     • INSERT / REPLACE / REMOVE PACEMAKER     • LAPAROSCOPIC GASTRIC BANDING      removed   • OTHER SURGICAL HISTORY  2018    myelogram   • TOTAL KNEE ARTHROPLASTY Left      Family History   Problem Relation Age of Onset   • Hypertension Father         PM implanted   • Prostate cancer Father    • Heart disease Father         He had a pacemaker/     • Arthritis Sister    • Heart disease Sister    • Lung disease Paternal Grandmother    • COPD Other    • Lung disease Other      Immunization History   Administered Date(s) Administered   • COVID-19 (MODERNA) 1st, 2nd, 3rd Dose Only 2021, 2021   • COVID-19 (UNSPECIFIED) 2021, 2021   • FLUAD TRI 65YR+ 2019   • Fluad Quad 65+ 2019, 2020   •  Fluzone High Dose =>65 Years ([employer] ONLY) 09/08/2015, 09/11/2017, 11/02/2018, 09/30/2019   • Fluzone Quad >6mos (Multi-dose) 09/26/2016   • H1N1 Inj 12/03/2009   • Influenza Quad Vaccine (Inpatient) 09/11/2017   • Influenza Whole 10/29/2012   • Pneumococcal Conjugate 13-Valent (PCV13) 06/16/2015   • Pneumococcal Polysaccharide (PPSV23) 11/01/2016   • Shingrix 01/03/2022   • Td 04/27/2018   • Tdap 07/24/2019       Office Visit on 06/30/2022   Component Date Value Ref Range Status   • WBC 06/30/2022 5.1  3.4 - 10.8 x10E3/uL Final   • RBC 06/30/2022 4.01  3.77 - 5.28 x10E6/uL Final   • Hemoglobin 06/30/2022 11.7  11.1 - 15.9 g/dL Final   • Hematocrit 06/30/2022 36.2  34.0 - 46.6 % Final   • MCV 06/30/2022 90  79 - 97 fL Final   • MCH 06/30/2022 29.2  26.6 - 33.0 pg Final   • MCHC 06/30/2022 32.3  31.5 - 35.7 g/dL Final   • RDW 06/30/2022 14.4  11.7 - 15.4 % Final   • Platelets 06/30/2022 229  150 - 450 x10E3/uL Final   • Neutrophil Rel % 06/30/2022 62  Not Estab. % Final   • Lymphocyte Rel % 06/30/2022 29  Not Estab. % Final   • Monocyte Rel % 06/30/2022 5  Not Estab. % Final   • Eosinophil Rel % 06/30/2022 3  Not Estab. % Final   • Basophil Rel % 06/30/2022 1  Not Estab. % Final   • Neutrophils Absolute 06/30/2022 3.1  1.4 - 7.0 x10E3/uL Final   • Lymphocytes Absolute 06/30/2022 1.5  0.7 - 3.1 x10E3/uL Final   • Monocytes Absolute 06/30/2022 0.2  0.1 - 0.9 x10E3/uL Final   • Eosinophils Absolute 06/30/2022 0.2  0.0 - 0.4 x10E3/uL Final   • Basophils Absolute 06/30/2022 0.0  0.0 - 0.2 x10E3/uL Final   • Immature Granulocyte Rel % 06/30/2022 0  Not Estab. % Final   • Immature Grans Absolute 06/30/2022 0.0  0.0 - 0.1 x10E3/uL Final   • Glucose 06/30/2022 89  65 - 99 mg/dL Final   • BUN 06/30/2022 23  8 - 27 mg/dL Final   • Creatinine 06/30/2022 1.33 (H)  0.57 - 1.00 mg/dL Final   • EGFR Result 06/30/2022 42 (L)  >59 mL/min/1.73 Final   • BUN/Creatinine Ratio 06/30/2022 17  12 - 28 Final   • Sodium 06/30/2022 140  134 -  144 mmol/L Final   • Potassium 06/30/2022 4.7  3.5 - 5.2 mmol/L Final   • Chloride 06/30/2022 104  96 - 106 mmol/L Final   • Total CO2 06/30/2022 20  20 - 29 mmol/L Final   • Calcium 06/30/2022 9.0  8.7 - 10.3 mg/dL Final   • Total Protein 06/30/2022 6.1  6.0 - 8.5 g/dL Final   • Albumin 06/30/2022 3.8  3.7 - 4.7 g/dL Final   • Globulin 06/30/2022 2.3  1.5 - 4.5 g/dL Final   • A/G Ratio 06/30/2022 1.7  1.2 - 2.2 Final   • Total Bilirubin 06/30/2022 0.4  0.0 - 1.2 mg/dL Final   • Alkaline Phosphatase 06/30/2022 99  44 - 121 IU/L Final   • AST (SGOT) 06/30/2022 13  0 - 40 IU/L Final   • ALT (SGPT) 06/30/2022 9  0 - 32 IU/L Final         Review of Systems   Constitutional: Negative.    HENT: Positive for congestion and sinus pressure.    Respiratory: Positive for cough.    Cardiovascular: Negative.    Gastrointestinal: Negative.    Genitourinary: Negative.    Musculoskeletal: Negative.    Skin: Negative.    Neurological: Negative.    Psychiatric/Behavioral: Negative.        Objective   Physical Exam  Constitutional:       Appearance: Normal appearance.   HENT:      Head: Normocephalic.      Nose:      Comments: Bilateral red turbinates     Mouth/Throat:      Comments: Heavy postnasal drip  Cardiovascular:      Rate and Rhythm: Normal rate and regular rhythm.      Pulses: Normal pulses.      Heart sounds: Normal heart sounds.   Pulmonary:      Effort: Pulmonary effort is normal.      Breath sounds: Normal breath sounds.   Abdominal:      General: Bowel sounds are normal.   Musculoskeletal:         General: Normal range of motion.   Skin:     General: Skin is warm and dry.   Neurological:      General: No focal deficit present.      Mental Status: She is alert and oriented to person, place, and time.   Psychiatric:         Mood and Affect: Mood normal.         Behavior: Behavior normal.         Procedures    Assessment & Plan   Diagnoses and all orders for this visit:    1. Lipid screening (Primary)  -     Lipid Panel  With LDL / HDL Ratio; Future    2. Allergic rhinitis, unspecified seasonality, unspecified trigger    3. Class 1 obesity due to excess calories with serious comorbidity and body mass index (BMI) of 33.0 to 33.9 in adult    4. Acute non-recurrent maxillary sinusitis    Other orders  -     hydrALAZINE (APRESOLINE) 50 MG tablet; Take 1 tablet by mouth 3 (Three) Times a Day.  Dispense: 90 tablet; Refill: 2  -     cephalexin (Keflex) 500 MG capsule; Take 1 capsule by mouth 2 (Two) Times a Day.  Dispense: 20 capsule; Refill: 0  -     mometasone (Nasonex) 50 MCG/ACT nasal spray; 2 sprays into the nostril(s) as directed by provider Daily.  Dispense: 17 g; Refill: 12  -     pseudoephedrine (Sudafed) 30 MG tablet; Take 1 tablet by mouth Every 4 (Four) Hours As Needed for Congestion.  Dispense: 60 tablet; Refill: 2  -     dicyclomine (BENTYL) 10 MG capsule; Take 1 capsule by mouth 4 (Four) Times a Day.  Dispense: 360 capsule; Refill: 1          Current Outpatient Medications:   •  alendronate (FOSAMAX) 70 MG tablet, Take 1 tablet by mouth Every 7 (Seven) Days., Disp: 13 tablet, Rfl: 3  •  allopurinol (ZYLOPRIM) 100 MG tablet, TAKE TWO TABLETS BY MOUTH EVERY NIGHT AT BEDTIME, Disp: 180 tablet, Rfl: 3  •  Aspirin Low Dose 81 MG EC tablet, TAKE ONE TABLET BY MOUTH ONCE DAILY, Disp: 90 tablet, Rfl: 2  •  busPIRone (BUSPAR) 10 MG tablet, TAKE ONE TABLET BY MOUTH TWO TIMES A DAY, Disp: 180 tablet, Rfl: 2  •  calcitriol (ROCALTROL) 0.25 MCG capsule, Take 1 capsule by mouth 2 (Two) Times a Week. Mondays and Fridays, Disp: 8 capsule, Rfl: 5  •  cyanocobalamin 1000 MCG/ML injection, INJECT INTRAMUSCULARLY EVERY 28 DAYS AS DIRECTED, Disp: 1 mL, Rfl: 6  •  cyclobenzaprine (FLEXERIL) 10 MG tablet, TAKE ONE TABLET BY MOUTH AT NIGHT AS NEEDED FOR MUSCLE SPASMS, Disp: 30 tablet, Rfl: 2  •  dicyclomine (BENTYL) 10 MG capsule, Take 1 capsule by mouth 4 (Four) Times a Day., Disp: 360 capsule, Rfl: 1  •  famotidine (PEPCID) 20 MG tablet, TAKE ONE  TABLET BY MOUTH TWO TIMES A DAY, Disp: 60 tablet, Rfl: 6  •  FeroSul 325 (65 Fe) MG tablet, TAKE ONE TABLET BY MOUTH ONCE DAILY WITH BREAKFAST, Disp: 30 tablet, Rfl: 2  •  Flector 1.3 % patch patch, Apply 1 patch topically to the appropriate area as directed Continuous As Needed (back pain)., Disp: , Rfl:   •  fluticasone (Flonase) 50 MCG/ACT nasal spray, 2 sprays into the nostril(s) as directed by provider Daily., Disp: 16 g, Rfl: 12  •  furosemide (LASIX) 40 MG tablet, Take 1 tablet by mouth Daily. Until ankle diameter <10.5 inches and weight <255 # (Patient taking differently: Take 1 tablet by mouth Daily As Needed. Until ankle diameter <10.5 inches and weight <255 #), Disp: 30 tablet, Rfl: o  •  HYDROcodone-acetaminophen (NORCO)  MG per tablet, Take 1 tablet by mouth Every 6 (Six) Hours As Needed for Severe Pain., Disp: , Rfl:   •  linaclotide (LINZESS) 72 MCG capsule capsule, Take 1 capsule by mouth Daily. (Patient taking differently: Take 1 capsule by mouth Daily As Needed.), Disp: 90 capsule, Rfl: 3  •  melatonin ([employer] Melatonin) 5 MG tablet tablet, Take 2 tablets by mouth Every Night., Disp: 60 tablet, Rfl: 12  •  memantine (NAMENDA) 10 MG tablet, TAKE ONE TABLET TWO TIMES A DAY, Disp: 180 tablet, Rfl: 1  •  metoclopramide (REGLAN) 5 MG tablet, TAKE ONE TABLET BY MOUTH FOUR TIMES A DAY, Disp: 90 tablet, Rfl: 1  •  O2 (OXYGEN), Inhale 2 L/min Every Night., Disp: , Rfl:   •  ondansetron (ZOFRAN) 4 MG tablet, TAKE ONE TABLET BY MOUTH AS NEEDED FOR NAUSEA AND VOMITING (Patient taking differently: Take 1 tablet by mouth Every 8 (Eight) Hours As Needed.), Disp: 30 tablet, Rfl: 1  •  pantoprazole (PROTONIX) 40 MG EC tablet, TAKE ONE TABLET BY MOUTH ONCE DAILY, Disp: 30 tablet, Rfl: 6  •  potassium chloride 10 MEQ CR tablet, TAKE ONE TABLET BY MOUTH TWO TIMES A DAY AS NEEDED WITH LASIX FOR POTASSIUM SUPPLEMENT, Disp: 60 tablet, Rfl: 1  •  propranolol (INDERAL) 80 MG tablet, Take 1 tablet by mouth 3 (Three)  "Times a Day., Disp: 90 tablet, Rfl: 5  •  sertraline (ZOLOFT) 100 MG tablet, TAKE ONE TABLET BY MOUTH ONCE DAILY ----MUST BE SEEN FOR ADDITIONAL REFILLS----, Disp: 30 tablet, Rfl: 1  •  Syringe 23G X 1\" 3 ML misc, 1 each Every 30 (Thirty) Days. Use with B12 Injections, Disp: 50 each, Rfl: 0  •  cephalexin (Keflex) 500 MG capsule, Take 1 capsule by mouth 2 (Two) Times a Day., Disp: 20 capsule, Rfl: 0  •  hydrALAZINE (APRESOLINE) 50 MG tablet, Take 1 tablet by mouth 3 (Three) Times a Day., Disp: 90 tablet, Rfl: 2  •  mometasone (Nasonex) 50 MCG/ACT nasal spray, 2 sprays into the nostril(s) as directed by provider Daily., Disp: 17 g, Rfl: 12  •  pseudoephedrine (Sudafed) 30 MG tablet, Take 1 tablet by mouth Every 4 (Four) Hours As Needed for Congestion., Disp: 60 tablet, Rfl: 2           MAGAN Sapp 3/22/2023 16:49 EDT  This note has been electronically signed  "

## 2023-04-06 RX ORDER — FERROUS SULFATE 325(65) MG
TABLET ORAL
Qty: 30 TABLET | Refills: 2 | Status: SHIPPED | OUTPATIENT
Start: 2023-04-06

## 2023-04-12 ENCOUNTER — OFFICE VISIT (OUTPATIENT)
Dept: PSYCHIATRY | Facility: CLINIC | Age: 73
End: 2023-04-12
Payer: MEDICARE

## 2023-04-12 DIAGNOSIS — F33.1 MAJOR DEPRESSIVE DISORDER, RECURRENT EPISODE, MODERATE: Primary | Chronic | ICD-10-CM

## 2023-04-12 DIAGNOSIS — G47.09 OTHER INSOMNIA: ICD-10-CM

## 2023-04-12 DIAGNOSIS — R41.3 LOSS OF MEMORY: ICD-10-CM

## 2023-04-12 DIAGNOSIS — F41.1 GENERALIZED ANXIETY DISORDER: Chronic | ICD-10-CM

## 2023-04-12 PROCEDURE — 1159F MED LIST DOCD IN RCRD: CPT | Performed by: PSYCHIATRY & NEUROLOGY

## 2023-04-12 PROCEDURE — 99214 OFFICE O/P EST MOD 30 MIN: CPT | Performed by: PSYCHIATRY & NEUROLOGY

## 2023-04-12 PROCEDURE — 1160F RVW MEDS BY RX/DR IN RCRD: CPT | Performed by: PSYCHIATRY & NEUROLOGY

## 2023-04-12 RX ORDER — SERTRALINE HYDROCHLORIDE 100 MG/1
150 TABLET, FILM COATED ORAL DAILY
Qty: 45 TABLET | Refills: 5 | Status: SHIPPED | OUTPATIENT
Start: 2023-04-12

## 2023-04-12 RX ORDER — BUSPIRONE HYDROCHLORIDE 10 MG/1
10 TABLET ORAL 2 TIMES DAILY
Qty: 180 TABLET | Refills: 1 | Status: SHIPPED | OUTPATIENT
Start: 2023-04-12

## 2023-04-12 RX ORDER — MEMANTINE HYDROCHLORIDE 10 MG/1
10 TABLET ORAL 2 TIMES DAILY
Qty: 180 TABLET | Refills: 1 | Status: SHIPPED | OUTPATIENT
Start: 2023-04-12

## 2023-04-12 NOTE — PROGRESS NOTES
Subjective   Elaine Toscano is a 72 y.o. female who presents today for follow up    Chief Complaint:  Depression, memory      History of Present Illness: the pt suffers from depression and anxiety for many years, was on different meds, the pt had issues with memory after she fall in     last appt here oct 2021   Depression became worse last few months, the pt lost her father 3 years ago, now the pt lives with her son and daughter in law, she lost her dog, the pt feels she is a burden for her family, like she is on their way  The pt has vague thoughts about death in general, but no active plan    Depression is rated as 8/10, more days when depressed, all day long,  Depression is associated  with low self esteem, low E level     The pt admitted to see her  father but she understands he is dead      The pt has memory issues, mainly short term, forgetful,   The pt can not remember recipes anymore   But she still knows how to use phone     Sleep - fragmented   Decreased appetite and + weight loss     The following portions of the patient's history were reviewed and updated as appropriate: allergies, current medications, past family history, past medical history, past social history, past surgical history and problem list.    PAST PSYCHIATRIC HISTORY  Axis I  Affective/Bipoloar Disorder, Anxiety/Panic Disorder  Axis II  Defer     PAST OUTPATIENT TREATMENT  Diagnosis treated:  Affective Disorder, Anxiety/Panic Disorder  Treatment Type:  Medication Management  Prior Psychiatric Medications:  pristiq - not effective  Zoloft, buspirone - now   Support Groups:  None   Sequelae Of Mental Disorder:  social isolation, emotional distress          Interval History  Depression  Increased     Side Effects  Denied       Past Medical History:  Past Medical History:   Diagnosis Date   • Abnormal ECG    • Acute renal failure superimposed on stage 4 chronic kidney disease 2021   • Anemia    • Broken foot     right   •  Broken nose 2017   • CHF (congestive heart failure)    • CHF (congestive heart failure)    • Chronic kidney disease (CKD), stage IV (severe) 2020   • Chronic low back pain    • CRI (chronic renal insufficiency)    • Depression    • DJD (degenerative joint disease)    • Fracture of ankle 2016   • GERD (gastroesophageal reflux disease)    • Gout    • Hypertension    • Hypothyroidism    • CRUZ (obstructive sleep apnea)     non-complaince with bipap   • Osteopenia    • Renal failure     stage 3   • Renal failure, chronic, stage 4 (severe) 2020   • Suicide attempt    • TMJ (dislocation of temporomandibular joint)        Social History:  Social History     Socioeconomic History   • Marital status:    Tobacco Use   • Smoking status: Never     Passive exposure: Current   • Smokeless tobacco: Never   Vaping Use   • Vaping Use: Never used   • Passive vaping exposure: Yes   Substance and Sexual Activity   • Alcohol use: Yes     Alcohol/week: 1.0 standard drink     Types: 1 Glasses of wine per week     Comment: Occasionally   • Drug use: No   • Sexual activity: Not Currently     Partners: Male       Family History:  Family History   Problem Relation Age of Onset   • Hypertension Father         PM implanted   • Prostate cancer Father    • Heart disease Father         He had a pacemaker/     • Arthritis Sister    • Heart disease Sister    • Lung disease Paternal Grandmother    • COPD Other    • Lung disease Other        Past Surgical History:  Past Surgical History:   Procedure Laterality Date   • APPENDECTOMY     • BACK SURGERY      --- L5-S1 spinal stenosis   • CARDIAC CATHETERIZATION  2018   • CHOLECYSTECTOMY     • EPIDURAL BLOCK     • FOOT SURGERY Bilateral    • FOOT SURGERY Bilateral 2019    Dr. Keenan- 2nd toe on bilateral feet    • GASTRIC BYPASS     • INSERT / REPLACE / REMOVE PACEMAKER     • LAPAROSCOPIC GASTRIC BANDING      removed   • OTHER  SURGICAL HISTORY  01/2018    myelogram   • TOTAL KNEE ARTHROPLASTY Left        Problem List:  Patient Active Problem List   Diagnosis   • History of anemia due to chronic kidney disease   • History of iron deficiency   • Dysuria   • Congestive heart failure   • Major depressive disorder, recurrent episode, moderate (HCC)   • Edema   • Gastroesophageal reflux disease   • Gout   • Hypertension   • Hypokalemia   • Hypothyroidism   • Loose total knee arthroplasty   • Hyperlipidemia   • Low back pain   • Asthma   • Mixed stress and urge urinary incontinence   • Vitamin D deficiency   • Swelling of lower extremity   • Spinal stenosis of lumbar region   • Osteoporosis   • Obstructive sleep apnea of adult   • Cobalamin deficiency   • Abnormal cardiovascular stress test   • Pain in right shoulder   • Breast lump   • Chest discomfort   • Dysphagia, unspecified   • Frequent falls   • Greater trochanteric bursitis   • Hand pain, left   • Hyperglycemia   • Loss of balance   • Mobility poor   • MRSA carrier   • Numbness and tingling sensation of skin   • Other spondylosis with radiculopathy, lumbar region   • Pain of right hip joint   • Leg pain, bilateral   • Peripheral axonal neuropathy   • Presence of cardiac pacemaker   • Sick sinus syndrome   • TMJ syndrome   • Degeneration of lumbar intervertebral disc   • Irritable bowel syndrome   • Peptic ulcer   • Anemia   • Chest pain   • Other insomnia   • Allergic rhinitis   • UTI (urinary tract infection)   • Stage 3a chronic kidney disease   • Generalized anxiety disorder   • Loss of memory   • Secondary hyperparathyroidism   • Coronary atherosclerosis   • Benign essential hypertension   • Leg wound, left, initial encounter   • Leg wound, left   • Vascular dementia without behavioral disturbance   • Posterior vitreous detachment   • Regular astigmatism of both eyes   • Pseudophakia of both eyes   • Presbyopia   • Keratitis   • Glaucoma suspect   • Epiretinal membrane (ERM) of left  eye   • Dry eyes   • Class 1 obesity due to excess calories with serious comorbidity and body mass index (BMI) of 33.0 to 33.9 in adult       Allergy:   Allergies   Allergen Reactions   • Azithromycin Unknown (See Comments)   • Codeine Nausea And Vomiting   • Dilaudid  [Hydromorphone Hcl] Unknown (See Comments)   • Isosorbide Nitrate Unknown - High Severity   • Macrobid  [Nitrofurantoin] Rash   • Morphine Rash   • Oxycontin  [Oxycodone] Delirium   • Pineapple Other (See Comments)     Mouth numbness   • Povidone Iodine Rash   • Tetanus Antitoxin Rash   • Tizanidine Other (See Comments) and Delirium     Increased pain   • Denosumab Other (See Comments)   • Demerol [Meperidine] Unknown (See Comments)     Pt does not remember. She states it was a long time ago   • Etodolac Other (See Comments)   • Lodrane D [Brompheniramine-Pseudoeph] Other (See Comments)     drowsiness   • Shrimp Flavor Swelling     Sauce only    • Sulfa Antibiotics Unknown (See Comments)   • Imipramine Palpitations   • Iodine Itching and Rash        Discontinued Medications:  Medications Discontinued During This Encounter   Medication Reason   • busPIRone (BUSPAR) 10 MG tablet Reorder   • sertraline (ZOLOFT) 100 MG tablet    • memantine (NAMENDA) 10 MG tablet Reorder       Current Medications:   Current Outpatient Medications   Medication Sig Dispense Refill   • busPIRone (BUSPAR) 10 MG tablet Take 1 tablet by mouth 2 (Two) Times a Day. 180 tablet 1   • memantine (NAMENDA) 10 MG tablet Take 1 tablet by mouth 2 (Two) Times a Day. 180 tablet 1   • sertraline (ZOLOFT) 100 MG tablet Take 1.5 tablets by mouth Daily. 45 tablet 5   • alendronate (FOSAMAX) 70 MG tablet Take 1 tablet by mouth Every 7 (Seven) Days. 13 tablet 3   • allopurinol (ZYLOPRIM) 100 MG tablet TAKE TWO TABLETS BY MOUTH EVERY NIGHT AT BEDTIME 180 tablet 3   • Aspirin Low Dose 81 MG EC tablet TAKE ONE TABLET BY MOUTH ONCE DAILY 90 tablet 2   • calcitriol (ROCALTROL) 0.25 MCG capsule Take 1  capsule by mouth 2 (Two) Times a Week. Mondays and Fridays 8 capsule 5   • cephalexin (Keflex) 500 MG capsule Take 1 capsule by mouth 2 (Two) Times a Day. 20 capsule 0   • cyanocobalamin 1000 MCG/ML injection INJECT INTRAMUSCULARLY EVERY 28 DAYS AS DIRECTED 1 mL 6   • cyclobenzaprine (FLEXERIL) 10 MG tablet TAKE ONE TABLET BY MOUTH AT NIGHT AS NEEDED FOR MUSCLE SPASMS 30 tablet 2   • dicyclomine (BENTYL) 10 MG capsule Take 1 capsule by mouth 4 (Four) Times a Day. 360 capsule 1   • famotidine (PEPCID) 20 MG tablet TAKE ONE TABLET BY MOUTH TWO TIMES A DAY 60 tablet 6   • FeroSul 325 (65 Fe) MG tablet TAKE ONE TABLET BY MOUTH ONCE DAILY WITH BREAKFAST 30 tablet 2   • Flector 1.3 % patch patch Apply 1 patch topically to the appropriate area as directed Continuous As Needed (back pain).     • fluticasone (Flonase) 50 MCG/ACT nasal spray 2 sprays into the nostril(s) as directed by provider Daily. 16 g 12   • furosemide (LASIX) 40 MG tablet Take 1 tablet by mouth Daily. Until ankle diameter <10.5 inches and weight <255 # (Patient taking differently: Take 1 tablet by mouth Daily As Needed. Until ankle diameter <10.5 inches and weight <255 #) 30 tablet o   • hydrALAZINE (APRESOLINE) 50 MG tablet Take 1 tablet by mouth 3 (Three) Times a Day. 90 tablet 2   • HYDROcodone-acetaminophen (NORCO)  MG per tablet Take 1 tablet by mouth Every 6 (Six) Hours As Needed for Severe Pain.     • linaclotide (LINZESS) 72 MCG capsule capsule Take 1 capsule by mouth Daily. (Patient taking differently: Take 1 capsule by mouth Daily As Needed.) 90 capsule 3   • melatonin ([employer] Melatonin) 5 MG tablet tablet Take 2 tablets by mouth Every Night. 60 tablet 12   • metoclopramide (REGLAN) 5 MG tablet TAKE ONE TABLET BY MOUTH FOUR TIMES A DAY 90 tablet 1   • mometasone (Nasonex) 50 MCG/ACT nasal spray 2 sprays into the nostril(s) as directed by provider Daily. 17 g 12   • O2 (OXYGEN) Inhale 2 L/min Every Night.     • ondansetron (ZOFRAN) 4 MG  "tablet TAKE ONE TABLET BY MOUTH AS NEEDED FOR NAUSEA AND VOMITING (Patient taking differently: Take 1 tablet by mouth Every 8 (Eight) Hours As Needed.) 30 tablet 1   • pantoprazole (PROTONIX) 40 MG EC tablet TAKE ONE TABLET BY MOUTH ONCE DAILY 30 tablet 6   • potassium chloride 10 MEQ CR tablet TAKE ONE TABLET BY MOUTH TWO TIMES A DAY AS NEEDED WITH LASIX FOR POTASSIUM SUPPLEMENT 60 tablet 1   • propranolol (INDERAL) 80 MG tablet Take 1 tablet by mouth 3 (Three) Times a Day. 90 tablet 5   • pseudoephedrine (Sudafed) 30 MG tablet Take 1 tablet by mouth Every 4 (Four) Hours As Needed for Congestion. 60 tablet 2   • Syringe 23G X 1\" 3 ML misc 1 each Every 30 (Thirty) Days. Use with B12 Injections 50 each 0     No current facility-administered medications for this visit.         Psychological ROS: positive for - anxiety, depression and memory difficulties  negative for - disorientation, hallucinations, hostility, irritability, mood swings, obsessive thoughts, physical abuse, sexual abuse, sleep disturbances or suicidal ideation      Physical Exam:   not currently breastfeeding.    Mental Status Exam:   Hygiene:   good  Cooperation:  Cooperative  Eye Contact:  Good  Psychomotor Behavior:  Slow  Affect:  Appropriate and Blunted  Mood: depressed and anxious  Hopelessness: Denies  Speech:  Monotone  Thought Process:  Goal directed and Linear  Thought Content:  Mood congruent  Suicidal:  None  Homicidal:  None  Hallucinations:  Not demonstrated today  Delusion:  None  Memory:  Intact  Orientation:  Person, Place, Time and Situation  Reliability:  good  Insight:  Good  Judgement:  Fair  Impulse Control:  Fair  Physical/Medical Issues:  Yes       MSE from 10/29/21 reviewed and accepted with changes     PHQ-9 Depression Screening  Little interest or pleasure in doing things? 3-->nearly every day   Feeling down, depressed, or hopeless? 3-->nearly every day   Trouble falling or staying asleep, or sleeping too much? 2-->more than " half the days   Feeling tired or having little energy? 2-->more than half the days   Poor appetite or overeating? 1-->several days   Feeling bad about yourself - or that you are a failure or have let yourself or your family down? 3-->nearly every day   Trouble concentrating on things, such as reading the newspaper or watching television? 2-->more than half the days   Moving or speaking so slowly that other people could have noticed? Or the opposite - being so fidgety or restless that you have been moving around a lot more than usual? 2-->more than half the days   Thoughts that you would be better off dead, or of hurting yourself in some way? 0-->not at all   PHQ-9 Total Score 18   If you checked off any problems, how difficult have these problems made it for you to do your work, take care of things at home, or get along with other people? very difficult           Never smoker    I advised Elaine of the risks of tobacco use.     Lab Results:   No visits with results within 3 Month(s) from this visit.   Latest known visit with results is:   Office Visit on 06/30/2022   Component Date Value Ref Range Status   • WBC 06/30/2022 5.1  3.4 - 10.8 x10E3/uL Final   • RBC 06/30/2022 4.01  3.77 - 5.28 x10E6/uL Final   • Hemoglobin 06/30/2022 11.7  11.1 - 15.9 g/dL Final   • Hematocrit 06/30/2022 36.2  34.0 - 46.6 % Final   • MCV 06/30/2022 90  79 - 97 fL Final   • MCH 06/30/2022 29.2  26.6 - 33.0 pg Final   • MCHC 06/30/2022 32.3  31.5 - 35.7 g/dL Final   • RDW 06/30/2022 14.4  11.7 - 15.4 % Final   • Platelets 06/30/2022 229  150 - 450 x10E3/uL Final   • Neutrophil Rel % 06/30/2022 62  Not Estab. % Final   • Lymphocyte Rel % 06/30/2022 29  Not Estab. % Final   • Monocyte Rel % 06/30/2022 5  Not Estab. % Final   • Eosinophil Rel % 06/30/2022 3  Not Estab. % Final   • Basophil Rel % 06/30/2022 1  Not Estab. % Final   • Neutrophils Absolute 06/30/2022 3.1  1.4 - 7.0 x10E3/uL Final   • Lymphocytes Absolute 06/30/2022 1.5  0.7 - 3.1  x10E3/uL Final   • Monocytes Absolute 06/30/2022 0.2  0.1 - 0.9 x10E3/uL Final   • Eosinophils Absolute 06/30/2022 0.2  0.0 - 0.4 x10E3/uL Final   • Basophils Absolute 06/30/2022 0.0  0.0 - 0.2 x10E3/uL Final   • Immature Granulocyte Rel % 06/30/2022 0  Not Estab. % Final   • Immature Grans Absolute 06/30/2022 0.0  0.0 - 0.1 x10E3/uL Final   • Glucose 06/30/2022 89  65 - 99 mg/dL Final   • BUN 06/30/2022 23  8 - 27 mg/dL Final   • Creatinine 06/30/2022 1.33 (H)  0.57 - 1.00 mg/dL Final   • EGFR Result 06/30/2022 42 (L)  >59 mL/min/1.73 Final   • BUN/Creatinine Ratio 06/30/2022 17  12 - 28 Final   • Sodium 06/30/2022 140  134 - 144 mmol/L Final   • Potassium 06/30/2022 4.7  3.5 - 5.2 mmol/L Final   • Chloride 06/30/2022 104  96 - 106 mmol/L Final   • Total CO2 06/30/2022 20  20 - 29 mmol/L Final   • Calcium 06/30/2022 9.0  8.7 - 10.3 mg/dL Final   • Total Protein 06/30/2022 6.1  6.0 - 8.5 g/dL Final   • Albumin 06/30/2022 3.8  3.7 - 4.7 g/dL Final   • Globulin 06/30/2022 2.3  1.5 - 4.5 g/dL Final   • A/G Ratio 06/30/2022 1.7  1.2 - 2.2 Final   • Total Bilirubin 06/30/2022 0.4  0.0 - 1.2 mg/dL Final   • Alkaline Phosphatase 06/30/2022 99  44 - 121 IU/L Final   • AST (SGOT) 06/30/2022 13  0 - 40 IU/L Final   • ALT (SGPT) 06/30/2022 9  0 - 32 IU/L Final       Assessment & Plan   Problems Addressed this Visit        Mental Health    Major depressive disorder, recurrent episode, moderate (HCC) - Primary (Chronic)    Relevant Medications    sertraline (ZOLOFT) 100 MG tablet    memantine (NAMENDA) 10 MG tablet    busPIRone (BUSPAR) 10 MG tablet    Other Relevant Orders    Psychotherapy    Generalized anxiety disorder (Chronic)    Relevant Medications    sertraline (ZOLOFT) 100 MG tablet    memantine (NAMENDA) 10 MG tablet    busPIRone (BUSPAR) 10 MG tablet    Other Relevant Orders    Psychotherapy       Neuro    Loss of memory       Sleep    Other insomnia   Diagnoses       Codes Comments    Major depressive disorder,  recurrent episode, moderate (HCC)    -  Primary ICD-10-CM: F33.1  ICD-9-CM: 296.32     Generalized anxiety disorder     ICD-10-CM: F41.1  ICD-9-CM: 300.02     Loss of memory     ICD-10-CM: R41.3  ICD-9-CM: 780.93     Other insomnia     ICD-10-CM: G47.09  ICD-9-CM: 780.52           Visit Diagnoses:    ICD-10-CM ICD-9-CM   1. Major depressive disorder, recurrent episode, moderate (HCC)  F33.1 296.32   2. Generalized anxiety disorder  F41.1 300.02   3. Loss of memory  R41.3 780.93   4. Other insomnia  G47.09 780.52       TREATMENT PLAN/GOALS: Continue supportive psychotherapy efforts and medications as indicated. Treatment and medication options discussed during today's visit. Patient ackowledged and verbally consented to continue with current treatment plan and was educated on the importance of compliance with treatment and follow-up appointments.    MEDICATION ISSUES:  INSPECT reviewed as expected- on hydrocodone (last refill on 3/17/23)    PHQ scored 18 -  severe depression   DONIS 7 scored 8  Patient screened positive for depression based on a PHQ-9 score of 18 on 4/12/2023. Follow-up recommendations include: Prescribed antidepressant medication treatment.          1. MDD- cont zoloft, increase to 150 mg , the pt experienced memory issues, the pt will benefit from  Psyfhotherapy , referred to [facility]     2. DONIS- cont zoloft 150 mg , buspirone 10 mg BID     discussed with the pt and her son (POA)       Discussed medication options and treatment plan of prescribed medication as well as the risks, benefits, and side effects including potential falls, possible impaired driving and metabolic adversities among others. Patient is agreeable to call the office with any worsening of symptoms or onset of side effects. Patient is agreeable to call 911 or go to the nearest ER should he/she begin having SI/HI. No medication side effects or related complaints today.     MEDS ORDERED DURING VISIT:  New Medications Ordered This Visit    Medications   • sertraline (ZOLOFT) 100 MG tablet     Sig: Take 1.5 tablets by mouth Daily.     Dispense:  45 tablet     Refill:  5   • memantine (NAMENDA) 10 MG tablet     Sig: Take 1 tablet by mouth 2 (Two) Times a Day.     Dispense:  180 tablet     Refill:  1   • busPIRone (BUSPAR) 10 MG tablet     Sig: Take 1 tablet by mouth 2 (Two) Times a Day.     Dispense:  180 tablet     Refill:  1       Return in about 6 months (around 10/12/2023).          This document has been electronically signed by Sharon Castorena MD  April 12, 2023 10:57 EDT    EMR Dragon transcription disclaimer:  Some of this encounter note is an electronic transcription translation of spoken language to printed text. The electronic translation of spoken language may permit erroneous, or at times, nonsensical words or phrases to be inadvertently transcribed; Although I have reviewed the note for such errors some may still exist.

## 2023-04-17 NOTE — TELEPHONE ENCOUNTER
Rehab consult noted, will sign off on patient at this time as he does not meet criteria for admission.    Spoke with Earnestine and she said she hadn't ever bruised before until recently.  Earnestine also said her iron levels were low on her last labs.  SG

## 2023-04-21 DIAGNOSIS — F41.1 GENERALIZED ANXIETY DISORDER: Chronic | ICD-10-CM

## 2023-04-21 DIAGNOSIS — K59.03 DRUG-INDUCED CONSTIPATION: ICD-10-CM

## 2023-04-21 DIAGNOSIS — M81.0 OSTEOPOROSIS, UNSPECIFIED OSTEOPOROSIS TYPE, UNSPECIFIED PATHOLOGICAL FRACTURE PRESENCE: ICD-10-CM

## 2023-04-21 NOTE — TELEPHONE ENCOUNTER
Pharmacy Name: [facility] - [place], [place] - [address] - [phone]  - [phone] FX     What medication are you calling in regards to: ALL MEDICATIONS     What question does the pharmacy have: BUTT DRUGS IS CLOSING. PATIENT WOULD LIKE ALL MEDICATIONS SEN TO [facility]

## 2023-04-24 RX ORDER — MEMANTINE HYDROCHLORIDE 10 MG/1
10 TABLET ORAL 2 TIMES DAILY
Qty: 180 TABLET | Refills: 1 | Status: SHIPPED | OUTPATIENT
Start: 2023-04-24

## 2023-04-24 RX ORDER — CYCLOBENZAPRINE HCL 10 MG
10 TABLET ORAL NIGHTLY PRN
Qty: 90 TABLET | Refills: 1 | Status: SHIPPED | OUTPATIENT
Start: 2023-04-24

## 2023-04-24 RX ORDER — CALCITRIOL 0.25 UG/1
0.25 CAPSULE, LIQUID FILLED ORAL 2 TIMES WEEKLY
Qty: 24 CAPSULE | Refills: 1 | Status: SHIPPED | OUTPATIENT
Start: 2023-04-24 | End: 2023-10-21

## 2023-04-24 RX ORDER — BUSPIRONE HYDROCHLORIDE 10 MG/1
10 TABLET ORAL 2 TIMES DAILY
Qty: 180 TABLET | Refills: 1 | Status: SHIPPED | OUTPATIENT
Start: 2023-04-24

## 2023-04-24 RX ORDER — PANTOPRAZOLE SODIUM 40 MG/1
40 TABLET, DELAYED RELEASE ORAL DAILY
Qty: 90 TABLET | Refills: 1 | Status: SHIPPED | OUTPATIENT
Start: 2023-04-24

## 2023-04-24 RX ORDER — SERTRALINE HYDROCHLORIDE 100 MG/1
150 TABLET, FILM COATED ORAL DAILY
Qty: 135 TABLET | Refills: 1 | Status: SHIPPED | OUTPATIENT
Start: 2023-04-24

## 2023-04-24 RX ORDER — ASPIRIN 81 MG/1
81 TABLET ORAL DAILY
Qty: 90 TABLET | Refills: 2 | Status: SHIPPED | OUTPATIENT
Start: 2023-04-24

## 2023-04-24 RX ORDER — PROPRANOLOL HYDROCHLORIDE 80 MG/1
80 TABLET ORAL 3 TIMES DAILY
Qty: 270 TABLET | Refills: 1 | Status: SHIPPED | OUTPATIENT
Start: 2023-04-24 | End: 2023-10-21

## 2023-04-24 RX ORDER — ALLOPURINOL 100 MG/1
200 TABLET ORAL NIGHTLY
Qty: 180 TABLET | Refills: 1 | Status: SHIPPED | OUTPATIENT
Start: 2023-04-24

## 2023-04-24 RX ORDER — METOCLOPRAMIDE 5 MG/1
5 TABLET ORAL 4 TIMES DAILY
Qty: 90 TABLET | Refills: 1 | Status: CANCELLED | OUTPATIENT
Start: 2023-04-24

## 2023-04-24 RX ORDER — ALENDRONATE SODIUM 70 MG/1
70 TABLET ORAL
Qty: 12 TABLET | Refills: 1 | Status: SHIPPED | OUTPATIENT
Start: 2023-04-24

## 2023-04-24 RX ORDER — POTASSIUM CHLORIDE 750 MG/1
10 TABLET, FILM COATED, EXTENDED RELEASE ORAL 2 TIMES DAILY
Qty: 180 TABLET | Refills: 1 | Status: SHIPPED | OUTPATIENT
Start: 2023-04-24

## 2023-04-26 ENCOUNTER — TELEPHONE (OUTPATIENT)
Dept: FAMILY MEDICINE CLINIC | Facility: CLINIC | Age: 73
End: 2023-04-26
Payer: MEDICARE

## 2023-04-26 NOTE — TELEPHONE ENCOUNTER
Caller: VY CHAPA    Relationship:     Best call back number:   VY Modlar [phone]         What medication are you requesting: *WASHABLE BED PADS AND KOTEX LIGHT PADS       Have you had these symptoms before:    [] Yes  [] No    Have you been treated for these symptoms before:   [] Yes  [] No    If a prescription is needed, what is your preferred pharmacy and phone number:          Additional notes: REFAXING THE REQUEST FOR THESE ITEMS TODAY

## 2023-05-05 NOTE — TELEPHONE ENCOUNTER
Caller: RENATE    Relationship:     Best call back number:    [phone]       Requested Prescriptions:   Requested Prescriptions     Pending Prescriptions Disp Refills   • hydrALAZINE (APRESOLINE) 50 MG tablet 90 tablet 2     Sig: Take 1 tablet by mouth 3 (Three) Times a Day.        Pharmacy where request should be sent: [facility] [address]  [phone] [facility] [phone]      Last office visit with prescribing clinician: 3/22/2023   Last telemedicine visit with prescribing clinician: 7/5/2023   Next office visit with prescribing clinician: 7/5/2023     Additional details provided by patient: OUT OF MEDICATION    Does the patient have less than a 3 day supply:  [x] Yes  [] No    Would you like a call back once the refill request has been completed: [] Yes [] No    If the office needs to give you a call back, can they leave a voicemail: [] Yes [] No    Kimberly Sahu   05/05/23 09:35 EDT

## 2023-05-06 RX ORDER — HYDRALAZINE HYDROCHLORIDE 50 MG/1
50 TABLET, FILM COATED ORAL 3 TIMES DAILY
Qty: 90 TABLET | Refills: 2 | Status: SHIPPED | OUTPATIENT
Start: 2023-05-06

## 2023-05-18 ENCOUNTER — OFFICE VISIT (OUTPATIENT)
Dept: CARDIOLOGY | Facility: CLINIC | Age: 73
End: 2023-05-18
Payer: MEDICARE

## 2023-05-18 ENCOUNTER — CLINICAL SUPPORT NO REQUIREMENTS (OUTPATIENT)
Dept: CARDIOLOGY | Facility: CLINIC | Age: 73
End: 2023-05-18
Payer: MEDICARE

## 2023-05-18 VITALS
BODY MASS INDEX: 34.96 KG/M2 | WEIGHT: 190 LBS | OXYGEN SATURATION: 97 % | SYSTOLIC BLOOD PRESSURE: 138 MMHG | HEART RATE: 62 BPM | HEIGHT: 62 IN | DIASTOLIC BLOOD PRESSURE: 88 MMHG

## 2023-05-18 DIAGNOSIS — I25.10 CORONARY ARTERY DISEASE INVOLVING NATIVE CORONARY ARTERY OF NATIVE HEART WITHOUT ANGINA PECTORIS: ICD-10-CM

## 2023-05-18 DIAGNOSIS — I49.5 SICK SINUS SYNDROME: ICD-10-CM

## 2023-05-18 DIAGNOSIS — Z95.0 PRESENCE OF CARDIAC PACEMAKER: Primary | ICD-10-CM

## 2023-05-18 DIAGNOSIS — G47.33 OBSTRUCTIVE SLEEP APNEA: ICD-10-CM

## 2023-05-18 DIAGNOSIS — E78.00 PURE HYPERCHOLESTEROLEMIA: ICD-10-CM

## 2023-05-18 DIAGNOSIS — I10 ESSENTIAL HYPERTENSION: ICD-10-CM

## 2023-05-18 NOTE — PROGRESS NOTES
Subjective:     Encounter Date:05/18/2023      Patient ID: Elaine Toscano is a 72 y.o. female.    Chief Complaint:  History of Present Illness 22-year-old white female with history of coronary disease hypertension hyperlipidemia sick sinus syndrome status post pacemaker placement obstructive sleep apnea presents to my office for follow-up.  Patient is currently stable without any symptoms of chest pain or shortness of breath at rest or exertion radiograms any PND orthopnea.  No palpitation but has some dizziness.  No syncope or she has some swelling of the feet.  She does not smoke and she is taking her medicines regularly    The following portions of the patient's history were reviewed and updated as appropriate: allergies, current medications, past family history, past medical history, past social history, past surgical history and problem list.  Past Medical History:   Diagnosis Date   • Abnormal ECG    • Acute renal failure superimposed on stage 4 chronic kidney disease 03/24/2021   • Anemia    • Broken foot     right   • Broken nose 07/2017   • CHF (congestive heart failure)    • CHF (congestive heart failure)    • Chronic kidney disease (CKD), stage IV (severe) 05/06/2020   • Chronic low back pain    • CRI (chronic renal insufficiency)    • Depression    • DJD (degenerative joint disease)    • Fracture of ankle 08/12/2016   • GERD (gastroesophageal reflux disease)    • Gout    • Hypertension    • Hypothyroidism    • CRUZ (obstructive sleep apnea)     non-complaince with bipap   • Osteopenia    • Renal failure     stage 3   • Renal failure, chronic, stage 4 (severe) 01/21/2020   • Suicide attempt    • TMJ (dislocation of temporomandibular joint)      Past Surgical History:   Procedure Laterality Date   • APPENDECTOMY  1986   • BACK SURGERY      2004---2005 L5-S1 spinal stenosis   • CARDIAC CATHETERIZATION  04/2018   • CHOLECYSTECTOMY  2008   • EPIDURAL BLOCK     • FOOT SURGERY Bilateral 2008   • FOOT SURGERY  "Bilateral 2019    Dr. Keenan- 2nd toe on bilateral feet    • GASTRIC BYPASS     • INSERT / REPLACE / REMOVE PACEMAKER     • LAPAROSCOPIC GASTRIC BANDING      removed   • OTHER SURGICAL HISTORY  2018    myelogram   • TOTAL KNEE ARTHROPLASTY Left      /88   Pulse 62   Ht 157.5 cm (62.01\")   Wt 86.2 kg (190 lb)   LMP  (LMP Unknown)   SpO2 97%   BMI 34.74 kg/m²   Family History   Problem Relation Age of Onset   • Hypertension Father         PM implanted   • Prostate cancer Father    • Heart disease Father         He had a pacemaker/     • Arthritis Sister    • Heart disease Sister    • Lung disease Paternal Grandmother    • COPD Other    • Lung disease Other        Current Outpatient Medications:   •  alendronate (FOSAMAX) 70 MG tablet, Take 1 tablet by mouth Every 7 (Seven) Days., Disp: 12 tablet, Rfl: 1  •  allopurinol (ZYLOPRIM) 100 MG tablet, Take 2 tablets by mouth Every Night., Disp: 180 tablet, Rfl: 1  •  aspirin (Aspirin Low Dose) 81 MG EC tablet, Take 1 tablet by mouth Daily., Disp: 90 tablet, Rfl: 2  •  busPIRone (BUSPAR) 10 MG tablet, Take 1 tablet by mouth 2 (Two) Times a Day., Disp: 180 tablet, Rfl: 1  •  calcitriol (ROCALTROL) 0.25 MCG capsule, Take 1 capsule by mouth 2 (Two) Times a Week for 180 days.  and , Disp: 24 capsule, Rfl: 1  •  cyanocobalamin 1000 MCG/ML injection, INJECT INTRAMUSCULARLY EVERY 28 DAYS AS DIRECTED, Disp: 1 mL, Rfl: 6  •  cyclobenzaprine (FLEXERIL) 10 MG tablet, Take 1 tablet by mouth At Night As Needed for Muscle Spasms., Disp: 90 tablet, Rfl: 1  •  dicyclomine (BENTYL) 10 MG capsule, Take 1 capsule by mouth 4 (Four) Times a Day., Disp: 360 capsule, Rfl: 1  •  famotidine (PEPCID) 20 MG tablet, TAKE ONE TABLET BY MOUTH TWO TIMES A DAY, Disp: 60 tablet, Rfl: 6  •  FeroSul 325 (65 Fe) MG tablet, TAKE ONE TABLET BY MOUTH ONCE DAILY WITH BREAKFAST, Disp: 30 tablet, Rfl: 2  •  Flector 1.3 % patch patch, Apply 1 patch topically to the " appropriate area as directed Continuous As Needed (back pain)., Disp: , Rfl:   •  fluticasone (Flonase) 50 MCG/ACT nasal spray, 2 sprays into the nostril(s) as directed by provider Daily., Disp: 16 g, Rfl: 12  •  furosemide (LASIX) 40 MG tablet, Take 1 tablet by mouth Daily. Until ankle diameter <10.5 inches and weight <255 # (Patient taking differently: Take 1 tablet by mouth Daily As Needed. Until ankle diameter <10.5 inches and weight <255 #), Disp: 30 tablet, Rfl: o  •  hydrALAZINE (APRESOLINE) 50 MG tablet, Take 1 tablet by mouth 3 (Three) Times a Day., Disp: 90 tablet, Rfl: 2  •  HYDROcodone-acetaminophen (NORCO)  MG per tablet, Take 1 tablet by mouth Every 6 (Six) Hours As Needed for Severe Pain., Disp: , Rfl:   •  linaclotide (LINZESS) 72 MCG capsule capsule, Take 1 capsule by mouth Daily., Disp: 90 capsule, Rfl: 1  •  melatonin ([employer] Melatonin) 5 MG tablet tablet, Take 2 tablets by mouth Every Night., Disp: 60 tablet, Rfl: 12  •  memantine (NAMENDA) 10 MG tablet, Take 1 tablet by mouth 2 (Two) Times a Day., Disp: 180 tablet, Rfl: 1  •  metoclopramide (REGLAN) 5 MG tablet, TAKE ONE TABLET BY MOUTH FOUR TIMES A DAY, Disp: 90 tablet, Rfl: 1  •  mometasone (Nasonex) 50 MCG/ACT nasal spray, 2 sprays into the nostril(s) as directed by provider Daily., Disp: 17 g, Rfl: 12  •  ondansetron (ZOFRAN) 4 MG tablet, TAKE ONE TABLET BY MOUTH AS NEEDED FOR NAUSEA AND VOMITING (Patient taking differently: Take 1 tablet by mouth Every 8 (Eight) Hours As Needed.), Disp: 30 tablet, Rfl: 1  •  pantoprazole (PROTONIX) 40 MG EC tablet, Take 1 tablet by mouth Daily., Disp: 90 tablet, Rfl: 1  •  potassium chloride 10 MEQ CR tablet, Take 1 tablet by mouth 2 (Two) Times a Day., Disp: 180 tablet, Rfl: 1  •  propranolol (INDERAL) 80 MG tablet, Take 1 tablet by mouth 3 (Three) Times a Day for 180 days., Disp: 270 tablet, Rfl: 1  •  pseudoephedrine (Sudafed) 30 MG tablet, Take 1 tablet by mouth Every 4 (Four) Hours As Needed for  "Congestion., Disp: 60 tablet, Rfl: 2  •  sertraline (ZOLOFT) 100 MG tablet, Take 1.5 tablets by mouth Daily., Disp: 135 tablet, Rfl: 1  •  Syringe 23G X 1\" 3 ML misc, 1 each Every 30 (Thirty) Days. Use with B12 Injections, Disp: 50 each, Rfl: 0  •  O2 (OXYGEN), Inhale 2 L/min Every Night., Disp: , Rfl:   Allergies   Allergen Reactions   • Azithromycin Unknown (See Comments)   • Codeine Nausea And Vomiting   • Dilaudid  [Hydromorphone Hcl] Unknown (See Comments)   • Isosorbide Nitrate Unknown - High Severity   • Macrobid  [Nitrofurantoin] Rash   • Morphine Rash   • Oxycontin  [Oxycodone] Delirium   • Pineapple Other (See Comments)     Mouth numbness   • Povidone Iodine Rash   • Tetanus Antitoxin Rash   • Tizanidine Other (See Comments) and Delirium     Increased pain   • Denosumab Other (See Comments)   • Demerol [Meperidine] Unknown (See Comments)     Pt does not remember. She states it was a long time ago   • Etodolac Other (See Comments)   • Lodrane D [Brompheniramine-Pseudoeph] Other (See Comments)     drowsiness   • Shrimp Flavor Swelling     Sauce only    • Sulfa Antibiotics Unknown (See Comments)   • Imipramine Palpitations   • Iodine Itching and Rash     Social History     Socioeconomic History   • Marital status:    Tobacco Use   • Smoking status: Never     Passive exposure: Current   • Smokeless tobacco: Never   Vaping Use   • Vaping Use: Never used   • Passive vaping exposure: Yes   Substance and Sexual Activity   • Alcohol use: Yes     Alcohol/week: 1.0 standard drink     Types: 1 Glasses of wine per week     Comment: Occasionally   • Drug use: No   • Sexual activity: Not Currently     Partners: Male     Review of Systems   Constitutional: Positive for malaise/fatigue.   Cardiovascular: Positive for leg swelling. Negative for chest pain, dyspnea on exertion and palpitations.   Respiratory: Negative for cough and shortness of breath.    Gastrointestinal: Negative for abdominal pain, nausea and " vomiting.   Neurological: Positive for light-headedness and numbness. Negative for dizziness, focal weakness and headaches.   All other systems reviewed and are negative.             Objective:     Constitutional:       Appearance: Well-developed.   Eyes:      General: No scleral icterus.     Conjunctiva/sclera: Conjunctivae normal.   HENT:      Head: Normocephalic and atraumatic.   Neck:      Vascular: No carotid bruit or JVD.   Pulmonary:      Effort: Pulmonary effort is normal.      Breath sounds: Normal breath sounds. No wheezing. No rales.   Cardiovascular:      Normal rate. Regular rhythm.   Pulses:     Intact distal pulses.   Edema:     Peripheral edema present.  Abdominal:      General: Bowel sounds are normal.      Palpations: Abdomen is soft.   Musculoskeletal:      Cervical back: Normal range of motion and neck supple. Skin:     General: Skin is warm and dry.      Findings: No rash.   Neurological:      Mental Status: Alert.       Procedures    Lab Review:         MDM  1.  Coronary disease  Patient is now full disease in the past and is currently stable on medication  2.  Hypertension  Patient blood pressure is currently stable on medical therapy including hydralazine  3.  Hyperlipidemia  Patient is on statins and the lipid levels are well within normal range  4.  Sick sinus syndrome status post pacemaker placement  Patient's pacemaker is recommended  5.  Sleep apnea  Patient has sleep apnea and uses CPAP machine    Patient's previous medical records, labs, and EKG were reviewed and discussed with the patient at today's visit.

## 2023-05-22 ENCOUNTER — TELEPHONE (OUTPATIENT)
Dept: CARDIOLOGY | Facility: CLINIC | Age: 73
End: 2023-05-22
Payer: MEDICARE

## 2023-05-22 NOTE — TELEPHONE ENCOUNTER
Caller: Elaine Toscano    Relationship: Self    Best call back number:     What is the best time to reach you: ANY    Who are you requesting to speak with (clinical staff, provider,  specific staff member): CLINICAL    What was the call regarding: PT WAS REQUESTING A MEDICATION FOR FUROSEMIDE. I COULDN'T SEE IT IN THE MEDICATION LIST. PT WAS REQUESTING THAT SHE NEEDS THIS FILLED PER DOCTOR REQUEST.     Do you require a callback: YES

## 2023-05-23 ENCOUNTER — TELEPHONE (OUTPATIENT)
Dept: CARDIOLOGY | Facility: CLINIC | Age: 73
End: 2023-05-23
Payer: MEDICARE

## 2023-05-23 DIAGNOSIS — R60.0 LOCALIZED EDEMA: Primary | ICD-10-CM

## 2023-05-23 RX ORDER — FUROSEMIDE 40 MG/1
40 TABLET ORAL DAILY
Qty: 30 TABLET | Refills: 11 | Status: SHIPPED | OUTPATIENT
Start: 2023-05-23

## 2023-05-23 NOTE — TELEPHONE ENCOUNTER
Okay, I will send in 1 more month of Lasix to pharmacy.  Please also remind patient that Dr. Larson ordered an echo at her last office visit to have completed when scheduled as this will give us a better idea why she is having lower extremity edema.

## 2023-05-23 NOTE — TELEPHONE ENCOUNTER
Patient is needing refill on furosemide sent to [facility] in [place].  [facility] is no longer her PCP and patient can not get that refill from [facility]nal[facility].

## 2023-05-23 NOTE — TELEPHONE ENCOUNTER
Patient called back and I verbally asked if her pcp was sending in her prescription for her edema and they are not.    She needs a different prescription      [phone]

## 2023-05-23 NOTE — TELEPHONE ENCOUNTER
Please call and ask patient if she is still experiencing ankle edema. It appears PCP ordered 30 days for edema and once <10.5 in in diameter she was to stop medication. I can send in another month is still edematous.

## 2023-05-25 NOTE — TELEPHONE ENCOUNTER
Called patient and verbally explained that we are sending in one more month of lasix and reminded her of her echo appointment.    -Patient verbally understood

## 2023-06-16 ENCOUNTER — HOSPITAL ENCOUNTER (OUTPATIENT)
Dept: CARDIOLOGY | Facility: HOSPITAL | Age: 73
Discharge: HOME | End: 2023-06-16
Payer: MEDICARE

## 2023-06-16 VITALS
SYSTOLIC BLOOD PRESSURE: 125 MMHG | HEIGHT: 62 IN | BODY MASS INDEX: 34.96 KG/M2 | HEART RATE: 84 BPM | WEIGHT: 190 LBS | DIASTOLIC BLOOD PRESSURE: 72 MMHG

## 2023-06-16 DIAGNOSIS — Z95.0 PRESENCE OF CARDIAC PACEMAKER: ICD-10-CM

## 2023-06-16 DIAGNOSIS — G47.33 OBSTRUCTIVE SLEEP APNEA: ICD-10-CM

## 2023-06-16 DIAGNOSIS — I25.10 CORONARY ARTERY DISEASE INVOLVING NATIVE CORONARY ARTERY OF NATIVE HEART WITHOUT ANGINA PECTORIS: ICD-10-CM

## 2023-06-16 DIAGNOSIS — I49.5 SICK SINUS SYNDROME: ICD-10-CM

## 2023-06-16 DIAGNOSIS — I10 ESSENTIAL HYPERTENSION: ICD-10-CM

## 2023-06-16 DIAGNOSIS — E78.00 PURE HYPERCHOLESTEROLEMIA: ICD-10-CM

## 2023-06-16 LAB
BH CV ECHO MEAS - ACS: 1.74 CM
BH CV ECHO MEAS - AO MAX PG: 10.4 MMHG
BH CV ECHO MEAS - AO MEAN PG: 5 MMHG
BH CV ECHO MEAS - AO ROOT DIAM: 3.2 CM
BH CV ECHO MEAS - AO V2 MAX: 161.1 CM/SEC
BH CV ECHO MEAS - AO V2 VTI: 32.3 CM
BH CV ECHO MEAS - AVA(I,D): 1.66 CM2
BH CV ECHO MEAS - EDV(CUBED): 105.9 ML
BH CV ECHO MEAS - EDV(MOD-SP2): 89.3 ML
BH CV ECHO MEAS - EDV(MOD-SP4): 72 ML
BH CV ECHO MEAS - EF(MOD-SP2): 63.6 %
BH CV ECHO MEAS - EF(MOD-SP4): 38.4 %
BH CV ECHO MEAS - ESV(CUBED): 52.3 ML
BH CV ECHO MEAS - ESV(MOD-SP2): 32.5 ML
BH CV ECHO MEAS - ESV(MOD-SP4): 44.3 ML
BH CV ECHO MEAS - FS: 21 %
BH CV ECHO MEAS - IVS/LVPW: 0.89 CM
BH CV ECHO MEAS - IVSD: 0.95 CM
BH CV ECHO MEAS - LA DIMENSION: 4.5 CM
BH CV ECHO MEAS - LV DIASTOLIC VOL/BSA (35-75): 38.5 CM2
BH CV ECHO MEAS - LV MASS(C)D: 168.1 GRAMS
BH CV ECHO MEAS - LV MAX PG: 3.1 MMHG
BH CV ECHO MEAS - LV MEAN PG: 2.04 MMHG
BH CV ECHO MEAS - LV SYSTOLIC VOL/BSA (12-30): 23.7 CM2
BH CV ECHO MEAS - LV V1 MAX: 88.3 CM/SEC
BH CV ECHO MEAS - LV V1 VTI: 16.7 CM
BH CV ECHO MEAS - LVIDD: 4.7 CM
BH CV ECHO MEAS - LVIDS: 3.7 CM
BH CV ECHO MEAS - LVOT AREA: 3.2 CM2
BH CV ECHO MEAS - LVOT DIAM: 2.02 CM
BH CV ECHO MEAS - LVPWD: 1.07 CM
BH CV ECHO MEAS - MR MAX PG: 66.8 MMHG
BH CV ECHO MEAS - MR MAX VEL: 408.5 CM/SEC
BH CV ECHO MEAS - MV A MAX VEL: 80.1 CM/SEC
BH CV ECHO MEAS - MV DEC SLOPE: 263.9 CM/SEC2
BH CV ECHO MEAS - MV DEC TIME: 0.24 MSEC
BH CV ECHO MEAS - MV E MAX VEL: 62.7 CM/SEC
BH CV ECHO MEAS - MV E/A: 0.78
BH CV ECHO MEAS - MV MAX PG: 3 MMHG
BH CV ECHO MEAS - MV MEAN PG: 1.3 MMHG
BH CV ECHO MEAS - MV V2 VTI: 26.7 CM
BH CV ECHO MEAS - MVA(VTI): 2 CM2
BH CV ECHO MEAS - PA V2 MAX: 101.7 CM/SEC
BH CV ECHO MEAS - PULM A REVS DUR: 0.12 SEC
BH CV ECHO MEAS - PULM A REVS VEL: 27.4 CM/SEC
BH CV ECHO MEAS - PULM DIAS VEL: 52.2 CM/SEC
BH CV ECHO MEAS - PULM S/D: 1.47
BH CV ECHO MEAS - PULM SYS VEL: 76.6 CM/SEC
BH CV ECHO MEAS - RAP SYSTOLE: 3 MMHG
BH CV ECHO MEAS - RV MAX PG: 1.76 MMHG
BH CV ECHO MEAS - RV V1 MAX: 66.3 CM/SEC
BH CV ECHO MEAS - RV V1 VTI: 13.8 CM
BH CV ECHO MEAS - RVSP: 41 MMHG
BH CV ECHO MEAS - SI(MOD-SP2): 30.4 ML/M2
BH CV ECHO MEAS - SI(MOD-SP4): 14.8 ML/M2
BH CV ECHO MEAS - SV(LVOT): 53.5 ML
BH CV ECHO MEAS - SV(MOD-SP2): 56.8 ML
BH CV ECHO MEAS - SV(MOD-SP4): 27.7 ML
BH CV ECHO MEAS - TR MAX PG: 38 MMHG
BH CV ECHO MEAS - TR MAX VEL: 307.4 CM/SEC
MAXIMAL PREDICTED HEART RATE: 146 BPM
STRESS TARGET HR: 124 BPM

## 2023-06-16 PROCEDURE — 93306 TTE W/DOPPLER COMPLETE: CPT | Performed by: INTERNAL MEDICINE

## 2023-06-16 PROCEDURE — 93306 TTE W/DOPPLER COMPLETE: CPT

## 2023-08-02 RX ORDER — HYDRALAZINE HYDROCHLORIDE 50 MG/1
TABLET, FILM COATED ORAL
Qty: 90 TABLET | Refills: 1 | Status: SHIPPED | OUTPATIENT
Start: 2023-08-02

## 2023-09-28 RX ORDER — HYDRALAZINE HYDROCHLORIDE 50 MG/1
TABLET, FILM COATED ORAL
Qty: 90 TABLET | Refills: 0 | OUTPATIENT
Start: 2023-09-28

## 2023-10-04 RX ORDER — HYDRALAZINE HYDROCHLORIDE 50 MG/1
TABLET, FILM COATED ORAL
Qty: 90 TABLET | Refills: 0 | OUTPATIENT
Start: 2023-10-04

## 2023-10-30 DIAGNOSIS — F41.1 GENERALIZED ANXIETY DISORDER: Chronic | ICD-10-CM

## 2023-10-30 DIAGNOSIS — M81.0 OSTEOPOROSIS, UNSPECIFIED OSTEOPOROSIS TYPE, UNSPECIFIED PATHOLOGICAL FRACTURE PRESENCE: ICD-10-CM

## 2023-11-01 RX ORDER — SERTRALINE HYDROCHLORIDE 100 MG/1
150 TABLET, FILM COATED ORAL DAILY
Qty: 135 TABLET | Refills: 0 | OUTPATIENT
Start: 2023-11-01

## 2023-11-01 RX ORDER — CALCITRIOL 0.25 UG/1
CAPSULE, LIQUID FILLED ORAL
Qty: 24 CAPSULE | Refills: 0 | OUTPATIENT
Start: 2023-11-01

## 2023-11-01 RX ORDER — ALENDRONATE SODIUM 70 MG/1
70 TABLET ORAL
Qty: 12 TABLET | Refills: 0 | OUTPATIENT
Start: 2023-11-01

## 2023-11-01 RX ORDER — PROPRANOLOL HYDROCHLORIDE 80 MG/1
80 TABLET ORAL 3 TIMES DAILY
Qty: 270 TABLET | Refills: 0 | OUTPATIENT
Start: 2023-11-01

## 2023-11-01 RX ORDER — ALLOPURINOL 100 MG/1
200 TABLET ORAL NIGHTLY
Qty: 180 TABLET | Refills: 0 | OUTPATIENT
Start: 2023-11-01

## 2023-11-01 RX ORDER — PANTOPRAZOLE SODIUM 40 MG/1
40 TABLET, DELAYED RELEASE ORAL DAILY
Qty: 90 TABLET | Refills: 0 | OUTPATIENT
Start: 2023-11-01

## 2023-11-21 ENCOUNTER — TELEPHONE (OUTPATIENT)
Dept: CARDIOLOGY | Facility: CLINIC | Age: 73
End: 2023-11-21
Payer: MEDICARE

## 2023-11-21 NOTE — TELEPHONE ENCOUNTER
Caller: Elaine Toscano    Relationship to patient: Self    Best call back number: [phone] (home)      Type of visit: DEVICE CHECK AND FOLLOW UP     Requested date: 11.28.23 OR 11.30.23     If rescheduling, when is the original appointment: 11.29.23     Additional notes:PT CALLED IN TO VERIFY APPT ON 11.27.23, TOLD PT APPT HAD BEEN MOVED TO 11.29.23 BY THE CLINIC. PT IS UNABLE TO MAKE IT TO APPT ON 11.29.23 AND REQUESTING TO MOVE IT TO 11.30.23 OR 11.28.23 IF POSSIBLE. PLEASE ADVISE AND CALL PT BACK TO GET APPT R/S.

## 2023-11-21 NOTE — TELEPHONE ENCOUNTER
I s/w pt to move her 11/29/23 device check appt and follow up with Dr Larson to 1/8/24 per pt she needs a different day, her caretaker has to take her kids to the dr on 11/29/23.

## 2024-01-08 ENCOUNTER — CLINICAL SUPPORT NO REQUIREMENTS (OUTPATIENT)
Dept: CARDIOLOGY | Facility: CLINIC | Age: 74
End: 2024-01-08
Payer: MEDICARE

## 2024-01-08 ENCOUNTER — OFFICE VISIT (OUTPATIENT)
Dept: CARDIOLOGY | Facility: CLINIC | Age: 74
End: 2024-01-08
Payer: MEDICARE

## 2024-01-08 VITALS
HEART RATE: 67 BPM | HEIGHT: 62 IN | WEIGHT: 190 LBS | DIASTOLIC BLOOD PRESSURE: 79 MMHG | SYSTOLIC BLOOD PRESSURE: 115 MMHG | OXYGEN SATURATION: 97 % | BODY MASS INDEX: 34.96 KG/M2

## 2024-01-08 DIAGNOSIS — I49.5 SICK SINUS SYNDROME: ICD-10-CM

## 2024-01-08 DIAGNOSIS — G47.33 OBSTRUCTIVE SLEEP APNEA: ICD-10-CM

## 2024-01-08 DIAGNOSIS — I49.5 SICK SINUS SYNDROME: Primary | ICD-10-CM

## 2024-01-08 DIAGNOSIS — E78.00 PURE HYPERCHOLESTEROLEMIA: ICD-10-CM

## 2024-01-08 DIAGNOSIS — I25.10 CORONARY ARTERY DISEASE INVOLVING NATIVE CORONARY ARTERY OF NATIVE HEART WITHOUT ANGINA PECTORIS: ICD-10-CM

## 2024-01-08 DIAGNOSIS — I10 ESSENTIAL HYPERTENSION: ICD-10-CM

## 2024-01-08 DIAGNOSIS — Z95.0 PRESENCE OF CARDIAC PACEMAKER: Primary | ICD-10-CM

## 2024-01-08 DIAGNOSIS — Z95.0 PRESENCE OF CARDIAC PACEMAKER: ICD-10-CM

## 2024-01-08 NOTE — PROGRESS NOTES
Subjective:     Encounter Date:01/08/2024      Patient ID: Elaine Toscano is a 73 y.o. female.    Chief Complaint:  History of Present Illness 73-year-old white female with history of sick sinus syndrome status post pacemaker placement coronary disease sleep apnea hypertension hyperlipidemia presents to office for follow-up.  Patient is currently stable without any signs of chest pain or shortness of breath at rest or exertion.  No complaints of any PND orthopnea.  No palpitation dizziness syncope or swelling of the feet.  Patient's pacemaker is working very well.    The following portions of the patient's history were reviewed and updated as appropriate: allergies, current medications, past family history, past medical history, past social history, past surgical history, and problem list.  Past Medical History:   Diagnosis Date    Abnormal ECG     Acute renal failure superimposed on stage 4 chronic kidney disease 03/24/2021    Anemia     Broken foot     right    Broken nose 07/2017    CHF (congestive heart failure)     CHF (congestive heart failure)     Chronic kidney disease (CKD), stage IV (severe) 05/06/2020    Chronic low back pain     CRI (chronic renal insufficiency)     Depression     DJD (degenerative joint disease)     Fracture of ankle 08/12/2016    GERD (gastroesophageal reflux disease)     Gout     Hypertension     Hypothyroidism     CRUZ (obstructive sleep apnea)     non-complaince with bipap    Osteopenia     Renal failure     stage 3    Renal failure, chronic, stage 4 (severe) 01/21/2020    Suicide attempt     TMJ (dislocation of temporomandibular joint)      Past Surgical History:   Procedure Laterality Date    APPENDECTOMY  1986    BACK SURGERY      2004---2005 L5-S1 spinal stenosis    CARDIAC CATHETERIZATION  04/2018    CHOLECYSTECTOMY  2008    EPIDURAL BLOCK      FOOT SURGERY Bilateral 2008    FOOT SURGERY Bilateral 09/2019    Dr. Keenan- 2nd toe on bilateral feet     GASTRIC BYPASS  2007     "INSERT / REPLACE / REMOVE PACEMAKER      LAPAROSCOPIC GASTRIC BANDING  2004    removed    OTHER SURGICAL HISTORY  2018    myelogram    TOTAL KNEE ARTHROPLASTY Left      /79   Pulse 67   Ht 157.5 cm (62.01\")   Wt 86.2 kg (190 lb)   LMP  (LMP Unknown)   SpO2 97%   BMI 34.74 kg/m²   Family History   Problem Relation Age of Onset    Hypertension Father         PM implanted    Prostate cancer Father     Heart disease Father         He had a pacemaker/  2019    Arthritis Sister     Heart disease Sister     Lung disease Paternal Grandmother     COPD Other     Lung disease Other        Current Outpatient Medications:     alendronate (FOSAMAX) 70 MG tablet, Take 1 tablet by mouth Every 7 (Seven) Days., Disp: 12 tablet, Rfl: 1    allopurinol (ZYLOPRIM) 100 MG tablet, Take 2 tablets by mouth Every Night., Disp: 180 tablet, Rfl: 1    aspirin (Aspirin Low Dose) 81 MG EC tablet, Take 1 tablet by mouth Daily., Disp: 90 tablet, Rfl: 2    busPIRone (BUSPAR) 10 MG tablet, Take 1 tablet by mouth 2 (Two) Times a Day., Disp: 180 tablet, Rfl: 1    cyanocobalamin 1000 MCG/ML injection, INJECT INTRAMUSCULARLY EVERY 28 DAYS AS DIRECTED, Disp: 1 mL, Rfl: 6    cyclobenzaprine (FLEXERIL) 10 MG tablet, Take 1 tablet by mouth At Night As Needed for Muscle Spasms., Disp: 90 tablet, Rfl: 1    dicyclomine (BENTYL) 10 MG capsule, Take 1 capsule by mouth 4 (Four) Times a Day., Disp: 360 capsule, Rfl: 1    famotidine (PEPCID) 20 MG tablet, TAKE ONE TABLET BY MOUTH TWO TIMES A DAY, Disp: 60 tablet, Rfl: 6    FeroSul 325 (65 Fe) MG tablet, TAKE ONE TABLET BY MOUTH ONCE DAILY WITH BREAKFAST, Disp: 30 tablet, Rfl: 2    Flector 1.3 % patch patch, Apply 1 patch topically to the appropriate area as directed Continuous As Needed (back pain)., Disp: , Rfl:     fluticasone (Flonase) 50 MCG/ACT nasal spray, 2 sprays into the nostril(s) as directed by provider Daily., Disp: 16 g, Rfl: 12    furosemide (LASIX) 40 MG tablet, Take 1 tablet by " "mouth Daily., Disp: 30 tablet, Rfl: 11    hydrALAZINE (APRESOLINE) 50 MG tablet, TAKE 1 TABLET BY MOUTH 3 (THREE) TIMES A DAY (SHORT FILL IF ASKED FOR BEFORE SYNC DATE), Disp: 90 tablet, Rfl: 1    HYDROcodone-acetaminophen (NORCO)  MG per tablet, Take 1 tablet by mouth Every 6 (Six) Hours As Needed for Severe Pain., Disp: , Rfl:     linaclotide (LINZESS) 72 MCG capsule capsule, Take 1 capsule by mouth Daily., Disp: 90 capsule, Rfl: 1    melatonin ([employer] Melatonin) 5 MG tablet tablet, Take 2 tablets by mouth Every Night., Disp: 60 tablet, Rfl: 12    memantine (NAMENDA) 10 MG tablet, Take 1 tablet by mouth 2 (Two) Times a Day., Disp: 180 tablet, Rfl: 1    metoclopramide (REGLAN) 5 MG tablet, TAKE ONE TABLET BY MOUTH FOUR TIMES A DAY, Disp: 90 tablet, Rfl: 1    mometasone (Nasonex) 50 MCG/ACT nasal spray, 2 sprays into the nostril(s) as directed by provider Daily., Disp: 17 g, Rfl: 12    O2 (OXYGEN), Inhale 2 L/min Every Night., Disp: , Rfl:     ondansetron (ZOFRAN) 4 MG tablet, TAKE ONE TABLET BY MOUTH AS NEEDED FOR NAUSEA AND VOMITING (Patient taking differently: Take 1 tablet by mouth Every 8 (Eight) Hours As Needed.), Disp: 30 tablet, Rfl: 1    pantoprazole (PROTONIX) 40 MG EC tablet, Take 1 tablet by mouth Daily., Disp: 90 tablet, Rfl: 1    potassium chloride 10 MEQ CR tablet, Take 1 tablet by mouth 2 (Two) Times a Day., Disp: 180 tablet, Rfl: 1    pseudoephedrine (Sudafed) 30 MG tablet, Take 1 tablet by mouth Every 4 (Four) Hours As Needed for Congestion., Disp: 60 tablet, Rfl: 2    sertraline (ZOLOFT) 100 MG tablet, Take 1.5 tablets by mouth Daily., Disp: 135 tablet, Rfl: 1    Syringe 23G X 1\" 3 ML misc, 1 each Every 30 (Thirty) Days. Use with B12 Injections, Disp: 50 each, Rfl: 0    calcitriol (ROCALTROL) 0.25 MCG capsule, Take 1 capsule by mouth 2 (Two) Times a Week for 180 days. Mondays and Fridays, Disp: 24 capsule, Rfl: 1    propranolol (INDERAL) 80 MG tablet, Take 1 tablet by mouth 3 (Three) Times a " Day for 180 days., Disp: 270 tablet, Rfl: 1  Allergies   Allergen Reactions    Azithromycin Unknown (See Comments)    Codeine Nausea And Vomiting    Dilaudid  [Hydromorphone Hcl] Unknown (See Comments)    Isosorbide Nitrate Unknown - High Severity    Macrobid  [Nitrofurantoin] Rash    Morphine Rash    Oxycontin  [Oxycodone] Delirium    Pineapple Other (See Comments)     Mouth numbness    Povidone Iodine Rash    Tetanus Antitoxin Rash    Tizanidine Other (See Comments) and Delirium     Increased pain    Denosumab Other (See Comments)    Demerol [Meperidine] Unknown (See Comments)     Pt does not remember. She states it was a long time ago    Etodolac Other (See Comments)    Lodrane D [Brompheniramine-Pseudoeph] Other (See Comments)     drowsiness    Shrimp Flavor Swelling     Sauce only     Sulfa Antibiotics Unknown (See Comments)    Imipramine Palpitations    Iodine Itching and Rash     Social History     Socioeconomic History    Marital status:    Tobacco Use    Smoking status: Never     Passive exposure: Current    Smokeless tobacco: Never   Vaping Use    Vaping Use: Never used    Passive vaping exposure: Yes   Substance and Sexual Activity    Alcohol use: Yes     Alcohol/week: 1.0 standard drink of alcohol     Types: 1 Glasses of wine per week     Comment: Occasionally    Drug use: No    Sexual activity: Not Currently     Partners: Male     Review of Systems   Constitutional: Positive for malaise/fatigue.   Cardiovascular:  Negative for chest pain, dyspnea on exertion, leg swelling and palpitations.   Respiratory:  Negative for cough and shortness of breath.    Gastrointestinal:  Positive for nausea. Negative for abdominal pain and vomiting.   Neurological:  Negative for dizziness, focal weakness, headaches, light-headedness and numbness.   All other systems reviewed and are negative.             Objective:     Constitutional:       Appearance: Well-developed.   Eyes:      General: No scleral icterus.      Conjunctiva/sclera: Conjunctivae normal.   HENT:      Head: Normocephalic and atraumatic.   Neck:      Vascular: No carotid bruit or JVD.   Pulmonary:      Effort: Pulmonary effort is normal.      Breath sounds: Normal breath sounds. No wheezing. No rales.   Cardiovascular:      Normal rate. Regular rhythm.   Pulses:     Intact distal pulses.   Abdominal:      General: Bowel sounds are normal.      Palpations: Abdomen is soft.   Musculoskeletal:      Cervical back: Normal range of motion and neck supple. Skin:     General: Skin is warm and dry.      Findings: No rash.   Neurological:      Mental Status: Alert.       Procedures    Lab Review:         MDM    #1 sick sinus syndrome  Patient has a pacemaker which is working very well and stable  2.  Hypertension  Patient blood pressure currently stable on medications including hydralazine  3.  Hyperlipidemia  Patient is on statins and the lipid levels are well within normal meds  4.  Coronary disease  Patient has nonapproved disease in the past and is currently stable on medications.    Patient's previous medical records, labs, and EKG were reviewed and discussed with the patient at today's visit.

## 2024-01-09 ENCOUNTER — OFFICE VISIT (OUTPATIENT)
Dept: ORTHOPEDIC SURGERY | Facility: CLINIC | Age: 74
End: 2024-01-09
Payer: MEDICARE

## 2024-01-09 VITALS — WEIGHT: 190 LBS | HEART RATE: 73 BPM | BODY MASS INDEX: 34.96 KG/M2 | HEIGHT: 62 IN | OXYGEN SATURATION: 98 %

## 2024-01-09 DIAGNOSIS — M25.511 RIGHT SHOULDER PAIN, UNSPECIFIED CHRONICITY: Primary | ICD-10-CM

## 2024-01-09 RX ORDER — TRIAMCINOLONE ACETONIDE 40 MG/ML
80 INJECTION, SUSPENSION INTRA-ARTICULAR; INTRAMUSCULAR
Status: COMPLETED | OUTPATIENT
Start: 2024-01-09 | End: 2024-01-09

## 2024-01-09 RX ORDER — LIDOCAINE HYDROCHLORIDE 10 MG/ML
2 INJECTION, SOLUTION EPIDURAL; INFILTRATION; INTRACAUDAL; PERINEURAL
Status: COMPLETED | OUTPATIENT
Start: 2024-01-09 | End: 2024-01-09

## 2024-01-09 RX ADMIN — TRIAMCINOLONE ACETONIDE 80 MG: 40 INJECTION, SUSPENSION INTRA-ARTICULAR; INTRAMUSCULAR at 10:17

## 2024-01-09 RX ADMIN — LIDOCAINE HYDROCHLORIDE 2 ML: 10 INJECTION, SOLUTION EPIDURAL; INFILTRATION; INTRACAUDAL; PERINEURAL at 10:17

## 2024-01-09 NOTE — PROGRESS NOTES
"Elaine is a 73 y.o. year old female presents to [facility]    Chief Complaint   Patient presents with    Right Shoulder - Pain, Initial Evaluation       History of Present Illness  Elaine Toscano is a 73 y.o. female presents to clinic with her son, Gavin, for evaluation of right shoulder pain that has been present for years, but worsening in the past month due to falls on her right side. Reports attempting to brace her fall with outstretched right hand. States she was unable to reach her hair due to pain and stiffness. Her PCP, Gerald, has initiated PT at [facility], in [place], and has completed 2 weeks of PT. States this has increased her range of motion. Treatments: Voltaren gel, tylenol, ice, heat, PT, activity modification, rest. Currently in pain management and receiving hydrocodone 10/325. We did discuss the option for reverse TSA and what this procedure and recovery would look like.  Patient would like to hold off on this at this time and proceed with a steroid injection to improve her function and decrease her pain.    Pacemaker in place and has been informed not MRI compatible    I have reviewed the patient's medical, family, and social history in detail and updated the computerized patient record.    Objective:  Pulse 73   Ht 157.5 cm (62.01\")   Wt 86.2 kg (190 lb)   LMP  (LMP Unknown)   SpO2 98%   BMI 34.74 kg/m²      Physical Exam    Vital signs reviewed.   General: No acute distress.  Eyes: conjunctiva clear  ENT: external ears atraumatic  CV: no peripheral edema  Resp: normal respiratory effort  Skin: no rashes or wounds; normal turgor  Psych: mood and affect appropriate; recent and remote memory intact  Neuro: sensation to light touch intact    MSK Exam      Right shoulder:  Tenderness along the lateral edge of scapula, upper trapezius and bicipital groove    Passive forward flexion 105 with pain and stiffness, abduction 85 pain/stiff  ER 35, active IR S1  Pain and " weakness with o'fátima & suprapsinatus testing  Positive speed  Belly press 5/5; lift off 3/5 with pain/weakness    Imaging:  [facility] x-ray 9/27/2023:    FINDINGS:  There is no displaced fracture or dislocation. The glenohumeral articulation is intact. Moderate osteoarthritic degenerative changes of the glenohumeral joint are noted. The acromioclavicular joint is intact. Moderate hypertrophic age-related changes of the AC joint are noted. No other significant focal osseous abnormalities are seen. The soft tissues are unremarkable.    IMPRESSION:  1. No evidence for displaced fracture or dislocation.  2. Moderate osteoarthritic degenerative changes of the glenohumeral joint.  3. Moderate hypertrophic age-related changes of the acromioclavicular joint.    Assessment:  Diagnoses and all orders for this visit:    Right shoulder pain, unspecified chronicity    Plan: Recommend intraarticular & subacromial steroid injection to the right shoulder to facilitate improved rehabilitation. Follow up as needed.     Large Joint Arthrocentesis: R glenohumeral  Date/Time: 1/9/2024 10:17 AM  Consent given by: patient  Site marked: site marked  Timeout: Immediately prior to procedure a time out was called to verify the correct patient, procedure, equipment, support staff and site/side marked as required   Supporting Documentation  Indications: pain   Procedure Details  Location: shoulder - R glenohumeral  Preparation: Patient was prepped and draped in the usual sterile fashion  Needle size: 25 G  Approach: posterior  Medications administered: 80 mg triamcinolone acetonide 40 MG/ML; 2 mL lidocaine PF 1% 1 %  Aspirate amount: 0 mL  Patient tolerance: patient tolerated the procedure well with no immediate complications      Risks and benefits of the injection were discussed. Under sterile technique and written consent I injected 80mg of Kenalog and 2cc of 1% Lidocaine plain into the shoulder and subacromial space. It was well  tolerated. Postinjection instructions were given.       Follow Up   Return if symptoms worsen or fail to improve.  Patient was given instructions and counseling regarding her condition or for health maintenance advice. Please see specific information pulled into the AVS if appropriate.     EMR Dragon/Transcription disclaimer:    Much of this encounter note is an electronic transcription/translation of spoken language to printed text.  The electronic translation of spoken language may permit erroneous, or at times, nonsensical words or phrases to be inadvertently transcribed.  Although I have reviewed the note for such errors some may still exist.

## 2024-01-10 ENCOUNTER — PATIENT ROUNDING (BHMG ONLY) (OUTPATIENT)
Dept: ORTHOPEDIC SURGERY | Facility: CLINIC | Age: 74
End: 2024-01-10
Payer: MEDICARE

## 2024-01-10 NOTE — PROGRESS NOTES
A My-Chart message has been sent to the patient for PATIENT ROUNDING with [facility]

## 2024-01-26 ENCOUNTER — TELEPHONE (OUTPATIENT)
Dept: CARDIOLOGY | Facility: CLINIC | Age: 74
End: 2024-01-26
Payer: MEDICARE

## 2024-01-26 RX ORDER — CARVEDILOL 3.12 MG/1
3.12 TABLET ORAL 2 TIMES DAILY WITH MEALS
COMMUNITY

## 2024-01-26 NOTE — TELEPHONE ENCOUNTER
Caller: Elaine Toscano     Relationship: SELF    Best call back number: [phone]    Which medication are you concerned about: KIDNEY DOCTOR HAS STOP MEDICATION-HYDRAZADINE AND PROPRANOLOL AND START NEW MEDICATION COREG AND CALCITRIOL      Who prescribed you this medication: DR PRICE    When did you start taking this medication: NOT STARTED, WAITING ON PHARMACY    What are your concerns: PATIENT ONLY WANTS TO REPORT CHANGE.

## 2024-01-26 NOTE — TELEPHONE ENCOUNTER
REQUEST FOR CARDIAC CLEARANCE    Caller name: Elaine Toscano     Phone Number: [phone]    Surgeon's name: DR TC MOTLEY    Type of planned surgery: FEET SURGERY    Date of planned surgery: NO, 2 MONTH FU    Type of anesthesia: UNKNOWN    Have you been experiencing chest pain or shortness of breath? NO, LOW BLOOD PRESSURE    Is your doctor requesting for you to stop any of your medications prior to your surgery? NO    Where should we fax the clearance to? [phone]

## 2024-01-26 NOTE — TELEPHONE ENCOUNTER
JUST KARLIE    Called patient and verbally got the correct dosage for her coreg and calcitriol dosages.  I updated her medication list.    Called Dr Chin office and left a detailed message to have them fax over a cardiac clearance request to our office for her procedure.    I have started a cardiac clearance request but I will have to wait to get the full cardiac clearance request faxed over to our office for her surgery information.

## 2024-01-30 RX ORDER — BUSPIRONE HYDROCHLORIDE 10 MG/1
10 TABLET ORAL 2 TIMES DAILY
Qty: 180 TABLET | Refills: 0 | OUTPATIENT
Start: 2024-01-30

## 2024-01-30 RX ORDER — ASPIRIN 81 MG/1
81 TABLET, COATED ORAL DAILY
Qty: 90 TABLET | Refills: 1 | OUTPATIENT
Start: 2024-01-30

## 2024-01-30 RX ORDER — POTASSIUM CHLORIDE 750 MG/1
10 TABLET, FILM COATED, EXTENDED RELEASE ORAL 2 TIMES DAILY
Qty: 180 TABLET | Refills: 0 | OUTPATIENT
Start: 2024-01-30

## 2024-02-14 NOTE — TELEPHONE ENCOUNTER
Completed ordered and faxed on 8/19/20   Is This A New Presentation, Or A Follow-Up?: Discoloration How Severe Is It?: mild

## 2024-02-16 ENCOUNTER — TELEPHONE (OUTPATIENT)
Dept: CARDIOLOGY | Facility: CLINIC | Age: 74
End: 2024-02-16
Payer: MEDICARE

## 2024-02-19 ENCOUNTER — TELEPHONE (OUTPATIENT)
Dept: CARDIOLOGY | Facility: CLINIC | Age: 74
End: 2024-02-19
Payer: MEDICARE

## 2024-02-19 NOTE — TELEPHONE ENCOUNTER
FACILITY: [facility]  DR: BONILLA GAYTAN  PHONE: [phone]  FAX: [phone]  PROCEDURE: FOOT SURGERY-[facility]  SCHEDULED: TBD  MEDS TO HOLD: NONE LISTED    GIVEN TO 'S MEDICAL ASSISTANTS.

## 2024-03-15 ENCOUNTER — TELEPHONE (OUTPATIENT)
Dept: CARDIOLOGY | Facility: CLINIC | Age: 74
End: 2024-03-15
Payer: MEDICARE

## 2024-03-15 NOTE — TELEPHONE ENCOUNTER
Caller: Elaine Toscano    Relationship to patient: Self    Best call back number:  [phone]    Patient is needing: PATIENT REQUESTING CARDIAC CLEARANCE IN CHART TO BE RESENT TO DR IYER OFFICE.

## 2024-04-01 ENCOUNTER — TELEPHONE (OUTPATIENT)
Dept: CARDIOLOGY | Facility: CLINIC | Age: 74
End: 2024-04-01
Payer: MEDICARE

## 2024-04-03 ENCOUNTER — OFFICE VISIT (OUTPATIENT)
Dept: CARDIOLOGY | Facility: CLINIC | Age: 74
End: 2024-04-03
Payer: MEDICARE

## 2024-04-03 VITALS
SYSTOLIC BLOOD PRESSURE: 133 MMHG | OXYGEN SATURATION: 98 % | DIASTOLIC BLOOD PRESSURE: 82 MMHG | HEART RATE: 69 BPM | WEIGHT: 196 LBS | HEIGHT: 62 IN | BODY MASS INDEX: 36.07 KG/M2

## 2024-04-03 DIAGNOSIS — I10 ESSENTIAL HYPERTENSION: ICD-10-CM

## 2024-04-03 DIAGNOSIS — Z01.810 PREOP CARDIOVASCULAR EXAM: ICD-10-CM

## 2024-04-03 DIAGNOSIS — I49.5 SICK SINUS SYNDROME: Primary | ICD-10-CM

## 2024-04-03 DIAGNOSIS — G47.33 OBSTRUCTIVE SLEEP APNEA: ICD-10-CM

## 2024-04-03 DIAGNOSIS — I50.22 CHRONIC SYSTOLIC CONGESTIVE HEART FAILURE: ICD-10-CM

## 2024-04-03 DIAGNOSIS — Z95.0 PRESENCE OF CARDIAC PACEMAKER: ICD-10-CM

## 2024-04-03 DIAGNOSIS — I25.10 CORONARY ARTERY DISEASE INVOLVING NATIVE CORONARY ARTERY OF NATIVE HEART WITHOUT ANGINA PECTORIS: ICD-10-CM

## 2024-04-03 DIAGNOSIS — E78.00 PURE HYPERCHOLESTEROLEMIA: ICD-10-CM

## 2024-04-03 RX ORDER — CARVEDILOL 6.25 MG/1
6.25 TABLET ORAL 2 TIMES DAILY WITH MEALS
COMMUNITY

## 2024-04-03 RX ORDER — AMOXICILLIN AND CLAVULANATE POTASSIUM 500; 125 MG/1; MG/1
1 TABLET, FILM COATED ORAL 2 TIMES DAILY
COMMUNITY

## 2024-04-03 RX ORDER — CIPROFLOXACIN 250 MG/1
250 TABLET, FILM COATED ORAL 2 TIMES DAILY
COMMUNITY

## 2024-04-03 NOTE — PROGRESS NOTES
Subjective:     Encounter Date:04/03/2024      Patient ID: Elaine Toscano is a 73 y.o. female.    Chief Complaint:  History of Present Illness 72-year-old white female with history of coronary disease hypertension hyperlipidemia sleep apnea and sick sinus syndrome status post pacemaker placement presents to the office for a follow-up.  Patient is currently stable without any symptoms of chest pain or shortness of breath.  No complaint of any PND or orthopnea.  Patient has occasional palpitation with dizziness.  No syncope or she has some swelling of the feet.  She is taking her medicines regularly.    The following portions of the patient's history were reviewed and updated as appropriate: allergies, current medications, past family history, past medical history, past social history, past surgical history, and problem list.  Past Medical History:   Diagnosis Date    Abnormal ECG     Acute renal failure superimposed on stage 4 chronic kidney disease 03/24/2021    Anemia     Broken foot     right    Broken nose 07/2017    CHF (congestive heart failure)     CHF (congestive heart failure)     Chronic kidney disease (CKD), stage IV (severe) 05/06/2020    Chronic low back pain     CRI (chronic renal insufficiency)     Depression     DJD (degenerative joint disease)     Fracture of ankle 08/12/2016    GERD (gastroesophageal reflux disease)     Gout     Hypertension     Hypothyroidism     CRUZ (obstructive sleep apnea)     non-complaince with bipap    Osteopenia     Renal failure     stage 3    Renal failure, chronic, stage 4 (severe) 01/21/2020    Suicide attempt     TMJ (dislocation of temporomandibular joint)      Past Surgical History:   Procedure Laterality Date    APPENDECTOMY  1986    BACK SURGERY      2004---2005 L5-S1 spinal stenosis    CARDIAC CATHETERIZATION  04/2018    CHOLECYSTECTOMY  2008    EPIDURAL BLOCK      FOOT SURGERY Bilateral 2008    FOOT SURGERY Bilateral 09/2019    Dr. Keenan- 2nd toe on bilateral  "feet     GASTRIC BYPASS  2007    INSERT / REPLACE / REMOVE PACEMAKER      LAPAROSCOPIC GASTRIC BANDING  2004    removed    OTHER SURGICAL HISTORY  2018    myelogram    TOTAL KNEE ARTHROPLASTY Left      /82   Pulse 69   Ht 157.5 cm (62\")   Wt 88.9 kg (196 lb)   LMP  (LMP Unknown)   SpO2 98%   BMI 35.85 kg/m²   Family History   Problem Relation Age of Onset    Hypertension Father         PM implanted    Prostate cancer Father     Heart disease Father         He had a pacemaker/  2019    Arthritis Sister     Heart disease Sister     Lung disease Paternal Grandmother     COPD Other     Lung disease Other        Current Outpatient Medications:     alendronate (FOSAMAX) 70 MG tablet, Take 1 tablet by mouth Every 7 (Seven) Days., Disp: 12 tablet, Rfl: 1    allopurinol (ZYLOPRIM) 100 MG tablet, Take 2 tablets by mouth Every Night., Disp: 180 tablet, Rfl: 1    amoxicillin-clavulanate (AUGMENTIN) 500-125 MG per tablet, Take 1 tablet by mouth 2 (Two) Times a Day., Disp: , Rfl:     aspirin (Aspirin Low Dose) 81 MG EC tablet, Take 1 tablet by mouth Daily., Disp: 90 tablet, Rfl: 2    busPIRone (BUSPAR) 10 MG tablet, Take 1 tablet by mouth 2 (Two) Times a Day., Disp: 180 tablet, Rfl: 1    carvedilol (COREG) 3.125 MG tablet, Take 1 tablet by mouth 2 (Two) Times a Day With Meals., Disp: , Rfl:     carvedilol (COREG) 6.25 MG tablet, Take 1 tablet by mouth 2 (Two) Times a Day With Meals., Disp: , Rfl:     ciprofloxacin (CIPRO) 250 MG tablet, Take 1 tablet by mouth 2 (Two) Times a Day., Disp: , Rfl:     cyanocobalamin 1000 MCG/ML injection, INJECT INTRAMUSCULARLY EVERY 28 DAYS AS DIRECTED, Disp: 1 mL, Rfl: 6    cyclobenzaprine (FLEXERIL) 10 MG tablet, Take 1 tablet by mouth At Night As Needed for Muscle Spasms., Disp: 90 tablet, Rfl: 1    dicyclomine (BENTYL) 10 MG capsule, Take 1 capsule by mouth 4 (Four) Times a Day., Disp: 360 capsule, Rfl: 1    famotidine (PEPCID) 20 MG tablet, TAKE ONE TABLET BY MOUTH TWO " "TIMES A DAY, Disp: 60 tablet, Rfl: 6    FeroSul 325 (65 Fe) MG tablet, TAKE ONE TABLET BY MOUTH ONCE DAILY WITH BREAKFAST, Disp: 30 tablet, Rfl: 2    fluticasone (Flonase) 50 MCG/ACT nasal spray, 2 sprays into the nostril(s) as directed by provider Daily., Disp: 16 g, Rfl: 12    furosemide (LASIX) 40 MG tablet, Take 1 tablet by mouth Daily., Disp: 30 tablet, Rfl: 11    HYDROcodone-acetaminophen (NORCO)  MG per tablet, Take 1 tablet by mouth Every 6 (Six) Hours As Needed for Severe Pain., Disp: , Rfl:     LACTULOSE PO, Take  by mouth., Disp: , Rfl:     melatonin ([employer] Melatonin) 5 MG tablet tablet, Take 2 tablets by mouth Every Night., Disp: 60 tablet, Rfl: 12    memantine (NAMENDA) 10 MG tablet, Take 1 tablet by mouth 2 (Two) Times a Day., Disp: 180 tablet, Rfl: 1    O2 (OXYGEN), Inhale 2 L/min Every Night., Disp: , Rfl:     ondansetron (ZOFRAN) 4 MG tablet, TAKE ONE TABLET BY MOUTH AS NEEDED FOR NAUSEA AND VOMITING (Patient taking differently: Take 1 tablet by mouth Every 8 (Eight) Hours As Needed.), Disp: 30 tablet, Rfl: 1    pantoprazole (PROTONIX) 40 MG EC tablet, Take 1 tablet by mouth Daily., Disp: 90 tablet, Rfl: 1    potassium chloride 10 MEQ CR tablet, Take 1 tablet by mouth 2 (Two) Times a Day., Disp: 180 tablet, Rfl: 1    Syringe 23G X 1\" 3 ML misc, 1 each Every 30 (Thirty) Days. Use with B12 Injections, Disp: 50 each, Rfl: 0    calcitriol (ROCALTROL) 0.25 MCG capsule, Take 1 capsule by mouth 2 (Two) Times a Week for 180 days. Mondays and Fridays, Disp: 24 capsule, Rfl: 1    Flector 1.3 % patch patch, Apply 1 patch topically to the appropriate area as directed Continuous As Needed (back pain)., Disp: , Rfl:     linaclotide (LINZESS) 72 MCG capsule capsule, Take 1 capsule by mouth Daily. (Patient not taking: Reported on 4/3/2024), Disp: 90 capsule, Rfl: 1    metoclopramide (REGLAN) 5 MG tablet, TAKE ONE TABLET BY MOUTH FOUR TIMES A DAY (Patient not taking: Reported on 4/3/2024), Disp: 90 tablet, Rfl: " 1    mometasone (Nasonex) 50 MCG/ACT nasal spray, 2 sprays into the nostril(s) as directed by provider Daily. (Patient not taking: Reported on 4/3/2024), Disp: 17 g, Rfl: 12    pseudoephedrine (Sudafed) 30 MG tablet, Take 1 tablet by mouth Every 4 (Four) Hours As Needed for Congestion. (Patient not taking: Reported on 4/3/2024), Disp: 60 tablet, Rfl: 2    sertraline (ZOLOFT) 100 MG tablet, Take 1.5 tablets by mouth Daily. (Patient not taking: Reported on 4/3/2024), Disp: 135 tablet, Rfl: 1  Allergies   Allergen Reactions    Azithromycin Unknown (See Comments)    Codeine Nausea And Vomiting    Dilaudid  [Hydromorphone Hcl] Unknown (See Comments)    Isosorbide Nitrate Unknown - High Severity    Macrobid  [Nitrofurantoin] Rash    Morphine Rash    Oxycontin  [Oxycodone] Delirium    Pineapple Other (See Comments)     Mouth numbness    Povidone Iodine Rash    Tetanus Antitoxin Rash    Tizanidine Other (See Comments) and Delirium     Increased pain    Denosumab Other (See Comments)    Demerol [Meperidine] Unknown (See Comments)     Pt does not remember. She states it was a long time ago    Etodolac Other (See Comments)    Lodrane D [Brompheniramine-Pseudoeph] Other (See Comments)     drowsiness    Shrimp Flavor Swelling     Sauce only     Sulfa Antibiotics Unknown (See Comments)    Imipramine Palpitations    Iodine Itching and Rash     Social History     Socioeconomic History    Marital status:    Tobacco Use    Smoking status: Never     Passive exposure: Current    Smokeless tobacco: Never   Vaping Use    Vaping status: Never Used    Passive vaping exposure: Yes   Substance and Sexual Activity    Alcohol use: Yes     Alcohol/week: 1.0 standard drink of alcohol     Types: 1 Glasses of wine per week     Comment: Occasionally    Drug use: No    Sexual activity: Not Currently     Partners: Male     Review of Systems   Constitutional: Positive for malaise/fatigue.   Cardiovascular:  Positive for leg swelling and  palpitations. Negative for chest pain and dyspnea on exertion.   Respiratory:  Negative for cough and shortness of breath.    Gastrointestinal:  Positive for nausea and vomiting. Negative for abdominal pain.   Neurological:  Positive for dizziness, light-headedness and numbness. Negative for focal weakness and headaches.   All other systems reviewed and are negative.             Objective:     Constitutional:       Appearance: Well-developed.   Eyes:      General: No scleral icterus.     Conjunctiva/sclera: Conjunctivae normal.   HENT:      Head: Normocephalic and atraumatic.   Neck:      Vascular: No carotid bruit or JVD.   Pulmonary:      Effort: Pulmonary effort is normal.      Breath sounds: Normal breath sounds. No wheezing. No rales.   Cardiovascular:      Normal rate. Regular rhythm.   Pulses:     Intact distal pulses.   Abdominal:      General: Bowel sounds are normal.      Palpations: Abdomen is soft.   Musculoskeletal:      Cervical back: Normal range of motion and neck supple. Skin:     General: Skin is warm and dry.      Findings: No rash.   Neurological:      Mental Status: Alert.       Procedures    Lab Review:         MDM    #1 sick sinus syndrome status post pacemaker placement  Patient's pacemaker is working very well at last visit  2.  Coronary disease  Patient has nonobstructive disease in the past and is currently stable on medications  3.  Hyperlipidemia  Patient is currently on statins and the lipid levels are followed by primary care doctor  4.  Hypertension  Patient blood pressure currently stable on carvedilol  5.  HFrEF  Patient has mild congestive heart failure with EF of 45 to 50% and is on beta-blockers.    Patient's previous medical records, labs, and EKG were reviewed and discussed with the patient at today's visit.

## 2024-04-04 ENCOUNTER — TELEPHONE (OUTPATIENT)
Dept: CARDIOLOGY | Facility: CLINIC | Age: 74
End: 2024-04-04
Payer: MEDICARE

## 2024-04-04 NOTE — TELEPHONE ENCOUNTER
Caller: Elaine Toscano    Relationship: Self    Best call back number:  [phone]      PATIENT WAS RETURNING A CALL TO SOMEONE, BUT THERE ARE NO NOTES IN THE CHART

## 2024-04-04 NOTE — TELEPHONE ENCOUNTER
Called patient and verbally explained that Dr duran office faxed over the surgery clearance to [facility] for her surgery.    -patient verbally understood and had no further questions or concerns at this time

## 2024-04-09 ENCOUNTER — TELEPHONE (OUTPATIENT)
Dept: CARDIOLOGY | Facility: CLINIC | Age: 74
End: 2024-04-09
Payer: MEDICARE

## 2024-04-09 NOTE — TELEPHONE ENCOUNTER
Caller: MADHAVI    Relationship: Other    Best call back number: [phone]    What form or medical record are you requesting: NOTE FROM LAST OFFICE VISIT ON 4.3.24    Who is requesting this form or medical record from you: [facility]     How would you like to receive the form or medical records (pick-up, mail, fax): FAX  If fax, what is the fax number: [phone]    Timeframe paperwork needed: ASAP, SURGERY IS SCHEDULED FOR THIS FRIDAY 4.12.24    Additional notes: THEY RECEIVED THE CARDIAC CLEARANCE AND IS REQUESTING THE NOTE FROM LAST OFFICE VISIT.

## 2024-07-22 ENCOUNTER — CLINICAL SUPPORT NO REQUIREMENTS (OUTPATIENT)
Dept: CARDIOLOGY | Facility: CLINIC | Age: 74
End: 2024-07-22
Payer: MEDICARE

## 2024-07-22 ENCOUNTER — OFFICE VISIT (OUTPATIENT)
Dept: CARDIOLOGY | Facility: CLINIC | Age: 74
End: 2024-07-22
Payer: MEDICARE

## 2024-07-22 VITALS
BODY MASS INDEX: 35.15 KG/M2 | OXYGEN SATURATION: 98 % | HEIGHT: 62 IN | SYSTOLIC BLOOD PRESSURE: 133 MMHG | WEIGHT: 191 LBS | HEART RATE: 60 BPM | DIASTOLIC BLOOD PRESSURE: 71 MMHG

## 2024-07-22 DIAGNOSIS — I10 ESSENTIAL HYPERTENSION: ICD-10-CM

## 2024-07-22 DIAGNOSIS — Z95.0 PRESENCE OF CARDIAC PACEMAKER: ICD-10-CM

## 2024-07-22 DIAGNOSIS — I25.10 CORONARY ARTERY DISEASE INVOLVING NATIVE CORONARY ARTERY OF NATIVE HEART WITHOUT ANGINA PECTORIS: ICD-10-CM

## 2024-07-22 DIAGNOSIS — I49.5 SICK SINUS SYNDROME: Primary | ICD-10-CM

## 2024-07-22 DIAGNOSIS — E78.00 PURE HYPERCHOLESTEROLEMIA: ICD-10-CM

## 2024-07-22 DIAGNOSIS — I50.22 CHRONIC SYSTOLIC CONGESTIVE HEART FAILURE: ICD-10-CM

## 2024-07-22 DIAGNOSIS — G47.33 OBSTRUCTIVE SLEEP APNEA: ICD-10-CM

## 2024-07-22 PROCEDURE — 99214 OFFICE O/P EST MOD 30 MIN: CPT | Performed by: INTERNAL MEDICINE

## 2024-07-22 PROCEDURE — 1160F RVW MEDS BY RX/DR IN RCRD: CPT | Performed by: INTERNAL MEDICINE

## 2024-07-22 PROCEDURE — 3075F SYST BP GE 130 - 139MM HG: CPT | Performed by: INTERNAL MEDICINE

## 2024-07-22 PROCEDURE — 3078F DIAST BP <80 MM HG: CPT | Performed by: INTERNAL MEDICINE

## 2024-07-22 PROCEDURE — 1159F MED LIST DOCD IN RCRD: CPT | Performed by: INTERNAL MEDICINE

## 2024-07-22 PROCEDURE — 93280 PM DEVICE PROGR EVAL DUAL: CPT | Performed by: INTERNAL MEDICINE

## 2024-07-22 NOTE — PROGRESS NOTES
Subjective:     Encounter Date:07/22/2024      Patient ID: Elaine Toscano is a 73 y.o. female.    Chief Complaint:  Follow-upCurrent symptoms: nausea and shortness of breath. Current symptoms include no chest pain, no dizziness, no palpitations, no headaches, no focal weakness, no abdominal pain and no cough.     72-year-old white female with history of coronary disease history of mild congestive heart failure sick sinus syndrome status post pacemaker placement hypertension hyperlipidemia sleep apnea presents to my office for a follow-up.  Patient is currently stable without any symptoms of chest pain at rest or exertion but has shortness of breath with exertion.  No complaint of any PND orthopnea.  No palpitations dizziness syncope or swelling of the feet.  Patient is taking all the medicines regularly.  The following portions of the patient's history were reviewed and updated as appropriate: allergies, current medications, past family history, past medical history, past social history, past surgical history, and problem list.  Past Medical History:   Diagnosis Date    Abnormal ECG     Acute renal failure superimposed on stage 4 chronic kidney disease 03/24/2021    Anemia     Broken foot     right    Broken nose 07/2017    CHF (congestive heart failure)     CHF (congestive heart failure)     Chronic kidney disease (CKD), stage IV (severe) 05/06/2020    Chronic low back pain     CRI (chronic renal insufficiency)     Depression     DJD (degenerative joint disease)     Fracture of ankle 08/12/2016    GERD (gastroesophageal reflux disease)     Gout     Hypertension     Hypothyroidism     CRUZ (obstructive sleep apnea)     non-complaince with bipap    Osteopenia     Renal failure     stage 3    Renal failure, chronic, stage 4 (severe) 01/21/2020    Suicide attempt     TMJ (dislocation of temporomandibular joint)      Past Surgical History:   Procedure Laterality Date    APPENDECTOMY  1986    BACK SURGERY       "--- L5-S1 spinal stenosis    CARDIAC CATHETERIZATION  2018    CHOLECYSTECTOMY  2008    EPIDURAL BLOCK      FOOT SURGERY Bilateral 2008    FOOT SURGERY Bilateral 2019    Dr. Keenan- 2nd toe on bilateral feet     GASTRIC BYPASS      INSERT / REPLACE / REMOVE PACEMAKER      LAPAROSCOPIC GASTRIC BANDING      removed    OTHER SURGICAL HISTORY  2018    myelogram    TOTAL KNEE ARTHROPLASTY Left      /71   Pulse 60   Ht 157.5 cm (62.01\")   Wt 86.6 kg (191 lb)   LMP  (LMP Unknown)   SpO2 98%   BMI 34.93 kg/m²   Family History   Problem Relation Age of Onset    Hypertension Father         PM implanted    Prostate cancer Father     Heart disease Father         He had a pacemaker/      Arthritis Sister     Heart disease Sister     Lung disease Paternal Grandmother     COPD Other     Lung disease Other        Current Outpatient Medications:     alendronate (FOSAMAX) 70 MG tablet, Take 1 tablet by mouth Every 7 (Seven) Days., Disp: 12 tablet, Rfl: 1    allopurinol (ZYLOPRIM) 100 MG tablet, Take 2 tablets by mouth Every Night., Disp: 180 tablet, Rfl: 1    amoxicillin-clavulanate (AUGMENTIN) 500-125 MG per tablet, Take 1 tablet by mouth 2 (Two) Times a Day., Disp: , Rfl:     aspirin (Aspirin Low Dose) 81 MG EC tablet, Take 1 tablet by mouth Daily., Disp: 90 tablet, Rfl: 2    busPIRone (BUSPAR) 10 MG tablet, Take 1 tablet by mouth 2 (Two) Times a Day., Disp: 180 tablet, Rfl: 1    carvedilol (COREG) 3.125 MG tablet, Take 1 tablet by mouth 2 (Two) Times a Day With Meals., Disp: , Rfl:     carvedilol (COREG) 6.25 MG tablet, Take 1 tablet by mouth 2 (Two) Times a Day With Meals., Disp: , Rfl:     ciprofloxacin (CIPRO) 250 MG tablet, Take 1 tablet by mouth 2 (Two) Times a Day., Disp: , Rfl:     cyanocobalamin 1000 MCG/ML injection, INJECT INTRAMUSCULARLY EVERY 28 DAYS AS DIRECTED, Disp: 1 mL, Rfl: 6    cyclobenzaprine (FLEXERIL) 10 MG tablet, Take 1 tablet by mouth At Night As Needed for " Muscle Spasms., Disp: 90 tablet, Rfl: 1    dicyclomine (BENTYL) 10 MG capsule, Take 1 capsule by mouth 4 (Four) Times a Day., Disp: 360 capsule, Rfl: 1    famotidine (PEPCID) 20 MG tablet, TAKE ONE TABLET BY MOUTH TWO TIMES A DAY, Disp: 60 tablet, Rfl: 6    FeroSul 325 (65 Fe) MG tablet, TAKE ONE TABLET BY MOUTH ONCE DAILY WITH BREAKFAST, Disp: 30 tablet, Rfl: 2    Flector 1.3 % patch patch, Apply 1 patch topically to the appropriate area as directed Continuous As Needed (back pain)., Disp: , Rfl:     fluticasone (Flonase) 50 MCG/ACT nasal spray, 2 sprays into the nostril(s) as directed by provider Daily., Disp: 16 g, Rfl: 12    furosemide (LASIX) 40 MG tablet, Take 1 tablet by mouth Daily., Disp: 30 tablet, Rfl: 11    HYDROcodone-acetaminophen (NORCO)  MG per tablet, Take 1 tablet by mouth Every 6 (Six) Hours As Needed for Severe Pain., Disp: , Rfl:     LACTULOSE PO, Take  by mouth., Disp: , Rfl:     linaclotide (LINZESS) 72 MCG capsule capsule, Take 1 capsule by mouth Daily., Disp: 90 capsule, Rfl: 1    melatonin ([employer] Melatonin) 5 MG tablet tablet, Take 2 tablets by mouth Every Night., Disp: 60 tablet, Rfl: 12    memantine (NAMENDA) 10 MG tablet, Take 1 tablet by mouth 2 (Two) Times a Day., Disp: 180 tablet, Rfl: 1    metoclopramide (REGLAN) 5 MG tablet, TAKE ONE TABLET BY MOUTH FOUR TIMES A DAY, Disp: 90 tablet, Rfl: 1    mometasone (Nasonex) 50 MCG/ACT nasal spray, 2 sprays into the nostril(s) as directed by provider Daily., Disp: 17 g, Rfl: 12    O2 (OXYGEN), Inhale 2 L/min Every Night., Disp: , Rfl:     ondansetron (ZOFRAN) 4 MG tablet, TAKE ONE TABLET BY MOUTH AS NEEDED FOR NAUSEA AND VOMITING (Patient taking differently: Take 1 tablet by mouth Every 8 (Eight) Hours As Needed.), Disp: 30 tablet, Rfl: 1    pantoprazole (PROTONIX) 40 MG EC tablet, Take 1 tablet by mouth Daily., Disp: 90 tablet, Rfl: 1    potassium chloride 10 MEQ CR tablet, Take 1 tablet by mouth 2 (Two) Times a Day., Disp: 180 tablet,  "Rfl: 1    pseudoephedrine (Sudafed) 30 MG tablet, Take 1 tablet by mouth Every 4 (Four) Hours As Needed for Congestion., Disp: 60 tablet, Rfl: 2    sertraline (ZOLOFT) 100 MG tablet, Take 1.5 tablets by mouth Daily., Disp: 135 tablet, Rfl: 1    Syringe 23G X 1\" 3 ML misc, 1 each Every 30 (Thirty) Days. Use with B12 Injections, Disp: 50 each, Rfl: 0    calcitriol (ROCALTROL) 0.25 MCG capsule, Take 1 capsule by mouth 2 (Two) Times a Week for 180 days. Mondays and Fridays, Disp: 24 capsule, Rfl: 1  Allergies   Allergen Reactions    Azithromycin Unknown (See Comments)    Codeine Nausea And Vomiting    Dilaudid  [Hydromorphone Hcl] Unknown (See Comments)    Isosorbide Nitrate Unknown - High Severity    Macrobid  [Nitrofurantoin] Rash    Morphine Rash    Oxycontin  [Oxycodone] Delirium    Pineapple Other (See Comments)     Mouth numbness    Povidone Iodine Rash    Tetanus Antitoxin Rash    Tizanidine Other (See Comments) and Delirium     Increased pain    Denosumab Other (See Comments)    Demerol [Meperidine] Unknown (See Comments)     Pt does not remember. She states it was a long time ago    Etodolac Other (See Comments)    Lodrane D [Brompheniramine-Pseudoeph] Other (See Comments)     drowsiness    Shrimp Flavor Swelling     Sauce only     Sulfa Antibiotics Unknown (See Comments)    Imipramine Palpitations    Iodine Itching and Rash     Social History     Socioeconomic History    Marital status:    Tobacco Use    Smoking status: Never     Passive exposure: Current    Smokeless tobacco: Never   Vaping Use    Vaping status: Never Used    Passive vaping exposure: Yes   Substance and Sexual Activity    Alcohol use: Yes     Alcohol/week: 1.0 standard drink of alcohol     Types: 1 Glasses of wine per week     Comment: Occasionally    Drug use: No    Sexual activity: Not Currently     Partners: Male     Review of Systems   Constitutional: Negative for malaise/fatigue.   Cardiovascular:  Negative for chest pain, " dyspnea on exertion, leg swelling and palpitations.   Respiratory:  Positive for shortness of breath. Negative for cough.    Gastrointestinal:  Positive for nausea. Negative for abdominal pain and vomiting.   Neurological:  Negative for dizziness, focal weakness, headaches, light-headedness and numbness.   All other systems reviewed and are negative.             Objective:     Constitutional:       Appearance: Well-developed.   Eyes:      General: No scleral icterus.     Conjunctiva/sclera: Conjunctivae normal.   HENT:      Head: Normocephalic and atraumatic.   Neck:      Vascular: No carotid bruit or JVD.   Pulmonary:      Effort: Pulmonary effort is normal.      Breath sounds: Normal breath sounds. No wheezing. No rales.   Cardiovascular:      Normal rate. Regular rhythm.      Murmurs: There is a systolic murmur.   Pulses:     Intact distal pulses.   Abdominal:      General: Bowel sounds are normal.      Palpations: Abdomen is soft.   Musculoskeletal:      Cervical back: Normal range of motion and neck supple. Skin:     General: Skin is warm and dry.      Findings: No rash.   Neurological:      Mental Status: Alert.       Procedures    Lab Review:         MDM    #1 coronary disease  Patient has nonobstructive disease now and is currently stable with normal LV function  2.  Sick sinus syndrome status post pacemaker placement  Patient's pacemaker is working very well  3.  Hypertension  Patient blood pressure currently stable on medications including carvedilol  4.  Hyperlipidemia  Patient is on over-the-counter medicines and followed by the primary care doctor  5 mild congestive heart failure  Patient's EF is about 45 to 50% and is currently stable on carvedilol  6.  Sleep apnea  Patient has sleep apnea and uses a CPAP machine.    Patient's previous medical records, labs, and EKG were reviewed and discussed with the patient at today's visit.

## 2024-09-18 ENCOUNTER — OFFICE (OUTPATIENT)
Age: 74
End: 2024-09-18
Payer: MEDICAID

## 2024-09-18 ENCOUNTER — OFFICE (OUTPATIENT)
Dept: URBAN - METROPOLITAN AREA CLINIC 64 | Facility: CLINIC | Age: 74
End: 2024-09-18
Payer: MEDICAID

## 2024-09-18 VITALS
WEIGHT: 190 LBS | HEART RATE: 66 BPM | SYSTOLIC BLOOD PRESSURE: 141 MMHG | HEART RATE: 66 BPM | WEIGHT: 190 LBS | HEIGHT: 62 IN | WEIGHT: 190 LBS | SYSTOLIC BLOOD PRESSURE: 141 MMHG | HEART RATE: 66 BPM | HEIGHT: 62 IN | DIASTOLIC BLOOD PRESSURE: 84 MMHG | HEIGHT: 62 IN | DIASTOLIC BLOOD PRESSURE: 84 MMHG | HEIGHT: 62 IN | HEART RATE: 66 BPM | SYSTOLIC BLOOD PRESSURE: 141 MMHG | HEART RATE: 66 BPM | DIASTOLIC BLOOD PRESSURE: 84 MMHG | HEART RATE: 66 BPM | WEIGHT: 190 LBS | WEIGHT: 190 LBS | HEIGHT: 62 IN | SYSTOLIC BLOOD PRESSURE: 141 MMHG | DIASTOLIC BLOOD PRESSURE: 84 MMHG | HEIGHT: 62 IN | DIASTOLIC BLOOD PRESSURE: 84 MMHG | HEIGHT: 62 IN | HEART RATE: 66 BPM | DIASTOLIC BLOOD PRESSURE: 84 MMHG | WEIGHT: 190 LBS | WEIGHT: 190 LBS | DIASTOLIC BLOOD PRESSURE: 84 MMHG | SYSTOLIC BLOOD PRESSURE: 141 MMHG | SYSTOLIC BLOOD PRESSURE: 141 MMHG | SYSTOLIC BLOOD PRESSURE: 141 MMHG

## 2024-09-18 DIAGNOSIS — R63.4 ABNORMAL WEIGHT LOSS: ICD-10-CM

## 2024-09-18 DIAGNOSIS — R11.0 NAUSEA: ICD-10-CM

## 2024-09-18 DIAGNOSIS — K21.9 GASTRO-ESOPHAGEAL REFLUX DISEASE WITHOUT ESOPHAGITIS: ICD-10-CM

## 2024-09-18 PROCEDURE — 99204 OFFICE O/P NEW MOD 45 MIN: CPT | Performed by: INTERNAL MEDICINE

## 2024-11-06 ENCOUNTER — HOSPITAL ENCOUNTER (OUTPATIENT)
Dept: GENERAL RADIOLOGY | Facility: HOSPITAL | Age: 74
Discharge: HOME | End: 2024-11-06
Payer: MEDICARE

## 2024-11-06 ENCOUNTER — PRE-ADMISSION TESTING (OUTPATIENT)
Dept: PREADMISSION TESTING | Facility: HOSPITAL | Age: 74
End: 2024-11-06
Payer: MEDICARE

## 2024-11-06 VITALS
DIASTOLIC BLOOD PRESSURE: 90 MMHG | RESPIRATION RATE: 20 BRPM | HEIGHT: 60 IN | BODY MASS INDEX: 37.07 KG/M2 | WEIGHT: 188.8 LBS | TEMPERATURE: 98 F | SYSTOLIC BLOOD PRESSURE: 147 MMHG | HEART RATE: 75 BPM | OXYGEN SATURATION: 96 %

## 2024-11-06 LAB
ALBUMIN SERPL-MCNC: 3.4 G/DL (ref 3.5–5.2)
ALBUMIN/GLOB SERPL: 1.2 G/DL
ALP SERPL-CCNC: 69 U/L (ref 39–117)
ALT SERPL W P-5'-P-CCNC: 12 U/L (ref 1–33)
ANION GAP SERPL CALCULATED.3IONS-SCNC: 10.1 MMOL/L (ref 5–15)
AST SERPL-CCNC: 18 U/L (ref 1–32)
BILIRUB SERPL-MCNC: 0.4 MG/DL (ref 0–1.2)
BILIRUB UR QL STRIP: NEGATIVE
BUN SERPL-MCNC: 29 MG/DL (ref 8–23)
BUN/CREAT SERPL: 15.3 (ref 7–25)
CALCIUM SPEC-SCNC: 9.1 MG/DL (ref 8.6–10.5)
CHLORIDE SERPL-SCNC: 104 MMOL/L (ref 98–107)
CLARITY UR: CLEAR
CO2 SERPL-SCNC: 24.9 MMOL/L (ref 22–29)
COLOR UR: YELLOW
CREAT SERPL-MCNC: 1.89 MG/DL (ref 0.57–1)
DEPRECATED RDW RBC AUTO: 50.4 FL (ref 37–54)
EGFRCR SERPLBLD CKD-EPI 2021: 27.3 ML/MIN/1.73
ERYTHROCYTE [DISTWIDTH] IN BLOOD BY AUTOMATED COUNT: 15.1 % (ref 12.3–15.4)
GLOBULIN UR ELPH-MCNC: 2.9 GM/DL
GLUCOSE SERPL-MCNC: 96 MG/DL (ref 65–99)
GLUCOSE UR STRIP-MCNC: NEGATIVE MG/DL
HCT VFR BLD AUTO: 32.5 % (ref 34–46.6)
HGB BLD-MCNC: 10.2 G/DL (ref 12–15.9)
HGB UR QL STRIP.AUTO: NEGATIVE
KETONES UR QL STRIP: NEGATIVE
LEUKOCYTE ESTERASE UR QL STRIP.AUTO: NEGATIVE
MCH RBC QN AUTO: 29.1 PG (ref 26.6–33)
MCHC RBC AUTO-ENTMCNC: 31.4 G/DL (ref 31.5–35.7)
MCV RBC AUTO: 92.6 FL (ref 79–97)
NITRITE UR QL STRIP: NEGATIVE
PH UR STRIP.AUTO: 5.5 [PH] (ref 5–8)
PLATELET # BLD AUTO: 196 10*3/MM3 (ref 140–450)
PMV BLD AUTO: 9.2 FL (ref 6–12)
POTASSIUM SERPL-SCNC: 4.2 MMOL/L (ref 3.5–5.2)
PROT SERPL-MCNC: 6.3 G/DL (ref 6–8.5)
PROT UR QL STRIP: NEGATIVE
QT INTERVAL: 456 MS
QTC INTERVAL: 510 MS
RBC # BLD AUTO: 3.51 10*6/MM3 (ref 3.77–5.28)
SODIUM SERPL-SCNC: 139 MMOL/L (ref 136–145)
SP GR UR STRIP: 1.01 (ref 1–1.03)
UROBILINOGEN UR QL STRIP: NORMAL
WBC NRBC COR # BLD AUTO: 4.9 10*3/MM3 (ref 3.4–10.8)

## 2024-11-06 PROCEDURE — 80053 COMPREHEN METABOLIC PANEL: CPT

## 2024-11-06 PROCEDURE — 85027 COMPLETE CBC AUTOMATED: CPT

## 2024-11-06 PROCEDURE — 81003 URINALYSIS AUTO W/O SCOPE: CPT

## 2024-11-06 PROCEDURE — 71046 X-RAY EXAM CHEST 2 VIEWS: CPT

## 2024-11-06 PROCEDURE — 93005 ELECTROCARDIOGRAM TRACING: CPT

## 2024-11-06 PROCEDURE — 73030 X-RAY EXAM OF SHOULDER: CPT

## 2024-11-06 PROCEDURE — 93010 ELECTROCARDIOGRAM REPORT: CPT | Performed by: INTERNAL MEDICINE

## 2024-11-06 PROCEDURE — 36415 COLL VENOUS BLD VENIPUNCTURE: CPT

## 2024-11-06 RX ORDER — CARVEDILOL 6.25 MG/1
1 TABLET ORAL 2 TIMES DAILY WITH MEALS
COMMUNITY
Start: 2024-07-30

## 2024-11-06 RX ORDER — CALCITRIOL 0.5 UG/1
1 CAPSULE, LIQUID FILLED ORAL DAILY
COMMUNITY
Start: 2024-10-26

## 2024-11-06 RX ORDER — MIRABEGRON 50 MG/1
50 TABLET, FILM COATED, EXTENDED RELEASE ORAL DAILY
COMMUNITY
Start: 2024-07-30

## 2024-11-06 RX ORDER — LACTULOSE 10 G/15ML
15 SOLUTION ORAL NIGHTLY
COMMUNITY
Start: 2024-10-14

## 2024-11-06 RX ORDER — VENLAFAXINE HYDROCHLORIDE 37.5 MG/1
37.5 CAPSULE, EXTENDED RELEASE ORAL DAILY
Status: ON HOLD | COMMUNITY
Start: 2024-09-30 | End: 2024-11-14

## 2024-11-06 RX ORDER — FAMOTIDINE 20 MG/1
1 TABLET, FILM COATED ORAL 2 TIMES DAILY
COMMUNITY
Start: 2024-08-30 | End: 2024-11-17

## 2024-11-06 NOTE — DISCHARGE INSTRUCTIONS
Take the following medications the morning of surgery:    PANTOPRAZOLE, VENLAFAXINE, CARVEDILOL,SERTRALINE, FAMOTIDINE, HYDRALAZINE      If you are on prescription narcotic pain medication to control your pain you may also take that medication the morning of surgery.      General Instructions:     Do not eat solid food after midnight the night before surgery.  Clear liquids day of surgery are allowed but must be stopped at least two hours before your hospital arrival time.       Allowed clear liquids      Water, sodas, and tea or coffee with no cream or milk added.       12 to 20 ounces of a clear liquid that contains carbohydrates is recommended.  If non-diabetic, have Gatorade or Powerade.  If diabetic, have G2 or Powerade Zero.     Do not have liquids red in color.  Do not consume chicken, beef, pork or vegetable broth or bouillon cubes of any variety as they are not considered clear liquids and are not allowed.      Infants may have breast milk up to four hours before surgery.  Infants drinking formula may drink formula up to six hours before surgery.   Patients who avoid smoking, chewing tobacco and alcohol for 4 weeks prior to surgery have a reduced risk of post-operative complications.  Quit smoking as many days before surgery as you can.  Do not smoke, use chewing tobacco or drink alcohol the day of surgery.   If applicable bring your C-PAP/ BI-PAP machine in with you to preop day of surgery.  Bring any papers given to you in the doctor’s office.  Wear clean comfortable clothes.  Do not wear contact lenses, false eyelashes or make-up.  Bring a case for your glasses.   Bring crutches or walker if applicable.  Remove all piercings.  Leave jewelry and any other valuables at home.  Hair extensions with metal clips must be removed prior to surgery.  The Pre-Admission Testing nurse will instruct you to bring medications if unable to obtain an accurate list in Pre-Admission Testing.      Day of surgery:  Your  arrival time is approximately two hours before your scheduled surgery time.  Upon arrival, a Pre-op nurse and Anesthesiologist will review your health history, obtain vital signs, and answer questions you may have.  The only belongings needed at this time will be a list of your home medications and if applicable your C-PAP/BI-PAP machine.  A Pre-op nurse will start an IV and you may receive medication in preparation for surgery, including something to help you relax.     Please be aware that surgery does come with discomfort.  We want to make every effort to control your discomfort so please discuss any uncontrolled symptoms with your nurse.   Your doctor will most likely have prescribed pain medications.      If you are going home after surgery you will receive individualized written care instructions before being discharged.  A responsible adult must drive you to and from the hospital on the day of your surgery and ideally stay with you through the night.  Discharge prescriptions can be filled by the hospital pharmacy during regular pharmacy hours.  If you are having surgery late in the day/evening your prescription may be e-prescribed to your pharmacy.  Please verify your pharmacy hours or chose a 24 hour pharmacy to avoid not having access to your prescription because your pharmacy has closed for the day.    If you are staying overnight following surgery, you will be transported to your hospital room following the recovery period.  [facility] has all private rooms.    If you have any questions please call Pre-Admission Testing at [phone].  Deductibles and co-payments are collected on the day of service. Please be prepared to pay the required co-pay, deductible or deposit on the day of service as defined by your plan.    Call your surgeon immediately if you experience any of the following symptoms:  Sore Throat  Shortness of Breath or difficulty breathing  Cough  Chills  Body soreness or  muscle pain  Headache  Fever  New loss of taste or smell  Do not arrive for your surgery ill.  Your procedure will need to be rescheduled to another time.  You will need to call your physician before the day of surgery to avoid any unnecessary exposure to hospital staff as well as other patients.        PREVENTING INFECTION IN SHOULDER SURGICAL SITES     C. acnes is a bacteria that lives deep within follicles and pores of the skin. It is found in large numbers on the skin of the face, axilla (armpit), chest and back and is the primary bacteria to cause a surgical site infection after shoulder surgery.      Use of a Benzoyl Peroxide solution prior to shoulder surgery decreases C. acnes and reduces post-op infections.   Your surgeon has ordered 5% Benzoyl Peroxide wash to be used three times prior to your surgery.     Please read the following instructions carefully and bring this form with you the day of surgery.     General bathing instructions starting two days before your surgery:    Shower using a fresh bar of anti-bacterial soap (such as Dial) and clean washcloth.  Pay special attention to the neck, shoulder and armpit area.   Wash your hair as usual with your regular shampoo.   Rinse hair and body thoroughly with warm water (not hot water) to remove shampoo and residue.   Dry with a clean towel.              Sleep in a clean bed with clean clothing.  Do not allow pets to sleep with you.     For 2 days before surgery, avoid shaving with a razor because the razor can irritate skin and make it easier to develop an infection.    Any areas of open skin can increase the risk of a post-operative wound infection by allowing bacteria to enter and travel throughout the body.  Notify your surgeon if you have any skin wounds / rashes even if it is not near the expected surgical site.  The area will need assessed to determine if surgery should be delayed until it is healed.      First application of 5% Benzoyl Peroxide Wash  two nights before surgery:                                                                Wash neck, shoulder (front, back, side) and armpit   with warm water, rinse and dry - see picture.  Gently wash the same areas with the Benzoyl Peroxide   cleanser going away from the neck for 10-20 seconds.   Work into a full lather and leave on the skin for   2 minutes for greatest effect.  Rinse thoroughly with warm water, not hot water.                     Pat dry with a clean towel.                                                            Wash your hands thoroughly.  Do not apply lotion, powder, perfume or deodorant.   Put on clean clothes.    Second application the night before surgery:  Repeat the above steps.        Third application morning of surgery:  Repeat the above steps.    Due to shoulder pain or decreased range of motion of your shoulder and arm, you may need assistance washing under the arm or the back portion of your shoulder.     Avoid further washing the areas of the skin treated with Benzoyl Peroxide for at least 1 hour.    For your convenience, you may purchase Benzoyl Peroxide at [facility] pharmacy.     Warning:  Let your physician know if you are allergic to Benzoyl Peroxide or have very sensitive skin and cannot use it.   Stop using and contact your surgeon if you experience any excessive scaling, itching, swelling, skin irritation or other signs of a reaction.  Keep out of eyes, ears, nose and mouth.  Do not apply to sunburned, irritated or broken area on the skin.  Avoid unwanted problems with bleaching effect by following these tips:  Wash hands after each use.  Avoid contact with hair, clothing, furnishings or carpeting.  Wear clean, old t-shirt or clothing to bed.   Use clean, old white pillow cases and sheets to avoid discoloring your bed linens.                                                                                                                                                                                                                                                                                    Please complete the checklist below, bring it with you to the hospital                               the day of surgery and give to the Pre-op Nurse     Preoperative Skin Prep Checklist        Patient Name Label             Enter dates and ?  boxes to indicate completed    Surgery Date: _______________ Regular Shower  Benzoyl Peroxide Wash   First Application:  2 days before_______________  (Stop shaving all body parts)           Morning or Evening                        Evening    Second Application:  1 day  before________________        Morning or Evening                          Evening   Third Application:  Day of Surgery______________                           Morning                   Morning

## 2024-11-12 NOTE — H&P
HPI  Chief complaint right shoulder pain  History of present illness: 74-year-old right-hand-dominant  female who presents with her granddaughter today for evaluation and treatment of her right shoulder. She has had ongoing significant arthritis and functional limitations with use and was worked up by Dr. Kim who kindly referred after a CT scan revealed significant retroversion and inclination changes likely requiring an augment for glenoid reconstruction. She had a CT scan because she has a pacemaker. She also has renal disease. She plans to get preoperative clearance for both. She had a corticosteroid injection in early September with only minimal relief. She is looking for definitive care. Incidentally she did fall this week and presents with ecchymosis over right eye and increased pain in the shoulder but much of the pain was actually there prior to the fall.  Physical Exam  Right shoulder:  Skin is normal. There is no warmth. No erythema.  Lymphadenopathy is negative.  Shoulder passive ROM today shows: Elevation = 90; ER(side) = 5; ER(abd) = 50; IR(abd) = 20; IR(vert) = lateral flank.  Shoulder strength: Elevation = 4/5; ER = 4/5; IR = 5-/5; ABD = 4/5. All tested through minimal range of motion  Severe crepitation in the glenohumeral joint. Arc of motion is positive with passive range of motion.  Pulses are normal. Normal sensation. Capillary refill is normal.  Tender over the anterior and posterior glenohumeral joint to palpation.  Increased pain with extended passive rotational movement  Assessment / Plan  CT scan previously obtained was reviewed showing version and inclination changes. There is question of a small fracture line or changes related to osteophytic formation of the inferior glenoid.    Assessment:  1. Severe primary glenohumeral osteoarthritis right shoulder  2. Likely concurrent rotator cuff disease or tear but difficult to tell clinically and she is unable to have an MRI due to her  pacemaker.  Plan:  1. I recommend CT scan right shoulder [employer] protocol for preoperative planning and intraoperative computer guidance. New scan is needed with thinner cuts for 3D imaging  2. I recommend scheduling right reverse total shoulder arthroplasty [employer] components because of the availability of their augments for the glenoid  3. Opti preop preop nutritional program discussion  4. We discussed the benefits and risks of surgical intervention, as well as alternative treatments. Potential surgical risks and complications include but are not limited to deep venous thrombosis, infection, neurovascular injury, fracture, implant wear, implant failure, implant dislocation, possible need for revision surgery, loss of motion, limb length changes. Sufficient opportunity was given to discuss the condition and treatment plan and all questions were answered for the patient. The patient agreed to proceed with the surgical plan.  5. This patient will experience restricted and limited mobility post-surgically. A Caprini DVT Risk Assessment for development of deep vein thrombosis (DVT) during the first 30 days of the post-procedural period finds this patient at high risk. Based on the patient's limited mobility following surgery, and past medical history and post-op risk of DVT, I am ordering home-use Portable Mechanical Compression devices (PMC) to stimulate circulation, decrease swelling, and reduce the likelihood of a VTE event. I have prescribed Portable Mechanical Compression devices for home-use for a period of 30 days post-surgery. I find this to be medically necessary to limit any additional risk of complications and bleeding.  Portable Mechanical Compression devices applied to the lower extremities will be used 3 hours QD and any time the patient is at rest, including during bed rest.

## 2024-11-14 ENCOUNTER — APPOINTMENT (OUTPATIENT)
Dept: GENERAL RADIOLOGY | Facility: HOSPITAL | Age: 74
End: 2024-11-14
Payer: MEDICARE

## 2024-11-14 ENCOUNTER — ANESTHESIA (OUTPATIENT)
Dept: PERIOP | Facility: HOSPITAL | Age: 74
End: 2024-11-14
Payer: MEDICARE

## 2024-11-14 ENCOUNTER — ANESTHESIA EVENT (OUTPATIENT)
Dept: PERIOP | Facility: HOSPITAL | Age: 74
End: 2024-11-14
Payer: MEDICARE

## 2024-11-14 ENCOUNTER — HOSPITAL ENCOUNTER (OUTPATIENT)
Facility: HOSPITAL | Age: 74
Setting detail: OBSERVATION
Discharge: HOME | End: 2024-11-15
Attending: ORTHOPAEDIC SURGERY | Admitting: ORTHOPAEDIC SURGERY
Payer: MEDICARE

## 2024-11-14 DIAGNOSIS — Z96.611 STATUS POST REVERSE ARTHROPLASTY OF SHOULDER, RIGHT: Primary | ICD-10-CM

## 2024-11-14 PROCEDURE — G0378 HOSPITAL OBSERVATION PER HR: HCPCS

## 2024-11-14 PROCEDURE — C1713 ANCHOR/SCREW BN/BN,TIS/BN: HCPCS | Performed by: ORTHOPAEDIC SURGERY

## 2024-11-14 PROCEDURE — C1776 JOINT DEVICE (IMPLANTABLE): HCPCS | Performed by: ORTHOPAEDIC SURGERY

## 2024-11-14 PROCEDURE — 63710000001 ALLOPURINOL 100 MG TABLET: Performed by: ORTHOPAEDIC SURGERY

## 2024-11-14 PROCEDURE — A9270 NON-COVERED ITEM OR SERVICE: HCPCS | Performed by: ORTHOPAEDIC SURGERY

## 2024-11-14 PROCEDURE — 25010000002 FENTANYL CITRATE (PF) 50 MCG/ML SOLUTION: Performed by: NURSE ANESTHETIST, CERTIFIED REGISTERED

## 2024-11-14 PROCEDURE — 25010000002 ONDANSETRON PER 1 MG: Performed by: NURSE ANESTHETIST, CERTIFIED REGISTERED

## 2024-11-14 PROCEDURE — 25810000003 LACTATED RINGERS PER 1000 ML: Performed by: ANESTHESIOLOGY

## 2024-11-14 PROCEDURE — 25010000002 VANCOMYCIN HCL 1.25 G RECONSTITUTED SOLUTION 1 EACH VIAL: Performed by: ORTHOPAEDIC SURGERY

## 2024-11-14 PROCEDURE — 63710000001 CARVEDILOL 6.25 MG TABLET: Performed by: ORTHOPAEDIC SURGERY

## 2024-11-14 PROCEDURE — 25010000002 DEXAMETHASONE SODIUM PHOSPHATE 20 MG/5ML SOLUTION: Performed by: NURSE ANESTHETIST, CERTIFIED REGISTERED

## 2024-11-14 PROCEDURE — 25010000002 SUGAMMADEX 200 MG/2ML SOLUTION: Performed by: NURSE ANESTHETIST, CERTIFIED REGISTERED

## 2024-11-14 PROCEDURE — 63710000001 MEMANTINE 10 MG TABLET: Performed by: ORTHOPAEDIC SURGERY

## 2024-11-14 PROCEDURE — 63710000001 POTASSIUM CHLORIDE 10 MEQ TABLET CONTROLLED-RELEASE: Performed by: ORTHOPAEDIC SURGERY

## 2024-11-14 PROCEDURE — 25010000002 LIDOCAINE PF 2% 2 % SOLUTION: Performed by: NURSE ANESTHETIST, CERTIFIED REGISTERED

## 2024-11-14 PROCEDURE — 25010000002 CEFAZOLIN PER 500 MG: Performed by: ORTHOPAEDIC SURGERY

## 2024-11-14 PROCEDURE — 25010000002 ESMOLOL 100 MG/10ML SOLUTION: Performed by: NURSE ANESTHETIST, CERTIFIED REGISTERED

## 2024-11-14 PROCEDURE — 73020 X-RAY EXAM OF SHOULDER: CPT

## 2024-11-14 PROCEDURE — 63710000001 FUROSEMIDE 40 MG TABLET: Performed by: ORTHOPAEDIC SURGERY

## 2024-11-14 PROCEDURE — 25810000003 SODIUM CHLORIDE 0.9 % SOLUTION 250 ML FLEX CONT: Performed by: ORTHOPAEDIC SURGERY

## 2024-11-14 PROCEDURE — 25010000002 PROPOFOL 200 MG/20ML EMULSION: Performed by: NURSE ANESTHETIST, CERTIFIED REGISTERED

## 2024-11-14 PROCEDURE — 63710000001 LACTULOSE 10 GM/15ML SOLUTION: Performed by: ORTHOPAEDIC SURGERY

## 2024-11-14 PROCEDURE — 63710000001 HYDROCODONE-ACETAMINOPHEN 10-325 MG TABLET: Performed by: ORTHOPAEDIC SURGERY

## 2024-11-14 PROCEDURE — 63710000001 CYCLOBENZAPRINE 10 MG TABLET: Performed by: ORTHOPAEDIC SURGERY

## 2024-11-14 PROCEDURE — 25010000002 HYDRALAZINE PER 20 MG: Performed by: NURSE ANESTHETIST, CERTIFIED REGISTERED

## 2024-11-14 PROCEDURE — 63710000001 OXYBUTYNIN XL 10 MG TABLET SUSTAINED-RELEASE 24 HOUR: Performed by: ORTHOPAEDIC SURGERY

## 2024-11-14 PROCEDURE — 63710000001 ASPIRIN 81 MG TABLET DELAYED-RELEASE: Performed by: ORTHOPAEDIC SURGERY

## 2024-11-14 DEVICE — HUMERAL LINER, CONSTRAINED
Type: IMPLANTABLE DEVICE | Site: SHOULDER | Status: FUNCTIONAL
Brand: EQUINOXE®

## 2024-11-14 DEVICE — IMPLANTABLE DEVICE
Type: IMPLANTABLE DEVICE | Site: SHOULDER | Status: FUNCTIONAL
Brand: EQUINOXE

## 2024-11-14 DEVICE — GLENOID PLATE
Type: IMPLANTABLE DEVICE | Site: SHOULDER | Status: FUNCTIONAL
Brand: EQUINOXE

## 2024-11-14 DEVICE — GLENOSPHERE
Type: IMPLANTABLE DEVICE | Site: SHOULDER | Status: FUNCTIONAL
Brand: EQUINOXE

## 2024-11-14 DEVICE — IMPLANTABLE DEVICE: Type: IMPLANTABLE DEVICE | Status: FUNCTIONAL

## 2024-11-14 DEVICE — HUMERAL ADAPTER TRAY
Type: IMPLANTABLE DEVICE | Site: SHOULDER | Status: FUNCTIONAL
Brand: EQUINOXE®

## 2024-11-14 RX ORDER — ONDANSETRON 4 MG/1
4 TABLET, ORALLY DISINTEGRATING ORAL EVERY 6 HOURS PRN
Status: DISCONTINUED | OUTPATIENT
Start: 2024-11-14 | End: 2024-11-15 | Stop reason: HOSPADM

## 2024-11-14 RX ORDER — FLUMAZENIL 0.1 MG/ML
0.2 INJECTION INTRAVENOUS AS NEEDED
Status: DISCONTINUED | OUTPATIENT
Start: 2024-11-14 | End: 2024-11-14 | Stop reason: HOSPADM

## 2024-11-14 RX ORDER — PROMETHAZINE HYDROCHLORIDE 25 MG/1
25 SUPPOSITORY RECTAL ONCE AS NEEDED
Status: DISCONTINUED | OUTPATIENT
Start: 2024-11-14 | End: 2024-11-14 | Stop reason: HOSPADM

## 2024-11-14 RX ORDER — LACTULOSE 10 G/15ML
10 SOLUTION ORAL NIGHTLY
Status: DISCONTINUED | OUTPATIENT
Start: 2024-11-14 | End: 2024-11-15 | Stop reason: HOSPADM

## 2024-11-14 RX ORDER — SODIUM CHLORIDE 0.9 % (FLUSH) 0.9 %
3-10 SYRINGE (ML) INJECTION AS NEEDED
Status: DISCONTINUED | OUTPATIENT
Start: 2024-11-14 | End: 2024-11-14 | Stop reason: HOSPADM

## 2024-11-14 RX ORDER — FUROSEMIDE 40 MG/1
40 TABLET ORAL DAILY
Status: DISCONTINUED | OUTPATIENT
Start: 2024-11-14 | End: 2024-11-15 | Stop reason: HOSPADM

## 2024-11-14 RX ORDER — OXYBUTYNIN CHLORIDE 10 MG/1
10 TABLET, EXTENDED RELEASE ORAL DAILY
Status: DISCONTINUED | OUTPATIENT
Start: 2024-11-14 | End: 2024-11-15 | Stop reason: HOSPADM

## 2024-11-14 RX ORDER — LIDOCAINE HYDROCHLORIDE 10 MG/ML
0.5 INJECTION, SOLUTION INFILTRATION; PERINEURAL ONCE AS NEEDED
Status: DISCONTINUED | OUTPATIENT
Start: 2024-11-14 | End: 2024-11-14 | Stop reason: HOSPADM

## 2024-11-14 RX ORDER — PROPOFOL 10 MG/ML
INJECTION, EMULSION INTRAVENOUS AS NEEDED
Status: DISCONTINUED | OUTPATIENT
Start: 2024-11-14 | End: 2024-11-14 | Stop reason: SURG

## 2024-11-14 RX ORDER — POTASSIUM CHLORIDE 750 MG/1
10 TABLET, EXTENDED RELEASE ORAL 2 TIMES DAILY
Status: DISCONTINUED | OUTPATIENT
Start: 2024-11-14 | End: 2024-11-15 | Stop reason: HOSPADM

## 2024-11-14 RX ORDER — DROPERIDOL 2.5 MG/ML
0.62 INJECTION, SOLUTION INTRAMUSCULAR; INTRAVENOUS
Status: DISCONTINUED | OUTPATIENT
Start: 2024-11-14 | End: 2024-11-14 | Stop reason: HOSPADM

## 2024-11-14 RX ORDER — CARVEDILOL 6.25 MG/1
6.25 TABLET ORAL 2 TIMES DAILY WITH MEALS
Status: DISCONTINUED | OUTPATIENT
Start: 2024-11-14 | End: 2024-11-15 | Stop reason: HOSPADM

## 2024-11-14 RX ORDER — DIPHENHYDRAMINE HYDROCHLORIDE 50 MG/ML
12.5 INJECTION INTRAMUSCULAR; INTRAVENOUS
Status: DISCONTINUED | OUTPATIENT
Start: 2024-11-14 | End: 2024-11-14 | Stop reason: HOSPADM

## 2024-11-14 RX ORDER — HYDROCODONE BITARTRATE AND ACETAMINOPHEN 5; 325 MG/1; MG/1
1 TABLET ORAL ONCE AS NEEDED
Status: DISCONTINUED | OUTPATIENT
Start: 2024-11-14 | End: 2024-11-14 | Stop reason: HOSPADM

## 2024-11-14 RX ORDER — FLUTICASONE PROPIONATE 50 MCG
2 SPRAY, SUSPENSION (ML) NASAL DAILY
Status: DISCONTINUED | OUTPATIENT
Start: 2024-11-14 | End: 2024-11-15 | Stop reason: HOSPADM

## 2024-11-14 RX ORDER — HYDROCODONE BITARTRATE AND ACETAMINOPHEN 7.5; 325 MG/1; MG/1
1 TABLET ORAL EVERY 4 HOURS PRN
Status: DISCONTINUED | OUTPATIENT
Start: 2024-11-14 | End: 2024-11-14 | Stop reason: HOSPADM

## 2024-11-14 RX ORDER — HYDRALAZINE HYDROCHLORIDE 20 MG/ML
INJECTION INTRAMUSCULAR; INTRAVENOUS AS NEEDED
Status: DISCONTINUED | OUTPATIENT
Start: 2024-11-14 | End: 2024-11-14 | Stop reason: SURG

## 2024-11-14 RX ORDER — NALOXONE HCL 0.4 MG/ML
0.4 VIAL (ML) INJECTION
Status: DISCONTINUED | OUTPATIENT
Start: 2024-11-14 | End: 2024-11-15 | Stop reason: HOSPADM

## 2024-11-14 RX ORDER — PHENYLEPHRINE HCL IN 0.9% NACL 1 MG/10 ML
SYRINGE (ML) INTRAVENOUS AS NEEDED
Status: DISCONTINUED | OUTPATIENT
Start: 2024-11-14 | End: 2024-11-14 | Stop reason: SURG

## 2024-11-14 RX ORDER — FENTANYL CITRATE 50 UG/ML
25 INJECTION, SOLUTION INTRAMUSCULAR; INTRAVENOUS
Status: DISCONTINUED | OUTPATIENT
Start: 2024-11-14 | End: 2024-11-14 | Stop reason: HOSPADM

## 2024-11-14 RX ORDER — NALOXONE HCL 0.4 MG/ML
0.2 VIAL (ML) INJECTION AS NEEDED
Status: DISCONTINUED | OUTPATIENT
Start: 2024-11-14 | End: 2024-11-14 | Stop reason: HOSPADM

## 2024-11-14 RX ORDER — FENTANYL CITRATE 50 UG/ML
INJECTION, SOLUTION INTRAMUSCULAR; INTRAVENOUS AS NEEDED
Status: DISCONTINUED | OUTPATIENT
Start: 2024-11-14 | End: 2024-11-14 | Stop reason: SURG

## 2024-11-14 RX ORDER — MORPHINE SULFATE 2 MG/ML
4 INJECTION, SOLUTION INTRAMUSCULAR; INTRAVENOUS
Status: DISCONTINUED | OUTPATIENT
Start: 2024-11-14 | End: 2024-11-15 | Stop reason: HOSPADM

## 2024-11-14 RX ORDER — MORPHINE SULFATE 2 MG/ML
6 INJECTION, SOLUTION INTRAMUSCULAR; INTRAVENOUS
Status: DISCONTINUED | OUTPATIENT
Start: 2024-11-14 | End: 2024-11-15 | Stop reason: HOSPADM

## 2024-11-14 RX ORDER — MEMANTINE HYDROCHLORIDE 10 MG/1
10 TABLET ORAL 2 TIMES DAILY
Status: DISCONTINUED | OUTPATIENT
Start: 2024-11-14 | End: 2024-11-15 | Stop reason: HOSPADM

## 2024-11-14 RX ORDER — ONDANSETRON 2 MG/ML
4 INJECTION INTRAMUSCULAR; INTRAVENOUS ONCE AS NEEDED
Status: DISCONTINUED | OUTPATIENT
Start: 2024-11-14 | End: 2024-11-14 | Stop reason: HOSPADM

## 2024-11-14 RX ORDER — FAMOTIDINE 20 MG/1
20 TABLET, FILM COATED ORAL DAILY
Status: DISCONTINUED | OUTPATIENT
Start: 2024-11-14 | End: 2024-11-15 | Stop reason: HOSPADM

## 2024-11-14 RX ORDER — SODIUM CHLORIDE, SODIUM LACTATE, POTASSIUM CHLORIDE, CALCIUM CHLORIDE 600; 310; 30; 20 MG/100ML; MG/100ML; MG/100ML; MG/100ML
9 INJECTION, SOLUTION INTRAVENOUS CONTINUOUS
Status: ACTIVE | OUTPATIENT
Start: 2024-11-14 | End: 2024-11-15

## 2024-11-14 RX ORDER — OXYCODONE AND ACETAMINOPHEN 5; 325 MG/1; MG/1
2 TABLET ORAL EVERY 4 HOURS PRN
Status: DISCONTINUED | OUTPATIENT
Start: 2024-11-14 | End: 2024-11-15 | Stop reason: HOSPADM

## 2024-11-14 RX ORDER — HYDROCODONE BITARTRATE AND ACETAMINOPHEN 10; 325 MG/1; MG/1
1 TABLET ORAL EVERY 4 HOURS PRN
Qty: 30 TABLET | Refills: 0 | Status: SHIPPED | OUTPATIENT
Start: 2024-11-14

## 2024-11-14 RX ORDER — HYDROCODONE BITARTRATE AND ACETAMINOPHEN 10; 325 MG/1; MG/1
1 TABLET ORAL EVERY 4 HOURS PRN
Status: DISCONTINUED | OUTPATIENT
Start: 2024-11-14 | End: 2024-11-15 | Stop reason: HOSPADM

## 2024-11-14 RX ORDER — CYCLOBENZAPRINE HCL 10 MG
10 TABLET ORAL NIGHTLY PRN
Status: DISCONTINUED | OUTPATIENT
Start: 2024-11-14 | End: 2024-11-15 | Stop reason: HOSPADM

## 2024-11-14 RX ORDER — IPRATROPIUM BROMIDE AND ALBUTEROL SULFATE 2.5; .5 MG/3ML; MG/3ML
3 SOLUTION RESPIRATORY (INHALATION) ONCE AS NEEDED
Status: DISCONTINUED | OUTPATIENT
Start: 2024-11-14 | End: 2024-11-14 | Stop reason: HOSPADM

## 2024-11-14 RX ORDER — FAMOTIDINE 10 MG/ML
20 INJECTION, SOLUTION INTRAVENOUS ONCE
Status: COMPLETED | OUTPATIENT
Start: 2024-11-14 | End: 2024-11-14

## 2024-11-14 RX ORDER — SODIUM CHLORIDE 0.9 % (FLUSH) 0.9 %
3 SYRINGE (ML) INJECTION EVERY 12 HOURS SCHEDULED
Status: DISCONTINUED | OUTPATIENT
Start: 2024-11-14 | End: 2024-11-14 | Stop reason: HOSPADM

## 2024-11-14 RX ORDER — HYDRALAZINE HYDROCHLORIDE 20 MG/ML
5 INJECTION INTRAMUSCULAR; INTRAVENOUS
Status: DISCONTINUED | OUTPATIENT
Start: 2024-11-14 | End: 2024-11-14 | Stop reason: HOSPADM

## 2024-11-14 RX ORDER — ALLOPURINOL 100 MG/1
200 TABLET ORAL NIGHTLY
Status: DISCONTINUED | OUTPATIENT
Start: 2024-11-14 | End: 2024-11-15 | Stop reason: HOSPADM

## 2024-11-14 RX ORDER — ESMOLOL HYDROCHLORIDE 10 MG/ML
INJECTION INTRAVENOUS AS NEEDED
Status: DISCONTINUED | OUTPATIENT
Start: 2024-11-14 | End: 2024-11-14 | Stop reason: SURG

## 2024-11-14 RX ORDER — LABETALOL HYDROCHLORIDE 5 MG/ML
5 INJECTION, SOLUTION INTRAVENOUS
Status: DISCONTINUED | OUTPATIENT
Start: 2024-11-14 | End: 2024-11-14 | Stop reason: HOSPADM

## 2024-11-14 RX ORDER — ONDANSETRON 2 MG/ML
INJECTION INTRAMUSCULAR; INTRAVENOUS AS NEEDED
Status: DISCONTINUED | OUTPATIENT
Start: 2024-11-14 | End: 2024-11-14 | Stop reason: SURG

## 2024-11-14 RX ORDER — FENTANYL CITRATE 50 UG/ML
25 INJECTION, SOLUTION INTRAMUSCULAR; INTRAVENOUS ONCE AS NEEDED
Status: DISCONTINUED | OUTPATIENT
Start: 2024-11-14 | End: 2024-11-14 | Stop reason: HOSPADM

## 2024-11-14 RX ORDER — ONDANSETRON 2 MG/ML
4 INJECTION INTRAMUSCULAR; INTRAVENOUS EVERY 6 HOURS PRN
Status: DISCONTINUED | OUTPATIENT
Start: 2024-11-14 | End: 2024-11-15 | Stop reason: HOSPADM

## 2024-11-14 RX ORDER — ROCURONIUM BROMIDE 10 MG/ML
INJECTION, SOLUTION INTRAVENOUS AS NEEDED
Status: DISCONTINUED | OUTPATIENT
Start: 2024-11-14 | End: 2024-11-14 | Stop reason: SURG

## 2024-11-14 RX ORDER — MIDAZOLAM HYDROCHLORIDE 1 MG/ML
0.5 INJECTION, SOLUTION INTRAMUSCULAR; INTRAVENOUS
Status: DISCONTINUED | OUTPATIENT
Start: 2024-11-14 | End: 2024-11-14 | Stop reason: HOSPADM

## 2024-11-14 RX ORDER — PANTOPRAZOLE SODIUM 40 MG/1
40 TABLET, DELAYED RELEASE ORAL DAILY
Status: DISCONTINUED | OUTPATIENT
Start: 2024-11-15 | End: 2024-11-15 | Stop reason: HOSPADM

## 2024-11-14 RX ORDER — PROMETHAZINE HYDROCHLORIDE 25 MG/1
25 TABLET ORAL ONCE AS NEEDED
Status: DISCONTINUED | OUTPATIENT
Start: 2024-11-14 | End: 2024-11-14 | Stop reason: HOSPADM

## 2024-11-14 RX ORDER — DIAZEPAM 5 MG/1
5 TABLET ORAL EVERY 6 HOURS PRN
Status: DISCONTINUED | OUTPATIENT
Start: 2024-11-14 | End: 2024-11-15 | Stop reason: HOSPADM

## 2024-11-14 RX ORDER — SODIUM CHLORIDE 450 MG/100ML
75 INJECTION, SOLUTION INTRAVENOUS CONTINUOUS
Status: DISCONTINUED | OUTPATIENT
Start: 2024-11-14 | End: 2024-11-15 | Stop reason: HOSPADM

## 2024-11-14 RX ORDER — EPHEDRINE SULFATE 50 MG/ML
5 INJECTION, SOLUTION INTRAVENOUS ONCE AS NEEDED
Status: DISCONTINUED | OUTPATIENT
Start: 2024-11-14 | End: 2024-11-14 | Stop reason: HOSPADM

## 2024-11-14 RX ORDER — ASPIRIN 81 MG/1
81 TABLET ORAL DAILY
Status: DISCONTINUED | OUTPATIENT
Start: 2024-11-14 | End: 2024-11-15 | Stop reason: HOSPADM

## 2024-11-14 RX ORDER — MAGNESIUM HYDROXIDE 1200 MG/15ML
LIQUID ORAL AS NEEDED
Status: DISCONTINUED | OUTPATIENT
Start: 2024-11-14 | End: 2024-11-14 | Stop reason: HOSPADM

## 2024-11-14 RX ORDER — TRANEXAMIC ACID 100 MG/ML
INJECTION, SOLUTION INTRAVENOUS AS NEEDED
Status: DISCONTINUED | OUTPATIENT
Start: 2024-11-14 | End: 2024-11-14 | Stop reason: SURG

## 2024-11-14 RX ORDER — LIDOCAINE HYDROCHLORIDE 20 MG/ML
INJECTION, SOLUTION EPIDURAL; INFILTRATION; INTRACAUDAL; PERINEURAL AS NEEDED
Status: DISCONTINUED | OUTPATIENT
Start: 2024-11-14 | End: 2024-11-14 | Stop reason: SURG

## 2024-11-14 RX ORDER — CALCITRIOL 0.25 UG/1
0.5 CAPSULE, LIQUID FILLED ORAL DAILY
Status: DISCONTINUED | OUTPATIENT
Start: 2024-11-14 | End: 2024-11-15 | Stop reason: HOSPADM

## 2024-11-14 RX ORDER — DEXAMETHASONE SODIUM PHOSPHATE 4 MG/ML
INJECTION, SOLUTION INTRA-ARTICULAR; INTRALESIONAL; INTRAMUSCULAR; INTRAVENOUS; SOFT TISSUE AS NEEDED
Status: DISCONTINUED | OUTPATIENT
Start: 2024-11-14 | End: 2024-11-14 | Stop reason: SURG

## 2024-11-14 RX ADMIN — TRANEXAMIC ACID 1000 MG: 100 INJECTION, SOLUTION INTRAVENOUS at 11:44

## 2024-11-14 RX ADMIN — ONDANSETRON 4 MG: 2 INJECTION INTRAMUSCULAR; INTRAVENOUS at 12:49

## 2024-11-14 RX ADMIN — OXYBUTYNIN CHLORIDE 10 MG: 10 TABLET, EXTENDED RELEASE ORAL at 16:02

## 2024-11-14 RX ADMIN — LACTULOSE 10 G: 10 SOLUTION ORAL at 20:37

## 2024-11-14 RX ADMIN — FAMOTIDINE 20 MG: 10 INJECTION INTRAVENOUS at 09:32

## 2024-11-14 RX ADMIN — SUGAMMADEX 350 MG: 100 INJECTION, SOLUTION INTRAVENOUS at 12:52

## 2024-11-14 RX ADMIN — HYDRALAZINE HYDROCHLORIDE 5 MG: 20 INJECTION, SOLUTION INTRAMUSCULAR; INTRAVENOUS at 11:50

## 2024-11-14 RX ADMIN — CARVEDILOL 6.25 MG: 6.25 TABLET, FILM COATED ORAL at 17:09

## 2024-11-14 RX ADMIN — VANCOMYCIN HYDROCHLORIDE 1250 MG: 1.25 INJECTION, POWDER, LYOPHILIZED, FOR SOLUTION INTRAVENOUS at 09:23

## 2024-11-14 RX ADMIN — ASPIRIN 81 MG: 81 TABLET, COATED ORAL at 16:02

## 2024-11-14 RX ADMIN — DEXAMETHASONE SODIUM PHOSPHATE 8 MG: 4 INJECTION, SOLUTION INTRAMUSCULAR; INTRAVENOUS at 11:40

## 2024-11-14 RX ADMIN — SODIUM CHLORIDE 75 ML/HR: 4.5 INJECTION, SOLUTION INTRAVENOUS at 16:05

## 2024-11-14 RX ADMIN — MEMANTINE HYDROCHLORIDE 10 MG: 10 TABLET, FILM COATED ORAL at 20:37

## 2024-11-14 RX ADMIN — ROCURONIUM BROMIDE 50 MG: 10 INJECTION, SOLUTION INTRAVENOUS at 11:33

## 2024-11-14 RX ADMIN — PROPOFOL 150 MG: 10 INJECTION, EMULSION INTRAVENOUS at 11:33

## 2024-11-14 RX ADMIN — SODIUM CHLORIDE, POTASSIUM CHLORIDE, SODIUM LACTATE AND CALCIUM CHLORIDE: 600; 310; 30; 20 INJECTION, SOLUTION INTRAVENOUS at 12:51

## 2024-11-14 RX ADMIN — FUROSEMIDE 40 MG: 40 TABLET ORAL at 16:02

## 2024-11-14 RX ADMIN — CEFAZOLIN 2000 MG: 2 INJECTION, POWDER, FOR SOLUTION INTRAVENOUS at 19:18

## 2024-11-14 RX ADMIN — FENTANYL CITRATE 50 MCG: 50 INJECTION, SOLUTION INTRAMUSCULAR; INTRAVENOUS at 11:55

## 2024-11-14 RX ADMIN — ESMOLOL HYDROCHLORIDE 25 MG: 10 INJECTION, SOLUTION INTRAVENOUS at 11:33

## 2024-11-14 RX ADMIN — SODIUM CHLORIDE, POTASSIUM CHLORIDE, SODIUM LACTATE AND CALCIUM CHLORIDE 9 ML/HR: 600; 310; 30; 20 INJECTION, SOLUTION INTRAVENOUS at 09:23

## 2024-11-14 RX ADMIN — ALLOPURINOL 200 MG: 100 TABLET ORAL at 20:37

## 2024-11-14 RX ADMIN — POTASSIUM CHLORIDE 10 MEQ: 750 TABLET, EXTENDED RELEASE ORAL at 20:37

## 2024-11-14 RX ADMIN — Medication 100 MCG: at 12:55

## 2024-11-14 RX ADMIN — CYCLOBENZAPRINE 10 MG: 10 TABLET, FILM COATED ORAL at 20:37

## 2024-11-14 RX ADMIN — SODIUM CHLORIDE 2000 MG: 900 INJECTION INTRAVENOUS at 11:16

## 2024-11-14 RX ADMIN — Medication 3 ML: at 09:24

## 2024-11-14 RX ADMIN — HYDROCODONE BITARTRATE AND ACETAMINOPHEN 1 TABLET: 10; 325 TABLET ORAL at 20:37

## 2024-11-14 RX ADMIN — LIDOCAINE HYDROCHLORIDE 100 MG: 20 INJECTION, SOLUTION EPIDURAL; INFILTRATION; INTRACAUDAL; PERINEURAL at 11:33

## 2024-11-14 RX ADMIN — HYDROCODONE BITARTRATE AND ACETAMINOPHEN 1 TABLET: 10; 325 TABLET ORAL at 16:12

## 2024-11-14 NOTE — DISCHARGE INSTRUCTIONS
Dr. Americo Naik  [address] [address]  [phone]    Outpatient REVERSE TOTAL SHOULDER REPLACEMENT  HOSPITAL DISCHARGE INSTRUCTIONS     GENERAL INFORMATION: With improved surgical techniques for total shoulder and reverse total shoulder replacement and the Buddhist of ultrasound guided long acting nerve blocks, the hospital stay for patients has decreased significantly.  Many people can go home right after surgery as an outpatient.    SPECIFIC POST-OP INSTRUCTIONS:  * FOLLOW UP APPOINTMENT: You should have been given an appointment to follow up 10-14 days after your date of surgery. We can see you back sooner if there are any problems or concerns.   * BLOOD THINNERS: All patients will be on some type of blood thinner post-op to prevent blood clot. Most patients who are not already on a blood thinner are discharged on over-the-counter aspirin 81 mg or 325mg daily which you will take for three weeks. If you were taking a blood thinner prior to surgery, we will have you resume this on the first day post-op.   * ICE: Ice helps to decrease both pain and swelling. Ice should be applied to the shoulder 20-25 minutes each hour while awake.    DRESSING CHANGES: We ask that you keep the incision clean and dry.  Your surgical dressing can be removed 48 hours after surgery.  There will be a yellow strip of gauze and a Zipline device that will be underneath the dressing.  The yellow gauze can be removed but leave the Zipline alone.  You can then apply a watertight dressing over the Zipline to protect it.  You can get this type of dressing from the hospital before you leave or at your local pharmacy. SHOWERING: The wound edges typically come together and seal by 3-5 days post-op if there is no drainage. Again, please keep the same waterproof dressing on. DO NOT SUBMERSE SHOULDER IN POOL,HOT TUB OR BATH UNTIL AT LEAST 3-4 WEEKS POST-OP (EVEN WITH WATERPROOF BANDAIDS).  * PAIN  MEDICINE: You will be given a prescription  for oral pain medication prior to discharge from the hospital. Please let us know if you have a sensitivity to certain pain medications prior to discharge. Additional pain medication prescriptions can be called into your pharmacy during normal business hours. NO medications can be called in over the weekends or after business hours.   * ORAL ANTI-INFLAMMATORIES:   You will be asked to discontinue ALL oral anti-inflammatories prior to surgery. You can resume these the first day post-op.These can be combined with the oral pain medications safely. DO NOT TAKE ADDITIONAL TYLENOL WITH THE NARCOTIC PAIN MEDICATION (it already has Tylenol in it). If you were not taking an anti-inflammatory pre-op, you can start one on the first day post-op. It will help decrease pain and swelling. Typical medications and dosages are as follows:   Advil/Motrin/Ibuprofen 200mg, 4 pills every 8 hours   Aleve/Naproxen Sodium 220mg, 2 pills every 12 hours  * SLING: We recommend you wear the sling at all times, other than when showering or doing physical therapy exercises.    * WEIGHT BEARING: We ask that you be non-weight bearing on the operative shoulder. Do not use the operative arm to lift/push/pull greater than 5lbs.   * PHYSICAL THERAPY: Before you leave the hospital staff will teach you some very gentle range of motion exercises to do at home for the first 10-14 days. After we see you in the office during your first post-op visit, we will give you a prescription to start formal physical therapy. This can be done downstairs at [facility] or at a location of your choice. Physical therapy is typically 2-3 times a week for 4-6 weeks, depending on the individual and their progress.   * DRIVING A CAR: Medically/legally we can not recommend you drive a car while in the sling or while on pain medication (roughly 10-14 days).   * RETURNING TO DAILY AND RECREATIONAL ACTIVITIES: For the most part patients can progress to daily activities as tolerated  "(keeping in mind the restrictions listed above). Initially, we do not want you to \"overdo\" it in an attempt to minimize post-op pain and swelling. Once the swelling is controlled, you can progress with activities as tolerated. Pain and swelling should be your guide to increasing your activity level.   * RETURN TO WORK: The return to work date depends on many factors and is very dependent on the individual. You would most likely be able to return to a sedentary job after your first post-op visit (10-14 days after surgery). A more physical job would obviously require a longer recovery time before return to work.  "

## 2024-11-14 NOTE — ANESTHESIA PROCEDURE NOTES
Airway  Urgency: elective    Date/Time: 11/14/2024 11:38 AM  Airway not difficult    General Information and Staff    Patient location during procedure: OR  Anesthesiologist: Adebayo Garcia MD  CRNA/CAA: Angela Elizalde CRNA    Indications and Patient Condition  Indications for airway management: airway protection    Preoxygenated: yes  MILS maintained throughout  Mask difficulty assessment: 2 - vent by mask + OA or adjuvant +/- NMBA    Final Airway Details  Final airway type: endotracheal airway      Successful airway: ETT  Cuffed: yes   Successful intubation technique: direct laryngoscopy  Facilitating devices/methods: intubating stylet and cricoid pressure  Endotracheal tube insertion site: oral  Blade: Rider  Blade size: 2  ETT size (mm): 7.0  Cormack-Lehane Classification: grade I - full view of glottis  Placement verified by: chest auscultation and capnometry   Cuff volume (mL): 6  Measured from: gums  ETT/EBT to gums (cm): 20  Number of attempts at approach: 1  Assessment: lips, teeth, and gum same as pre-op and atraumatic intubation

## 2024-11-14 NOTE — DISCHARGE PLACEMENT REQUEST
"Ida Trinidad (74 y.o. Female)       Date of Birth   1950    Social Security Number       Address   [address] [place] [address] [place] [facility]    Home Phone   [phone]    MRN   [id number]       [facility]    Marital Status                               Admission Date   11/14/24    Admission Type   Elective    Admitting Provider   Americo Naik MD    Attending Provider   Americo Naik MD    Department, Room/Bed   [address], P783/1       Discharge Date       Discharge Disposition   Home or Self Care    Discharge Destination                                 Attending Provider: Americo Naik MD    Allergies: Azithromycin, Codeine, Dilaudid  [Hydromorphone Hcl], Isosorbide Nitrate, Macrobid  [Nitrofurantoin], Morphine, Oxycontin  [Oxycodone], Pineapple, Povidone Iodine, Tetanus Antitoxin, Tizanidine, Denosumab, Demerol [Meperidine], Etodolac, Lodrane D [Brompheniramine-pseudoeph], Shrimp Flavor, Sulfa Antibiotics, Imipramine, Iodine    Isolation: None   Infection: MRSA/History Only (11/14/24)   Code Status: CPR    Ht: 152.4 cm (60\")   Wt: 85.6 kg (188 lb 11.4 oz)    Admission Cmt: None   Principal Problem: Status post reverse arthroplasty of shoulder, right [Z96.611]                   Active Insurance as of 11/14/2024       Primary Coverage       Payor Plan Insurance Group Employer/Plan Group    ANTHEM MEDICARE REPLACEMENT ANTHEM MEDICARE ADVANTAGE [id number]       Payor Plan Address Payor Plan Phone Number Payor Plan Fax Number Effective Dates    [address] [phone]  4/1/2023 - None Entered    [facility] [address]         Subscriber Name Subscriber Birth Date Member ID       IDA TRINIDAD 1950 [id number]               Secondary Coverage       Payor Plan Insurance Group Employer/Plan Group    [place]        Payor Plan Address Payor Plan Phone Number Payor Plan Fax Number Effective Dates    [address] [address]   6/25/2019 - None " [facility] [place] [address]         Subscriber Name Subscriber Birth Date Member ID       IDA TRINIDAD 1950 [id number]                     Emergency Contacts        (Rel.) Home Phone Work Phone Mobile Phone    Everton LugoGavin (Son) -- -- [phone]    SONI SUAREZ (Care Giver) -- -- [phone]    RENATE TRINIDAD (Relative) -- -- [phone]

## 2024-11-14 NOTE — ANESTHESIA PREPROCEDURE EVALUATION
Anesthesia Evaluation     Patient summary reviewed and Nursing notes reviewed   NPO Solid Status: > 6 hours  NPO Liquid Status: > 2 hours           Airway   Mallampati: II  TM distance: >3 FB  Neck ROM: full  Dental    (+) edentulous    Pulmonary    (+) asthma (mild),sleep apnea  (-) COPD, not a smoker  Cardiovascular   Exercise tolerance: good (4-7 METS)    ECG reviewed    (+) pacemaker pacemaker, hypertension, CHF Systolic <55%, hyperlipidemia  (-) past MI, dysrhythmias, angina    ROS comment: Echo 2024: LVEF 35-40, RV nl fxn, no significant valve issues     Neuro/Psych  (+) psychiatric history Anxiety and Depression, dementia  (-) seizures, CVA  GI/Hepatic/Renal/Endo    (+) morbid obesity, GERD well controlled, renal disease- CRI, thyroid problem hypothyroidism  (-) liver disease, diabetes    Musculoskeletal     Abdominal    Substance History      OB/GYN          Other   arthritis,                 Anesthesia Plan    ASA 3     general with block       Anesthetic plan, risks, benefits, and alternatives have been provided, discussed and informed consent has been obtained with: patient.    CODE STATUS:

## 2024-11-14 NOTE — ANESTHESIA POSTPROCEDURE EVALUATION
Patient: Elaine Toscano    Procedure Summary       Date: 11/14/24 Room / Location:  [facility]  [facility]    Anesthesia Start: 1127 Anesthesia Stop: 1317    Procedure: REVERSE TOTAL SHOULDER  ARTHROPLASTY (Right: Shoulder) Diagnosis:     Surgeons: Americo Naik MD Provider: Adebayo Garcia MD    Anesthesia Type: general with block ASA Status: 3            Anesthesia Type: general with block    Vitals  Vitals Value Taken Time   /75 11/14/24 1345   Temp 36.3 °C (97.4 °F) 11/14/24 1315   Pulse 78 11/14/24 1356   Resp 16 11/14/24 1345   SpO2 99 % 11/14/24 1356   Vitals shown include unfiled device data.        Post Anesthesia Care and Evaluation    Patient location during evaluation: bedside  Patient participation: complete - patient participated  Level of consciousness: awake  Pain management: adequate    Airway patency: patent  Anesthetic complications: No anesthetic complications    Cardiovascular status: acceptable  Respiratory status: acceptable  Hydration status: acceptable    Comments: */75   Pulse 78   Temp 36.3 °C (97.4 °F) (Oral)   Resp 16   Wt 85.6 kg (188 lb 11.4 oz)   LMP  (LMP Unknown)   SpO2 100%   BMI 36.86 kg/m²

## 2024-11-14 NOTE — OP NOTE
Date: 11/14/2024  Time: 12:59 EST TOTAL SHOULDER REVERSE ARTHROPLASTY  Procedure Note    Elaine Toscano  11/14/2024    Pre-op Diagnosis:    Rotator Cuff Arthropathy Right Shoulder      Post-op Diagnosis   Rotator Cuff Arthropathy Right Shoulder    Procedure:   RightReverse total shoulder arthroplasty with intraoperative computer navigation    Surgeon: Americo Naik M.D.    Surgical assistant: CORINNA SCOTT      Anesthesia: General with Block  Anesthesiologist: Adebayo Garcia MD  CRNA: Angela Elizalde CRNA    Staff:   Circulator: Dangelo Rodriguez RN; Juancho Cheek RN  Scrub Person: Janel Seo  Vendor Representative: Gabino Mata)  Assistant: Corinna Scott APRN    Indication for Asst.  A surgical assistant, CORINNA SCOTT, was utilized as a medical necessity and was present through the entire procedure assisting in exposure, placement of implants, closure of wounds allowing safe completion of the procedure as well as reducing overall operative time and morbidity for the patient.    Estimated Blood Loss: 200 mL    Specimens: * No orders in the log *    Complications: None     Implant specifics:  Implant Name Type Inv. Item Serial No.  Lot No. LRB No. Used Action   SCRW LK EQUINOXE GLENOSPHERE REV/SHLDR - [id number] - [id number] Implant SCRW LK EQUINOXE GLENOSPHERE REV/SHLDR [id number] EXACTECH . Right 1 Implanted   SCRW EQUINOXE TORQ DEFINE REV SHLDR KT - [id number] - [id number] Implant SCRW EQUINOXE TORQ DEFINE REV SHLDR KT [id number] EXACTECH . Right 1 Implanted   PLT AUG CADEN EQUINOXE SUPR/POST REV 8DEG SM RT - [id number] - [id number] Implant PLT AUG CADEN EQUINOXE SUPR/POST REV 8DEG SM RT [id number] EXACTECH . Right 1 Implanted   SCRW COMPR EQUINOXE LK 4.5X26MM - [id number] - [id number] Implant SCRW COMPR EQUINOXE LK 4.5X26MM [id number] EXACTECH . Right 1 Implanted   SCRW COMPR EQUINOXE LK 4.5X22MM - [id number] - [id number] Implant SCRW COMPR EQUINOXE LK 4.5X22MM [id number] EXACTECH  . Right 1 Implanted   GLENOSPHERE REV SHLDR EQUINOXE 36MM SM - [id number] - [id number] Implant GLENOSPHERE REV SHLDR EQUINOXE 36MM SM [id number] EXACTECH . Right 1 Implanted   SCRW COMPR EQUINOXE LK 4.5X22MM - [id number] - [id number] Implant SCRW COMPR EQUINOXE LK 4.5X22MM [id number] EXACTECH . Right 1 Implanted   SCRW COMPR EQUINOXE LK 4.5X22MM - [id number] - [id number] Implant SCRW COMPR EQUINOXE LK 4.5X22MM [id number] EXACTECH . Right 1 Implanted   STEM HUM NASH EQUINOXE PRESSFIT 12MM SHT - [id number] - [id number] Implant STEM HUM NASH EQUINOXE PRESSFIT 12MM SHT [id number] EXACTECH . Right 1 Implanted   TRY ADAPT HUM/SHLDR EQUINOXE FOR/REV/TOTL PLS0 - [id number] - [id number] Implant TRY ADAPT HUM/SHLDR EQUINOXE FOR/REV/TOTL PLS0 [id number] EXACTECH . Right 1 Implanted   LINER HUM/SHLDR EQUINOXE/REV PE 145DEG 36MM PLS0 - [id number] - [id number] Implant LINER HUM/SHLDR EQUINOXE/REV PE 145DEG 36MM PLS0 [id number] EXACTECH . Right 1 Implanted       SURGICAL APPROACH: Deltopectoral      SURGICAL TECHNIQUE: Tenotomy         Procedure: The patient was taken to the operative suite.  After adequate anesthesia was established the upper extremity was prepped and draped in the usual fashion.  The head was placed in a neutral position and bony prominences were padded.  Ioban was utilized covering the skin over the shoulder and axilla.  Preoperative antibiotics and transexamic acid were confirmed.  An anterior incision was made starting lateral to the coracoid towards the axillary crease through skin and subcutaneous tissues.  The deltopectoral interval was entered.  The cephalic vein was identified, preserved, and retracted laterally.  The conjoined tendon was reflected medially.  The biceps tendon was identified near the pectoralis insertion site.  A small portion of the pectoralis tendon was released and then the biceps tendon was tenodesed to the pectoralis tendon insertion on the humeral.  The biceps tendon was then tenotomized more proximally.  The  subscapularis was peeled off the lesser tuberosity and tagged.  Arthropathic changes were noted in the glenohumeral joint.  The rotator cuff was in poor condition with significant tearing of the rotator cuff with retraction and atrophy.  The capsule was released off the humeral neck and the posterior soft tissue attachments were protected. An external cutting guide was utilized and the head was cut at a 20° retroverted angle.  Excess osteophytes were excised with a rongeur.  Sequential impaction broaching was carried out using the Preserve system broaches.  The final broach was left in place for later trialing.   I then visualized the glenoid and retractors were placed starting posteriorly and superiorly.  The capsule was reflected off the deep surface of the subscapularis.  An anterior retractor was placed.  The labrum was then circumferentially excised off of the glenoid.  The biceps stump was released and removed as well.  A careful capsular release off the inferior glenoid was performed protecting the axillary nerve and vasculature.  This exposed the scapular pillar.    This allowed for visualization of the glenoid.  The coracoid was skeletonized with the Bovie exposing the anterior portion and the neck of the coracoid.  A tracker was fixed to the coracoid with 2 small bicortical screws with firm fixation.  Acquisitions were then obtained from the bony surface of the coracoid and glenoid to transfer data to the computer to allow for navigation.  Once this was complete I was able to use computer guidance for placement of the glenoid.  The preoperative plan demonstrated superior inclination and retroversion the corrected well the posterior superior many baseplate to minimize reaming.  Using navigation a central drill point was drilled.  Sequential reaming was carried out at the prescribed angle and to the prescribed depth based on the preoperative plan using intraoperative computer guidance.    A central hole was  then drilled for the cage using computer navigation.  This assured that we were within the vault of the glenoid and preserved as much bone as possible during reaming.    Autogenous bone graft was harvested from the humeral head and packed into the cage of the baseplate.  The baseplate was then compressed onto the glenoid.  Rotation was checked with computer navigation.  Computer navigation was then used to drill holes for the compression screws.  These were then placed to the appropriate depth and locking caps were placed over the screw heads.  Good baseplate placement and stable fixation were noted.  An appropriate size glenosphere based on the preoperative plan and intraoperative visualization of the anatomy was chosen and fixed to the baseplate with a central compression screw.  I then removed the tracker from the coracoid by removing both bicortical screws.  I then trialed the humeral tray and polyethylene.  I was looking for smooth nonimpinged range of motion.  I also wanted to assure good stability by placing traction on the arm and also checking for lateral stability.  Once satisfied with range of motion and stability I removed the humeral components.  I pulse lavaged the proximal humerus and press-fit the appropriate size stem with good purchase and fixation.  Next I re-trialed the humeral tray and humeral liner.  When I was satisfied with range of motion and stability I removed the trial liner and tray.  The appropriate humeral tray was then placed and held with a torque limiting screw.  The chosen polyethylene was then inserted and compressed into place and the shoulder was reduced.  Subdeltoid scar tissue was excised.  Good range of motion was noted.  Good stability was noted.  Vigorous irrigation and suctioning was performed.  The subscapularis was evaluated for repair.  It was going to over tighten and restrict motion and therefore was not repaired.  The deltopectoral interval was closed with 0 Vicryl.   The subcutaneous tissues were closed with 2-0 Vicryl.  The skin was closed with a zip line device.  The patient had a sterile compression dressing applied and was awoken and taken to the postanesthetic recovery unit in good condition.    Americo Naik MD     Date: 11/14/2024  Time: 12:59 EST

## 2024-11-15 ENCOUNTER — DOCUMENTATION (OUTPATIENT)
Dept: HOME HEALTH SERVICES | Facility: HOME HEALTHCARE | Age: 74
End: 2024-11-15
Payer: MEDICARE

## 2024-11-15 ENCOUNTER — TRANSCRIBE ORDERS (OUTPATIENT)
Dept: HOME HEALTH SERVICES | Facility: HOME HEALTHCARE | Age: 74
End: 2024-11-15
Payer: MEDICARE

## 2024-11-15 ENCOUNTER — HOME HEALTH ADMISSION (OUTPATIENT)
Dept: HOME HEALTH SERVICES | Facility: HOME HEALTHCARE | Age: 74
End: 2024-11-15
Payer: COMMERCIAL

## 2024-11-15 VITALS
SYSTOLIC BLOOD PRESSURE: 129 MMHG | RESPIRATION RATE: 16 BRPM | BODY MASS INDEX: 37.05 KG/M2 | DIASTOLIC BLOOD PRESSURE: 78 MMHG | WEIGHT: 188.71 LBS | TEMPERATURE: 98.8 F | HEIGHT: 60 IN | HEART RATE: 65 BPM | OXYGEN SATURATION: 97 %

## 2024-11-15 DIAGNOSIS — Z96.611 STATUS POST REVERSE ARTHROPLASTY OF RIGHT SHOULDER: Primary | ICD-10-CM

## 2024-11-15 LAB
ANION GAP SERPL CALCULATED.3IONS-SCNC: 9.5 MMOL/L (ref 5–15)
BUN SERPL-MCNC: 24 MG/DL (ref 8–23)
BUN/CREAT SERPL: 18.6 (ref 7–25)
CALCIUM SPEC-SCNC: 7.6 MG/DL (ref 8.6–10.5)
CHLORIDE SERPL-SCNC: 108 MMOL/L (ref 98–107)
CO2 SERPL-SCNC: 22.5 MMOL/L (ref 22–29)
CREAT SERPL-MCNC: 1.29 MG/DL (ref 0.57–1)
EGFRCR SERPLBLD CKD-EPI 2021: 43.1 ML/MIN/1.73
GLUCOSE SERPL-MCNC: 112 MG/DL (ref 65–99)
HCT VFR BLD AUTO: 28.1 % (ref 34–46.6)
HGB BLD-MCNC: 8.9 G/DL (ref 12–15.9)
POTASSIUM SERPL-SCNC: 3.6 MMOL/L (ref 3.5–5.2)
SODIUM SERPL-SCNC: 140 MMOL/L (ref 136–145)

## 2024-11-15 PROCEDURE — A9270 NON-COVERED ITEM OR SERVICE: HCPCS | Performed by: ORTHOPAEDIC SURGERY

## 2024-11-15 PROCEDURE — 85018 HEMOGLOBIN: CPT | Performed by: ORTHOPAEDIC SURGERY

## 2024-11-15 PROCEDURE — 63710000001 SERTRALINE 50 MG TABLET: Performed by: ORTHOPAEDIC SURGERY

## 2024-11-15 PROCEDURE — 63710000001 HYDROCODONE-ACETAMINOPHEN 10-325 MG TABLET: Performed by: ORTHOPAEDIC SURGERY

## 2024-11-15 PROCEDURE — 97166 OT EVAL MOD COMPLEX 45 MIN: CPT

## 2024-11-15 PROCEDURE — 97535 SELF CARE MNGMENT TRAINING: CPT

## 2024-11-15 PROCEDURE — 63710000001 OXYCODONE-ACETAMINOPHEN 5-325 MG TABLET: Performed by: ORTHOPAEDIC SURGERY

## 2024-11-15 PROCEDURE — 63710000001 POTASSIUM CHLORIDE 10 MEQ TABLET CONTROLLED-RELEASE: Performed by: ORTHOPAEDIC SURGERY

## 2024-11-15 PROCEDURE — 63710000001 PANTOPRAZOLE 40 MG TABLET DELAYED-RELEASE: Performed by: ORTHOPAEDIC SURGERY

## 2024-11-15 PROCEDURE — 63710000001 ASPIRIN 81 MG TABLET DELAYED-RELEASE: Performed by: ORTHOPAEDIC SURGERY

## 2024-11-15 PROCEDURE — 25010000002 CEFAZOLIN PER 500 MG: Performed by: ORTHOPAEDIC SURGERY

## 2024-11-15 PROCEDURE — 63710000001 SERTRALINE 100 MG TABLET: Performed by: ORTHOPAEDIC SURGERY

## 2024-11-15 PROCEDURE — 63710000001 FAMOTIDINE 20 MG TABLET: Performed by: ORTHOPAEDIC SURGERY

## 2024-11-15 PROCEDURE — 85014 HEMATOCRIT: CPT | Performed by: ORTHOPAEDIC SURGERY

## 2024-11-15 PROCEDURE — 80048 BASIC METABOLIC PNL TOTAL CA: CPT | Performed by: ORTHOPAEDIC SURGERY

## 2024-11-15 PROCEDURE — G0378 HOSPITAL OBSERVATION PER HR: HCPCS

## 2024-11-15 PROCEDURE — 63710000001 OXYBUTYNIN XL 10 MG TABLET SUSTAINED-RELEASE 24 HOUR: Performed by: ORTHOPAEDIC SURGERY

## 2024-11-15 PROCEDURE — 63710000001 FLUTICASONE 50 MCG/ACT SUSPENSION 16 G BOTTLE: Performed by: ORTHOPAEDIC SURGERY

## 2024-11-15 PROCEDURE — 63710000001 FUROSEMIDE 40 MG TABLET: Performed by: ORTHOPAEDIC SURGERY

## 2024-11-15 PROCEDURE — 63710000001 CARVEDILOL 6.25 MG TABLET: Performed by: ORTHOPAEDIC SURGERY

## 2024-11-15 PROCEDURE — 63710000001 MEMANTINE 10 MG TABLET: Performed by: ORTHOPAEDIC SURGERY

## 2024-11-15 PROCEDURE — 97530 THERAPEUTIC ACTIVITIES: CPT

## 2024-11-15 RX ADMIN — CARVEDILOL 6.25 MG: 6.25 TABLET, FILM COATED ORAL at 08:30

## 2024-11-15 RX ADMIN — HYDROCODONE BITARTRATE AND ACETAMINOPHEN 1 TABLET: 10; 325 TABLET ORAL at 06:01

## 2024-11-15 RX ADMIN — FLUTICASONE PROPIONATE 2 SPRAY: 50 SPRAY, METERED NASAL at 08:31

## 2024-11-15 RX ADMIN — OXYCODONE HYDROCHLORIDE AND ACETAMINOPHEN 2 TABLET: 5; 325 TABLET ORAL at 14:28

## 2024-11-15 RX ADMIN — POTASSIUM CHLORIDE 10 MEQ: 750 TABLET, EXTENDED RELEASE ORAL at 08:30

## 2024-11-15 RX ADMIN — HYDROCODONE BITARTRATE AND ACETAMINOPHEN 1 TABLET: 10; 325 TABLET ORAL at 10:05

## 2024-11-15 RX ADMIN — FAMOTIDINE 20 MG: 20 TABLET, FILM COATED ORAL at 08:30

## 2024-11-15 RX ADMIN — CEFAZOLIN 2000 MG: 2 INJECTION, POWDER, FOR SOLUTION INTRAVENOUS at 03:21

## 2024-11-15 RX ADMIN — PANTOPRAZOLE SODIUM 40 MG: 40 TABLET, DELAYED RELEASE ORAL at 08:30

## 2024-11-15 RX ADMIN — FUROSEMIDE 40 MG: 40 TABLET ORAL at 08:31

## 2024-11-15 RX ADMIN — HYDROCODONE BITARTRATE AND ACETAMINOPHEN 1 TABLET: 10; 325 TABLET ORAL at 02:04

## 2024-11-15 RX ADMIN — OXYBUTYNIN CHLORIDE 10 MG: 10 TABLET, EXTENDED RELEASE ORAL at 08:30

## 2024-11-15 RX ADMIN — ASPIRIN 81 MG: 81 TABLET, COATED ORAL at 08:30

## 2024-11-15 RX ADMIN — MEMANTINE HYDROCHLORIDE 10 MG: 10 TABLET, FILM COATED ORAL at 08:31

## 2024-11-15 RX ADMIN — SERTRALINE 150 MG: 100 TABLET, FILM COATED ORAL at 08:29

## 2024-11-15 NOTE — THERAPY EVALUATION
Patient Name: Elaine Toscano  : 1950    MRN: [id number]                              Today's Date: 11/15/2024       Admit Date: 2024    Visit Dx:     ICD-10-CM ICD-9-CM   1. Status post reverse arthroplasty of shoulder, right  Z96.611 V43.61     Patient Active Problem List   Diagnosis    History of anemia due to chronic kidney disease    History of iron deficiency    Dysuria    Congestive heart failure    Major depressive disorder, recurrent episode, moderate    Edema    Gastroesophageal reflux disease    Gout    Hypertension    Hypokalemia    Hypothyroidism    Loose total knee arthroplasty    Hyperlipidemia    Low back pain    Asthma    Mixed stress and urge urinary incontinence    Vitamin D deficiency    Swelling of lower extremity    Spinal stenosis of lumbar region    Osteoporosis    Obstructive sleep apnea of adult    Cobalamin deficiency    Abnormal cardiovascular stress test    Pain in right shoulder    Breast lump    Chest discomfort    Dysphagia, unspecified    Frequent falls    Greater trochanteric bursitis    Hand pain, left    Hyperglycemia    Loss of balance    Mobility poor    MRSA carrier    Numbness and tingling sensation of skin    Other spondylosis with radiculopathy, lumbar region    Pain of right hip joint    Leg pain, bilateral    Peripheral axonal neuropathy    Presence of cardiac pacemaker    Sick sinus syndrome    TMJ syndrome    Degeneration of lumbar intervertebral disc    Irritable bowel syndrome    Peptic ulcer    Anemia    Chest pain    Other insomnia    Allergic rhinitis    UTI (urinary tract infection)    Stage 3a chronic kidney disease    Generalized anxiety disorder    Loss of memory    Secondary hyperparathyroidism    Coronary atherosclerosis    Benign essential hypertension    Leg wound, left, initial encounter    Leg wound, left    Vascular dementia without behavioral disturbance    Posterior vitreous detachment    Regular astigmatism of both eyes    Pseudophakia of  both eyes    Presbyopia    Keratitis    Glaucoma suspect    Epiretinal membrane (ERM) of left eye    Dry eyes    Class 1 obesity due to excess calories with serious comorbidity and body mass index (BMI) of 33.0 to 33.9 in adult    Status post reverse arthroplasty of shoulder, right     Past Medical History:   Diagnosis Date    Abnormal ECG     Acute renal failure superimposed on stage 4 chronic kidney disease 03/24/2021    Anemia     Anesthesia complication     PT STATES SLOW TO WAKE UP    At high risk for falls     GAIT UNSTEADY, USES ROLLATOR DAILY    Broken foot     right    Broken nose 07/2017    CHF (congestive heart failure)     Chronic kidney disease (CKD), stage IV (severe) 05/06/2020    Chronic low back pain     CRI (chronic renal insufficiency)     Depression     DJD (degenerative joint disease)     Fracture of ankle 08/12/2016    Frequent UTI     GERD (gastroesophageal reflux disease)     Gout     History of MRSA infection     PT STATS NASAL SWAP-OVER 5 YEARS AGO    History of recent fall     HAD FALL IN MID OCTOBER 2024    Hypertension     Hypothyroidism     IBS (irritable bowel syndrome)     Multiple bruises     CHEEKS BILAT-WORSE ON RIGHT FACE, MULTIPLE BRUISING ON FOREARMS, WORSE ON LEFT.  PT STATES HAS SEEN DR. ASH SINCE FALL    Neuropathy     FEET BILAT    OA (osteoarthritis) of shoulder     RIGHT: PAIN, WEAKNESS, LIMITED MOBILITY    Osteopenia     Overactive bladder     FREQUENCY, URGENCY    Pacemaker     Skin abnormalities     STATES HAS ITCHY ON LEFT MID BACK AREA AND SEVERAL SCRATCHES NOTED FROM PT    SSS (sick sinus syndrome)     DR. SHAH EVERY 6 MONTHS    Suicide attempt     TMJ (dislocation of temporomandibular joint)     Urinary incontinence     DAILY PAD     Past Surgical History:   Procedure Laterality Date    APPENDECTOMY  1986    CARDIAC CATHETERIZATION  04/2018    CATARACT EXTRACTION Bilateral     CHOLECYSTECTOMY  2008    COLONOSCOPY      ENDOSCOPY      STATES HAS HAD DILATION WITH  PROCEDURE    EPIDURAL BLOCK      FOOT SURGERY Bilateral 2008    FOOT SURGERY Bilateral 09/2019    Dr. Kenean- 2nd toe on bilateral feet     FOOT SURGERY Left 2024    3-5TH TOE STRAIGHTENED WITH METAL    GASTRIC BYPASS  2007    HYSTERECTOMY      INSERT / REPLACE / REMOVE PACEMAKER      LAPAROSCOPIC GASTRIC BANDING  2004    removed    LUMBAR FUSION      STATES X2:  HAD ORINIGAL SURGERY AND THEN HAD FALL AND HAD TO HAVE REPEAT SURGERY    OTHER SURGICAL HISTORY  01/2018    myelogram    TOTAL KNEE ARTHROPLASTY Left       General Information       Row Name 11/15/24 0923          OT Time and Intention    Subjective Information complains of;dyspnea  -RB     Document Type evaluation  -RB     Mode of Treatment individual therapy;occupational therapy  -RB     Patient Effort good  -RB       Row Name 11/15/24 0923          General Information    Patient Profile Reviewed yes  -RB     Prior Level of Function independent:;ADL's;transfer  -RB     Existing Precautions/Restrictions fall;right;shoulder;non-weight bearing  -RB     Barriers to Rehab none identified  -RB       Row Name 11/15/24 0923          Living Environment    People in Home child(radha), adult  -RB       Row Name 11/15/24 0923          Cognition    Orientation Status (Cognition) oriented x 4  -RB       Row Name 11/15/24 0923          Safety Issues/Impairments Affecting Functional Mobility    Safety Issues Affecting Function (Mobility) safety precautions follow-through/compliance;safety precaution awareness  -RB     Impairments Affecting Function (Mobility) balance;endurance/activity tolerance;pain;strength;shortness of breath;range of motion (ROM);sensation/sensory awareness  -RB               User Key  (r) = Recorded By, (t) = Taken By, (c) = Cosigned By      Initials Name Provider Type    RB Surekha Wan OT Occupational Therapist                     Mobility/ADL's       Row Name 11/15/24 0924          Bed Mobility    Comment, (Bed Mobility) in bedside chair  -RB        Row Name 11/15/24 0924          Transfers    Transfers sit-stand transfer;stand-sit transfer  -RB       [facility] Name 11/15/24 0924          Sit-Stand Transfer    Sit-Stand [place] (Transfers) contact guard  -RB       [facility] Name 11/15/24 0924          Stand-Sit Transfer    Stand-Sit [place] (Transfers) contact guard  -RB       Row Name 11/15/24 0924          Functional Mobility    Functional Mobility- Ind. Level contact guard assist  -RB     Functional Mobility- Safety Issues balance decreased during turns;step length decreased  -RB     Functional Mobility- Comment SOA and PLB completed  -RB       [facility] Name 11/15/24 0924          Activities of Daily Living    BADL Assessment/Intervention lower body dressing;upper body dressing  -RB       [facility] Name 11/15/24 0924          Mobility    Extremity Weight-bearing Status right upper extremity  -RB     Right Upper Extremity (Weight-bearing Status) non weight-bearing (NWB)  -RB       Row Name 11/15/24 0924          Lower Body Dressing Assessment/Training    [place] Level (Lower Body Dressing) lower body dressing skills;maximum assist (25% patient effort);don;verbal cues  -RB     Position (Lower Body Dressing) supported sitting;supported standing  -RB       [facility] Name 11/15/24 0924          Upper Body Dressing Assessment/Training    [place] Level (Upper Body Dressing) upper body dressing skills;maximum assist (25% patient effort);verbal cues  -RB               User Key  (r) = Recorded By, (t) = Taken By, (c) = Cosigned By      Initials Name Provider Type    Surekha Renae OT Occupational Therapist                   Obj/Interventions       [facility] Name 11/15/24 0925          Sensory Assessment (Somatosensory)    Sensory Assessment RUE nerve block partially intact  -RB       [facility] Name 11/15/24 0925          Vision Assessment/Intervention    Visual Impairment/Limitations WFL  -RB       [facility] Name 11/15/24 0925          Range of Motion Comprehensive    Comment,  General Range of Motion RUE ROM deficits  -RB       Row Name 11/15/24 0925          Strength Comprehensive (MMT)    Comment, General Manual Muscle Testing (MMT) Assessment RUE strength deficits  -RB       Row Name 11/15/24 0925          Motor Skills    Therapeutic Exercise --  The pt was educated on her post op HEP and to begin once her nerve block wears off. She was able to complete AAROM of he R elbow, wrist and hand  -RB       Row Name 11/15/24 0925          Balance    Comment, Balance CGA standing balance using her cane. She reports 9 falls within the last year.  -RB               User Key  (r) = Recorded By, (t) = Taken By, (c) = Cosigned By      Initials Name Provider Type    RB Surekha Wan, OT Occupational Therapist                   Goals/Plan       Row Name 11/15/24 0927          Bed Mobility Goal 1 (OT)    Activity/Assistive Device (Bed Mobility Goal 1, OT) bed mobility activities, all  -RB     [place] Level/Cues Needed (Bed Mobility Goal 1, OT) standby assist  -RB     Time Frame (Bed Mobility Goal 1, OT) short term goal (STG);2 weeks  -RB     Progress/Outcomes (Bed Mobility Goal 1, OT) new goal  -RB       Row Name 11/15/24 0927          Transfer Goal 1 (OT)    Activity/Assistive Device (Transfer Goal 1, OT) transfers, all  -RB     [place] Level/Cues Needed (Transfer Goal 1, OT) standby assist  -RB     Time Frame (Transfer Goal 1, OT) short term goal (STG);2 weeks  -RB     Progress/Outcome (Transfer Goal 1, OT) new goal  -RB       Row Name 11/15/24 0927          Bathing Goal 1 (OT)    Activity/Device (Bathing Goal 1, OT) bathing skills, all  -RB     [place] Level/Cues Needed (Bathing Goal 1, OT) minimum assist (75% or more patient effort)  -RB     Time Frame (Bathing Goal 1, OT) short term goal (STG);2 weeks  -RB     Progress/Outcomes (Bathing Goal 1, OT) new goal  -RB       Row Name 11/15/24 0927          Dressing Goal 1 (OT)    Activity/Device (Dressing Goal 1, OT) dressing skills,  all  -RB     [place]/Cues Needed (Dressing Goal 1, OT) minimum assist (75% or more patient effort)  -RB     Time Frame (Dressing Goal 1, OT) short term goal (STG);2 weeks  -RB     Progress/Outcome (Dressing Goal 1, OT) new goal  -RB       Row Name 11/15/24 0927          Toileting Goal 1 (OT)    Activity/Device (Toileting Goal 1, OT) toileting skills, all  -RB     [place] Level/Cues Needed (Toileting Goal 1, OT) minimum assist (75% or more patient effort)  -RB     Time Frame (Toileting Goal 1, OT) short term goal (STG);2 weeks  -RB     Progress/Outcome (Toileting Goal 1, OT) new goal  -RB       Row Name 11/15/24 0927          Grooming Goal 1 (OT)    Activity/Device (Grooming Goal 1, OT) grooming skills, all  -RB     [place] (Grooming Goal 1, OT) standby assist  -RB     Time Frame (Grooming Goal 1, OT) short term goal (STG);2 weeks  -RB     Progress/Outcome (Grooming Goal 1, OT) new goal  -RB       Row Name 11/15/24 0927          Therapy Assessment/Plan (OT)    Planned Therapy Interventions (OT) transfer/mobility retraining;strengthening exercise;ROM/therapeutic exercise;activity tolerance training;adaptive equipment training;BADL retraining;functional balance retraining;occupation/activity based interventions;patient/caregiver education/training;edema control/reduction  -RB               User Key  (r) = Recorded By, (t) = Taken By, (c) = Cosigned By      Initials Name Provider Type    RB Surekha Wan, OT Occupational Therapist                   Clinical Impression       Row Name 11/15/24 0926          Pain Assessment    Pretreatment Pain Rating 5/10  -RB     Posttreatment Pain Rating 5/10  -RB     Pain Location shoulder  -RB     Pain Side/Orientation right  -RB       Row Name 11/15/24 0926          Plan of Care Review    Plan of Care Reviewed With patient  -RB     Progress no change  -RB       Row Name 11/15/24 0926          Therapy Assessment/Plan (OT)    Rehab Potential (OT) good  -RB      Criteria for Skilled Therapeutic Interventions Met (OT) yes;skilled treatment is necessary  -RB     Therapy Frequency (OT) 5 times/wk  -RB       Row Name 11/15/24 0926          Therapy Plan Review/Discharge Plan (OT)    Anticipated Discharge Disposition (OT) skilled nursing facility;home with 24/7 care;home with home health  -RB       Row Name 11/15/24 0926          Vital Signs    Pre SpO2 (%) 98  -RB     O2 Delivery Pre Treatment room air  -RB     Intra SpO2 (%) 93  -RB     O2 Delivery Intra Treatment room air  + SOA, high fatigue, PLB  -RB     Post SpO2 (%) 98  -RB     O2 Delivery Post Treatment room air  -RB     Pre Patient Position Sitting  -RB     Intra Patient Position Standing  -RB     Post Patient Position Sitting  -RB       Row Name 11/15/24 0926          Positioning and Restraints    Pre-Treatment Position sitting in chair/recliner  -RB     Post Treatment Position chair  -RB     In Chair reclined;sitting;call light within reach;encouraged to call for assist;exit alarm on;with family/caregiver;legs elevated  -RB               User Key  (r) = Recorded By, (t) = Taken By, (c) = Cosigned By      Initials Name Provider Type    RB Surekha Wan, OT Occupational Therapist                   Outcome Measures       Row Name 11/15/24 0928          How much help from another is currently needed...    Putting on and taking off regular lower body clothing? 2  -RB     Bathing (including washing, rinsing, and drying) 2  -RB     Toileting (which includes using toilet bed pan or urinal) 2  -RB     Putting on and taking off regular upper body clothing 2  -RB     Taking care of personal grooming (such as brushing teeth) 3  -RB     Eating meals 3  -RB     AM-PAC 6 Clicks Score (OT) 14  -RB       Row Name 11/15/24 0928          Modified [place] Scale    Modified [place] Scale 4 - Moderately severe disability.  Unable to walk without assistance, and unable to attend to own bodily needs without assistance.  -RB       Row Name  11/15/24 0928          Functional Assessment    Outcome Measure Options AM-PAC 6 Clicks Daily Activity (OT);Modified [place]  -RB               User Key  (r) = Recorded By, (t) = Taken By, (c) = Cosigned By      Initials Name Provider Type    Surekha Renae OT Occupational Therapist                    Occupational Therapy Education       Title: PT OT SLP Therapies (Done)       Topic: Occupational Therapy (Done)       Point: ADL training (Done)       Description:   Instruct learner(s) on proper safety adaptation and remediation techniques during self care or transfers.   Instruct in proper use of assistive devices.                  Learning Progress Summary            Patient Acceptance, E,TB,D, DU,VU by RB at 11/15/2024 0928    Comment: HEP, sling donning/doffing, ADL modifications, precautions   Family Acceptance, E,TB,D, DU,VU by RB at 11/15/2024 0928    Comment: HEP, sling donning/doffing, ADL modifications, precautions                      Point: Home exercise program (Done)       Description:   Instruct learner(s) on appropriate technique for monitoring, assisting and/or progressing therapeutic exercises/activities.                  Learning Progress Summary            Patient Acceptance, E,TB,D, DU,VU by RB at 11/15/2024 0928    Comment: HEP, sling donning/doffing, ADL modifications, precautions   Family Acceptance, E,TB,D, DU,VU by RB at 11/15/2024 0928    Comment: HEP, sling donning/doffing, ADL modifications, precautions                      Point: Precautions (Done)       Description:   Instruct learner(s) on prescribed precautions during self-care and functional transfers.                  Learning Progress Summary            Patient Acceptance, E,TB,D, DU,VU by RB at 11/15/2024 0928    Comment: HEP, sling donning/doffing, ADL modifications, precautions   Family Acceptance, E,TB,D, DU,VU by RB at 11/15/2024 0928    Comment: HEP, sling donning/doffing, ADL modifications, precautions                       Point: Body mechanics (Done)       Description:   Instruct learner(s) on proper positioning and spine alignment during self-care, functional mobility activities and/or exercises.                  Learning Progress Summary            Patient Acceptance, E,TB,D, DU,VU by  at 11/15/2024 0928    Comment: HEP, sling donning/doffing, ADL modifications, precautions   Family Acceptance, E,TB,D, DU,VU by  at 11/15/2024 0928    Comment: HEP, sling donning/doffing, ADL modifications, precautions                                      User Key       Initials Effective Dates Name Provider Type Discipline     06/16/21 -  Surekha Wan, OT Occupational Therapist OT                  OT Recommendation and Plan  Planned Therapy Interventions (OT): transfer/mobility retraining, strengthening exercise, ROM/therapeutic exercise, activity tolerance training, adaptive equipment training, BADL retraining, functional balance retraining, occupation/activity based interventions, patient/caregiver education/training, edema control/reduction  Therapy Frequency (OT): 5 times/wk  Plan of Care Review  Plan of Care Reviewed With: patient  Progress: no change     Time Calculation:   Evaluation Complexity (OT)  Review Occupational Profile/Medical/Therapy History Complexity: expanded/moderate complexity  Assessment, Occupational Performance/Identification of Deficit Complexity: 3-5 performance deficits  Clinical Decision Making Complexity (OT): detailed assessment/moderate complexity  Overall Complexity of Evaluation (OT): moderate complexity     Time Calculation- OT       Row Name 11/15/24 0922             Time Calculation- OT    OT Start Time 0833  -RB      OT Stop Time 0910  -      OT Time Calculation (min) 37 min  -RB      Total Timed Code Minutes- OT 27 minute(s)  -RB      OT Received On 11/15/24  -RB      OT - Next Appointment 11/18/24  -      OT Goal Re-Cert Due Date 11/29/24  -         Timed Charges    55371 - OT Therapeutic  Activity Minutes 10  -RB      97973 - OT Self Care/Mgmt Minutes 17  -RB         Untimed Charges    OT Eval/Re-eval Minutes 10  -RB         Total Minutes    Timed Charges Total Minutes 27  -RB      Untimed Charges Total Minutes 10  -RB       Total Minutes 37  -RB                User Key  (r) = Recorded By, (t) = Taken By, (c) = Cosigned By      Initials Name Provider Type    RB Surekha Wan, OT Occupational Therapist                  Therapy Charges for Today       Code Description Service Date Service Provider Modifiers Qty    62575310470 HC OT THERAPEUTIC ACT EA 15 MIN 11/15/2024 Surekha Wan OT GO 1    24497485429 HC OT SELF CARE/MGMT/TRAIN EA 15 MIN 11/15/2024 Surekha Wan OT GO 1    72494315414 HC OT EVAL MOD COMPLEXITY 2 11/15/2024 Surekha Wan OT GO 1                 Surekha Wan OT  11/15/2024

## 2024-11-15 NOTE — PLAN OF CARE
Goal Outcome Evaluation:           Progress: improving   POD 1. A&O x4. VSS. Voiding function intact. Pain managed w prn medications. Possible dc today.

## 2024-11-15 NOTE — DISCHARGE SUMMARY
Orthopedic Discharge Summary      Patient: Elaine Toscano      YOB: 1950    Medical Record Number: [id number]    Attending Physician: Americo Naik MD  Consulting Physician(s):   Date of Admission: 11/14/2024  8:25 AM  Date of Discharge:       Status post reverse arthroplasty of shoulder, right    [unfilled]    Allergies:   Allergies   Allergen Reactions    Azithromycin Unknown (See Comments)    Codeine Nausea And Vomiting    Dilaudid  [Hydromorphone Hcl] Unknown (See Comments)    Isosorbide Nitrate Unknown - High Severity    Macrobid  [Nitrofurantoin] Rash    Morphine Rash    Oxycontin  [Oxycodone] Delirium    Pineapple Other (See Comments)     Mouth numbness    Povidone Iodine Rash    Tetanus Antitoxin Rash    Tizanidine Other (See Comments) and Delirium     Increased pain    Denosumab Other (See Comments)    Demerol [Meperidine] Unknown (See Comments)     Pt does not remember. She states it was a long time ago    Etodolac Other (See Comments)    Lodrane D [Brompheniramine-Pseudoeph] Other (See Comments)     drowsiness    Shrimp Flavor Swelling     Sauce only     Sulfa Antibiotics Unknown (See Comments)    Imipramine Palpitations    Iodine Itching and Rash       Current Medications:     Discharge Medications        New Medications        Instructions Start Date   naloxone 4 MG/0.1ML nasal spray  Commonly known as: NARCAN   Call 911. Don't prime. [place] in 1 nostril for overdose. Repeat in 2-3 minutes in other nostril if no or minimal breathing/responsiveness.             Changes to Medications        Instructions Start Date   aspirin 81 MG EC tablet  Commonly known as: Aspirin Low Dose   81 mg, Oral, Daily      cyclobenzaprine 10 MG tablet  Commonly known as: FLEXERIL  What changed: when to take this   10 mg, Oral, Nightly PRN      fluticasone 50 MCG/ACT nasal spray  Commonly known as: Flonase  What changed:   when to take this  reasons to take this   2 sprays, Nasal, Daily       HYDROcodone-acetaminophen  MG per tablet  Commonly known as: NORCO  What changed: Another medication with the same name was added. Make sure you understand how and when to take each.   1 tablet, 3 Times Daily      HYDROcodone-acetaminophen  MG per tablet  Commonly known as: NORCO  What changed: You were already taking a medication with the same name, and this prescription was added. Make sure you understand how and when to take each.   1 tablet, Oral, Every 4 Hours PRN      memantine 10 MG tablet  Commonly known as: NAMENDA  What changed: when to take this   10 mg, Oral, 2 Times Daily      ondansetron 4 MG tablet  Commonly known as: ZOFRAN  What changed: See the new instructions.   TAKE ONE TABLET BY MOUTH AS NEEDED FOR NAUSEA AND VOMITING      pantoprazole 40 MG EC tablet  Commonly known as: PROTONIX  What changed: when to take this   40 mg, Oral, Daily      potassium chloride 10 MEQ CR tablet  What changed: when to take this   10 mEq, Oral, 2 Times Daily      sertraline 100 MG tablet  Commonly known as: ZOLOFT  What changed:   how much to take  when to take this   150 mg, Oral, Daily             Continue These Medications        Instructions Start Date   allopurinol 100 MG tablet  Commonly known as: ZYLOPRIM   200 mg, Oral, Nightly      calcitriol 0.5 MCG capsule  Commonly known as: ROCALTROL   0.5 mcg, Daily      carvedilol 6.25 MG tablet  Commonly known as: COREG   6.25 mg, 2 Times Daily With Meals      cyanocobalamin 1000 MCG/ML injection   INJECT INTRAMUSCULARLY EVERY 28 DAYS AS DIRECTED      famotidine 20 MG tablet  Commonly known as: PEPCID   20 mg, 2 Times Daily      furosemide 40 MG tablet  Commonly known as: LASIX   40 mg, Oral, Daily      lactulose 10 GM/15ML solution  Commonly known as: CHRONULAC   10 g, Nightly      melatonin 5 MG tablet tablet  Commonly known as: [facility] Melatonin   10 mg, Oral, Nightly      Myrbetriq 50 MG tablet sustained-release 24 hour 24 hr tablet  Generic drug:  "Mirabegron ER   50 mg, Daily      Syringe 23G X 1\" 3 ML misc   1 each, Not Applicable, Every 30 Days, Use with B12 Injections               Past Medical History:   Diagnosis Date    Abnormal ECG     Acute renal failure superimposed on stage 4 chronic kidney disease 03/24/2021    Anemia     Anesthesia complication     PT STATES SLOW TO WAKE UP    At high risk for falls     GAIT UNSTEADY, USES ROLLATOR DAILY    Broken foot     right    Broken nose 07/2017    CHF (congestive heart failure)     Chronic kidney disease (CKD), stage IV (severe) 05/06/2020    Chronic low back pain     CRI (chronic renal insufficiency)     Depression     DJD (degenerative joint disease)     Fracture of ankle 08/12/2016    Frequent UTI     GERD (gastroesophageal reflux disease)     Gout     History of MRSA infection     PT STATS NASAL SWAP-OVER 5 YEARS AGO    History of recent fall     HAD FALL IN MID OCTOBER 2024    Hypertension     Hypothyroidism     IBS (irritable bowel syndrome)     Multiple bruises     CHEEKS BILAT-WORSE ON RIGHT FACE, MULTIPLE BRUISING ON FOREARMS, WORSE ON LEFT.  PT STATES HAS SEEN DR. ASH SINCE FALL    Neuropathy     FEET BILAT    OA (osteoarthritis) of shoulder     RIGHT: PAIN, WEAKNESS, LIMITED MOBILITY    Osteopenia     Overactive bladder     FREQUENCY, URGENCY    Pacemaker     Skin abnormalities     STATES HAS ITCHY ON LEFT MID BACK AREA AND SEVERAL SCRATCHES NOTED FROM PT    SSS (sick sinus syndrome)     DR. SHAH EVERY 6 MONTHS    Suicide attempt     TMJ (dislocation of temporomandibular joint)     Urinary incontinence     DAILY PAD     Past Surgical History:   Procedure Laterality Date    APPENDECTOMY  1986    CARDIAC CATHETERIZATION  04/2018    CATARACT EXTRACTION Bilateral     CHOLECYSTECTOMY  2008    COLONOSCOPY      ENDOSCOPY      STATES HAS HAD DILATION WITH PROCEDURE    EPIDURAL BLOCK      FOOT SURGERY Bilateral 2008    FOOT SURGERY Bilateral 09/2019    Dr. Keenan- 2nd toe on bilateral feet     FOOT " SURGERY Left     3-5TH TOE STRAIGHTENED WITH METAL    GASTRIC BYPASS  2007    HYSTERECTOMY      INSERT / REPLACE / REMOVE PACEMAKER      LAPAROSCOPIC GASTRIC BANDING  2004    removed    LUMBAR FUSION      STATES X2:  HAD ORINIGAL SURGERY AND THEN HAD FALL AND HAD TO HAVE REPEAT SURGERY    OTHER SURGICAL HISTORY  2018    myelogram    TOTAL KNEE ARTHROPLASTY Left      Social History     Occupational History    Not on file   Tobacco Use    Smoking status: Never     Passive exposure: Current    Smokeless tobacco: Never   Vaping Use    Vaping status: Never Used    Passive vaping exposure: Yes   Substance and Sexual Activity    Alcohol use: Not Currently     Alcohol/week: 1.0 standard drink of alcohol     Types: 1 Glasses of wine per week     Comment: Occasionally    Drug use: No    Sexual activity: Not Currently     Partners: Male      Social History     Social History Narrative    Lives with daughter.     Family History   Problem Relation Age of Onset    Hypertension Father         PM implanted    Prostate cancer Father     Heart disease Father         He had a pacemaker/      Arthritis Sister     Heart disease Sister     Lung disease Paternal Grandmother     COPD Other     Lung disease Other     Malig Hyperthermia Neg Hx          Vitals:    24 1651 24 2105 11/15/24 0143 11/15/24 0503   BP: 148/91 119/69 128/76 119/74   BP Location:  Left arm Left arm Left arm   Patient Position:  Lying Lying Lying   Pulse: 94 89 82 88   Resp: 16 16 16 16   Temp: 98.8 °F (37.1 °C) 99 °F (37.2 °C) 98.3 °F (36.8 °C) 98.5 °F (36.9 °C)   TempSrc: Oral Oral Oral Oral   SpO2: 100% 97% 97% 98%   Weight:       Height:             Physical Exam: 74 y.o. female  General Appearance:    Alert, cooperative, in no acute distress           Extremities: right shoulder in sling, regional nerve block in place. Nonoperative extremities FROM       Pulses:  Pulses palpable and equal bilaterally.       Skin:  No bleeding,  bruising or rash.       Lymph nodes:  No palpable adenopathy.       Neurologic:  No deficits noted.              Hospital Course:  74 y.o. female admitted to [facility] [place], operated by [facility] to services of Americo Naik MDto Americo Naik MD for Right reverse total shoulder arthroplasty.  Antibiotic and VTE prophylaxis were per SCIP protocols. Post-operatively the patient transferred to the post-operative floor where the patient underwent mobilization therapy that included active as well as passive ROM exercises. Opioids were titrated to achieve appropriate pain management to allow for participation in mobilization exercises. Vital signs are now stable. The incision is intact without signs or symptoms of infection and dressing was changed. Appropriate education re: incision care, activity levels, medications, and follow up visits was completed and all questions were answered. The patient is now deemed stable for discharge.    Discharge and Follow up Instructions: Follow up in 10-14 days.    Date: [unfilled]  Americo Naik MD

## 2024-11-15 NOTE — PROGRESS NOTES
Continued Stay Note  [facility]     Patient Name: Elaine Toscano  MRN: [id number]  Today's Date: 11/15/2024    Admit Date: 11/14/2024    Plan: Home with [facility]   Discharge Plan       Row Name 11/15/24 1355       Plan    Plan Home with [facility]    Patient/Family in Agreement with Plan yes    Plan Comments Spoke with pt regarding d/c planning. Orginal plan was to d/c to [facility], however patients insurance is out of network. Patient and family then decided they are agreeable to d/c home with [facility], referral sent in [facility] and notified Jennifer/BENNIE who states they are able to accept. Plan will be home with [facility] and family support.    Final Discharge Disposition Code 06 - home with home health care    Final Note [facility]                   Discharge Codes    No documentation.                 Expected Discharge Date and Time       Expected Discharge Date Expected Discharge Time    Nov 15, 2024               Judith Neff RN

## 2024-11-15 NOTE — PLAN OF CARE
The pt was admitted to [facility] 2/2 to a R reverse TSA performed by Dr. Naik. The pt lives with her children in a single level home with two steps to enter. She is alone the majority of the day. Both a cane/walker are used for functional mobility/transfers and x9 falls have been reported. Today, the pt required Max A for total body dressing. CGA + cane for functional mobility around her bed twice - she completed of fatigue after these short walks and completed of SOA. PLB utilized, O2 >90% throughout the session. The pt and family are concerned about her safety at home and SNF requested.     Anticipated Discharge Disposition (OT): skilled nursing facility, home with 24/7 care, home with home health

## 2024-11-15 NOTE — PLAN OF CARE
Goal Outcome Evaluation:      Patient is POD #0 for a right reverse total shoulder arthroplasty.  Patient is alert x4.  Vitals are stable.  RUE remains elevated.  Ice applied to operative extremity.  Teaching provided on how to use the incentive spirometer.  Patient was able to ambulate from the stretcher to the bed.  Voiding function is intact.  Iv fluids are infusing. Norco, percocet, and morphine were ordered for pain management.  Patient has denied pain in operative extremity since arriving to the floor.  Will continue to monitor and update accordingly.

## 2024-11-15 NOTE — PLAN OF CARE
Goal Outcome Evaluation:      Patient is POD # 1 for a right reverse total shoulder arthroplasty.  Patient is alert x4.  Vitals are stable.  RUE remains elevated.  Ice applied to operative extremity.   Norco, percocet, and morphine were ordered for pain management. Patient worked with occupational therapy this am.  Up to chair.  Patient to discharge today.  [facility] to provide OT/PT evaluations.

## 2024-11-15 NOTE — PROGRESS NOTES
Orthopedic Progress Note    Subjective     Post-Operative Day: 1 post-right RTSA  Systemic or Specific Complaints: No Complaints. Pain is under control with PO meds.     Objective     Vital signs in last 24 hours:  Temp:  [97.4 °F (36.3 °C)-99 °F (37.2 °C)] 98.5 °F (36.9 °C)  Heart Rate:  [63-94] 88  Resp:  [16-20] 16  BP: (119-188)/(68-99) 119/74    General: alert, appears stated age and cooperative   Neurovascular: Regional nerve block in place, Cap refill < 3 sec  Radial pulse: 2+.   Wound: Dressing clean and dry.    Range of Motion: Limited ROM Post op     Data Review  CBC:  Results from last 7 days   Lab Units 11/15/24  0337   HEMOGLOBIN g/dL 8.9*   HEMATOCRIT % 28.1*       Assessment & Plan     IMPRESSION:stable status post Right reverse total shoulder arthroplasty    PLAN: D/C Today, possible ECF placement per patient and family wishes, nurse case management to coordinate. Change dressing to a light watertight bandage. Pendulum swings and elbow and wrist range of motion initiated.  Follow up in office 10-14 days (already scheduled).  Physical therapy is scheduled.    Activity: As directed by physical therapy.  Sling for protection.  Pendulum swings, elbow and wrist range of motion.  Ice for swelling     LOS: 0 days     Americo Naik MD    Date: 11/15/2024  Time: 06:44 EST

## 2024-11-15 NOTE — PROGRESS NOTES
[facility] to provide home PT and OT evals.  Spoke with patient and Dtr in [facility] at bedside and they have never used our HH agency.  Staff call Elsa's number as listed as preferred to schedule all visits.  Patient lives with son and Dtr in [facility].  Will have 24/7 supervision.  Dr Naik to follow plan of care.

## 2024-11-16 ENCOUNTER — READMISSION MANAGEMENT (OUTPATIENT)
Dept: CALL CENTER | Facility: HOSPITAL | Age: 74
End: 2024-11-16
Payer: MEDICARE

## 2024-11-16 NOTE — OUTREACH NOTE
8year-old female presents the ED with complaints of being stung by a bee, worried about possible anaphylaxis. Patient states she took her EpiPen at 4;40pm and came in because she was having shortness of breath and some middle of the chest tightness. Patient states the chest tightness is the middle the chest does not radiate and just feels tight and started immediately after she was stung by the bee. Patient states she took her prednisone when she was discharged within 2 Benadryl. She was stung on the right elbow by me her main complaint is shortness of breath and feels like she has labored breathing. Was seen yesterday for the same issue for being stung by a bee and was treated and discharged. Was also seen about a week ago for being stung by a bee as well and discharged. Past medical history of COPD. Review of Systems   Constitutional: Negative for chills, fatigue and fever. HENT: Negative for congestion, sinus pressure, sinus pain and sore throat. Respiratory: Positive for shortness of breath. Negative for cough and chest tightness. Cardiovascular: Positive for chest pain. Negative for leg swelling. Gastrointestinal: Negative for abdominal distention, abdominal pain, constipation, diarrhea, nausea and vomiting. Endocrine: Negative for polyuria. Genitourinary: Negative for difficulty urinating, dysuria, frequency, hematuria, urgency, vaginal bleeding and vaginal discharge. Musculoskeletal: Negative for arthralgias and back pain. Skin: Negative for color change and pallor. Neurological: Negative for dizziness and weakness. Physical Exam  Vitals signs and nursing note reviewed. Constitutional:       Appearance: Normal appearance. She is normal weight. HENT:      Head: Normocephalic and atraumatic. Eyes:      Conjunctiva/sclera: Conjunctivae normal.   Cardiovascular:      Rate and Rhythm: Normal rate and regular rhythm. Pulses: Normal pulses.       Heart sounds: Prep Survey      Flowsheet Row Responses   [facility] facility patient discharged from? [place]   Is LACE score < 7 ? No   Eligibility Readm Mgmt   Discharge diagnosis REVERSE TOTAL SHOULDER  ARTHROPLASTY   Does the patient have one of the following disease processes/diagnoses(primary or secondary)? Total Joint Replacement   Does the patient have Home health ordered? Yes   What is the Home health agency?  [facility]   Prep survey completed? Yes            Myra HUGO - Registered Nurse           of possible worsening anaphylactic transient swelling of the throat and possible death. Patient was fine with these risks and wanted to leave. ED Course as of Sep 11 1905   Fri Sep 11, 2020   1816 Chest x-ray unremarkable. CBC BMP also unremarkable. [CB]   3116 Talked with patient about her lab work being unremarkable and that she is feeling much better post her breathing treatments told her that for a anaphylactic reaction that we hold the patient 4 hours after the epi injection patient states she is feeling much better would like to leave a told her that she would have to sign out AMA and she was agreeable signed AMA toward the risks of signing out AMA worsening anaphylaxis and possible death. Patient was okay with this. [CB]      ED Course User Index  [CB] Matt Hodge MD        ED Course as of Sep 11 1908   Fri Sep 11, 2020   1816 Chest x-ray unremarkable. CBC BMP also unremarkable. [CB]   6133 Talked with patient about her lab work being unremarkable and that she is feeling much better post her breathing treatments told her that for a anaphylactic reaction that we hold the patient 4 hours after the epi injection patient states she is feeling much better would like to leave a told her that she would have to sign out AMA and she was agreeable signed AMA toward the risks of signing out AMA worsening anaphylaxis and possible death. Patient was okay with this.     [CB]      ED Course User Index  [CB] Matt Hodge MD       --------------------------------------------- PAST HISTORY ---------------------------------------------  Past Medical History:  has a past medical history of Adenoma of right adrenal gland, Anemia, Anxiety, Arthritis, Asthma, Benign essential tremor, Bipolar affective ([address] Chronic back pain, Chronic respiratory failure with hypoxia ([address] Depression, Difficulty swallowing, DM (diabetes mellitus) ([facility] [address] Environmental and seasonal allergies, Fatty liver, Full dentures, Generalized seizure disorder ([facility] [address] GERD (gastroesophageal reflux disease), Gout, Herniated cervical disc, History of swelling of feet, Lymphedema of lower extremity, Mitochondrial cytopathy ([address] Neuropathy, Obesity, PETR (obstructive sleep apnea), PONV (postoperative nausea and vomiting), Seizures ([address] and Spinal headache. Past Surgical History:  has a past surgical history that includes knee surgery (Bilateral); Gastric bypass surgery (1999); myomectomy; Tonsillectomy; Nerve Block (Left, 10 02 2013); Nerve Block (Left, 10 9 13); Nerve Block (Left, 10/16/13); other surgical history (Left, 11 25 13); Nerve Block (Left, 10/29/2014); Nerve Block (Left, 11/12/2014); Nerve Block (Left, 12 8 14); Nerve Block (Right, 3/30/15); Nerve Block (Right, 4/6/2015); Nerve Block (Right, 4/13/15); Nerve Block (Left, 7/6/15); Nerve Block (07/20/15); Nerve Block (Left, 10 1 15); Nerve Block (10/26/15); other surgical history (3/28/2016); Endoscopy, colon, diagnostic; pr colonoscopy flx dx w/collj spec when pfrmd (N/A, 3/20/2018); pr egd transoral biopsy single/multiple (N/A, 3/20/2018); Cholecystectomy (1999); Hysterectomy (1988); Colonoscopy (N/A, 9/18/2018); Esophagus dilation (9/18/2018); back surgery (last one 1995); Cardiac catheterization (Right, 6-6-2013); Appendectomy; other surgical history (1995); joint replacement (Bilateral, [id number]); egd colonoscopy (N/A, 10/8/2019); and Colonoscopy (N/A, 10/8/2019). Social History:  reports that she has been smoking cigarettes and cigars. She started smoking about 30 years ago. She has a 42.00 pack-year smoking history. She has never used smokeless tobacco. She reports that she does not drink alcohol or use drugs.     Family History: family history includes Arthritis in an other family member; Cancer in her father and another family member; Depression in an other family member; Heart Disease in her mother and another family member; Hypertension in an other family member; Mental FINDINGS: Neurostimulator wires overlie the thoracolumbar spine. Acromioclavicular arthropathy. The heart, lungs, mediastinum and regional skeleton are otherwise unremarkable. No evidence for acute cardiopulmonary process. Xr Chest Portable    Result Date: 2020  Patient MRN:  [id number] : 1962 Age: 59 years Gender: Female Order Date:  2020 12:42 AM EXAM: XR CHEST PORTABLE COMPARISON: 2020 INDICATION:  cp cp FINDINGS: The heart is normal in size. No focal airspace opacity. There is no pleural effusion. There is no pneumothorax. No free air beneath the diaphragms. No airspace opacities or pleural effusion. Nm Cardiac Stress Test Nuclear Imaging    Result Date: 2020  Patient MRN:  [id number] : 1962 Age: 59 years Gender: Female Order Date:  2020 10:57 AM EXAM: NM MYOCARDIAL SPECT REST EXERCISE OR RX Number of Images: 10 views INDICATION:  Chest pain Reason for Exam?->Chest pain Procedure Type->Rx COMPARISON: None TECHNIQUE: 10.4 mCi of Tc-99m MIBI was injected intravenously at rest and cardiac SPECT images were performed. In addition 31.6 mCi of Tc-99m MIBI was injected intravenously at maximum stress by using Lexiscan. Stress SPECT images and gated study were performed. FINDINGS: Perfusion images demonstrate no reversible perfusion defect. Wall motion is within normal limits. The end diastolic volume is 659 ml. The end systolic volume is 33 ml. The estimated ejection fraction is 72 %. 1. No reversible perfusion defect 2. Ejection fraction is 72 %. 3. No significant wall motion abnormality       ------------------------- NURSING NOTES AND VITALS REVIEWED ---------------------------  Date / Time Roomed:  2020  5:17 PM  ED Bed Assignment:  [facility] [place]    The nursing notes within the ED encounter and vital signs as below have been reviewed.    /60   Pulse 105   Temp 98 °F (36.7 °C) (Oral)   Resp 18   Ht 5' 7\" (1.702 m)   Wt 246 lb (111.6 kg) LMP 08/31/1988   SpO2 97%   BMI 38.53 kg/m²   Oxygen Saturation Interpretation: Normal      ------------------------------------------ PROGRESS NOTES ------------------------------------------  Time: 1151  Re-evaluation. Patients symptoms are improving  Repeat physical examination is significantly improved        I have spoken with the patient and discussed todays availabe results to this point, in addition to providing specific details for the plan of care and counseling regarding the diagnosis and prognosis. Their questions are answered, however they are not agreeable to admission.     --------------------------------- ADDITIONAL PROVIDER NOTES ---------------------------------  This patient has chosen to leave against medical advice. I have personally explained to them that choosing to do so may result in permanent bodily harm or death. I discussed at length that without further evaluation and monitoring there may be unforeseen circumstances and deterioration resulting in permanent bodily harm or death as a result of their choice. They are alert, oriented, and competent at this time. They state that they are aware of the serious risks as explained, but they continue to wish to leave against medical   advice. In light of their decision to leave against medical advice, follow-up has been arranged and they are aware of the importance of following up as instructed. They have been advised that they should return to the ED immediately if they change their mind at any time, or if their condition begins to change or worsen. The follow up plan has been discussed in detail and they are aware of the specific conditions for emergent return, as well as the importance of follow-up.       New Prescriptions    EPINEPHRINE (EPIPEN 2-CARMEN) 0.3 MG/0.3ML SOAJ INJECTION    Inject 0.3 mLs into the muscle once for 1 dose Use as directed for allergic reaction       Diagnosis:  No diagnosis found.    Disposition:  Patient's disposition: Left against medical advice. Patient's condition is stable.        Derek Glass MD  Resident  09/11/20 3358

## 2024-11-17 ENCOUNTER — HOME CARE VISIT (OUTPATIENT)
Dept: HOME HEALTH SERVICES | Facility: HOME HEALTHCARE | Age: 74
End: 2024-11-17
Payer: COMMERCIAL

## 2024-11-17 VITALS
WEIGHT: 185 LBS | HEART RATE: 81 BPM | SYSTOLIC BLOOD PRESSURE: 124 MMHG | TEMPERATURE: 97.9 F | DIASTOLIC BLOOD PRESSURE: 84 MMHG | RESPIRATION RATE: 18 BRPM | HEIGHT: 62 IN | OXYGEN SATURATION: 95 % | BODY MASS INDEX: 34.04 KG/M2

## 2024-11-17 PROCEDURE — G0151 HHCP-SERV OF PT,EA 15 MIN: HCPCS

## 2024-11-17 NOTE — HOME HEALTH
"Home Health PT admission done on 11/17/24    Home Health ordered for: PT, OT    Reason for Hosp/Primary Dx/Co-morbidities: Patient is status post elective rt total shoulder replacement due to diagnosis of end-stage rt shoulder OA and long standing pain with failed conservative measures.    Focus of care: Rt handed patient with rt total shoulder replacement and related impaired gait     Patient's goal(s): \" To be able to josefa again\"    Current Functional status/mobility/DME: Patient is rt handed and status post rt total shoulder replacement with history of frequent falls who has difficulty ambulating with std cane placement in lt hand. Able to ambulate about 50ft with min assist, std cane, and constant verbal/visual cues for cane/feet placement , showing shuffling gait pattern and difficulty using device with lt hand. In addition, patient requires cga/min assist for coming stand without compromising NWB order on rt ue.     HB status/Living Arrangements: Patient lives with son and daughter in law who are very supportive and assisting patient as needed    Skin Integrity/wound status: Rt shoulder incision is unobservable per surgical dressing    Code Status: Full code    Fall Risk/Safety concerns: High risk for fall post rt total shoulder replacement and history of frequent falls    Educated on Emergency Plan, steps to take prior to going to the ER and when to Call Home Health First: Yes     Medication issues/Concerns: No    Additional Problems/Concerns: Past history of frequent falls.     SDOH Barriers (i.e. caregiver concerns, social isolation, transportation, food insecurity, environment, income etc.)/Need for MSW: No    Patient will require additional PT 1wk1 for pain control in rt shoulder, transfer training, therapeutic/home exercise program to [facility]), caregiver education for safe gait assistance and gait training."

## 2024-11-18 ENCOUNTER — HOME CARE VISIT (OUTPATIENT)
Dept: HOME HEALTH SERVICES | Facility: HOME HEALTHCARE | Age: 74
End: 2024-11-18
Payer: MEDICARE

## 2024-11-18 VITALS — SYSTOLIC BLOOD PRESSURE: 108 MMHG | DIASTOLIC BLOOD PRESSURE: 80 MMHG

## 2024-11-18 PROCEDURE — G0152 HHCP-SERV OF OT,EA 15 MIN: HCPCS

## 2024-11-20 ENCOUNTER — READMISSION MANAGEMENT (OUTPATIENT)
Dept: CALL CENTER | Facility: HOSPITAL | Age: 74
End: 2024-11-20
Payer: MEDICARE

## 2024-11-20 ENCOUNTER — HOME CARE VISIT (OUTPATIENT)
Dept: HOME HEALTH SERVICES | Facility: HOME HEALTHCARE | Age: 74
End: 2024-11-20
Payer: MEDICARE

## 2024-11-20 VITALS — OXYGEN SATURATION: 100 % | SYSTOLIC BLOOD PRESSURE: 110 MMHG | DIASTOLIC BLOOD PRESSURE: 68 MMHG | HEART RATE: 71 BPM

## 2024-11-20 PROCEDURE — G0152 HHCP-SERV OF OT,EA 15 MIN: HCPCS

## 2024-11-20 NOTE — HOME HEALTH
Pt is a 75 yo female who lives in a 1 story home with son and DIL.   Pt recently hospitalized secondary to R rTSR.        PLOF pt independent with all self care      CLOF pt indepedent with feeding and toileting.   Pt requires MOD/MAX assist with bathing and dressing.  Total assist with IADLs      Skilled OT for ther ex/HEP and adl training.   Pt and therapist in agreement with goals and POC.      Pt denies any falls or med changes

## 2024-11-20 NOTE — OUTREACH NOTE
Total Joint Week 1 Survey      Flowsheet Row Responses   [facility] patient discharged from? [place]   Does the patient have one of the following disease processes/diagnoses(primary or secondary)? Total Joint Replacement   Joint surgery performed? Shoulder   Week 1 attempt successful? Yes   Call start time 1330   Call end time 1336   Discharge diagnosis REVERSE TOTAL SHOULDER  ARTHROPLASTY   Is patient permission given to speak with other caregiver? Yes   List who call center can speak with Dtr-in-Law   Person spoke with today (if not patient) and relationship Dtr-in-Law   Does the patient have all medications related to this admission filled (includes all antibiotics, pain medications, etc.) Yes   Is the patient taking all medications as directed (includes completed medication regime)? Yes   Is the patient able to teach back alternate methods of pain control? Ice, Reposition, Shoulder-elevate above heart/ keep in sling as advised   Comments regarding appointments Surgery follow up 11/26/24   Does the patient have a follow up appointment with their surgeon? Yes   Has the patient kept scheduled appointments due by today? N/A   What is the Home health agency?  [facility]   Has home health visited the patient within 72 hours of discharge? Yes   Psychosocial issues? No   Has the patient began therapy sessions (either in the home or as an out patient)? Yes   If the patient has started attending therapy, what post op day did they begin to attend (either in home or as an out patient)?   PT and OT twice weekly   Does the patient have a wound vac in place? N/A   Has the patient fallen since discharge? No   Did the patient receive a copy of their discharge instructions? Yes   Nursing interventions Reviewed instructions with patient   What is the patient's perception of their functional status since discharge? Improving   Is the patient able to teach back signs and symptoms of infection? Temp >100.4 for 24h or  longer, Incisional drainage, Increased swelling or redness around incision (not associated with surgical edema)   Is the patient able to teach back how to prevent infection? Check incision daily, Wash hands before and after touching incision, Keep incision covered if pets in house, Eat well-balanced diet   Did the patient implement home safety suggestions from pre-surgery classes if attended? Yes   If the patient is a current smoker, are they able to teach back resources for cessation? Not a smoker   Is the patient/caregiver able to teach back the hierarchy of who to call/visit for symptoms/problems? PCP, Specialist, Home health nurse, Urgent Care, ED, 911 Yes   Week 1 call completed? Yes   Is the patient interested in additional calls from an ambulatory ? No   Would this patient benefit from a Referral to [facility] Social Work? No   Call end time [id number]            Cece HERR - Registered Nurse

## 2024-11-21 ENCOUNTER — TELEPHONE (OUTPATIENT)
Dept: ORTHOPEDIC SURGERY | Facility: HOSPITAL | Age: 74
End: 2024-11-21
Payer: MEDICARE

## 2024-11-21 ENCOUNTER — HOME CARE VISIT (OUTPATIENT)
Dept: HOME HEALTH SERVICES | Facility: HOME HEALTHCARE | Age: 74
End: 2024-11-21
Payer: COMMERCIAL

## 2024-11-21 VITALS
HEART RATE: 80 BPM | DIASTOLIC BLOOD PRESSURE: 70 MMHG | OXYGEN SATURATION: 94 % | TEMPERATURE: 98 F | SYSTOLIC BLOOD PRESSURE: 102 MMHG

## 2024-11-21 PROCEDURE — G0151 HHCP-SERV OF PT,EA 15 MIN: HCPCS

## 2024-11-21 NOTE — TELEPHONE ENCOUNTER
Called and spoke with Ms. Toscano's family as they are the primary contact to see how she is doing as she is 1 week SP [facility]. She said Ms. Toscano is doing really well. She is working with PT and OT. She is sore, and wears out easily but pain is controlled and she is doing well. She has a lot of bruising in her chest and side. Explained that this is normal. They don't have any other questions for me at this time. She was given my contact information should they need anything.

## 2024-11-25 ENCOUNTER — HOME CARE VISIT (OUTPATIENT)
Dept: HOME HEALTH SERVICES | Facility: HOME HEALTHCARE | Age: 74
End: 2024-11-25
Payer: COMMERCIAL

## 2024-11-25 VITALS — DIASTOLIC BLOOD PRESSURE: 60 MMHG | OXYGEN SATURATION: 97 % | SYSTOLIC BLOOD PRESSURE: 102 MMHG | HEART RATE: 78 BPM

## 2024-11-25 PROCEDURE — G0152 HHCP-SERV OF OT,EA 15 MIN: HCPCS

## 2024-11-26 ENCOUNTER — HOME CARE VISIT (OUTPATIENT)
Dept: HOME HEALTH SERVICES | Facility: HOME HEALTHCARE | Age: 74
End: 2024-11-26
Payer: COMMERCIAL

## 2024-11-26 VITALS
SYSTOLIC BLOOD PRESSURE: 120 MMHG | OXYGEN SATURATION: 97 % | DIASTOLIC BLOOD PRESSURE: 80 MMHG | TEMPERATURE: 98 F | HEART RATE: 78 BPM

## 2024-11-26 PROCEDURE — G0151 HHCP-SERV OF PT,EA 15 MIN: HCPCS

## 2024-11-29 ENCOUNTER — READMISSION MANAGEMENT (OUTPATIENT)
Dept: CALL CENTER | Facility: HOSPITAL | Age: 74
End: 2024-11-29
Payer: MEDICARE

## 2024-11-29 NOTE — OUTREACH NOTE
Total Joint Week 2 Survey      Flowsheet Row Responses   [facility] patient discharged from? [place]   Does the patient have one of the following disease processes/diagnoses(primary or secondary)? Total Joint Replacement   Joint surgery performed? Shoulder   Week 2 attempt successful? Yes   Call end time 1544   Has the patient been back in either the hospital or Emergency Department since discharge? No   Discharge diagnosis REVERSE TOTAL SHOULDER  ARTHROPLASTY   Person spoke with today (if not patient) and relationship Dtr-in-Law   Does the patient have all medications related to this admission filled (includes all antibiotics, pain medications, etc.) Yes   Is the patient taking all medications as directed (includes completed medication regime)? Yes   Comments regarding appointments Surgery follow up 11/26/24   Does the patient have a follow up appointment with their surgeon? Yes   Has the patient kept scheduled appointments due by today? Yes   What is the Home health agency?  [facility]   Has home health visited the patient within 72 hours of discharge? Yes   Home health comments PT visiting   Psychosocial issues? No   Has the patient began therapy sessions (either in the home or as an out patient)? Yes   If the patient has started attending therapy, what post op day did they begin to attend (either in home or as an out patient)?   exercises everyday   Has the patient fallen since discharge? No   Did the patient receive a copy of their discharge instructions? Yes   Nursing interventions Reviewed instructions with patient   What is the patient's perception of their functional status since discharge? Improving   Did the patient implement home safety suggestions from pre-surgery classes if attended? Yes   If the patient is a current smoker, are they able to teach back resources for cessation? Not a smoker   Is the patient/caregiver able to teach back the hierarchy of who to call/visit for  symptoms/problems? PCP, Specialist, Home health nurse, Urgent Care, ED, 911 Yes   Week 2 call completed? Yes   Is the patient interested in additional calls from an ambulatory ? No   Would this patient benefit from a Referral to Amb Social Work? No   Wrap up additional comments Pt doing well per dtr in law.  Denies needs.   Call end time [phone]            MURRAY FISHER - Registered Nurse

## 2024-12-02 ENCOUNTER — HOME CARE VISIT (OUTPATIENT)
Dept: HOME HEALTH SERVICES | Facility: HOME HEALTHCARE | Age: 74
End: 2024-12-02
Payer: COMMERCIAL

## 2024-12-02 ENCOUNTER — HOME CARE VISIT (OUTPATIENT)
Dept: HOME HEALTH SERVICES | Facility: HOME HEALTHCARE | Age: 74
End: 2024-12-02
Payer: MEDICARE

## 2024-12-02 VITALS — HEART RATE: 73 BPM | DIASTOLIC BLOOD PRESSURE: 80 MMHG | OXYGEN SATURATION: 96 % | SYSTOLIC BLOOD PRESSURE: 120 MMHG

## 2024-12-02 VITALS
OXYGEN SATURATION: 93 % | SYSTOLIC BLOOD PRESSURE: 120 MMHG | TEMPERATURE: 98 F | HEART RATE: 78 BPM | DIASTOLIC BLOOD PRESSURE: 80 MMHG

## 2024-12-02 PROCEDURE — G0152 HHCP-SERV OF OT,EA 15 MIN: HCPCS

## 2024-12-02 PROCEDURE — G0151 HHCP-SERV OF PT,EA 15 MIN: HCPCS

## 2024-12-06 ENCOUNTER — HOME CARE VISIT (OUTPATIENT)
Dept: HOME HEALTH SERVICES | Facility: HOME HEALTHCARE | Age: 74
End: 2024-12-06
Payer: MEDICARE

## 2024-12-06 PROCEDURE — G0152 HHCP-SERV OF OT,EA 15 MIN: HCPCS

## 2024-12-09 ENCOUNTER — HOME CARE VISIT (OUTPATIENT)
Dept: HOME HEALTH SERVICES | Facility: HOME HEALTHCARE | Age: 74
End: 2024-12-09
Payer: COMMERCIAL

## 2024-12-09 VITALS
TEMPERATURE: 98 F | OXYGEN SATURATION: 96 % | SYSTOLIC BLOOD PRESSURE: 120 MMHG | DIASTOLIC BLOOD PRESSURE: 80 MMHG | HEART RATE: 76 BPM

## 2024-12-09 PROCEDURE — G0151 HHCP-SERV OF PT,EA 15 MIN: HCPCS

## 2024-12-12 ENCOUNTER — ANESTHESIA (OUTPATIENT)
Dept: GASTROENTEROLOGY | Facility: HOSPITAL | Age: 74
End: 2024-12-12
Payer: MEDICARE

## 2024-12-12 ENCOUNTER — ANESTHESIA EVENT (OUTPATIENT)
Dept: GASTROENTEROLOGY | Facility: HOSPITAL | Age: 74
End: 2024-12-12
Payer: MEDICARE

## 2024-12-12 ENCOUNTER — ON CAMPUS - OUTPATIENT (OUTPATIENT)
Dept: URBAN - METROPOLITAN AREA HOSPITAL 85 | Facility: HOSPITAL | Age: 74
End: 2024-12-12
Payer: MEDICAID

## 2024-12-12 ENCOUNTER — HOSPITAL ENCOUNTER (OUTPATIENT)
Facility: HOSPITAL | Age: 74
Setting detail: HOSPITAL OUTPATIENT SURGERY
Discharge: HOME | End: 2024-12-12
Attending: INTERNAL MEDICINE | Admitting: INTERNAL MEDICINE
Payer: MEDICARE

## 2024-12-12 VITALS
BODY MASS INDEX: 33.47 KG/M2 | SYSTOLIC BLOOD PRESSURE: 102 MMHG | WEIGHT: 181.88 LBS | DIASTOLIC BLOOD PRESSURE: 57 MMHG | RESPIRATION RATE: 13 BRPM | OXYGEN SATURATION: 94 % | HEIGHT: 62 IN | HEART RATE: 81 BPM | TEMPERATURE: 98.9 F

## 2024-12-12 DIAGNOSIS — K21.9 GASTRO-ESOPHAGEAL REFLUX DISEASE WITHOUT ESOPHAGITIS: ICD-10-CM

## 2024-12-12 DIAGNOSIS — Z98.84 BARIATRIC SURGERY STATUS: ICD-10-CM

## 2024-12-12 DIAGNOSIS — R11.0 NAUSEA: ICD-10-CM

## 2024-12-12 DIAGNOSIS — R13.10 DYSPHAGIA, UNSPECIFIED: ICD-10-CM

## 2024-12-12 PROCEDURE — C1726 CATH, BAL DIL, NON-VASCULAR: HCPCS | Performed by: INTERNAL MEDICINE

## 2024-12-12 PROCEDURE — 25810000003 SODIUM CHLORIDE 0.9 % SOLUTION: Performed by: NURSE ANESTHETIST, CERTIFIED REGISTERED

## 2024-12-12 PROCEDURE — 25010000002 PROPOFOL 500 MG/50ML EMULSION: Performed by: NURSE ANESTHETIST, CERTIFIED REGISTERED

## 2024-12-12 PROCEDURE — 25010000002 LIDOCAINE PF 2% 2 % SOLUTION: Performed by: NURSE ANESTHETIST, CERTIFIED REGISTERED

## 2024-12-12 PROCEDURE — 43249 ESOPH EGD DILATION <30 MM: CPT | Performed by: INTERNAL MEDICINE

## 2024-12-12 RX ORDER — IPRATROPIUM BROMIDE AND ALBUTEROL SULFATE 2.5; .5 MG/3ML; MG/3ML
3 SOLUTION RESPIRATORY (INHALATION) ONCE AS NEEDED
Status: CANCELLED | OUTPATIENT
Start: 2024-12-12

## 2024-12-12 RX ORDER — HYDRALAZINE HYDROCHLORIDE 20 MG/ML
5 INJECTION INTRAMUSCULAR; INTRAVENOUS
Status: CANCELLED | OUTPATIENT
Start: 2024-12-12

## 2024-12-12 RX ORDER — LABETALOL HYDROCHLORIDE 5 MG/ML
5 INJECTION, SOLUTION INTRAVENOUS
Status: CANCELLED | OUTPATIENT
Start: 2024-12-12

## 2024-12-12 RX ORDER — EPHEDRINE SULFATE 5 MG/ML
5 INJECTION INTRAVENOUS ONCE AS NEEDED
Status: CANCELLED | OUTPATIENT
Start: 2024-12-12

## 2024-12-12 RX ORDER — DIPHENHYDRAMINE HYDROCHLORIDE 50 MG/ML
12.5 INJECTION INTRAMUSCULAR; INTRAVENOUS
Status: CANCELLED | OUTPATIENT
Start: 2024-12-12

## 2024-12-12 RX ORDER — LIDOCAINE HYDROCHLORIDE 20 MG/ML
INJECTION, SOLUTION EPIDURAL; INFILTRATION; INTRACAUDAL; PERINEURAL AS NEEDED
Status: DISCONTINUED | OUTPATIENT
Start: 2024-12-12 | End: 2024-12-12 | Stop reason: SURG

## 2024-12-12 RX ORDER — ONDANSETRON 2 MG/ML
4 INJECTION INTRAMUSCULAR; INTRAVENOUS ONCE AS NEEDED
Status: CANCELLED | OUTPATIENT
Start: 2024-12-12

## 2024-12-12 RX ORDER — SODIUM CHLORIDE 9 MG/ML
INJECTION, SOLUTION INTRAVENOUS CONTINUOUS PRN
Status: DISCONTINUED | OUTPATIENT
Start: 2024-12-12 | End: 2024-12-12 | Stop reason: SURG

## 2024-12-12 RX ORDER — PROPOFOL 10 MG/ML
INJECTION, EMULSION INTRAVENOUS AS NEEDED
Status: DISCONTINUED | OUTPATIENT
Start: 2024-12-12 | End: 2024-12-12 | Stop reason: SURG

## 2024-12-12 RX ORDER — ONDANSETRON 2 MG/ML
4 INJECTION INTRAMUSCULAR; INTRAVENOUS ONCE AS NEEDED
Status: DISCONTINUED | OUTPATIENT
Start: 2024-12-12 | End: 2024-12-12 | Stop reason: HOSPADM

## 2024-12-12 RX ADMIN — PROPOFOL INJECTABLE EMULSION 30 MG: 10 INJECTION, EMULSION INTRAVENOUS at 10:11

## 2024-12-12 RX ADMIN — PROPOFOL INJECTABLE EMULSION 70 MG: 10 INJECTION, EMULSION INTRAVENOUS at 10:05

## 2024-12-12 RX ADMIN — SODIUM CHLORIDE: 9 INJECTION, SOLUTION INTRAVENOUS at 09:45

## 2024-12-12 RX ADMIN — PROPOFOL INJECTABLE EMULSION 30 MG: 10 INJECTION, EMULSION INTRAVENOUS at 10:07

## 2024-12-12 RX ADMIN — LIDOCAINE HYDROCHLORIDE 100 MG: 20 INJECTION, SOLUTION EPIDURAL; INFILTRATION; INTRACAUDAL; PERINEURAL at 10:05

## 2024-12-12 RX ADMIN — PROPOFOL INJECTABLE EMULSION 30 MG: 10 INJECTION, EMULSION INTRAVENOUS at 10:09

## 2024-12-12 NOTE — H&P
GI Procedure H&P:    Referring Provider:    Debbie Cesar MD Harrell, Steven, MD    Chief complaint: <principal problem not specified>    Subjective .  GERD, epigastric pain and weight loss    History of present illness:      Elaine Toscano is a 74 y.o. female who presents today for Procedure(s):  ESOPHAGOGASTRODUODENOSCOPY for the indications listed below.     The updated Patient Profile was reviewed prior to the procedure, in conjunction with the Physical Exam, including medical conditions, surgical procedures, medications, allergies, family history and social history.     Pre-operatively, I reviewed the indication(s) for the procedure, the risks of the procedure [including but not limited to: unexpected bleeding possibly requiring hospitalization and/or unplanned repeat procedures, perforation possibly requiring surgical treatment, missed lesions and complications of sedation/MAC (also explained by anesthesia staff)].     I have evaluated the patient for risks associated with the planned anesthesia and the procedure to be performed and find the patient an acceptable candidate for IV sedation.    Multiple opportunities were provided for any questions or concerns, and all questions were answered satisfactorily before any anesthesia was administered. We will proceed with the planned procedure.    Past Medical History:  Past Medical History:   Diagnosis Date    Abnormal ECG     Acute renal failure superimposed on stage 4 chronic kidney disease 03/24/2021    Anemia     Anesthesia complication     PT STATES SLOW TO WAKE UP    At high risk for falls     GAIT UNSTEADY, USES ROLLATOR DAILY    Broken foot     right    Broken nose 07/2017    CHF (congestive heart failure)     Chronic kidney disease (CKD), stage IV (severe) 05/06/2020    Chronic low back pain     CRI (chronic renal insufficiency)     Depression     DJD (degenerative joint disease)     Fracture of ankle 08/12/2016    Frequent UTI     GERD  (gastroesophageal reflux disease)     Gout     History of MRSA infection     PT STATS NASAL SWAP-OVER 5 YEARS AGO    History of recent fall     HAD FALL IN MID OCTOBER 2024    Hypertension     Hypothyroidism     IBS (irritable bowel syndrome)     Multiple bruises     CHEEKS BILAT-WORSE ON RIGHT FACE, MULTIPLE BRUISING ON FOREARMS, WORSE ON LEFT.  PT STATES HAS SEEN DR. NAIK SINCE FALL    Neuropathy     FEET BILAT    OA (osteoarthritis) of shoulder     RIGHT: PAIN, WEAKNESS, LIMITED MOBILITY    Osteopenia     Overactive bladder     FREQUENCY, URGENCY    Pacemaker     Skin abnormalities     STATES HAS ITCHY ON LEFT MID BACK AREA AND SEVERAL SCRATCHES NOTED FROM PT    SSS (sick sinus syndrome)     DR. SHAH EVERY 6 MONTHS    Suicide attempt     TMJ (dislocation of temporomandibular joint)     Urinary incontinence     DAILY PAD       Past Surgical History:  Past Surgical History:   Procedure Laterality Date    APPENDECTOMY  1986    CARDIAC CATHETERIZATION  04/2018    CATARACT EXTRACTION Bilateral     CHOLECYSTECTOMY  2008    COLONOSCOPY      ENDOSCOPY      STATES HAS HAD DILATION WITH PROCEDURE    EPIDURAL BLOCK      FOOT SURGERY Bilateral 2008    FOOT SURGERY Bilateral 09/2019    Dr. Keenan- 2nd toe on bilateral feet     FOOT SURGERY Left 2024    3-5TH TOE STRAIGHTENED WITH METAL    GASTRIC BYPASS  2007    HYSTERECTOMY      INSERT / REPLACE / REMOVE PACEMAKER      LAPAROSCOPIC GASTRIC BANDING  2004    removed    LUMBAR FUSION      STATES X2:  HAD ORINIGAL SURGERY AND THEN HAD FALL AND HAD TO HAVE REPEAT SURGERY    OTHER SURGICAL HISTORY  01/2018    myelogram    TOTAL KNEE ARTHROPLASTY Left     TOTAL SHOULDER ARTHROPLASTY W/ DISTAL CLAVICLE EXCISION Right 11/14/2024    Procedure: REVERSE TOTAL SHOULDER  ARTHROPLASTY;  Surgeon: Americo Naik MD;  Location: [facility];  Service: Orthopedics;  Laterality: Right;       Social History:  Social History     Tobacco Use    Smoking status: Never     Passive exposure:  "Current    Smokeless tobacco: Never   Vaping Use    Vaping status: Never Used    Passive vaping exposure: Yes   Substance Use Topics    Alcohol use: Not Currently     Alcohol/week: 1.0 standard drink of alcohol     Types: 1 Glasses of wine per week     Comment: Occasionally    Drug use: No       Family History:  Family History   Problem Relation Age of Onset    Hypertension Father         PM implanted    Prostate cancer Father     Heart disease Father         He had a pacemaker/  2019    Arthritis Sister     Heart disease Sister     Lung disease Paternal Grandmother     COPD Other     Lung disease Other     Malig Hyperthermia Neg Hx        Medications:  No medications prior to admission.       Scheduled Meds:  Continuous Infusions:No current facility-administered medications for this encounter.    PRN Meds:.    ALLERGIES:  Azithromycin, Codeine, Dilaudid  [hydromorphone hcl], Isosorbide nitrate, Macrobid  [nitrofurantoin], Morphine, Oxycontin  [oxycodone], Pineapple, Povidone iodine, Tetanus antitoxin, Tizanidine, Denosumab, Demerol [meperidine], Etodolac, Lodrane d [brompheniramine-pseudoeph], Shrimp flavor, Sulfa antibiotics, Imipramine, and Iodine    ROS:  The following systems were reviewed and negative;   Constitution:  No fevers, chills, no unintentional weight loss  Skin: no rash, no jaundice  Eyes:  No blurry vision, no eye pain  HENT:  No change in hearing or smell  Resp:  No dyspnea or cough  CV:  No chest pain or palpitations  :  No dysuria, hematuria  Musculoskeletal:  No leg cramps or arthralgias  Neuro:  No tremor, no numbness  Psych:  No depression or confsuion    Objective     Vital Signs:   Vitals:    24 1449   Weight: 83.9 kg (185 lb)   Height: 157.5 cm (62\")       Physical Exam:       General Appearance:    Awake and alert, in no acute distress   Head:    Normocephalic, without obvious abnormality, atraumatic   Throat:   No oral lesions, no thrush, oral mucosa moist   Lungs:     " respirations regular, even and unlabored   Skin:   No rash, no jaundice       Results Review:  Lab Results (last 24 hours)       ** No results found for the last 24 hours. **            Imaging Results (Last 24 Hours)       ** No results found for the last 24 hours. **             I reviewed the patient's labs and imaging.    ASSESSMENT AND PLAN:  GERD, epigastric pain and weight loss    Active Problems:    * No active hospital problems. *       Procedure(s):  ESOPHAGOGASTRODUODENOSCOPY      I discussed the patients findings and my recommendations with the patient.    Greg Hartman MD  12/12/24  08:07 EST

## 2024-12-12 NOTE — DISCHARGE INSTRUCTIONS
Dr. Hartman [phone]      A responsible adult should stay with you and you should rest quietly for the rest of the day.    Do not drink alcohol, drive, operate any heavy machinery or power tools or make any legal/important decisions for the next 24 hours.     Progress your diet as tolerated.  If you begin to experience severe pain, increased shortness of breath, racing heartbeat or a fever above 101 F, seek immediate medical attention.     Follow up with MD as instructed. Call office for results in 3 to 5 business days if needed.     Findings:  Normal esophagus dilated 18 to 20 mm with a balloon without trauma or resistance  Evidence of prior Hayden-en-Y gastric bypass surgery without ulceration or stricture    Recommendations:  Monitor for symptom improvement following dilation  Gastroparesis diet

## 2024-12-12 NOTE — ANESTHESIA POSTPROCEDURE EVALUATION
Patient: Elaine Toscano    Procedure Summary       Date: 12/12/24 Room / Location: [facility] ENDOSCOPY 1 / [facility] ENDOSCOPY    Anesthesia Start: 0957 Anesthesia Stop: 1016    Procedure: ESOPHAGOGASTRODUODENOSCOPY with dilation (balloon 1/8,19,20) Diagnosis:       Nausea      GERD (gastroesophageal reflux disease)      (Nausea [R11.0])      (GERD (gastroesophageal reflux disease) [K21.9])    Surgeons: Greg Hartman MD Provider: Surjit Cummings MD    Anesthesia Type: MAC ASA Status: 4            Anesthesia Type: MAC    Vitals  Vitals Value Taken Time   /54 12/12/24 1027   Temp     Pulse 78 12/12/24 1028   Resp     SpO2 97 % 12/12/24 1028   Vitals shown include unfiled device data.        Post Anesthesia Care and Evaluation    Patient location during evaluation: PACU  Patient participation: complete - patient participated  Level of consciousness: awake  Pain scale: See nurse's notes for pain score.  Pain management: adequate    Airway patency: patent  Anesthetic complications: No anesthetic complications  PONV Status: none  Cardiovascular status: acceptable  Respiratory status: acceptable and spontaneous ventilation  Hydration status: acceptable    Comments: Patient seen and examined postoperatively; vital signs stable; SpO2 greater than or equal to 90%; cardiopulmonary status stable; nausea/vomiting adequately controlled; pain adequately controlled; no apparent anesthesia complications; patient discharged from anesthesia care when discharge criteria were met

## 2024-12-12 NOTE — OP NOTE
ESOPHAGOGASTRODUODENOSCOPY Procedure Report    Patient Name:  Elaine Toscano  YOB: 1950    Date of Surgery:  12/12/2024     Pre-Op Diagnosis:  Nausea [R11.0]  GERD (gastroesophageal reflux disease) [K21.9]  Dysphagia       Post-Op Diagnosis Codes:     * Nausea [R11.0]     * GERD (gastroesophageal reflux disease) [K21.9]  Normal esophagus dilated 18 to 20 mm with a balloon without trauma or resistance  Evidence of prior Hayden-en-Y gastric bypass surgery without ulceration or stricture    Procedure/CPT® Codes:      Procedure(s):  ESOPHAGOGASTRODUODENOSCOPY with dilation (balloon 1/8,19,20)    Staff:  Surgeon(s):  Greg Hartman MD      Anesthesia: Monitored Anesthesia Care    Description of Procedure:  A description of the procedure as well as risks, benefits and alternative methods were explained to the patient who voiced understanding and signed the corresponding consent form. A physical exam was performed and vital signs were monitored throughout the procedure.    An upper GI endoscope was placed into the mouth and proceeded through the esophagus, stomach and second portion of the duodenum without difficulty. The scope was then retroflexed and the fundus was visualized. The procedure was not difficult and there were no immediate complications.    Specimen:        See Below    Estimated blood loss: none    Complications:  None    Findings:  Normal esophagus dilated 18 to 20 mm with a balloon without trauma or resistance  Evidence of prior Hayden-en-Y gastric bypass surgery without ulceration or stricture    Impression:  Dysphagia status post empiric dilation which has helped in the past  Nausea vomiting and early satiety likely from gastric bypass surgery    Recommendations:  Monitor for symptom improvement following dilation  Gastroparesis diet      Greg Hartman MD     Date: 12/12/2024    Time: 10:15 EST

## 2024-12-12 NOTE — ANESTHESIA PREPROCEDURE EVALUATION
Anesthesia Evaluation     NPO Solid Status: > 8 hours  NPO Liquid Status: > 8 hours           Airway   Mallampati: II  TM distance: >3 FB  Neck ROM: full  No difficulty expected  Dental - normal exam     Pulmonary - normal exam   (+) asthma,sleep apnea  Cardiovascular - normal exam    (+) hypertension well controlled, CAD, CHF , hyperlipidemia    ROS comment: ·  Left ventricular ejection fraction appears to be 46 - 50%.  ·  The left atrial cavity is moderately dilated.  ·  Estimated right ventricular systolic pressure from tricuspid regurgitation is mildly elevated (35-45 mmHg).  ·  No pericardial effusion noted         Neuro/Psych  (+) numbness, psychiatric history, dementia  GI/Hepatic/Renal/Endo    (+) obesity, morbid obesity, GERD, PUD, renal disease-, thyroid problem hypothyroidism    Musculoskeletal     Abdominal  - normal exam    Bowel sounds: normal.   Substance History      OB/GYN          Other   arthritis,                   Anesthesia Plan    ASA 4     MAC   total IV anesthesia  intravenous induction     Anesthetic plan, risks, benefits, and alternatives have been provided, discussed and informed consent has been obtained with: patient.  Pre-procedure education provided  Plan discussed with CRNA.    CODE STATUS:

## 2024-12-17 ENCOUNTER — READMISSION MANAGEMENT (OUTPATIENT)
Dept: CALL CENTER | Facility: HOSPITAL | Age: 74
End: 2024-12-17
Payer: MEDICARE

## 2024-12-17 NOTE — OUTREACH NOTE
Total Joint Month 1 Survey      Flowsheet Row Responses   [facility] patient discharged from? [facility] [place]   Does the patient have one of the following disease processes/diagnoses(primary or secondary)? Total Joint Replacement   Joint surgery performed? Shoulder   Month 1 attempt successful? Yes   Call start time 1202   Call end time 1203   Has the patient been back in either the hospital or Emergency Department since discharge? No   Person spoke with today (if not patient) and relationship Dtr-in-Law   Is the patient taking all medications as directed (includes completed medication regime)? Yes   Comments regarding appointments Surgery follow up 11/26/24   Is the patient still receiving Home Health Services? N/A   Is the patient still attending therapy sessions(either in the home or as an outpatient)? Yes   Has the patient fallen since discharge? No   Comments 2x per week   What is the patient's perception of their functional status since discharge? Improving   If the patient is a current smoker, are they able to teach back resources for cessation? Not a smoker   Is the patient/caregiver able to teach back the hierarchy of who to call/visit for symptoms/problems? PCP, Specialist, Home health nurse, Urgent Care, ED, 911 Yes   Month 1 call completed? Yes   Graduated Yes   Is the patient interested in additional calls from an ambulatory ? No   Would this patient benefit from a Referral to Amb Social Work? No   Wrap up additional comments Pt doing well per dtr in law.  Denies needs.   Call end time 1203            MURRAY FISHER - Registered Nurse

## 2024-12-30 ENCOUNTER — TELEPHONE (OUTPATIENT)
Dept: CARDIOLOGY | Facility: CLINIC | Age: 74
End: 2024-12-30
Payer: MEDICARE

## 2024-12-30 NOTE — TELEPHONE ENCOUNTER
Please let patient know that per last interrogation in July there was 11 months battery longevity remaining  Upcoming appointment 1/27/2024 with Dr. Larson and Nicole for device interrogation    Device will be interrogated to include battery longevity and next steps in generator change will be placed if TERRANCE status.     Thank you.  If patient has any further questions or concerns please reach out to Cindy for further advice for pacemaker.

## 2024-12-30 NOTE — TELEPHONE ENCOUNTER
Spoke to patient, 11 months on her battery 7/22/24. Will check her device on 1/27/25 as scheduled. Will make a determination at this time.

## 2025-01-27 ENCOUNTER — CLINICAL SUPPORT NO REQUIREMENTS (OUTPATIENT)
Dept: CARDIOLOGY | Facility: CLINIC | Age: 75
End: 2025-01-27
Payer: MEDICARE

## 2025-01-27 ENCOUNTER — OFFICE VISIT (OUTPATIENT)
Dept: CARDIOLOGY | Facility: CLINIC | Age: 75
End: 2025-01-27
Payer: MEDICARE

## 2025-01-27 VITALS
OXYGEN SATURATION: 98 % | DIASTOLIC BLOOD PRESSURE: 72 MMHG | HEART RATE: 64 BPM | BODY MASS INDEX: 33.77 KG/M2 | WEIGHT: 183.5 LBS | SYSTOLIC BLOOD PRESSURE: 106 MMHG | HEIGHT: 62 IN

## 2025-01-27 DIAGNOSIS — E78.00 PURE HYPERCHOLESTEROLEMIA: ICD-10-CM

## 2025-01-27 DIAGNOSIS — I25.10 CORONARY ARTERY DISEASE INVOLVING NATIVE CORONARY ARTERY OF NATIVE HEART WITHOUT ANGINA PECTORIS: ICD-10-CM

## 2025-01-27 DIAGNOSIS — Z95.0 PRESENCE OF CARDIAC PACEMAKER: ICD-10-CM

## 2025-01-27 DIAGNOSIS — I49.5 SICK SINUS SYNDROME: ICD-10-CM

## 2025-01-27 DIAGNOSIS — I49.5 SICK SINUS SYNDROME: Primary | ICD-10-CM

## 2025-01-27 DIAGNOSIS — Z95.0 PRESENCE OF CARDIAC PACEMAKER: Primary | ICD-10-CM

## 2025-01-27 DIAGNOSIS — I50.22 CHRONIC SYSTOLIC CONGESTIVE HEART FAILURE: ICD-10-CM

## 2025-01-27 DIAGNOSIS — I10 ESSENTIAL HYPERTENSION: ICD-10-CM

## 2025-01-27 DIAGNOSIS — G47.33 OBSTRUCTIVE SLEEP APNEA: ICD-10-CM

## 2025-01-27 PROCEDURE — 3078F DIAST BP <80 MM HG: CPT | Performed by: INTERNAL MEDICINE

## 2025-01-27 PROCEDURE — 93280 PM DEVICE PROGR EVAL DUAL: CPT | Performed by: INTERNAL MEDICINE

## 2025-01-27 PROCEDURE — 99214 OFFICE O/P EST MOD 30 MIN: CPT | Performed by: INTERNAL MEDICINE

## 2025-01-27 PROCEDURE — 1159F MED LIST DOCD IN RCRD: CPT | Performed by: INTERNAL MEDICINE

## 2025-01-27 PROCEDURE — 3074F SYST BP LT 130 MM HG: CPT | Performed by: INTERNAL MEDICINE

## 2025-01-27 PROCEDURE — 1160F RVW MEDS BY RX/DR IN RCRD: CPT | Performed by: INTERNAL MEDICINE

## 2025-01-27 RX ORDER — FAMOTIDINE 20 MG/1
20 TABLET, FILM COATED ORAL 2 TIMES DAILY
COMMUNITY

## 2025-01-27 RX ORDER — CARVEDILOL 12.5 MG/1
12.5 TABLET ORAL 2 TIMES DAILY WITH MEALS
Qty: 180 TABLET | Refills: 2 | Status: SHIPPED | OUTPATIENT
Start: 2025-01-27

## 2025-01-27 RX ORDER — LORATADINE 10 MG/1
10 TABLET ORAL DAILY
COMMUNITY

## 2025-01-27 RX ORDER — ERGOCALCIFEROL 1.25 MG/1
50000 CAPSULE, LIQUID FILLED ORAL WEEKLY
COMMUNITY

## 2025-01-27 RX ORDER — ALBUTEROL SULFATE 90 UG/1
2 INHALANT RESPIRATORY (INHALATION) EVERY 4 HOURS PRN
COMMUNITY

## 2025-01-27 NOTE — PROGRESS NOTES
Subjective:     Encounter Date:01/27/2025      Patient ID: Elaine Toscano is a 74 y.o. female.    Chief Complaint:  History of Present Illness 74-year-old white female with history of coronary disease history of mild congestive heart failure sleep apnea hypertension hyperlipidemia sick sinus syndrome status post pacemaker placement presents to my office for a follow-up.  Patient is currently stable without any chest pains but has shortness of breath with exertion.  No complaint of any PND orthopnea.  She has occasional palpitation with dizziness.  No syncope or swelling of the feet.  She also fell down 6 times in the last 3 months.    The following portions of the patient's history were reviewed and updated as appropriate: allergies, current medications, past family history, past medical history, past social history, past surgical history, and problem list.  Past Medical History:   Diagnosis Date    Abnormal ECG     Acute renal failure superimposed on stage 4 chronic kidney disease 03/24/2021    Anemia     Anesthesia complication     PT STATES SLOW TO WAKE UP    At high risk for falls     GAIT UNSTEADY, USES ROLLATOR DAILY    Broken foot     right    Broken nose 07/2017    CHF (congestive heart failure)     Chronic kidney disease (CKD), stage IV (severe) 05/06/2020    Chronic low back pain     CRI (chronic renal insufficiency)     Depression     DJD (degenerative joint disease)     Fracture of ankle 08/12/2016    Frequent UTI     GERD (gastroesophageal reflux disease)     Gout     History of MRSA infection     PT STATS NASAL SWAP-OVER 5 YEARS AGO    History of recent fall     HAD FALL IN MID OCTOBER 2024    Hypertension     Hypothyroidism     IBS (irritable bowel syndrome)     Multiple bruises     CHEEKS BILAT-WORSE ON RIGHT FACE, MULTIPLE BRUISING ON FOREARMS, WORSE ON LEFT.  PT STATES HAS SEEN DR. ASH SINCE FALL    Neuropathy     FEET BILAT    OA (osteoarthritis) of shoulder     RIGHT: PAIN, WEAKNESS, LIMITED  "MOBILITY    Osteopenia     Overactive bladder     FREQUENCY, URGENCY    Pacemaker     Skin abnormalities     STATES HAS ITCHY ON LEFT MID BACK AREA AND SEVERAL SCRATCHES NOTED FROM PT    SSS (sick sinus syndrome)     DR. SHAH EVERY 6 MONTHS    Suicide attempt     TMJ (dislocation of temporomandibular joint)     Urinary incontinence     DAILY PAD     Past Surgical History:   Procedure Laterality Date    APPENDECTOMY  1986    CARDIAC CATHETERIZATION  2018    CATARACT EXTRACTION Bilateral     CHOLECYSTECTOMY      COLONOSCOPY      ENDOSCOPY      STATES HAS HAD DILATION WITH PROCEDURE    ENDOSCOPY N/A 2024    Procedure: ESOPHAGOGASTRODUODENOSCOPY with dilation (balloon ,,20);  Surgeon: Greg Hartman MD;  Location: [facility] ENDOSCOPY;  Service: Gastroenterology;  Laterality: N/A;  gastric bypass    EPIDURAL BLOCK      FOOT SURGERY Bilateral     FOOT SURGERY Bilateral 2019    Dr. Keenan- 2nd toe on bilateral feet     FOOT SURGERY Left     3-5TH TOE STRAIGHTENED WITH METAL    GASTRIC BYPASS  2007    HYSTERECTOMY      INSERT / REPLACE / REMOVE PACEMAKER      LAPAROSCOPIC GASTRIC BANDING  2004    removed    LUMBAR FUSION      STATES X2:  HAD ORINIGAL SURGERY AND THEN HAD FALL AND HAD TO HAVE REPEAT SURGERY    OTHER SURGICAL HISTORY  2018    myelogram    TOTAL KNEE ARTHROPLASTY Left     TOTAL SHOULDER ARTHROPLASTY W/ DISTAL CLAVICLE EXCISION Right 2024    Procedure: REVERSE TOTAL SHOULDER  ARTHROPLASTY;  Surgeon: Americo Naik MD;  [facility]  [facility];  Service: Orthopedics;  Laterality: Right;     /72   Pulse 64   Ht 157.5 cm (62\")   Wt 83.2 kg (183 lb 8 oz)   LMP  (LMP Unknown)   SpO2 98%   BMI 33.56 kg/m²   Family History   Problem Relation Age of Onset    Hypertension Father         PM implanted    Prostate cancer Father     Heart disease Father         He had a pacemaker/  2019    Arthritis Sister     Heart disease Sister     Lung disease Paternal " Grandmother     COPD Other     Lung disease Other     Malig Hyperthermia Neg Hx        Current Outpatient Medications:     albuterol sulfate  (90 Base) MCG/ACT inhaler, Inhale 2 puffs Every 4 (Four) Hours As Needed for Wheezing., Disp: , Rfl:     alendronate (FOSAMAX) 70 MG tablet, Take 1 tablet by mouth Every 7 (Seven) Days. Indications: Osteoporosis, Disp: , Rfl:     allopurinol (ZYLOPRIM) 100 MG tablet, Take 2 tablets by mouth Every Night., Disp: 180 tablet, Rfl: 1    aspirin (Aspirin Low Dose) 81 MG EC tablet, Take 1 tablet by mouth Daily., Disp: 90 tablet, Rfl: 2    calcitriol (ROCALTROL) 0.5 MCG capsule, Take 1 capsule by mouth Daily. Indications: Low Amount of Calcium in the Blood, Disp: , Rfl:     carvedilol (COREG) 6.25 MG tablet, Take 1 tablet by mouth 2 (Two) Times a Day With Meals. Indications: High Blood Pressure, Disp: , Rfl:     cyanocobalamin 1000 MCG/ML injection, INJECT INTRAMUSCULARLY EVERY 28 DAYS AS DIRECTED (Patient taking differently: Inject 0.1 mL under the skin into the appropriate area as directed Every 28 (Twenty-Eight) Days.), Disp: 1 mL, Rfl: 6    cyclobenzaprine (FLEXERIL) 10 MG tablet, Take 1 tablet by mouth At Night As Needed for Muscle Spasms., Disp: 90 tablet, Rfl: 1    Diclofenac Sodium (VOLTAREN) 1 % gel gel, Apply 4 g topically to the appropriate area as directed 4 (Four) Times a Day As Needed., Disp: , Rfl:     famotidine (PEPCID) 20 MG tablet, Take 1 tablet by mouth 2 (Two) Times a Day., Disp: , Rfl:     fluticasone (Flonase) 50 MCG/ACT nasal spray, 2 sprays into the nostril(s) as directed by provider Daily., Disp: 16 g, Rfl: 12    furosemide (LASIX) 40 MG tablet, Take 1 tablet by mouth Daily., Disp: 30 tablet, Rfl: 11    hydrALAZINE (APRESOLINE) 10 MG tablet, Take 1 tablet by mouth 3 (Three) Times a Day. Indications: High Blood Pressure, Disp: , Rfl:     HYDROcodone-acetaminophen (NORCO)  MG per tablet, Take 1 tablet by mouth 3 (Three) Times a Day. PAIN CLINIC:   "[facility] [place] LUMBAR FUSION:  1980'S  Indications: Pain, Disp: , Rfl:     HYDROcodone-acetaminophen (NORCO)  MG per tablet, Take 1 tablet by mouth Every 4 (Four) Hours As Needed for Moderate Pain (Pain)., Disp: 30 tablet, Rfl: 0    lactulose (CHRONULAC) 10 GM/15ML solution, Take 15 mL by mouth Every Night. Indications: Impaired Brain Function due to Liver Disease, Disp: , Rfl:     loratadine (CLARITIN) 10 MG tablet, Take 1 tablet by mouth Daily., Disp: , Rfl:     melatonin ([employer] Melatonin) 5 MG tablet tablet, Take 2 tablets by mouth Every Night., Disp: 60 tablet, Rfl: 12    memantine (NAMENDA) 10 MG tablet, Take 1 tablet by mouth 2 (Two) Times a Day., Disp: 180 tablet, Rfl: 1    Myrbetriq 50 MG tablet sustained-release 24 hour 24 hr tablet, Take 50 mg by mouth Daily. Indications: Overactive Bladder, Urinary Incontinence, Disp: , Rfl:     ondansetron (ZOFRAN) 4 MG tablet, TAKE ONE TABLET BY MOUTH AS NEEDED FOR NAUSEA AND VOMITING (Patient taking differently: Take 1 tablet by mouth Every 8 (Eight) Hours As Needed.), Disp: 30 tablet, Rfl: 1    pantoprazole (PROTONIX) 40 MG EC tablet, Take 1 tablet by mouth Daily., Disp: 90 tablet, Rfl: 1    potassium chloride 10 MEQ CR tablet, Take 1 tablet by mouth 2 (Two) Times a Day., Disp: 180 tablet, Rfl: 1    sertraline (ZOLOFT) 100 MG tablet, Take 1.5 tablets by mouth Daily., Disp: 135 tablet, Rfl: 1    Syringe 23G X 1\" 3 ML misc, 1 each Every 30 (Thirty) Days. Use with B12 Injections, Disp: 50 each, Rfl: 0    venlafaxine XR (Effexor XR) 37.5 MG 24 hr capsule, Take 1 capsule by mouth Daily. Indications: Major Depressive Disorder, Disp: , Rfl:     vitamin D (ERGOCALCIFEROL) 1.25 MG (30709 UT) capsule capsule, Take 1 capsule by mouth 1 (One) Time Per Week., Disp: , Rfl:     naloxone (NARCAN) 4 MG/0.1ML nasal spray, Call 911. Don't prime. [place] in 1 nostril for overdose. Repeat in 2-3 minutes in other nostril if no or minimal breathing/responsiveness. (Patient " not taking: Reported on 11/17/2024), Disp: 2 each, Rfl: 0  Allergies   Allergen Reactions    Azithromycin Unknown (See Comments)    Codeine Nausea And Vomiting    Dilaudid  [Hydromorphone Hcl] Unknown (See Comments)    Isosorbide Nitrate Unknown - High Severity    Macrobid  [Nitrofurantoin] Rash    Morphine Rash    Oxycontin  [Oxycodone] Delirium    Pineapple Other (See Comments)     Mouth numbness    Povidone Iodine Rash    Tetanus Antitoxin Rash    Tizanidine Other (See Comments) and Delirium     Increased pain    Denosumab Other (See Comments)    Demerol [Meperidine] Unknown (See Comments)     Pt does not remember. She states it was a long time ago    Etodolac Other (See Comments)    Lodrane D [Brompheniramine-Pseudoeph] Other (See Comments)     drowsiness    Shrimp Flavor Agent (Non-Screening) Swelling     Sauce only     Sulfa Antibiotics Unknown (See Comments)    Imipramine Palpitations    Iodine Itching and Rash     Social History     Socioeconomic History    Marital status:    Tobacco Use    Smoking status: Never     Passive exposure: Current    Smokeless tobacco: Never   Vaping Use    Vaping status: Never Used    Passive vaping exposure: Yes   Substance and Sexual Activity    Alcohol use: Not Currently     Alcohol/week: 1.0 standard drink of alcohol     Types: 1 Glasses of wine per week     Comment: Occasionally    Drug use: No    Sexual activity: Not Currently     Partners: Male     Review of Systems   Constitutional: Positive for malaise/fatigue.   Cardiovascular:  Positive for palpitations. Negative for chest pain, dyspnea on exertion and leg swelling.   Respiratory:  Positive for shortness of breath. Negative for cough.    Gastrointestinal:  Negative for abdominal pain, nausea and vomiting.   Neurological:  Positive for dizziness. Negative for focal weakness, headaches, light-headedness and numbness.   All other systems reviewed and are negative.             Objective:     Constitutional:        Appearance: Well-developed.   Eyes:      General: No scleral icterus.     Conjunctiva/sclera: Conjunctivae normal.   HENT:      Head: Normocephalic and atraumatic.   Neck:      Vascular: No carotid bruit or JVD.   Pulmonary:      Effort: Pulmonary effort is normal.      Breath sounds: Normal breath sounds. No wheezing. No rales.   Cardiovascular:      Normal rate. Regular rhythm.   Pulses:     Intact distal pulses.   Abdominal:      General: Bowel sounds are normal.      Palpations: Abdomen is soft.   Musculoskeletal:      Cervical back: Normal range of motion and neck supple. Skin:     General: Skin is warm and dry.      Findings: No rash.   Neurological:      Mental Status: Alert.       Procedures    Lab Review:         MDM  #1 coronary disease  Patient has nonobstructive disease and is currently stable without any angina  2.  Sick sinus syndrome status post pacemaker placement  Patient pacemaker is working very well however she has episodes of short runs of A-fib and hence I will increase the beta-blocker  3.  Status post falls  Patient has frequent falls and I will stop the hydralazine but increase the carvedilol and watch her blood pressure and heart rate and hence she cannot take beta-blockers but is on aspirin  4.  Hypertension  Patient blood pressure action the lower side and hence I will stop the hydralazine and continue on her on the beta-blocker on the increased dose and also stop the diuretics.      Patient's previous medical records, labs, and EKG were reviewed and discussed with the patient at today's visit.

## 2025-02-04 ENCOUNTER — TELEPHONE (OUTPATIENT)
Dept: CARDIOLOGY | Facility: CLINIC | Age: 75
End: 2025-02-04
Payer: MEDICARE

## 2025-02-04 NOTE — TELEPHONE ENCOUNTER
Called patient and she does not have HR with the BP readings. She states her HR is running in the 60's-70's    Patient verbally stated her BP/HR medications and she is currently taking:  Coreg 12.5 mg BID    Patient stopped her Hydralazine    Office Visit with Sánchez Larson MD (01/27/2025)

## 2025-02-04 NOTE — TELEPHONE ENCOUNTER
Patient in office 1/27. Dr. Larson wanted patient to keep track of blood pressures.   102/63  129/78  121/70  117/74  134/82  100/67  97/66  120/77  131/76  139/85

## 2025-02-05 NOTE — TELEPHONE ENCOUNTER
Blood pressures reviewed.  Continue current medical therapy with carvedilol and no hydralazine    Thank you and please let me know if she has any further questions or concerns

## 2025-02-05 NOTE — TELEPHONE ENCOUNTER
Called patient and verbally explained there are no further medication changes at this time.    She verbally understood and had no further questions or concerns at this time.

## 2025-02-07 ENCOUNTER — TRANSCRIBE ORDERS (OUTPATIENT)
Dept: HOME HEALTH SERVICES | Facility: HOME HEALTHCARE | Age: 75
End: 2025-02-07
Payer: MEDICARE

## 2025-02-07 DIAGNOSIS — S22.42XA CLOSED FRACTURE OF MULTIPLE RIBS OF LEFT SIDE, INITIAL ENCOUNTER: Primary | ICD-10-CM

## 2025-02-08 ENCOUNTER — HOME CARE VISIT (OUTPATIENT)
Dept: HOME HEALTH SERVICES | Facility: HOME HEALTHCARE | Age: 75
End: 2025-02-08

## 2025-02-08 ENCOUNTER — HOME CARE VISIT (OUTPATIENT)
Dept: HOME HEALTH SERVICES | Facility: HOME HEALTHCARE | Age: 75
End: 2025-02-08
Payer: COMMERCIAL

## 2025-02-18 ENCOUNTER — TELEPHONE (OUTPATIENT)
Dept: CARDIOLOGY | Facility: CLINIC | Age: 75
End: 2025-02-18
Payer: MEDICARE

## 2025-02-18 NOTE — TELEPHONE ENCOUNTER
Spoke to patient, she states she is using a wrist cuff but will try to get an arm cuff.     Patient states that she does Hydralazine and she was taking Hydralazine 10 MG TID.     Please advise

## 2025-02-18 NOTE — TELEPHONE ENCOUNTER
Patient has been notified and understands. Patient will call back with readings once they are complete and she has a new bp monitor that goes around arm.

## 2025-02-18 NOTE — TELEPHONE ENCOUNTER
Per chart review blood pressure at last office visit with Dr. Larson was low at 106/72 when he stopped hydralazine  Blood pressure at nephrologist office on Friday 2/14 was well-controlled at 118/70    per her cuff readings blood pressure the same day as her nephrologist apt was 149/86.  I am concerned her cuff is not accurate.  Please make sure she has an arm cuff and not a wrist cuff and we may need to have blood pressure cuff calibrated in our office.    Please also see if she still has the hydralazine that Dr. Larson stopped.  It looks like she was on 10 mg TID, please confirm.  If we need to restart due to elevated pressure I will likely do so less frequently

## 2025-02-18 NOTE — TELEPHONE ENCOUNTER
Please call patient and let her know right now continue current medications.    If able I would like her to obtain a blood pressure cuff that goes around the arm as these are generally more accurate.  When she does this please have her monitor for 5 to 7 days and call us with readings.  At that time if they are still elevated I will add back the hydralazine twice a day instead of TID.

## 2025-02-18 NOTE — TELEPHONE ENCOUNTER
Libia called in to report  blood pressure readings. She asked we call back at [phone].    2/10 142/85  2/11 146/80  2/12 137/79   2/13 159/93  2/14 149/86  2/15  2/16 184/106  2/17 161/110  2/18 161/103

## 2025-04-25 ENCOUNTER — TELEPHONE (OUTPATIENT)
Dept: CARDIOLOGY | Facility: CLINIC | Age: 75
End: 2025-04-25
Payer: MEDICARE

## 2025-04-25 NOTE — TELEPHONE ENCOUNTER
FACILITY: [facility]  DR: ELIZABETH ANDRES MD  PHONE: [phone]  FAX: [phone]  PROCEDURE: FUNCTIONAL ENDOSCOPIC SINUS SURGERY  SCHEDULED: 5/21/25  MEDS TO HOLD: ASA     PLACED CARDIAC CLEARANCE IN LYNDA CARRERO'S FOLDER TO SIGN.

## 2025-04-29 ENCOUNTER — CLINICAL SUPPORT NO REQUIREMENTS (OUTPATIENT)
Dept: CARDIOLOGY | Facility: CLINIC | Age: 75
End: 2025-04-29
Payer: MEDICARE

## 2025-04-29 DIAGNOSIS — Z95.0 PRESENCE OF CARDIAC PACEMAKER: Primary | ICD-10-CM

## 2025-04-29 DIAGNOSIS — I49.5 SICK SINUS SYNDROME: ICD-10-CM

## 2025-04-29 PROCEDURE — 93288 INTERROG EVL PM/LDLS PM IP: CPT | Performed by: INTERNAL MEDICINE

## 2025-04-30 ENCOUNTER — TELEPHONE (OUTPATIENT)
Dept: CARDIOLOGY | Facility: CLINIC | Age: 75
End: 2025-04-30
Payer: MEDICARE

## 2025-04-30 NOTE — TELEPHONE ENCOUNTER
MURRAY FROM [facility] [phone]. CALLING ABOUT CHANGE IN HER CARVEDILOL. PCP TRYING TO CHANGE DOSE. PLEASE CALL AND ADVISE

## 2025-05-01 NOTE — TELEPHONE ENCOUNTER
Called patient and no answer, could not leave message.     I need to verify with the patient on the medication change and verify her PCP as well.     I will contact PCP as well today to verify medication adjustments.

## 2025-07-22 ENCOUNTER — OFFICE VISIT (OUTPATIENT)
Dept: CARDIOLOGY | Facility: CLINIC | Age: 75
End: 2025-07-22
Payer: MEDICARE

## 2025-07-22 ENCOUNTER — CLINICAL SUPPORT NO REQUIREMENTS (OUTPATIENT)
Dept: CARDIOLOGY | Facility: CLINIC | Age: 75
End: 2025-07-22
Payer: MEDICARE

## 2025-07-22 VITALS
WEIGHT: 183 LBS | HEIGHT: 62 IN | BODY MASS INDEX: 33.68 KG/M2 | OXYGEN SATURATION: 98 % | HEART RATE: 79 BPM | DIASTOLIC BLOOD PRESSURE: 81 MMHG | SYSTOLIC BLOOD PRESSURE: 128 MMHG

## 2025-07-22 DIAGNOSIS — I49.5 SICK SINUS SYNDROME: ICD-10-CM

## 2025-07-22 DIAGNOSIS — Z95.0 PRESENCE OF CARDIAC PACEMAKER: ICD-10-CM

## 2025-07-22 DIAGNOSIS — I25.10 CORONARY ARTERY DISEASE INVOLVING NATIVE CORONARY ARTERY OF NATIVE HEART WITHOUT ANGINA PECTORIS: ICD-10-CM

## 2025-07-22 DIAGNOSIS — I49.5 SICK SINUS SYNDROME: Primary | ICD-10-CM

## 2025-07-22 DIAGNOSIS — I10 ESSENTIAL HYPERTENSION: ICD-10-CM

## 2025-07-22 DIAGNOSIS — Z95.0 STATUS POST PLACEMENT OF CARDIAC PACEMAKER: Primary | ICD-10-CM

## 2025-07-22 DIAGNOSIS — I50.22 CHRONIC SYSTOLIC CONGESTIVE HEART FAILURE: ICD-10-CM

## 2025-07-22 RX ORDER — DICYCLOMINE HYDROCHLORIDE 10 MG/1
10 CAPSULE ORAL 2 TIMES DAILY
COMMUNITY

## 2025-07-22 RX ORDER — HYDRALAZINE HYDROCHLORIDE 10 MG/1
TABLET, FILM COATED ORAL
COMMUNITY

## 2025-07-22 NOTE — PROGRESS NOTES
Encounter Date:07/22/2025      Patient ID: Elaine Toscano is a 74 y.o. female.    Chief Complaint:  Status post pacemaker  Pacemaker has reached TERRANCE status.  History of nonobstructive coronary artery disease    History of Present Illness  The patient is a pleasant 74-year-old white female who had permanent dual-chamber pacemaker implantation in 2015 is here and was found to have TERRANCE status.  Patient of Dr. Larson.  Patient was advised pulse generator replacement.    Assessment and Plan     ]]]]]]]]]]]]]]]]]]]]]]]  Impression  ===========  -Status post permanent dual-chamber pacemaker implantation 1/30/2015 ([employer]).  Pulse generator has reached TERRANEC.  History of sick sinus syndrome.    - History of paroxysmal atrial fibrillation    - Hypertension dyslipidemia obstructive sleep apnea hypothyroidism    - History of nonobstructive coronary artery disease    - Status post cholecystectomy gastric bypass appendectomy hysterectomy lumbar fusion arthroscopic shoulder surgery total knee arthroplasty.    - Multiple allergies (please see the list).    - History of falls.    - Non-smoker  ===========  Plan  =======  Patient to have pacemaker pulse generator replacement.  Risks and benefits pros and cons of the procedure were discussed with patient and patient's daughter at bedside.  Thank you for soon possible.    Further plan will depend on patient's progress.    ]]]]]]]]]]]]]]]]]]]]]]       Diagnosis Plan   1. Status post placement of cardiac pacemaker  Case Request Cath Lab: PPM generator change - dual    Basic Metabolic Panel    ECG 12 Lead    MRSA Screen Culture (Outpatient) - Swab, Nares      2. Sick sinus syndrome        3. Chronic systolic congestive heart failure        4. Coronary artery disease involving native coronary artery of native heart without angina pectoris        5. Essential hypertension        LAB RESULTS (LAST 7 DAYS)    CBC        BMP        CMP         BNP        TROPONIN        CoAg        Creatinine  Clearance  CrCl cannot be calculated (Patient's most recent lab result is older than the maximum 30 days allowed.).    ABG        Radiology  No radiology results for the last day                The following portions of the patient's history were reviewed and updated as appropriate: allergies, current medications, past family history, past medical history, past social history, past surgical history, and problem list.    Review of Systems   Constitutional: Positive for malaise/fatigue.   Cardiovascular:  Positive for palpitations. Negative for chest pain, dyspnea on exertion and leg swelling.   Respiratory:  Negative for cough and shortness of breath.    Gastrointestinal:  Positive for nausea. Negative for abdominal pain and vomiting.   Neurological:  Positive for dizziness, light-headedness and weakness. Negative for headaches and numbness.   All other systems reviewed and are negative.        Current Outpatient Medications:     albuterol sulfate  (90 Base) MCG/ACT inhaler, Inhale 2 puffs Every 4 (Four) Hours As Needed for Wheezing., Disp: , Rfl:     alendronate (FOSAMAX) 70 MG tablet, Take 1 tablet by mouth Every 7 (Seven) Days. Indications: Osteoporosis, Disp: , Rfl:     allopurinol (ZYLOPRIM) 100 MG tablet, Take 2 tablets by mouth Every Night., Disp: 180 tablet, Rfl: 1    aspirin (Aspirin Low Dose) 81 MG EC tablet, Take 1 tablet by mouth Daily., Disp: 90 tablet, Rfl: 2    calcitriol (ROCALTROL) 0.5 MCG capsule, Take 1 capsule by mouth Daily. Indications: Low Amount of Calcium in the Blood, Disp: , Rfl:     carvedilol (Coreg) 12.5 MG tablet, Take 1 tablet by mouth 2 (Two) Times a Day With Meals. (Patient taking differently: Take 1 tablet by mouth Daily.), Disp: 180 tablet, Rfl: 2    cyanocobalamin 1000 MCG/ML injection, INJECT INTRAMUSCULARLY EVERY 28 DAYS AS DIRECTED (Patient taking differently: Inject 0.1 mL under the skin into the appropriate area as directed Every 28 (Twenty-Eight) Days.), Disp: 1 mL,  Rfl: 6    cyclobenzaprine (FLEXERIL) 10 MG tablet, Take 1 tablet by mouth At Night As Needed for Muscle Spasms., Disp: 90 tablet, Rfl: 1    dicyclomine (BENTYL) 10 MG capsule, Take 1 capsule by mouth 2 (Two) Times a Day., Disp: , Rfl:     famotidine (PEPCID) 20 MG tablet, Take 1 tablet by mouth 2 (Two) Times a Day., Disp: , Rfl:     fluticasone (Flonase) 50 MCG/ACT nasal spray, 2 sprays into the nostril(s) as directed by provider Daily., Disp: 16 g, Rfl: 12    furosemide (LASIX) 40 MG tablet, Take 1 tablet by mouth Daily., Disp: 30 tablet, Rfl: 11    hydrALAZINE (APRESOLINE) 10 MG tablet, , Disp: , Rfl:     HYDROcodone-acetaminophen (NORCO)  MG per tablet, Take 1 tablet by mouth 3 (Three) Times a Day. PAIN CLINIC:  [facility] [place] LUMBAR FUSION:  1980'S  Indications: Pain, Disp: , Rfl:     HYDROcodone-acetaminophen (NORCO)  MG per tablet, Take 1 tablet by mouth Every 4 (Four) Hours As Needed for Moderate Pain (Pain)., Disp: 30 tablet, Rfl: 0    melatonin ([facility] Melatonin) 5 MG tablet tablet, Take 2 tablets by mouth Every Night. (Patient taking differently: Take 4 tablets by mouth Every Night. Indications: Trouble Sleeping), Disp: 60 tablet, Rfl: 12    memantine (NAMENDA) 10 MG tablet, Take 1 tablet by mouth 2 (Two) Times a Day. (Patient taking differently: Take 1 tablet by mouth Daily. Indications: Cognitive Dysfunction), Disp: 180 tablet, Rfl: 1    ondansetron (ZOFRAN) 4 MG tablet, TAKE ONE TABLET BY MOUTH AS NEEDED FOR NAUSEA AND VOMITING (Patient taking differently: Take 1 tablet by mouth Every 8 (Eight) Hours As Needed.), Disp: 30 tablet, Rfl: 1    pantoprazole (PROTONIX) 40 MG EC tablet, Take 1 tablet by mouth Daily. (Patient taking differently: Take 1 tablet by mouth 2 (Two) Times a Day. Indications: Gastroesophageal Reflux Disease, Heartburn), Disp: 90 tablet, Rfl: 1    potassium chloride 10 MEQ CR tablet, Take 1 tablet by mouth 2 (Two) Times a Day., Disp: 180 tablet, Rfl: 1     "sertraline (ZOLOFT) 100 MG tablet, Take 1.5 tablets by mouth Daily. (Patient taking differently: Take 1 tablet by mouth 2 (Two) Times a Day. Indications: Major Depressive Disorder), Disp: 135 tablet, Rfl: 1    Syringe 23G X 1\" 3 ML misc, 1 each Every 30 (Thirty) Days. Use with B12 Injections, Disp: 50 each, Rfl: 0    venlafaxine XR (Effexor XR) 37.5 MG 24 hr capsule, Take 1 capsule by mouth Daily. Indications: Major Depressive Disorder, Disp: , Rfl:     Vibegron 75 MG tablet, Take 1 tablet by mouth Daily., Disp: , Rfl:     Diclofenac Sodium (VOLTAREN) 1 % gel gel, Apply 4 g topically to the appropriate area as directed 4 (Four) Times a Day As Needed. (Patient not taking: Reported on 7/22/2025), Disp: , Rfl:     lactulose (CHRONULAC) 10 GM/15ML solution, Take 15 mL by mouth Every Night. Indications: Impaired Brain Function due to Liver Disease (Patient not taking: Reported on 7/22/2025), Disp: , Rfl:     loratadine (CLARITIN) 10 MG tablet, Take 1 tablet by mouth Daily. (Patient not taking: Reported on 7/22/2025), Disp: , Rfl:     Myrbetriq 50 MG tablet sustained-release 24 hour 24 hr tablet, Take 50 mg by mouth Daily. Indications: Overactive Bladder, Urinary Incontinence (Patient not taking: Reported on 7/22/2025), Disp: , Rfl:     naloxone (NARCAN) 4 MG/0.1ML nasal spray, Call 911. Don't prime. [place] in 1 nostril for overdose. Repeat in 2-3 minutes in other nostril if no or minimal breathing/responsiveness. (Patient not taking: Reported on 11/17/2024), Disp: 2 each, Rfl: 0    vitamin D (ERGOCALCIFEROL) 1.25 MG (84287 UT) capsule capsule, Take 1 capsule by mouth 1 (One) Time Per Week. (Patient not taking: Reported on 7/22/2025), Disp: , Rfl:     Allergies   Allergen Reactions    Azithromycin Unknown (See Comments)    Codeine Nausea And Vomiting    Dilaudid  [Hydromorphone Hcl] Unknown (See Comments)    Isosorbide Nitrate Unknown - High Severity    Macrobid  [Nitrofurantoin] Rash    Morphine Rash    Oxycontin  " [Oxycodone] Delirium    Pineapple Other (See Comments)     Mouth numbness    Povidone Iodine Rash    Tetanus Antitoxin Rash    Tizanidine Other (See Comments) and Delirium     Increased pain    Denosumab Other (See Comments)    Demerol [Meperidine] Unknown (See Comments)     Pt does not remember. She states it was a long time ago    Etodolac Other (See Comments)    Lodrane D [Brompheniramine-Pseudoeph] Other (See Comments)     drowsiness    Shrimp Flavor Agent (Non-Screening) Swelling     Sauce only     Sulfa Antibiotics Unknown (See Comments)    Imipramine Palpitations    Iodine Itching and Rash       Family History   Problem Relation Age of Onset    Hypertension Father         PM implanted    Prostate cancer Father     Heart disease Father         He had a pacemaker/  2019    Arthritis Sister     Heart disease Sister     Lung disease Paternal Grandmother     COPD Other     Lung disease Other     Malig Hyperthermia Neg Hx        Past Surgical History:   Procedure Laterality Date    APPENDECTOMY  1986    CARDIAC CATHETERIZATION  2018    CATARACT EXTRACTION Bilateral     CHOLECYSTECTOMY      COLONOSCOPY      ENDOSCOPY      STATES HAS HAD DILATION WITH PROCEDURE    ENDOSCOPY N/A 2024    Procedure: ESOPHAGOGASTRODUODENOSCOPY with dilation (balloon ,,);  Surgeon: Greg Hartman MD;  Location: [facility] ENDOSCOPY;  Service: Gastroenterology;  Laterality: N/A;  gastric bypass    EPIDURAL BLOCK      FOOT SURGERY Bilateral     FOOT SURGERY Bilateral 2019    Dr. Keenan- 2nd toe on bilateral feet     FOOT SURGERY Left     3-5TH TOE STRAIGHTENED WITH METAL    GASTRIC BYPASS  2007    HYSTERECTOMY      INSERT / REPLACE / REMOVE PACEMAKER      LAPAROSCOPIC GASTRIC BANDING  2004    removed    LUMBAR FUSION      STATES X2:  HAD ORINIGAL SURGERY AND THEN HAD FALL AND HAD TO HAVE REPEAT SURGERY    OTHER SURGICAL HISTORY  2018    myelogram    TOTAL KNEE ARTHROPLASTY Left     TOTAL SHOULDER  ARTHROPLASTY W/ DISTAL CLAVICLE EXCISION Right 2024    Procedure: REVERSE TOTAL SHOULDER  ARTHROPLASTY;  Surgeon: Americo Naik MD;  Location: [facility];  Service: Orthopedics;  Laterality: Right;       Past Medical History:   Diagnosis Date    Abnormal ECG     Acute renal failure superimposed on stage 4 chronic kidney disease 2021    Anemia     Anesthesia complication     PT STATES SLOW TO WAKE UP    At high risk for falls     GAIT UNSTEADY, USES ROLLATOR DAILY    Broken foot     right    Broken nose 2017    CHF (congestive heart failure)     Chronic kidney disease (CKD), stage IV (severe) 2020    Chronic low back pain     CRI (chronic renal insufficiency)     Depression     DJD (degenerative joint disease)     Fracture of ankle 2016    Frequent UTI     GERD (gastroesophageal reflux disease)     Gout     History of MRSA infection     PT STATS NASAL SWAP-OVER 5 YEARS AGO    History of recent fall     HAD FALL IN MID 2024    Hypertension     Hypothyroidism     IBS (irritable bowel syndrome)     Multiple bruises     CHEEKS BILAT-WORSE ON RIGHT FACE, MULTIPLE BRUISING ON FOREARMS, WORSE ON LEFT.  PT STATES HAS SEEN DR. NAIK SINCE FALL    Neuropathy     FEET BILAT    OA (osteoarthritis) of shoulder     RIGHT: PAIN, WEAKNESS, LIMITED MOBILITY    Osteopenia     Overactive bladder     FREQUENCY, URGENCY    Pacemaker     Skin abnormalities     STATES HAS ITCHY ON LEFT MID BACK AREA AND SEVERAL SCRATCHES NOTED FROM PT    SSS (sick sinus syndrome)     DR. SHAH EVERY 6 MONTHS    Suicide attempt     TMJ (dislocation of temporomandibular joint)     Urinary incontinence     DAILY PAD       Family History   Problem Relation Age of Onset    Hypertension Father         PM implanted    Prostate cancer Father     Heart disease Father         He had a pacemaker/      Arthritis Sister     Heart disease Sister     Lung disease Paternal Grandmother     COPD Other     Lung disease  "Other     Malig Hyperthermia Neg Hx        Social History     Socioeconomic History    Marital status:    Tobacco Use    Smoking status: Never     Passive exposure: Current    Smokeless tobacco: Never   Vaping Use    Vaping status: Never Used    Passive vaping exposure: Yes   Substance and Sexual Activity    Alcohol use: Not Currently     Alcohol/week: 1.0 standard drink of alcohol     Types: 1 Glasses of wine per week     Comment: Occasionally    Drug use: No    Sexual activity: Not Currently     Partners: Male         Procedures      Objective:       Physical Exam    /81 (BP Location: Left arm, Patient Position: Sitting, Cuff Size: Adult)   Pulse 79   Ht 157.5 cm (62\")   Wt 83 kg (183 lb)   LMP  (LMP Unknown)   SpO2 98%   BMI 33.47 kg/m²   The patient is alert, oriented and in no distress.    Vital signs as noted above.    Head and neck revealed no carotid bruits or jugular venous distension.  No thyromegaly or lymphadenopathy is present.    Lungs clear.  No wheezing.  Breath sounds are normal bilaterally.    Heart normal first and second heart sounds.  No murmur..  No pericardial rub is present.  No gallop is present.    Abdomen soft and nontender.  No organomegaly is present.    Extremities revealed good peripheral pulses without any pedal edema.    Skin warm and dry.  Pacemaker site looks normal.    Musculoskeletal system is grossly normal.    CNS grossly normal.    Reviewed and updated.        "

## 2025-08-11 ENCOUNTER — LAB (OUTPATIENT)
Dept: LAB | Facility: HOSPITAL | Age: 75
End: 2025-08-11
Payer: MEDICARE

## 2025-08-11 ENCOUNTER — HOSPITAL ENCOUNTER (OUTPATIENT)
Dept: CARDIOLOGY | Facility: HOSPITAL | Age: 75
Discharge: HOME | End: 2025-08-11
Payer: MEDICARE

## 2025-08-11 DIAGNOSIS — Z95.0 STATUS POST PLACEMENT OF CARDIAC PACEMAKER: ICD-10-CM

## 2025-08-11 LAB
ANION GAP SERPL CALCULATED.3IONS-SCNC: 10.2 MMOL/L (ref 5–15)
BUN SERPL-MCNC: 30 MG/DL (ref 8–23)
BUN/CREAT SERPL: 18.9 (ref 7–25)
CALCIUM SPEC-SCNC: 8.9 MG/DL (ref 8.6–10.5)
CHLORIDE SERPL-SCNC: 103 MMOL/L (ref 98–107)
CO2 SERPL-SCNC: 26.8 MMOL/L (ref 22–29)
CREAT SERPL-MCNC: 1.59 MG/DL (ref 0.57–1)
EGFRCR SERPLBLD CKD-EPI 2021: 33.5 ML/MIN/1.73
GLUCOSE SERPL-MCNC: 88 MG/DL (ref 65–99)
MRSA DNA SPEC QL NAA+PROBE: ABNORMAL
POTASSIUM SERPL-SCNC: 4.3 MMOL/L (ref 3.5–5.2)
QT INTERVAL: 407 MS
QTC INTERVAL: 464 MS
SODIUM SERPL-SCNC: 140 MMOL/L (ref 136–145)

## 2025-08-11 PROCEDURE — 87641 MR-STAPH DNA AMP PROBE: CPT

## 2025-08-11 PROCEDURE — 36415 COLL VENOUS BLD VENIPUNCTURE: CPT

## 2025-08-11 PROCEDURE — 93005 ELECTROCARDIOGRAM TRACING: CPT | Performed by: INTERNAL MEDICINE

## 2025-08-11 PROCEDURE — 80048 BASIC METABOLIC PNL TOTAL CA: CPT

## 2025-08-12 RX ORDER — MEMANTINE HYDROCHLORIDE 10 MG/1
10 TABLET ORAL DAILY
COMMUNITY

## 2025-08-12 RX ORDER — CETIRIZINE HYDROCHLORIDE 10 MG/1
10 TABLET ORAL DAILY
COMMUNITY

## 2025-08-12 RX ORDER — LANOLIN ALCOHOL/MO/W.PET/CERES
1000 CREAM (GRAM) TOPICAL DAILY
COMMUNITY

## 2025-08-12 RX ORDER — LACTULOSE 10 G/15ML
20 SOLUTION ORAL NIGHTLY
COMMUNITY

## 2025-08-12 RX ORDER — FUROSEMIDE 40 MG/1
40 TABLET ORAL 2 TIMES WEEKLY
COMMUNITY

## 2025-08-12 RX ORDER — HYDRALAZINE HYDROCHLORIDE 10 MG/1
10 TABLET, FILM COATED ORAL DAILY
COMMUNITY

## 2025-08-12 RX ORDER — ONDANSETRON 4 MG/1
4 TABLET, FILM COATED ORAL EVERY 8 HOURS PRN
COMMUNITY

## 2025-08-13 ENCOUNTER — HOSPITAL ENCOUNTER (OUTPATIENT)
Facility: HOSPITAL | Age: 75
Setting detail: HOSPITAL OUTPATIENT SURGERY
Discharge: HOME | End: 2025-08-13
Attending: INTERNAL MEDICINE | Admitting: INTERNAL MEDICINE
Payer: MEDICARE

## 2025-08-13 VITALS
OXYGEN SATURATION: 95 % | HEIGHT: 62 IN | HEART RATE: 67 BPM | TEMPERATURE: 98.9 F | DIASTOLIC BLOOD PRESSURE: 64 MMHG | SYSTOLIC BLOOD PRESSURE: 129 MMHG | BODY MASS INDEX: 33.23 KG/M2 | WEIGHT: 180.56 LBS | RESPIRATION RATE: 16 BRPM

## 2025-08-13 DIAGNOSIS — Z95.0 STATUS POST PLACEMENT OF CARDIAC PACEMAKER: ICD-10-CM

## 2025-08-13 PROCEDURE — 25010000002 VANCOMYCIN 1 G RECONSTITUTED SOLUTION 1 EACH VIAL: Performed by: INTERNAL MEDICINE

## 2025-08-13 PROCEDURE — C1785 PMKR, DUAL, RATE-RESP: HCPCS | Performed by: INTERNAL MEDICINE

## 2025-08-13 PROCEDURE — 25010000002 MIDAZOLAM PER 1 MG: Performed by: INTERNAL MEDICINE

## 2025-08-13 PROCEDURE — 25010000002 LIDOCAINE 1 % SOLUTION: Performed by: INTERNAL MEDICINE

## 2025-08-13 PROCEDURE — 33228 REMV&REPLC PM GEN DUAL LEAD: CPT | Performed by: INTERNAL MEDICINE

## 2025-08-13 PROCEDURE — 25010000002 FENTANYL CITRATE (PF) 100 MCG/2ML SOLUTION: Performed by: INTERNAL MEDICINE

## 2025-08-13 PROCEDURE — C1889 IMPLANT/INSERT DEVICE, NOC: HCPCS | Performed by: INTERNAL MEDICINE

## 2025-08-13 PROCEDURE — 25810000003 SODIUM CHLORIDE 0.9 % SOLUTION 250 ML FLEX CONT: Performed by: INTERNAL MEDICINE

## 2025-08-13 DEVICE — ENV PM AIGISRX ANTIBAC RESORB 2.5X2.7IN MD: Type: IMPLANTABLE DEVICE | Site: CHEST | Status: FUNCTIONAL

## 2025-08-13 DEVICE — GEN PM AZURE S SURESCAN DR MRI: Type: IMPLANTABLE DEVICE | Site: CHEST | Status: FUNCTIONAL

## 2025-08-13 RX ORDER — SODIUM CHLORIDE 9 MG/ML
40 INJECTION, SOLUTION INTRAVENOUS AS NEEDED
Status: DISCONTINUED | OUTPATIENT
Start: 2025-08-13 | End: 2025-08-13 | Stop reason: HOSPADM

## 2025-08-13 RX ORDER — LIDOCAINE HYDROCHLORIDE 10 MG/ML
INJECTION, SOLUTION INFILTRATION; PERINEURAL
Status: DISCONTINUED | OUTPATIENT
Start: 2025-08-13 | End: 2025-08-13 | Stop reason: HOSPADM

## 2025-08-13 RX ORDER — SODIUM CHLORIDE 0.9 % (FLUSH) 0.9 %
10 SYRINGE (ML) INJECTION EVERY 12 HOURS SCHEDULED
Status: DISCONTINUED | OUTPATIENT
Start: 2025-08-13 | End: 2025-08-13 | Stop reason: HOSPADM

## 2025-08-13 RX ORDER — SODIUM CHLORIDE 0.9 % (FLUSH) 0.9 %
10 SYRINGE (ML) INJECTION AS NEEDED
Status: DISCONTINUED | OUTPATIENT
Start: 2025-08-13 | End: 2025-08-13 | Stop reason: HOSPADM

## 2025-08-13 RX ORDER — FENTANYL CITRATE 50 UG/ML
INJECTION, SOLUTION INTRAMUSCULAR; INTRAVENOUS
Status: DISCONTINUED | OUTPATIENT
Start: 2025-08-13 | End: 2025-08-13 | Stop reason: HOSPADM

## 2025-08-13 RX ORDER — MIDAZOLAM HYDROCHLORIDE 1 MG/ML
INJECTION, SOLUTION INTRAMUSCULAR; INTRAVENOUS
Status: DISCONTINUED | OUTPATIENT
Start: 2025-08-13 | End: 2025-08-13 | Stop reason: HOSPADM

## 2025-08-13 RX ADMIN — VANCOMYCIN HYDROCHLORIDE 1000 MG: 1 INJECTION, POWDER, LYOPHILIZED, FOR SOLUTION INTRAVENOUS at 07:52

## 2025-08-27 ENCOUNTER — TELEPHONE (OUTPATIENT)
Dept: CARDIOLOGY | Facility: CLINIC | Age: 75
End: 2025-08-27
Payer: OTHER GOVERNMENT

## 2025-08-28 ENCOUNTER — CLINICAL SUPPORT NO REQUIREMENTS (OUTPATIENT)
Dept: CARDIOLOGY | Facility: CLINIC | Age: 75
End: 2025-08-28
Payer: OTHER GOVERNMENT

## 2025-08-28 ENCOUNTER — OFFICE VISIT (OUTPATIENT)
Dept: CARDIOLOGY | Facility: CLINIC | Age: 75
End: 2025-08-28
Payer: OTHER GOVERNMENT

## 2025-08-28 VITALS
OXYGEN SATURATION: 96 % | WEIGHT: 179.25 LBS | BODY MASS INDEX: 33.84 KG/M2 | HEART RATE: 76 BPM | SYSTOLIC BLOOD PRESSURE: 123 MMHG | DIASTOLIC BLOOD PRESSURE: 76 MMHG | HEIGHT: 61 IN

## 2025-08-28 DIAGNOSIS — N18.31 STAGE 3A CHRONIC KIDNEY DISEASE: ICD-10-CM

## 2025-08-28 DIAGNOSIS — Z95.0 STATUS POST CARDIAC PACEMAKER PROCEDURE: ICD-10-CM

## 2025-08-28 DIAGNOSIS — I10 BENIGN ESSENTIAL HYPERTENSION: ICD-10-CM

## 2025-08-28 DIAGNOSIS — I48.0 PAROXYSMAL ATRIAL FIBRILLATION: ICD-10-CM

## 2025-08-28 DIAGNOSIS — I49.5 SICK SINUS SYNDROME: ICD-10-CM

## 2025-08-28 DIAGNOSIS — Z95.0 PRESENCE OF CARDIAC PACEMAKER: Primary | ICD-10-CM

## 2025-08-28 DIAGNOSIS — I49.5 SICK SINUS SYNDROME: Primary | ICD-10-CM

## 2025-08-28 DIAGNOSIS — Z45.010 PACEMAKER AT END OF BATTERY LIFE: ICD-10-CM

## 2025-08-28 DIAGNOSIS — Z95.0 PRESENCE OF CARDIAC PACEMAKER: ICD-10-CM

## 2025-08-28 RX ORDER — HYDRALAZINE HYDROCHLORIDE 25 MG/1
25 TABLET, FILM COATED ORAL 3 TIMES DAILY
COMMUNITY
Start: 2025-08-27

## (undated) DEVICE — ZIP 16 SURGICAL SKIN CLOSURE DEVICE, PSA: Brand: ZIP 16 SURGICAL SKIN CLOSURE DEVICE

## (undated) DEVICE — GLV SURG BIOGEL LTX PF 8

## (undated) DEVICE — VIOLET BRAIDED (POLYGLACTIN 910), SYNTHETIC ABSORBABLE SUTURE: Brand: COATED VICRYL

## (undated) DEVICE — KT PIN HEX SHLDR CORACOID EXACTECHGPS DISP

## (undated) DEVICE — 450 ML BOTTLE OF 0.05% CHLORHEXIDINE GLUCONATE IN 99.95% STERILE WATER FOR IRRIGATION, USP AND APPLICATOR.: Brand: IRRISEPT ANTIMICROBIAL WOUND LAVAGE

## (undated) DEVICE — PATIENT RETURN ELECTRODE, SINGLE-USE, CONTACT QUALITY MONITORING, ADULT, WITH 9FT CORD, FOR PATIENTS WEIGING OVER 33LBS. (15KG): Brand: MEGADYNE

## (undated) DEVICE — ESOPHAGEAL BALLOON DILATATION CATHETER: Brand: CRE FIXED WIRE

## (undated) DEVICE — MAT FLR ABSORBENT LG 4FT 10 2.5FT

## (undated) DEVICE — BLANKT WARM LOWR/BDY 100X120CM

## (undated) DEVICE — GLND KWIRE
Type: IMPLANTABLE DEVICE | Site: SHOULDER | Status: NON-FUNCTIONAL
Brand: EQUINOXE
Removed: 2024-11-14

## (undated) DEVICE — PK ENDO GI 50

## (undated) DEVICE — STRIP,CLOSURE,WOUND,MEDI-STRIP,1/2X4: Brand: MEDLINE

## (undated) DEVICE — 3M™ PATIENT PLATE, CORDED, SPLIT, LARGE, 40 PER CASE, 1179: Brand: 3M™

## (undated) DEVICE — SUT ETHIB 2 CV V37 MS/4 30IN MX69G

## (undated) DEVICE — SKIN PREP TRAY W/CHG: Brand: MEDLINE INDUSTRIES, INC.

## (undated) DEVICE — GLV SURG BIOGEL LTX PF 6 1/2

## (undated) DEVICE — HANDPIECE SET WITH COAXIAL HIGH FLOW TIP AND SUCTION TUBE: Brand: INTERPULSE

## (undated) DEVICE — BITEBLOCK ENDO W/STRAP 60F A/ LF DISP

## (undated) DEVICE — PACEMAKER CDS: Brand: MEDLINE INDUSTRIES, INC.

## (undated) DEVICE — BIT DRL EQUINOXE 2 AND 3.2MM

## (undated) DEVICE — DRAPE,U/ SHT,SPLIT,PLAS,STERIL: Brand: MEDLINE

## (undated) DEVICE — MICRO HVTSA, 0.5G AND HVTSA SOURCEMARK PRODUCT CODE M1206 AND M1206-01: Brand: EXOFIN MICRO HVTSA, 0.5G

## (undated) DEVICE — YANKAUER,POOLE TIP,STERILE,50/CS: Brand: MEDLINE

## (undated) DEVICE — COVER,TABLE,44X90,STERILE: Brand: MEDLINE

## (undated) DEVICE — SUT VIC 2/0 X1 27IN J459H

## (undated) DEVICE — ELECTRD DEFIB M/FUNC PROPADZ RADIOL 2PK

## (undated) DEVICE — SUT VIC 0 CT1 36IN J946H

## (undated) DEVICE — DEV INFL CRE STERIFLATE 60CC DISP

## (undated) DEVICE — KT SHLDR EXACTECHGPS DISP

## (undated) DEVICE — UNDYED BRAIDED (POLYGLACTIN 910), SYNTHETIC ABSORBABLE SUTURE: Brand: COATED VICRYL

## (undated) DEVICE — DUAL CUT SAGITTAL BLADE

## (undated) DEVICE — CABL BIPOL W/ALLGTR CLIP/SM 12FT

## (undated) DEVICE — DRESSING,GAUZE,XEROFORM,CURAD,1"X8",ST: Brand: CURAD

## (undated) DEVICE — DRAPE,INSTRUMENT,MAGNETIC,10X16: Brand: MEDLINE

## (undated) DEVICE — KT BIT DRL EXATECHGPS REV 2MM 3.2MM DISP

## (undated) DEVICE — PK SHLDR OPN 40

## (undated) DEVICE — GLV SURG BIOGEL LTX PF 8 1/2

## (undated) DEVICE — PLASMABLADE PS200-040 4.0: Brand: PLASMABLADETM

## (undated) DEVICE — SHEET, DRAPE, SPLIT, STERILE: Brand: MEDLINE